# Patient Record
Sex: FEMALE | Race: WHITE | NOT HISPANIC OR LATINO | Employment: FULL TIME | URBAN - METROPOLITAN AREA
[De-identification: names, ages, dates, MRNs, and addresses within clinical notes are randomized per-mention and may not be internally consistent; named-entity substitution may affect disease eponyms.]

---

## 2017-01-02 ENCOUNTER — TELEPHONE (OUTPATIENT)
Dept: FAMILY MEDICINE | Facility: CLINIC | Age: 47
End: 2017-01-02

## 2017-01-02 ENCOUNTER — OFFICE VISIT (OUTPATIENT)
Dept: FAMILY MEDICINE | Facility: CLINIC | Age: 47
End: 2017-01-02
Payer: COMMERCIAL

## 2017-01-02 VITALS
SYSTOLIC BLOOD PRESSURE: 146 MMHG | RESPIRATION RATE: 20 BRPM | DIASTOLIC BLOOD PRESSURE: 86 MMHG | WEIGHT: 172.2 LBS | HEART RATE: 100 BPM | OXYGEN SATURATION: 97 % | BODY MASS INDEX: 29.4 KG/M2 | HEIGHT: 64 IN | TEMPERATURE: 99 F

## 2017-01-02 DIAGNOSIS — Z87.891 HISTORY OF SMOKING 30 OR MORE PACK YEARS: ICD-10-CM

## 2017-01-02 DIAGNOSIS — J45.31 MILD PERSISTENT ASTHMA WITH ACUTE EXACERBATION: ICD-10-CM

## 2017-01-02 DIAGNOSIS — J45.31 MILD PERSISTENT ASTHMA WITH ACUTE EXACERBATION: Primary | ICD-10-CM

## 2017-01-02 DIAGNOSIS — R05.9 COUGH: ICD-10-CM

## 2017-01-02 DIAGNOSIS — J20.9 ACUTE BRONCHITIS, UNSPECIFIED ORGANISM: Primary | ICD-10-CM

## 2017-01-02 PROCEDURE — 99214 OFFICE O/P EST MOD 30 MIN: CPT | Performed by: PHYSICIAN ASSISTANT

## 2017-01-02 RX ORDER — LEVALBUTEROL TARTRATE 45 UG/1
2 AEROSOL, METERED ORAL EVERY 6 HOURS PRN
Qty: 1 INHALER | Refills: 1 | Status: SHIPPED | OUTPATIENT
Start: 2017-01-02 | End: 2019-10-28

## 2017-01-02 RX ORDER — PREDNISONE 20 MG/1
40 TABLET ORAL DAILY
Qty: 10 TABLET | Refills: 0 | Status: SHIPPED | OUTPATIENT
Start: 2017-01-02 | End: 2017-01-07

## 2017-01-02 RX ORDER — FLUTICASONE PROPIONATE 220 UG/1
2 AEROSOL, METERED RESPIRATORY (INHALATION) 2 TIMES DAILY
Qty: 1 INHALER | Refills: 5 | Status: SHIPPED | OUTPATIENT
Start: 2017-01-02 | End: 2017-01-02

## 2017-01-02 RX ORDER — AZITHROMYCIN 250 MG/1
TABLET, FILM COATED ORAL
Qty: 6 TABLET | Refills: 0 | Status: SHIPPED | OUTPATIENT
Start: 2017-01-02 | End: 2017-01-23

## 2017-01-02 NOTE — PATIENT INSTRUCTIONS
-- start on prednisone today. Take with meal or snack  -- Start flovent inhaler today, remember to rinse mouth after each use  -- Use Xopenex as needed for cough wheezing or shortness of breath. Stop if causes elevated heart rate  -- Start antibiotics in 48 hours if not improving.   -- Follow up in 10-14 days if still coughing.     [facility]   Discharged by : Bren Bruno MA    Paper scripts provided to patient : no     If you have any questions regarding your visit please contact your care team:     Team Gold Clinic Hours Telephone Number   Dr. Angle Camargo PA-C   7am-7pm Monday - Thursday   7am-5pm Fridays  [phone]   (Appointment scheduling available 24/7)   RN Line   [phone] option 2       For a Price Quote for your services, please call our [place] Price Line at [phone].     What options do I have for visits at the clinic other than the traditional office visit?     To expand how we care for you, many of our providers are utilizing electronic visits (e-visits) and telephone visits, when medically appropriate, for interactions with their patients rather than a visit in the clinic. We also offer nurse visits for many medical concerns. Just like any other service, we will bill your insurance company for this type of visit based on time spent on the phone with your provider. Not all insurance companies cover these visits. Please check with your medical insurance if this type of visit is covered. You will be responsible for any charges that are not paid by your insurance.   E-visits via [employer]: generally incur a $35.00 fee.     Telephone visits:   Time spent on the phone: *charged based on time that is spent on the phone in increments of 10 minutes. Estimated cost:   5-10 mins $30.00   11-20 mins. $59.00   21-30 mins. $85.00     Use [employer] (secure email communication and access to your chart) to send your primary care  provider a message or make an appointment. Ask someone on your Team how to sign up for [employer].     As always, Thank you for trusting us with your health care needs!    WHERE TO GO FOR CARE?    Clinic    Make an appointment if you:       Are sick (cold, cough, flu, sore throat, earache or in pain).       Have a small injury (sprain, small cut, burn or broken bone).       Need a physical exam, Pap smear, vaccine or prescription refill.       Have questions about your health or medicines.    To reach us:      Call [phone] ([phone]). Open 24 hours every day. (For counseling services, call [phone].)    Log into [employer] at [email or web address].[email or web address]. (Visit [email or web address].Hickies to create an account.) Hospital emergency room    An emergency is a serious or life- threatening problem that must be treated right away.    Call 911 or get to the hospital if you have:      Very bad or sudden:            - Chest pain or pressure         - Bleeding         - Head or belly pain         - Dizziness or trouble seeing, walking or                          Speaking      Problems breathing      Blood in your vomit or you are coughing up blood      A major injury (knocked out, loss of a finger or limb, rape, broken bone protruding from skin)    A mental health crisis. (Or call the Mental Health Crisis line at 1-107.221.9040 or Suicide Prevention Hotline at 1-129.490.6134.)    Open 24 hours every day. You don't need an appointment.     Urgent care    Visit urgent care for sickness or small injuries when the clinic is closed. You don't need an appointment. To check hours or find an urgent care near you, visit [email or web address]. Online care    Get online care from [employer] for more than 70 common problems, like colds, allergies and infections. Open 24 hours every day at: [email or web address]   Need help deciding?    For advice about where to be seen, you may call your clinic and ask to speak with a nurse. We're  here for you 24 hours every day.         If you are deaf or hard of hearing, please let us know. We provide many free services including sign language interpreters, oral interpreters, TTYs, telephone amplifiers, note takers and written materials.

## 2017-01-02 NOTE — TELEPHONE ENCOUNTER
Kianna,    Patient was expecting an Rx for a Zpak to be put on file in case she needs it in 48 hours.    Aracely Arias PharmD, [facility]  Pharmacist Manager   [facility]  [phone]

## 2017-01-02 NOTE — PROGRESS NOTES
"  SUBJECTIVE:                                                    Olga Lidia Harley is a 46 year old female who presents to clinic today for the following health issues:      ENT Symptoms             Symptoms: cc Present Absent Comment   Fever/Chills  x   chills   Fatigue  x   not sleeping well   Muscle Aches  x     Eye Irritation   x    Sneezing   x    Nasal Eliel/Drg   x    Sinus Pressure/Pain   x    Loss of smell   x    Dental pain   x    Sore Throat  x   from coughing   Swollen Glands   x    Ear Pain/Fullness   x    Cough x      Wheeze  x  A little   Chest Pain  x     Shortness of breath  x     Rash   x    Other  x   headache     Symptom duration:  2 days   Symptom severity:  moderate   Treatments tried:  expectorant    Contacts:  works in a hospital     30 year smoking history. History of bronchitis.  Has history of asthma. She doesn't use her inhalers regularly. She doesn't use albuterol because it makes her heart race. She has never tried xopenex.  -------------------------------------    Problem list, Medication list, Allergies, and Medical/Social/Surgical histories reviewed in [facility] and updated as appropriate.    ROS:  A pertinent ROS of the General  HEENT  Cardiovascular  Pulmonary  GI systems is otherwise unremarkable.      OBJECTIVE:                                                    /86 mmHg  Pulse 100  Temp(Src) 99  F (37.2  C) (Oral)  Resp 20  Ht 5' 4\" (1.626 m)  Wt 172 lb 3.2 oz (78.109 kg)  BMI 29.54 kg/m2  SpO2 97%  LMP 12/17/2016   GENERAL: healthy, alert and no distress  HENT:  ear canals- normal; TMs- normal; Nose- normal; Mouth- no ulcers, no lesions  NECK: no tenderness, no adenopathy, no asymmetry, no masses, no stiffness; thyroid- normal to palpation  RESP: scattered rhonchi in bases bilateral,  otherwise lungs clear to auscultation - no rales, no wheezes  CV: regular rates and rhythm, normal S1 S2, no S3 or S4 and no murmur, no click or rub -  MS: extremities- no gross " deformities noted, no edema    Diagnostic test results:  none      ASSESSMENT/PLAN:                                                        1. Acute bronchitis, unspecified organism  Discussed treatment and supportive care for patient's symptoms.  Start with prednisone and flovent at home. Use xopenex prn.  If not improving within 48 hours, given smoking history, consider zpack.     Return to the clinic if cough is still present in 2 weeks, sooner with fevers, shortness of breath or wheezing.   - levalbuterol (XOPENEX HFA) 45 MCG/ACT Inhaler; Inhale 2 puffs into the lungs every 6 hours as needed for shortness of breath / dyspnea or wheezing  Dispense: 1 Inhaler; Refill: 1  - azithromycin (ZITHROMAX Z-AAKASH) 250 MG tablet; Take 2 tabs day 1; then 1 tab days 2-5  Dispense: 6 tablet; Refill: 0    2. Mild persistent asthma with acute exacerbation  As above. Will do PA for xopenex if needed.  - predniSONE (DELTASONE) 20 MG tablet; Take 2 tablets (40 mg) by mouth daily for 5 days  Dispense: 10 tablet; Refill: 0  - levalbuterol (XOPENEX HFA) 45 MCG/ACT Inhaler; Inhale 2 puffs into the lungs every 6 hours as needed for shortness of breath / dyspnea or wheezing  Dispense: 1 Inhaler; Refill: 1    3. Cough    4. History of smoking 30 or more pack years  - azithromycin (ZITHROMAX Z-AAKASH) 250 MG tablet; Take 2 tabs day 1; then 1 tab days 2-5  Dispense: 6 tablet; Refill: 0    Follow up with Provider - 2 weeks if still coughing, sooner with fevers, shortness of breath or wheezing.     Kianna Camargo PA-C  [facility]

## 2017-01-02 NOTE — MR AVS SNAPSHOT
After Visit Summary   1/2/2017    Olga Lidia Harley    MRN: [id number]           Patient Information     Date Of Birth          1970        Visit Information        Provider Department      1/2/2017 10:40 AM Kianna Camargo PA-C [facility]        Today's Diagnoses     Acute bronchitis, unspecified organism    -  1     Mild persistent asthma with acute exacerbation         Cough         History of smoking 30 or more pack years           Care Instructions    -- start on prednisone today. Take with meal or snack  -- Start flovent inhaler today, remember to rinse mouth after each use  -- Use Xopenex as needed for cough wheezing or shortness of breath. Stop if causes elevated heart rate  -- Start antibiotics in 48 hours if not improving.   -- Follow up in 10-14 days if still coughing.     [facility]   Discharged by : Bren Bruno MA    Paper scripts provided to patient : no     If you have any questions regarding your visit please contact your care team:     Team Gold Clinic Hours Telephone Number   Dr. Angle Camargo PA-C   7am-7pm Monday - Thursday   7am-5pm Fridays  [phone]   (Appointment scheduling available 24/7)   RN Line   [phone] option 2       For a Price Quote for your services, please call our Consumer Price Line at [phone].     What options do I have for visits at the clinic other than the traditional office visit?     To expand how we care for you, many of our providers are utilizing electronic visits (e-visits) and telephone visits, when medically appropriate, for interactions with their patients rather than a visit in the clinic. We also offer nurse visits for many medical concerns. Just like any other service, we will bill your insurance company for this type of visit based on time spent on the phone with your provider. Not all insurance companies cover these visits.  Please check with your medical insurance if this type of visit is covered. You will be responsible for any charges that are not paid by your insurance.   E-visits via [employer]: generally incur a $35.00 fee.     Telephone visits:   Time spent on the phone: *charged based on time that is spent on the phone in increments of 10 minutes. Estimated cost:   5-10 mins $30.00   11-20 mins. $59.00   21-30 mins. $85.00     Use [employer] (secure email communication and access to your chart) to send your primary care provider a message or make an appointment. Ask someone on your Team how to sign up for [employer].     As always, Thank you for trusting us with your health care needs!    WHERE TO GO FOR CARE?    Clinic    Make an appointment if you:       Are sick (cold, cough, flu, sore throat, earache or in pain).       Have a small injury (sprain, small cut, burn or broken bone).       Need a physical exam, Pap smear, vaccine or prescription refill.       Have questions about your health or medicines.    To reach us:      Call [phone] ([phone]). Open 24 hours every day. (For counseling services, call [phone].)    Log into [employer] at [email or web address].Sciona. (Visit "[employer].Sciona to create an account.) Hospital emergency room    An emergency is a serious or life- threatening problem that must be treated right away.    Call 911 or get to the hospital if you have:      Very bad or sudden:            - Chest pain or pressure         - Bleeding         - Head or belly pain         - Dizziness or trouble seeing, walking or                          Speaking      Problems breathing      Blood in your vomit or you are coughing up blood      A major injury (knocked out, loss of a finger or limb, rape, broken bone protruding from skin)    A mental health crisis. (Or call the Mental Health Crisis line at 1-580.439.9160 or Suicide Prevention Hotline at 1-826.136.3950.)    Open 24 hours every day. You don't need an  appointment.     Urgent care    Visit urgent care for sickness or small injuries when the clinic is closed. You don't need an appointment. To check hours or find an urgent care near you, visit [email or web address]. Online care    Get online care from [facility] for more than 70 common problems, like colds, allergies and infections. Open 24 hours every day at: [email or web address]   Need help deciding?    For advice about where to be seen, you may call your clinic and ask to speak with a nurse. We're here for you 24 hours every day.         If you are deaf or hard of hearing, please let us know. We provide many free services including sign language interpreters, oral interpreters, TTYs, telephone amplifiers, note takers and written materials.                       Follow-ups after your visit        Your next 10 appointments already scheduled     Oct 23, 2017  8:00 AM   (Arrive by 7:45 AM)   Return General Liver with Chaitanya Corey MD   [facility] ([facility] [place])    [address] [place] [address]  3rd [facility] [address] [phone]              Who to contact     If you have questions or need follow up information about today's clinic visit or your schedule please contact [facility] directly at [phone].  Normal or non-critical lab and imaging results will be communicated to you by MyChart, letter or phone within 4 business days after the clinic has received the results. If you do not hear from us within 7 days, please contact the clinic through NationBuilder[employer] or phone. If you have a critical or abnormal lab result, we will notify you by phone as soon as possible.  Submit refill requests through [employer] or call your pharmacy and they will forward the refill request to us. Please allow 3 business days for your refill to be completed.          Additional Information About Your Visit        [employer] Information     [employer] gives you secure access to  "your electronic health record. If you see a primary care provider, you can also send messages to your care team and make appointments. If you have questions, please call your primary care clinic.  If you do not have a primary care provider, please call [phone] and they will assist you.        Your Vitals Were     Pulse Temperature Respirations Height BMI (Body Mass Index) Pulse Oximetry    100 99  F (37.2  C) (Oral) 20 5' 4\" (1.626 m) 29.54 kg/m2 97%    Last Period                   12/17/2016            Blood Pressure from Last 3 Encounters:   01/02/17 146/86   10/24/16 125/82   07/06/16 120/70    Weight from Last 3 Encounters:   01/02/17 172 lb 3.2 oz (78.109 kg)   10/24/16 164 lb (74.39 kg)   07/06/16 160 lb 6 oz (72.746 kg)              Today, you had the following     No orders found for display         Today's Medication Changes          These changes are accurate as of: 1/2/17 11:19 AM.  If you have any questions, ask your nurse or doctor.               Start taking these medicines.        Dose/Directions    levalbuterol 45 MCG/ACT Inhaler   Commonly known as:  XOPENEX HFA   Used for:  Acute bronchitis, unspecified organism, Mild persistent asthma with acute exacerbation   Started by:  Kianna Camargo PA-C        Dose:  2 puff   Inhale 2 puffs into the lungs every 6 hours as needed for shortness of breath / dyspnea or wheezing   Quantity:  1 Inhaler   Refills:  1       predniSONE 20 MG tablet   Commonly known as:  DELTASONE   Used for:  Mild persistent asthma with acute exacerbation   Started by:  Kianna Camargo PA-C        Dose:  40 mg   Take 2 tablets (40 mg) by mouth daily for 5 days   Quantity:  10 tablet   Refills:  0         These medicines have changed or have updated prescriptions.        Dose/Directions    fluticasone 220 MCG/ACT Inhaler   Commonly known as:  FLOVENT HFA   This may have changed:  additional instructions   Used for:  Cough, Mild persistent asthma with acute exacerbation "   Changed by:  Kianna Camargo PA-C        Dose:  2 puff   Inhale 2 puffs into the lungs 2 times daily Rinse mouth after each use   Quantity:  1 Inhaler   Refills:  5       ranitidine 150 MG tablet   Commonly known as:  ZANTAC   This may have changed:    - when to take this  - reasons to take this   Used for:  Cough        Dose:  150 mg   Take 1 tablet (150 mg) by mouth 2 times daily   Quantity:  60 tablet   Refills:  4         Stop taking these medicines if you haven't already. Please contact your care team if you have questions.     metoprolol 50 MG 24 hr tablet   Commonly known as:  TOPROL-XL   Stopped by:  Kianna Camargo PA-C                Where to get your medicines      These medications were sent to [facility] - [address].  [address] [place] [address], [facility] [address]     Phone:  [phone]    - fluticasone 220 MCG/ACT Inhaler  - levalbuterol 45 MCG/ACT Inhaler  - predniSONE 20 MG tablet             Primary Care Provider Office Phone # Fax #    Wan Anderson [phone]       [address] [address]        Thank you!     Thank you for choosing [facility]  for your care. Our goal is always to provide you with excellent care. Hearing back from our patients is one way we can continue to improve our services. Please take a few minutes to complete the written survey that you may receive in the mail after your visit with us. Thank you!             Your Updated Medication List - Protect others around you: Learn how to safely use, store and throw away your medicines at www.disposemymeds.org.          This list is accurate as of: 1/2/17 11:19 AM.  Always use your most recent med list.                   Brand Name Dispense Instructions for use    amLODIPine 5 MG tablet    NORVASC    90 tablet    Take 1 tablet (5 mg) by mouth daily       aspirin 81 MG tablet     100 tablet     Take 1 tablet (81 mg) by mouth daily       atorvastatin 20 MG tablet    LIPITOR    90 tablet    Take 1 tablet (20 mg) by mouth daily       CO Q 10 PO      Take 1 tablet by mouth daily       fluticasone 220 MCG/ACT Inhaler    FLOVENT HFA    1 Inhaler    Inhale 2 puffs into the lungs 2 times daily Rinse mouth after each use       levalbuterol 45 MCG/ACT Inhaler    XOPENEX HFA    1 Inhaler    Inhale 2 puffs into the lungs every 6 hours as needed for shortness of breath / dyspnea or wheezing       losartan 50 MG tablet    COZAAR    90 tablet    Take 1 tablet (50 mg) by mouth daily       MULTIVITAMIN GUMMIES ADULT Chew      Take 1 chew tab by mouth daily       predniSONE 20 MG tablet    DELTASONE    10 tablet    Take 2 tablets (40 mg) by mouth daily for 5 days       ranitidine 150 MG tablet    ZANTAC    60 tablet    Take 1 tablet (150 mg) by mouth 2 times daily       vitamin B complex with vitamin C Tabs tablet      Take 1 tablet by mouth daily

## 2017-01-02 NOTE — TELEPHONE ENCOUNTER
Order completed.        Kianna Camargo, [facility], PA-C  [facility]

## 2017-01-02 NOTE — TELEPHONE ENCOUNTER
Please do not close this encounter until this has been addressed.  (prior auth approved/denied, prescriber refusal to complete prior auth or medication changed/discontinued)    Prior Authorization needed on: Xopenex  Drug NDC: [address]-98     Insurance: [id number]  Member ID: [id number]   Insurance phone #: [phone]    Pharmacy NPI: [id number]  Pharmacy Phone #: [phone]  Pharmacy Fax #: [phone]    Please let us know if the PA gets approved or denied or if medication is changed.     Aracely Arias PharmD, [facility]  Pharmacist Manager   [facility]  [phone]

## 2017-01-02 NOTE — TELEPHONE ENCOUNTER
Per refill authorization protocol - therapeutic substitution.     Medication: Flovent is not covered by insurance.  . Reviewed pharmacy records, and dicussed therapeutic option with patient.     Sent new prescription for Aerospan.      Routing to the prescriber as an  FYI to inform of change.      Updated medication list in EPIC.    Aracely Arias PharmD, [facility]  Pharmacist Manager   [facility]  [phone]

## 2017-01-03 NOTE — TELEPHONE ENCOUNTER
PA form started and placed in Kianna's sign folder for review.  Netta Brandon CMA ([facility])

## 2017-01-09 ENCOUNTER — MYC MEDICAL ADVICE (OUTPATIENT)
Dept: FAMILY MEDICINE | Facility: CLINIC | Age: 47
End: 2017-01-09

## 2017-01-11 NOTE — TELEPHONE ENCOUNTER
Called [employer] at: [phone], and spoke to representative Wendy. She stated that the PA needs to be submitted to [facility], phone number: [phone].   Contacted the number below- on hold for 10 minutes. Will try later.  Netta Brandon CMA ([facility])

## 2017-01-12 NOTE — TELEPHONE ENCOUNTER
Contacted [employer] at: [phone] and spoke to representative Claudia.  PA re-submitted via phone, PA # [phone]. Please follow-up in 3 days.  Netta Brandon CMA ([facility])

## 2017-01-13 ENCOUNTER — MYC MEDICAL ADVICE (OUTPATIENT)
Dept: FAMILY MEDICINE | Facility: CLINIC | Age: 47
End: 2017-01-13

## 2017-01-13 DIAGNOSIS — J20.9 ACUTE BRONCHITIS, UNSPECIFIED ORGANISM: Primary | ICD-10-CM

## 2017-01-13 RX ORDER — BENZONATATE 200 MG/1
200 CAPSULE ORAL 3 TIMES DAILY PRN
Qty: 21 CAPSULE | Refills: 0 | Status: SHIPPED | OUTPATIENT
Start: 2017-01-13 | End: 2017-01-23

## 2017-01-13 NOTE — Clinical Note
[facility]  [address] [place] [id number]  Phone: [phone]    January 16, 2017        Olga Lidia Harley  [address] [facility] [address]          To whom it may concern:    RE: Olga Lidia Harley, Appeal on coverage of Xopenex.    This patient has tried Albuterol on multiple occasions in the past as a rescue inhaler for her asthma and it has caused increased premature ventricular contractions or palpitations. Therefore, patient should not take any form of Albuterol, including formulary Ventolin and Proair. It is extremely important the patient be allowed to try Xopenex (Levalbuterol) in order to have a rescue inhaler for her asthma. As you are aware, asthma can be a life threatening condition and denying this could come with serious repercussions.     Please contact me for questions or concerns.      Sincerely,        Kianna Camargo PA-C

## 2017-01-13 NOTE — TELEPHONE ENCOUNTER
Patient's xopenex was denied. Keep open because will see if she has tried both of the meds listed.  Sent her my chart.    MAXWELL Hurt, PA-C  [facility]

## 2017-01-13 NOTE — TELEPHONE ENCOUNTER
Sent MyChart. Route to provider to see if anything cough related can be prescribed.  Valorie Kapoor RN

## 2017-01-16 NOTE — TELEPHONE ENCOUNTER
Route response. (I reviewed our historical med list & don't see that we have prescribed either.)   Valorie Kapoor RN

## 2017-01-16 NOTE — TELEPHONE ENCOUNTER
Appeal letter written. xopenex was denied. Please fax.  Placed in my completed box      Kianna Camargo PA-C

## 2017-01-16 NOTE — TELEPHONE ENCOUNTER
Started appeal for xopenex in another encounter.  Will close.  .  Kianna Camargo, MAXWELL, PA-C  [facility]

## 2017-01-16 NOTE — TELEPHONE ENCOUNTER
Appeal letter faxed to [facility] Clinical Review fax: [phone]. Will postpone encounter to see if we get a response back in a few days    Tracy De Leon MA

## 2017-01-23 ENCOUNTER — OFFICE VISIT (OUTPATIENT)
Dept: FAMILY MEDICINE | Facility: CLINIC | Age: 47
End: 2017-01-23
Payer: COMMERCIAL

## 2017-01-23 ENCOUNTER — RADIANT APPOINTMENT (OUTPATIENT)
Dept: GENERAL RADIOLOGY | Facility: CLINIC | Age: 47
End: 2017-01-23
Attending: PHYSICIAN ASSISTANT
Payer: COMMERCIAL

## 2017-01-23 VITALS
HEART RATE: 82 BPM | SYSTOLIC BLOOD PRESSURE: 138 MMHG | TEMPERATURE: 98.6 F | OXYGEN SATURATION: 96 % | DIASTOLIC BLOOD PRESSURE: 88 MMHG | HEIGHT: 64 IN | WEIGHT: 170 LBS | BODY MASS INDEX: 29.02 KG/M2

## 2017-01-23 DIAGNOSIS — R05.9 COUGH: ICD-10-CM

## 2017-01-23 DIAGNOSIS — R07.89 CHEST WALL PAIN: ICD-10-CM

## 2017-01-23 DIAGNOSIS — R05.9 COUGH: Primary | ICD-10-CM

## 2017-01-23 DIAGNOSIS — J45.31 MILD PERSISTENT ASTHMA WITH ACUTE EXACERBATION: ICD-10-CM

## 2017-01-23 PROCEDURE — 99214 OFFICE O/P EST MOD 30 MIN: CPT | Performed by: PHYSICIAN ASSISTANT

## 2017-01-23 PROCEDURE — 71020 XR CHEST 2 VW: CPT

## 2017-01-23 RX ORDER — CODEINE PHOSPHATE AND GUAIFENESIN 10; 100 MG/5ML; MG/5ML
2 SOLUTION ORAL EVERY 4 HOURS PRN
Qty: 120 ML | Refills: 0 | Status: SHIPPED | OUTPATIENT
Start: 2017-01-23 | End: 2017-02-22

## 2017-01-23 NOTE — MR AVS SNAPSHOT
After Visit Summary   1/23/2017    Olga Lidia Harley    MRN: [id number]           Patient Information     Date Of Birth          1970        Visit Information        Provider Department      1/23/2017 3:00 PM Laith Sims PA-C [facility]        Today's Diagnoses     Cough    -  1     Chest wall pain         Mild persistent asthma with acute exacerbation           Care Instructions    [facility]   Discharged by : Shagufta SOSA Certified Medical Assistant (AAMA)   Paper scripts provided to patient : 1   If you have any questions regarding to your visit please contact your care team:   [facility] Hours Telephone Number   PARISA Sanchez Dr., PA-C    7am-7pm Monday - Thursday   7am-5pm Fridays  [phone]   (Appointment scheduling available 24/7)   RN Line   [phone] option 2       What options do I have for visits at the clinic other than the traditional office visit?   To expand how we care for you, many of our providers are utilizing electronic visits (e-visits) and telephone visits, when medically appropriate, for interactions with their patients rather than a visit in the clinic. We also offer nurse visits for many medical concerns. Just like any other service, we will bill your insurance company for this type of visit based on time spent on the phone with your provider. Not all insurance companies cover these visits. Please check with your medical insurance if this type of visit is covered. You will be responsible for any charges that are not paid by your insurance.     E-visits via [employer] generally incur a $35.00 fee.     Telephone visits:   Time spent on the phone: *charged based on time that is spent on the phone in increments of 10 minutes. Estimated cost:   5-10 mins $30.00   11-20 mins. $59.00   21-30 mins. $85.00   Use [employer] (secure email communication and access to your  chart) to send your primary care provider a message or make an appointment. Ask someone on your Team how to sign up for [employer].   For a Price Quote for your services, please call our Consumer Price Line at [phone].   As always, Thank you for trusting us with your health care needs!                [facility]:    Scheduling Appointments  [facility]  Call: [phone]    [facility]  Call: [phone]    [facility]  Call: [phone]                  Follow-ups after your visit        Your next 10 appointments already scheduled     Feb 22, 2017  3:40 PM   [place] Physical Adult with Jessie Perdomo MD   [facility] ([facility])    [address] [place] [id number] [phone]            Oct 23, 2017  8:00 AM   (Arrive by 7:45 AM)   Return General Liver with Chaitanya Corey MD   [facility] ([facility])    [address] [place] [id number] [phone]              Who to contact     If you have questions or need follow up information about today's clinic visit or your schedule please contact [facility] directly at [phone].  Normal or non-critical lab and imaging results will be communicated to you by [employer], letter or phone within 4 business days after the clinic has received the results. If you do not hear from us within 7 days, please contact the clinic through [employer] or phone. If you have a critical or abnormal lab result, we will notify you by phone as soon as possible.  Submit refill requests through [employer] or call your pharmacy and they will forward the refill request to us. Please allow 3 business days for your refill to be completed.          Additional Information About Your Visit        [employer] Information     [employer] gives you secure access to your  "electronic health record. If you see a primary care provider, you can also send messages to your care team and make appointments. If you have questions, please call your primary care clinic.  If you do not have a primary care provider, please call [phone] and they will assist you.        Care EveryWhere ID     This is your Care EveryWhere ID. This could be used by other organizations to access your [facility] medical records  [id number]        Your Vitals Were     Pulse Temperature Height BMI (Body Mass Index) Pulse Oximetry Last Period    82 98.6  F (37  C) (Oral) 5' 4\" (1.626 m) 29.17 kg/m2 96% 01/11/2017 (Exact Date)       Blood Pressure from Last 3 Encounters:   01/23/17 138/88   01/02/17 146/86   10/24/16 125/82    Weight from Last 3 Encounters:   01/23/17 170 lb (77.111 kg)   01/02/17 172 lb 3.2 oz (78.109 kg)   10/24/16 164 lb (74.39 kg)                 Today's Medication Changes          These changes are accurate as of: 1/23/17  3:51 PM.  If you have any questions, ask your nurse or doctor.               Start taking these medicines.        Dose/Directions    guaiFENesin-codeine 100-10 MG/5ML Soln solution   Commonly known as:  ROBITUSSIN AC   Used for:  Cough   Started by:  Laith Sims PA-C        Dose:  2 tsp.   Take 10 mLs by mouth every 4 hours as needed for cough   Quantity:  120 mL   Refills:  0         These medicines have changed or have updated prescriptions.        Dose/Directions    ranitidine 150 MG tablet   Commonly known as:  ZANTAC   This may have changed:    - when to take this  - reasons to take this   Used for:  Cough        Dose:  150 mg   Take 1 tablet (150 mg) by mouth 2 times daily   Quantity:  60 tablet   Refills:  4            Where to get your medicines      Some of these will need a paper prescription and others can be bought over the counter.  Ask your nurse if you have questions.     Bring a paper prescription for each of these medications    - guaiFENesin-codeine " 100-10 MG/5ML Soln solution             Primary Care Provider Office Phone # Fax #    Wan Jermaine Anderson [phone]       [address] [address]        Thank you!     Thank you for choosing [facility]  for your care. Our goal is always to provide you with excellent care. Hearing back from our patients is one way we can continue to improve our services. Please take a few minutes to complete the written survey that you may receive in the mail after your visit with us. Thank you!             Your Updated Medication List - Protect others around you: Learn how to safely use, store and throw away your medicines at www.disposemymeds.org.          This list is accurate as of: 1/23/17  3:51 PM.  Always use your most recent med list.                   Brand Name Dispense Instructions for use    amLODIPine 5 MG tablet    NORVASC    90 tablet    Take 1 tablet (5 mg) by mouth daily       aspirin 81 MG tablet     100 tablet    Take 1 tablet (81 mg) by mouth daily       atorvastatin 20 MG tablet    LIPITOR    90 tablet    Take 1 tablet (20 mg) by mouth daily       CO Q 10 PO      Take 1 tablet by mouth daily       Flunisolide HFA 80 MCG/ACT Aers    AEROSPAN    2 Inhaler    Inhale 5 puffs into the lungs 2 times daily Rinse mouth after each use.       guaiFENesin-codeine 100-10 MG/5ML Soln solution    ROBITUSSIN AC    120 mL    Take 10 mLs by mouth every 4 hours as needed for cough       levalbuterol 45 MCG/ACT Inhaler    XOPENEX HFA    1 Inhaler    Inhale 2 puffs into the lungs every 6 hours as needed for shortness of breath / dyspnea or wheezing       losartan 50 MG tablet    COZAAR    90 tablet    Take 1 tablet (50 mg) by mouth daily       MULTIVITAMIN GUMMIES ADULT Chew      Take 1 chew tab by mouth daily       ranitidine 150 MG tablet    ZANTAC    60 tablet    Take 1 tablet (150 mg) by mouth 2 times daily       vitamin B complex with  vitamin C Tabs tablet      Take 1 tablet by mouth daily

## 2017-01-23 NOTE — PROGRESS NOTES
SUBJECTIVE:                                                    Olga Lidia Harley is a 46 year old female who presents to clinic today for the following health issues:    Patient is following up from bronchitis.  States that for the past 2 weeks, symptoms have stayed the same.  Also feels she might have broken a rib on the left side. Denies fevers, chills. Cough is dry. No other ENT symptoms.     Left side rib cage pain. Worse with coughing. Is sharp, local and hurts to breath. No other associated symptoms in that area.     She has asthma. Her asthma overall she thinks has been mostly controlled. Denies wheeze or restriction. She uses her inhalers regularly.    Patient Active Problem List   Diagnosis     Hypertension goal BP (blood pressure) < 140/90     Hyperlipidemia with target LDL less than 130     Herniated vertebral disc     Disorder of bursae and tendons in shoulder region     Cough     Piriformis syndrome     Mild persistent asthma     Gastroesophageal reflux disease without esophagitis     Bilateral low back pain without sciatica     Hip pain, right      Current Outpatient Prescriptions   Medication     guaiFENesin-codeine (ROBITUSSIN AC) 100-10 MG/5ML SOLN solution     levalbuterol (XOPENEX HFA) 45 MCG/ACT Inhaler     Flunisolide HFA (AEROSPAN) 80 MCG/ACT AERS     amLODIPine (NORVASC) 5 MG tablet     losartan (COZAAR) 50 MG tablet     ranitidine (ZANTAC) 150 MG tablet     atorvastatin (LIPITOR) 20 MG tablet     vitamin  B complex with vitamin C (VITAMIN  B COMPLEX) TABS     Multiple Vitamins-Minerals (MULTIVITAMIN GUMMIES ADULT) CHEW     Coenzyme Q10 (CO Q 10 PO)     aspirin 81 MG tablet     No current facility-administered medications for this visit.          Problem list and histories reviewed & adjusted, as indicated.  Additional history: as documented    Problem list, Medication list, Allergies, and Medical/Social/Surgical histories reviewed in EPIC and updated as  "appropriate.    ROS:  Constitutional, HEENT, cardiovascular, pulmonary, gi and gu systems are negative, except as otherwise noted.    OBJECTIVE:                                                    /88 mmHg  Pulse 82  Temp(Src) 98.6  F (37  C) (Oral)  Ht 5' 4\" (1.626 m)  Wt 170 lb (77.111 kg)  BMI 29.17 kg/m2  SpO2 96%  LMP 01/11/2017 (Exact Date)  Body mass index is 29.17 kg/(m^2).  GENERAL: healthy, alert and no distress  HENT: ear canals and TM's normal, nose and mouth without ulcers or lesions  NECK: no adenopathy, no asymmetry, masses, or scars and thyroid normal to palpation  RESP: lungs clear to auscultation - no rales, rhonchi or wheezes  CV: regular rate and rhythm, normal S1 S2, no S3 or S4, no murmur, click or rub, no peripheral edema and peripheral pulses strong  MS: Left anterior chest wall around T 5 or so there is a point tender area of the rib cage, no crepitus or step off   SKIN: no suspicious lesions or rashes    X ray Chest - Clear based on my read      ASSESSMENT/PLAN:                                                      (R05) Cough  (primary encounter diagnosis)  Comment:   Plan: XR Chest 2 Views, guaiFENesin-codeine         (ROBITUSSIN AC) 100-10 MG/5ML SOLN solution        Reviewed. X ray clear. No findings of infection or acute localized fracture. Await final radiologist's read. For now recommend cough management and usual symptomatic measures. No signs of major asthma exacerbation requiring steroids right now - use inhaler regularly though she's had problems with insurance coverage - she's going to check to see if the PA went through. She finished zithromax recently, I see no indication for antibiotics based on history, exam. For now, Symptomatic measures and suggestions for self care given.  Follow up if not improving or symptoms change as discussed.  All questions were answered.     (R07.89) Chest wall pain  Comment:   Plan: XR Chest 2 Views        As noted. Probably localized " rib injury due to cough. Cough management, pain management provided. Area to rest as much as possible.     (J45.31) Mild persistent asthma with acute exacerbation  Comment:   Plan: Mild - I think if she can get albuterol on board she will feel better. She has no wheeze or restrictive pattern on exam so I don't see indication for steroids right now.     SUSANNAH MCKAY PA-C  [facility]

## 2017-01-23 NOTE — NURSING NOTE
"Chief Complaint   Patient presents with     Cough     Rib Pain     pt concerned that she may have broken another rib       Initial /88 mmHg  Pulse 82  Temp(Src) 98.6  F (37  C) (Oral)  Ht 5' 4\" (1.626 m)  Wt 170 lb (77.111 kg)  BMI 29.17 kg/m2  SpO2 96%  LMP 01/11/2017 (Exact Date) Estimated body mass index is 29.17 kg/(m^2) as calculated from the following:    Height as of this encounter: 5' 4\" (1.626 m).    Weight as of this encounter: 170 lb (77.111 kg).  BP completed using cuff size: keaton Mcrae, Certified Medical Assistant (AAMA)     "

## 2017-01-23 NOTE — PATIENT INSTRUCTIONS
[facility]   Discharged by : Shagufta SOSA Certified Medical Assistant (AAMA)   Paper scripts provided to patient : 1   If you have any questions regarding to your visit please contact your care team:   [facility] Clinic Hours Telephone Number   PARISA Sanchez Dr., PA-C    7am-7pm Monday - Thursday   7am-5pm Fridays  [phone]   (Appointment scheduling available 24/7)   RN Line   [phone] option 2       What options do I have for visits at the clinic other than the traditional office visit?   To expand how we care for you, many of our providers are utilizing electronic visits (e-visits) and telephone visits, when medically appropriate, for interactions with their patients rather than a visit in the clinic. We also offer nurse visits for many medical concerns. Just like any other service, we will bill your insurance company for this type of visit based on time spent on the phone with your provider. Not all insurance companies cover these visits. Please check with your medical insurance if this type of visit is covered. You will be responsible for any charges that are not paid by your insurance.     E-visits via [employer]: generally incur a $35.00 fee.     Telephone visits:   Time spent on the phone: *charged based on time that is spent on the phone in increments of 10 minutes. Estimated cost:   5-10 mins $30.00   11-20 mins. $59.00   21-30 mins. $85.00   Use [employer] (secure email communication and access to your chart) to send your primary care provider a message or make an appointment. Ask someone on your Team how to sign up for [employer].   For a Price Quote for your services, please call our Consumer Price Line at [phone].   As always, Thank you for trusting us with your health care needs!                [facility]:    Scheduling Appointments  [facility]  Call: [phone]    [facility]  Call: [phone]    [facility]  Call: [phone]

## 2017-01-24 NOTE — TELEPHONE ENCOUNTER
Contacted [employer] and spoke to representative Madhavi. She stated that the case is still in review.  Will postpone the encounter for 3 days.  Netta Brandon CMA ([facility])

## 2017-02-02 NOTE — TELEPHONE ENCOUNTER
Contacted [employer] at: [phone] and spoke to representative Jessica. Per representative, the case is still in review.  Will try later.  Netta Brandon CMA ([facility])

## 2017-02-15 DIAGNOSIS — E88.810 DYSMETABOLIC SYNDROME X: Primary | ICD-10-CM

## 2017-02-15 NOTE — TELEPHONE ENCOUNTER
PA appeal for Xopenex HFA 45 mcg inhaler has been approved, per letter from [employer].  Case ID: [id number], this authorization is effective from 2/15/2017 to 02/15/2018.   Letter sent to abstract, patient notified via [employer]. [facility] notified.   Netta Brandon CMA ([facility])

## 2017-02-16 DIAGNOSIS — E78.5 HYPERLIPIDEMIA WITH TARGET LDL LESS THAN 130: ICD-10-CM

## 2017-02-16 NOTE — TELEPHONE ENCOUNTER
Lipitor 20mg      Last Written Prescription Date: 2/8/16  Last Fill Quantity: 90, # refills: 3    Last Office Visit with [facility], [facility] or [facility] prescribing provider:  7/6/16   Future Office Visit:    Next 5 appointments (look out 90 days)     Feb 22, 2017  3:40 PM CST   MyChart Physical Adult with Jessie Perdomo MD   [facility] ([facility])    [address] [place] [id number]   [phone]                    BP Readings from Last 3 Encounters:   01/23/17 138/88   01/02/17 146/86   10/24/16 125/82

## 2017-02-20 ENCOUNTER — MYC MEDICAL ADVICE (OUTPATIENT)
Dept: FAMILY MEDICINE | Facility: CLINIC | Age: 47
End: 2017-02-20

## 2017-02-20 RX ORDER — ATORVASTATIN CALCIUM 20 MG/1
20 TABLET, FILM COATED ORAL DAILY
Qty: 30 TABLET | Refills: 0 | Status: SHIPPED | OUTPATIENT
Start: 2017-02-20 | End: 2017-03-15

## 2017-02-20 NOTE — TELEPHONE ENCOUNTER
Prescription approved per [facility] Refill Protocol.   Velma Anderson RN   February 20, 2017 2:08 PM  [facility] Triage   [phone]

## 2017-02-21 RX ORDER — SIMETHICONE 125 MG
250 TABLET,CHEWABLE ORAL ONCE
Qty: 2 TABLET | Refills: 0 | Status: SHIPPED
Start: 2017-02-21 | End: 2017-02-21

## 2017-02-21 RX ORDER — MAGNESIUM CARB/ALUMINUM HYDROX 105-160MG
296 TABLET,CHEWABLE ORAL ONCE
Qty: 296 ML | Refills: 0 | Status: SHIPPED
Start: 2017-02-21 | End: 2017-02-21

## 2017-02-22 ENCOUNTER — OFFICE VISIT (OUTPATIENT)
Dept: OBGYN | Facility: CLINIC | Age: 47
End: 2017-02-22
Payer: COMMERCIAL

## 2017-02-22 VITALS
BODY MASS INDEX: 30.12 KG/M2 | WEIGHT: 170 LBS | DIASTOLIC BLOOD PRESSURE: 78 MMHG | HEIGHT: 63 IN | HEART RATE: 84 BPM | SYSTOLIC BLOOD PRESSURE: 118 MMHG

## 2017-02-22 DIAGNOSIS — E88.819 INSULIN RESISTANCE: ICD-10-CM

## 2017-02-22 DIAGNOSIS — L65.9 HAIR LOSS: ICD-10-CM

## 2017-02-22 DIAGNOSIS — Z97.5 PRESENCE OF INTRAUTERINE CONTRACEPTIVE DEVICE: ICD-10-CM

## 2017-02-22 DIAGNOSIS — I10 HYPERTENSION GOAL BP (BLOOD PRESSURE) < 140/90: ICD-10-CM

## 2017-02-22 DIAGNOSIS — E78.5 HYPERLIPIDEMIA WITH TARGET LDL LESS THAN 130: ICD-10-CM

## 2017-02-22 DIAGNOSIS — Z01.419 ENCOUNTER FOR GYNECOLOGICAL EXAMINATION WITHOUT ABNORMAL FINDING: Primary | ICD-10-CM

## 2017-02-22 PROCEDURE — 99396 PREV VISIT EST AGE 40-64: CPT | Performed by: OBSTETRICS & GYNECOLOGY

## 2017-02-22 PROCEDURE — G0145 SCR C/V CYTO,THINLAYER,RESCR: HCPCS | Performed by: OBSTETRICS & GYNECOLOGY

## 2017-02-22 RX ORDER — COPPER 313.4 MG/1
1 INTRAUTERINE DEVICE INTRAUTERINE ONCE
COMMUNITY
End: 2019-08-28

## 2017-02-22 ASSESSMENT — ANXIETY QUESTIONNAIRES
IF YOU CHECKED OFF ANY PROBLEMS ON THIS QUESTIONNAIRE, HOW DIFFICULT HAVE THESE PROBLEMS MADE IT FOR YOU TO DO YOUR WORK, TAKE CARE OF THINGS AT HOME, OR GET ALONG WITH OTHER PEOPLE: NOT DIFFICULT AT ALL
7. FEELING AFRAID AS IF SOMETHING AWFUL MIGHT HAPPEN: NOT AT ALL
GAD7 TOTAL SCORE: 2
1. FEELING NERVOUS, ANXIOUS, OR ON EDGE: NOT AT ALL
3. WORRYING TOO MUCH ABOUT DIFFERENT THINGS: SEVERAL DAYS
6. BECOMING EASILY ANNOYED OR IRRITABLE: SEVERAL DAYS
2. NOT BEING ABLE TO STOP OR CONTROL WORRYING: NOT AT ALL
5. BEING SO RESTLESS THAT IT IS HARD TO SIT STILL: NOT AT ALL

## 2017-02-22 ASSESSMENT — PATIENT HEALTH QUESTIONNAIRE - PHQ9: 5. POOR APPETITE OR OVEREATING: NOT AT ALL

## 2017-02-22 NOTE — PROGRESS NOTES
Olga Lidia is a 46 year old  female who presents for annual exam.     Besides routine health maintenance,  she would like to discuss hair loss and insulin resistance.    HPI:  The patient's PCP is Wan Anedrson MD.    Patient was seen last year by Dr. scott mostly for hair loss but nothing was really able to be done. Has her PCP who she sees for her lipids and HTN and does her meds and labs through them. Last set of fasting labs was within the year. Has been dx'd with insulin resistance and pre-DM though not on meds for it. Is part of a study right now that is seeing if gut kain contribute to that so when study is done may end up on medication.  Patient has a paragard IUD. She is having regular menses, not overly heavy. Had one prior to that for 10 yrs so very used to it. cna't remember for sure when this one was placed but thinks maybe 4 yrs ago. Did it at [facility] ob[facility] and told this would take her into menopause. Having some nightsweats but nothing significant. Can't tell if cyclic before menses but seems to be more sporadic. Has just started tracking her cycles b/c never paid attention before that so will keep track and see if other sx are related.  Continues to have thinning scalp hair. Has small round patches that are gone but general thinning as well. Tried vitamins and tried rogaine shampoo. The shampoo worked at first but then had blistering so stopped it. Has seen derm and told it was stress. Hasn't seen a hair loss specialist though.  Not dating and not s.a for awhile.  Son is 18 and goes to [employer] school in [place]. Likes it but comes hoem often. Freshman but didn't like dorm life so renting a room in a house so not making as many friends as he could in the dorms.  Patient discussed with Dr. Scott some excess drinking. Patient reports 3-4 drinks at a time about 2-3 times a week for many years. Denies a problem with it, no legal issues or work/life implications from it.  Elevated LFTs in  past attributed to fatty liver and statin therapy.      GYNECOLOGIC HISTORY:    Patient's last menstrual period was 2017 (exact date).  Her current contraception method is: IUD.  She  reports that she quit smoking about 4 years ago. Her smoking use included Cigarettes. She has a 10.00 pack-year smoking history. She has never used smokeless tobacco.    Patient is not sexually active.  STD testing offered?  Declined  Last PHQ-9 score on record =   PHQ-9 SCORE 2017   Total Score 1     Last GAD7 score on record =   TIMO-7 SCORE 2017   Total Score 2     Alcohol Score = 4    HEALTH MAINTENANCE:  Cholesterol: 3/8/16   Total= 142, Triglycerides=129, HDL=44, LDL=72, FBS=10/21/, TSH=12/16/15-1.54   Last Mammo: 13, Result: normal, Next Mammo: Due. Encouraged to schedule.  Pap: 10/22/12 neg  Colonoscopy: NA. Scheduled for  for reseach study.  Dexa:  Never    Health maintenance updated:  yes    HISTORY:  Obstetric History       T1      TAB0   SAB0   E0   M0   L1       # Outcome Date GA Lbr Davis/2nd Weight Sex Delivery Anes PTL Lv   3 Term 99    M       2 AB            1 AB                   Patient Active Problem List   Diagnosis     Hypertension goal BP (blood pressure) < 140/90     Hyperlipidemia with target LDL less than 130     Herniated vertebral disc     Disorder of bursae and tendons in shoulder region     Cough     Piriformis syndrome     Mild persistent asthma     Gastroesophageal reflux disease without esophagitis     Bilateral low back pain without sciatica     Hip pain, right     Presence of intrauterine contraceptive device     Past Surgical History   Procedure Laterality Date     No history of surgery        Social History   Substance Use Topics     Smoking status: Former Smoker     Packs/day: 0.50     Years: 20.00     Types: Cigarettes     Quit date: 2012     Smokeless tobacco: Never Used     Alcohol use 0.0 oz/week     0 Standard drinks or equivalent  per week      Comment: more on weekends 8 drinks per week or wine or mixed drinks       Problem (# of Occurrences) Relation (Name,Age of Onset)    Blood Disease (1) Paternal Uncle    Breast Cancer (3) Paternal Aunt, Paternal Half-Sister (aunt), Cousin    Hyperlipidemia (1) Father    Hypertension (3) Mother, Father, Maternal Grandmother    Lung Cancer (1) Mother    Other Cancer (2) Mother: lung, Paternal Half-Brother (uncle): leukemia       Negative family history of: Liver Disease            Current Outpatient Prescriptions   Medication Sig     BIOTIN PO      paragard intrauterine copper 1 each by Intrauterine route once     study - clindamycin vs. placebo (IDS# 4878) 300 MG capsule Take 1 capsule (300 mg) by mouth 3 times daily for 5 days     study - Moviprep (IDS# 4878) 100 G solution Mix according to instructions on package and take by mouth as directed.     study - vancomycin vs. placebo (IDS# 4878) 50 mg/mL SOLN oral solution Take 10 mLs (500 mg) by mouth 3 times daily for 7 days (IDS will provide as 21 x 10 ml oral syringes)     atorvastatin (LIPITOR) 20 MG tablet Take 1 tablet (20 mg) by mouth daily     levalbuterol (XOPENEX HFA) 45 MCG/ACT Inhaler Inhale 2 puffs into the lungs every 6 hours as needed for shortness of breath / dyspnea or wheezing     Flunisolide HFA (AEROSPAN) 80 MCG/ACT AERS Inhale 5 puffs into the lungs 2 times daily Rinse mouth after each use.     amLODIPine (NORVASC) 5 MG tablet Take 1 tablet (5 mg) by mouth daily     losartan (COZAAR) 50 MG tablet Take 1 tablet (50 mg) by mouth daily     ranitidine (ZANTAC) 150 MG tablet Take 1 tablet (150 mg) by mouth 2 times daily (Patient taking differently: Take 150 mg by mouth 2 times daily as needed )     Multiple Vitamins-Minerals (MULTIVITAMIN GUMMIES ADULT) CHEW Take 1 chew tab by mouth daily     Coenzyme Q10 (CO Q 10 PO) Take 1 tablet by mouth daily     aspirin 81 MG tablet Take 1 tablet (81 mg) by mouth daily     No current  "facility-administered medications for this visit.      No Known Allergies    Past medical, surgical, social and family histories were reviewed and updated in EPIC.    ROS:   12 point review of systems negative other than symptoms noted below.  Constitutional: Fatigue  Gastrointestinal: Constipation  Genitourinary: Night Sweats  Musculoskeletal: Joint Pain  Endocrine: Loss of Hair    EXAM:  /78  Pulse 84  Ht 5' 3\" (1.6 m)  Wt 170 lb (77.1 kg)  LMP 02/08/2017 (Exact Date)  BMI 30.11 kg/m2   BMI: Body mass index is 30.11 kg/(m^2).    PHYSICAL EXAM:  Constitutional:  Appearance: Well nourished, well developed, alert, in no acute distress  Neck:  Lymph Nodes:  No lymphadenopathy present    Thyroid:  Gland size normal, nontender, no nodules or masses present  on palpation  Chest:  Respiratory Effort:  Breathing unlabored  Cardiovascular:    Heart: Auscultation:  Regular rate, normal rhythm, no murmurs present  Breasts: Inspection of Breasts:  No lymphadenopathy present    Palpation of Breasts and Axillae:  No masses present on palpation, no  breast tenderness    Axillary Lymph Nodes:  No lymphadenopathy present  Gastrointestinal:   Abdominal Examination:  Abdomen nontender to palpation, tone normal without rigidity or guarding, no masses present, umbilicus without lesions   Liver and Spleen:  No hepatomegaly present, liver nontender to palpation    Hernias:  No hernias present  Lymphatic: Lymph Nodes:  No other lymphadenopathy present  Skin:  General Inspection:  No rashes present, no lesions present, no areas of  discoloration    Genitalia and Groin:  No rashes present, no lesions present, no areas of  discoloration, no masses present  Neurologic/Psychiatric:    Mental Status:  Oriented X3     Pelvic Exam:  External Genitalia:     Normal appearance for age, no discharge present, no tenderness present, no inflammatory lesions present, color normal  Vagina:    Normal vaginal vault without central or paravaginal " defects, no discharge present, no inflammatory lesions present, no masses present  Bladder:     Nontender to palpation  Urethra:   Urethral Body:  Urethra palpation normal, urethra structural support normal   Urethral Meatus:  No erythema or lesions present  Cervix:     Appearance healthy, no lesions present, nontender to palpation, no bleeding present, string present  Uterus:     Nontender to palpation, no masses present, position anteflexed, mobility: normal  Adnexa:     No adnexal tenderness present, no adnexal masses present  Perineum:     Perineum within normal limits, no evidence of trauma, no rashes or skin lesions present  Anus:     Anus within normal limits, no hemorrhoids present  Inguinal Lymph Nodes:     No lymphadenopathy present  Pubic Hair:     Normal pubic hair distribution for age  Genitalia and Groin:     No rashes present, no lesions present, no areas of discoloration, no masses present    COUNSELING:   Reviewed preventive health counseling, as reflected in patient instructions  Special attention given to:        Regular exercise       Healthy diet/nutrition       Contraception       (Ashlee)menopause management    BMI: Body mass index is 30.11 kg/(m^2).  Weight management plan: Patient was referred to their PCP to discuss a diet and exercise plan.    ASSESSMENT:  46 year old female with satisfactory annual exam.    ICD-10-CM    1. Encounter for gynecological examination without abnormal finding Z01.419 Pap imaged thin layer screen with HPV - recommended age 30 - 65     HPV High Risk Types DNA Cervical   2. Hypertension goal BP (blood pressure) < 140/90 I10    3. Hyperlipidemia with target LDL less than 130 E78.5    4. Presence of intrauterine contraceptive device Z97.5    5. Hair loss L65.9    6. Insulin resistance E88.81        PLAN:  Pap and cotesting done today  Weren't able to add patient on to mammo schedule b/c too late in the day but states she will call and schedule since works right across  the street.  Continue her primary care with her PCP and when time to work up the pre-DM would defer to them as well.  Given referral to Dr. Lilliam George who specializes in hair loss at the [facility]    Jessie Perdomo MD

## 2017-02-22 NOTE — MR AVS SNAPSHOT
After Visit Summary   2/22/2017    Olga Lidia Harley    MRN: [id number]           Patient Information     Date Of Birth          1970        Visit Information        Provider Department      2/22/2017 3:40 PM Jessie Perdomo MD [facility] [place]        Today's Diagnoses     Encounter for gynecological examination without abnormal finding    -  1    Hypertension goal BP (blood pressure) < 140/90        Hyperlipidemia with target LDL less than 130        Presence of intrauterine contraceptive device        Hair loss        Insulin resistance           Follow-ups after your visit        Your next 10 appointments already scheduled     Mar 13, 2017   Procedure with Tato Child MD   [facility] Endoscopy ([facility])    [address] [id number] [phone]           The [facility] is located on the corner of [address] and [address] on the [facility]. It is easily accessible from virtually any point in the [place] area, via [address] and [address].            Oct 23, 2017  8:00 AM CDT   (Arrive by 7:45 AM)   Return General Liver with Chaitanya Corey MD   [facility] ([facility])    [address]  [address] [facility] [id number] [phone]              Who to contact     If you have questions or need follow up information about today's clinic visit or your schedule please contact [facility] directly at [phone].  Normal or non-critical lab and imaging results will be communicated to you by MyChart, letter or phone within 4 business days after the clinic has received the results. If you do not hear from us within 7 days, please contact the clinic through MyChart or phone. If you have a critical or abnormal lab result, we will notify you by phone as soon as possible.  Submit refill requests  "through [email or web address] or call your pharmacy and they will forward the refill request to us. Please allow 3 business days for your refill to be completed.          Additional Information About Your Visit        [facility] Information     [email or web address] gives you secure access to your electronic health record. If you see a primary care provider, you can also send messages to your care team and make appointments. If you have questions, please call your primary care clinic.  If you do not have a primary care provider, please call [phone] and they will assist you.        Care EveryWhere ID     This is your Care EveryWhere ID. This could be used by other organizations to access your [place] medical records  [id number]        Your Vitals Were     Pulse Height Last Period BMI (Body Mass Index)          84 5' 3\" (1.6 m) 02/08/2017 (Exact Date) 30.11 kg/m2         Blood Pressure from Last 3 Encounters:   02/22/17 118/78   01/23/17 138/88   01/02/17 146/86    Weight from Last 3 Encounters:   02/22/17 170 lb (77.1 kg)   01/23/17 170 lb (77.1 kg)   01/02/17 172 lb 3.2 oz (78.1 kg)              We Performed the Following     HPV High Risk Types DNA Cervical     Pap imaged thin layer screen with HPV - recommended age 30 - 65          Today's Medication Changes          These changes are accurate as of: 2/22/17 11:59 PM.  If you have any questions, ask your nurse or doctor.               These medicines have changed or have updated prescriptions.        Dose/Directions    ranitidine 150 MG tablet   Commonly known as:  ZANTAC   This may have changed:    - when to take this  - reasons to take this   Used for:  Cough        Dose:  150 mg   Take 1 tablet (150 mg) by mouth 2 times daily   Quantity:  60 tablet   Refills:  4         Stop taking these medicines if you haven't already. Please contact your care team if you have questions.     guaiFENesin-codeine 100-10 MG/5ML Soln solution   Commonly known as:  ROBITUSSIN AC   Stopped by:  Leanne, " MD Jessie           vitamin B complex with vitamin C Tabs tablet   Stopped by:  Jessie Perdomo MD                    Primary Care Provider Office Phone # Fax #    Wan Anderson [phone]       [address] [address]        Thank you!     Thank you for choosing [facility]  for your care. Our goal is always to provide you with excellent care. Hearing back from our patients is one way we can continue to improve our services. Please take a few minutes to complete the written survey that you may receive in the mail after your visit with us. Thank you!             Your Updated Medication List - Protect others around you: Learn how to safely use, store and throw away your medicines at www.disposemymeds.org.          This list is accurate as of: 2/22/17 11:59 PM.  Always use your most recent med list.                   Brand Name Dispense Instructions for use    amLODIPine 5 MG tablet    NORVASC    90 tablet    Take 1 tablet (5 mg) by mouth daily       aspirin 81 MG tablet     100 tablet    Take 1 tablet (81 mg) by mouth daily       atorvastatin 20 MG tablet    LIPITOR    30 tablet    Take 1 tablet (20 mg) by mouth daily       BIOTIN PO          CO Q 10 PO      Take 1 tablet by mouth daily       Flunisolide HFA 80 MCG/ACT Aers    AEROSPAN    2 Inhaler    Inhale 5 puffs into the lungs 2 times daily Rinse mouth after each use.       levalbuterol 45 MCG/ACT Inhaler    XOPENEX HFA    1 Inhaler    Inhale 2 puffs into the lungs every 6 hours as needed for shortness of breath / dyspnea or wheezing       losartan 50 MG tablet    COZAAR    90 tablet    Take 1 tablet (50 mg) by mouth daily       MULTIVITAMIN GUMMIES ADULT Chew      Take 1 chew tab by mouth daily       paragard intrauterine copper      1 each by Intrauterine route once       ranitidine 150 MG tablet    ZANTAC    60 tablet    Take 1 tablet (150 mg) by mouth 2 times daily        study - clindamycin vs. placebo 300 MG capsule    ids# [id number]    15 capsule    Take 1 capsule (300 mg) by mouth 3 times daily for 5 days       study - Moviprep 100 G solution    IDS# 4878    1 each    Mix according to instructions on package and take by mouth as directed.       study - neomycin vs. placebo 500 MG capsule    ids# [id number]    6 capsule    Take 2 capsules (1,000 mg) by mouth 3 times daily for 1 day       study - vancomycin vs. placebo 50 mg/mL Soln oral solution    IDS# 4878    210 mL    Take 10 mLs (500 mg) by mouth 3 times daily for 7 days (IDS will provide as 21 x 10 ml oral syringes)

## 2017-02-23 PROBLEM — Z97.5 PRESENCE OF INTRAUTERINE CONTRACEPTIVE DEVICE: Status: ACTIVE | Noted: 2017-02-23

## 2017-02-23 PROCEDURE — 87624 HPV HI-RISK TYP POOLED RSLT: CPT | Performed by: OBSTETRICS & GYNECOLOGY

## 2017-02-23 ASSESSMENT — PATIENT HEALTH QUESTIONNAIRE - PHQ9: SUM OF ALL RESPONSES TO PHQ QUESTIONS 1-9: 1

## 2017-02-23 ASSESSMENT — ANXIETY QUESTIONNAIRES: GAD7 TOTAL SCORE: 2

## 2017-02-27 LAB
COPATH REPORT: NORMAL
PAP: NORMAL

## 2017-02-28 LAB
FINAL DIAGNOSIS: NORMAL
HPV HR 12 DNA CVX QL NAA+PROBE: NEGATIVE
HPV16 DNA SPEC QL NAA+PROBE: NEGATIVE
HPV18 DNA SPEC QL NAA+PROBE: NEGATIVE
SPECIMEN DESCRIPTION: NORMAL

## 2017-03-13 ENCOUNTER — RESULTS ONLY (OUTPATIENT)
Dept: GASTROENTEROLOGY | Facility: CLINIC | Age: 47
End: 2017-03-13

## 2017-03-13 ENCOUNTER — HOSPITAL ENCOUNTER (OUTPATIENT)
Facility: CLINIC | Age: 47
Discharge: HOME | End: 2017-03-13
Attending: INTERNAL MEDICINE | Admitting: INTERNAL MEDICINE

## 2017-03-13 VITALS
BODY MASS INDEX: 30.12 KG/M2 | DIASTOLIC BLOOD PRESSURE: 76 MMHG | RESPIRATION RATE: 20 BRPM | OXYGEN SATURATION: 96 % | HEIGHT: 63 IN | SYSTOLIC BLOOD PRESSURE: 123 MMHG | WEIGHT: 170 LBS

## 2017-03-13 LAB — COLONOSCOPY: NORMAL

## 2017-03-13 PROCEDURE — 45378 DIAGNOSTIC COLONOSCOPY: CPT | Performed by: INTERNAL MEDICINE

## 2017-03-13 PROCEDURE — 25000132 ZZH RX MED GY IP 250 OP 250 PS 637: Performed by: INTERNAL MEDICINE

## 2017-03-13 PROCEDURE — 25000128 H RX IP 250 OP 636: Performed by: INTERNAL MEDICINE

## 2017-03-13 PROCEDURE — G0500 MOD SEDAT ENDO SERVICE >5YRS: HCPCS | Performed by: INTERNAL MEDICINE

## 2017-03-13 PROCEDURE — 25000125 ZZHC RX 250: Performed by: INTERNAL MEDICINE

## 2017-03-13 RX ORDER — FENTANYL CITRATE 50 UG/ML
INJECTION, SOLUTION INTRAMUSCULAR; INTRAVENOUS PRN
Status: DISCONTINUED | OUTPATIENT
Start: 2017-03-13 | End: 2017-03-13 | Stop reason: HOSPADM

## 2017-03-13 RX ORDER — SIMETHICONE
LIQUID (ML) MISCELLANEOUS PRN
Status: DISCONTINUED | OUTPATIENT
Start: 2017-03-13 | End: 2017-03-13 | Stop reason: HOSPADM

## 2017-03-13 RX ORDER — ONDANSETRON 2 MG/ML
4 INJECTION INTRAMUSCULAR; INTRAVENOUS
Status: DISCONTINUED | OUTPATIENT
Start: 2017-03-13 | End: 2017-03-13 | Stop reason: HOSPADM

## 2017-03-13 NOTE — DISCHARGE INSTRUCTIONS
Discharge Instructions after Colonoscopy    Today you had a Colonoscopy with stool transplant     Activity and Diet  You were given medicine for pain. You may be dizzy or sleepy.  For 24 hours:    Do not drive or use heavy equipment.    Do not make important decisions.    Do not drink any alcohol.  You may return to your normal diet and medicines.    Discomfort    Air was placed in your colon during the exam in order to see it. Walking helps to pass the air.  Follow-up  When to call:    Call right away if you have:    Unusual pain in belly or chest pain not relieved with passing air.    More than 1 to 2 Tablespoons of bleeding from your rectum.    Fever above 100.6  F (37.5  C).    If you have severe pain, bleeding, or shortness of breath, go to an emergency room.    If you have questions, call:  Monday to Friday, 7 a.m. to 4:30 p.m.  Endoscopy: [phone] (We may have to call you back)    After hours  Hospital: [phone] (Ask for the GI fellow on call)

## 2017-03-13 NOTE — IP AVS SNAPSHOT
MRN:[id number]                      After Visit Summary   3/13/2017    Olga Lidia Harley    MRN: [id number]           Thank you!     Thank you for choosing [facility] for your care. Our goal is always to provide you with excellent care. Hearing back from our patients is one way we can continue to improve our services. Please take a few minutes to complete the written survey that you may receive in the mail after you visit with us. Thank you!        Patient Information     Date Of Birth          1970        About your hospital stay     You were admitted on:  March 13, 2017 You last received care in the:  [facility], Endoscopy    You were discharged on:  March 13, 2017       Who to Call     For medical emergencies, please call 911.  For non-urgent questions about your medical care, please call your primary care provider or clinic, [phone]  For questions related to your surgery, please call your surgery clinic        Attending Provider     Provider Specialty    Tato Child MD Gastroenterology       Primary Care Provider Office Phone # Fax #    Wan Anderson [phone]       [address] [address]        Your next 10 appointments already scheduled     Oct 23, 2017  8:00 AM CDT   (Arrive by 7:45 AM)   Return General Liver with Chaitanya Corey MD   [facility] Hepatology ([facility])    [address] [place] [address] [phone]              Further instructions from your care team       Discharge Instructions after Colonoscopy    Today you had a Colonoscopy with stool transplant     Activity and Diet  You were given medicine for pain. You may be dizzy or sleepy.  For 24 hours:    Do not drive or use heavy equipment.    Do not make important decisions.    Do not drink any alcohol.  You may return to your normal diet and  "medicines.    Discomfort    Air was placed in your colon during the exam in order to see it. Walking helps to pass the air.  Follow-up  When to call:    Call right away if you have:    Unusual pain in belly or chest pain not relieved with passing air.    More than 1 to 2 Tablespoons of bleeding from your rectum.    Fever above 100.6  F (37.5  C).    If you have severe pain, bleeding, or shortness of breath, go to an emergency room.    If you have questions, call:  Monday to Friday, 7 a.m. to 4:30 p.m.  Endoscopy: [phone] (We may have to call you back)    After hours  Hospital: [phone] (Ask for the GI fellow on call)    Pending Results     No orders found from 3/11/2017 to 3/14/2017.            Admission Information     Date & Time Provider Department Dept. Phone    3/13/2017 Tato Child MD [facility], [place], Endoscopy [phone]      Your Vitals Were     Blood Pressure Respirations Height Weight Last Period Pulse Oximetry    132/80 14 1.6 m (5' 3\") 77.1 kg (170 lb) 02/08/2017 (Exact Date) 99%    BMI (Body Mass Index)                   30.11 kg/m2           [employer] Information     "[employer] gives you secure access to your electronic health record. If you see a primary care provider, you can also send messages to your care team and make appointments. If you have questions, please call your primary care clinic.  If you do not have a primary care provider, please call [phone] and they will assist you.        Care EveryWhere ID     This is your Care EveryWhere ID. This could be used by other organizations to access your [facility] medical records  [id number]           Review of your medicines      UNREVIEWED medicines. Ask your doctor about these medicines        Dose / Directions    amLODIPine 5 MG tablet   Commonly known as:  NORVASC   Used for:  PVC's (premature ventricular contractions)        Dose:  5 mg   Take 1 tablet (5 mg) by mouth daily   Quantity:  90 tablet   Refills:  3       aspirin 81 " MG tablet        Dose:  81 mg   Take 1 tablet (81 mg) by mouth daily   Quantity:  100 tablet   Refills:  3       atorvastatin 20 MG tablet   Commonly known as:  LIPITOR   Used for:  Hyperlipidemia with target LDL less than 130        Dose:  20 mg   Take 1 tablet (20 mg) by mouth daily   Quantity:  30 tablet   Refills:  0       BIOTIN PO        Refills:  0       CO Q 10 PO   Used for:  Elevated LFTs, Hyperlipidemia, unspecified hyperlipidemia, NAFLD (nonalcoholic fatty liver disease)        Dose:  1 tablet   Take 1 tablet by mouth daily   Refills:  0       Flunisolide HFA 80 MCG/ACT Aers   Commonly known as:  AEROSPAN   Used for:  Mild persistent asthma with acute exacerbation, Cough        Dose:  5 puff   Inhale 5 puffs into the lungs 2 times daily Rinse mouth after each use.   Quantity:  2 Inhaler   Refills:  5       levalbuterol 45 MCG/ACT Inhaler   Commonly known as:  XOPENEX HFA   Used for:  Acute bronchitis, unspecified organism, Mild persistent asthma with acute exacerbation        Dose:  2 puff   Inhale 2 puffs into the lungs every 6 hours as needed for shortness of breath / dyspnea or wheezing   Quantity:  1 Inhaler   Refills:  1       losartan 50 MG tablet   Commonly known as:  COZAAR   Used for:  Essential hypertension with goal blood pressure less than 140/90        Dose:  50 mg   Take 1 tablet (50 mg) by mouth daily   Quantity:  90 tablet   Refills:  3       MULTIVITAMIN GUMMIES ADULT Chew   Used for:  Elevated LFTs, Hyperlipidemia, unspecified hyperlipidemia, NAFLD (nonalcoholic fatty liver disease)        Dose:  1 chew tab   Take 1 chew tab by mouth daily   Refills:  0       paragard intrauterine copper        Dose:  1 each   1 each by Intrauterine route once   Refills:  0       ranitidine 150 MG tablet   Commonly known as:  ZANTAC   Used for:  Cough        Dose:  150 mg   Take 1 tablet (150 mg) by mouth 2 times daily   Quantity:  60 tablet   Refills:  4       study - Moviprep 100 G solution   Commonly  known as:  IDS# [id number]   Used for:  Dysmetabolic syndrome X        Mix according to instructions on package and take by mouth as directed.   Quantity:  1 each   Refills:  0                Protect others around you: Learn how to safely use, store and throw away your medicines at www.disposemymeds.org.             Medication List: This is a list of all your medications and when to take them. Check marks below indicate your daily home schedule. Keep this list as a reference.      Medications           Morning Afternoon Evening Bedtime As Needed    amLODIPine 5 MG tablet   Commonly known as:  NORVASC   Take 1 tablet (5 mg) by mouth daily                                aspirin 81 MG tablet   Take 1 tablet (81 mg) by mouth daily                                atorvastatin 20 MG tablet   Commonly known as:  LIPITOR   Take 1 tablet (20 mg) by mouth daily                                BIOTIN PO                                CO Q 10 PO   Take 1 tablet by mouth daily                                Flunisolide HFA 80 MCG/ACT Aers   Commonly known as:  AEROSPAN   Inhale 5 puffs into the lungs 2 times daily Rinse mouth after each use.                                levalbuterol 45 MCG/ACT Inhaler   Commonly known as:  XOPENEX HFA   Inhale 2 puffs into the lungs every 6 hours as needed for shortness of breath / dyspnea or wheezing                                losartan 50 MG tablet   Commonly known as:  COZAAR   Take 1 tablet (50 mg) by mouth daily                                MULTIVITAMIN GUMMIES ADULT Chew   Take 1 chew tab by mouth daily                                paragard intrauterine copper   1 each by Intrauterine route once                                ranitidine 150 MG tablet   Commonly known as:  ZANTAC   Take 1 tablet (150 mg) by mouth 2 times daily                                study - Moviprep 100 G solution   Commonly known as:  IDS# [id number]   Mix according to instructions on package and take by mouth as  directed.

## 2017-03-13 NOTE — IP AVS SNAPSHOT
[facility], [place], Endoscopy    [address] [address]    Phone:  [phone]                                       After Visit Summary   3/13/2017    Olga Lidia Harley    MRN: [id number]           After Visit Summary Signature Page     I have received my discharge instructions, and my questions have been answered. I have discussed any challenges I see with this plan with the nurse or doctor.    ..........................................................................................................................................  Patient/Patient Representative Signature      ..........................................................................................................................................  Patient Representative Print Name and Relationship to Patient    ..................................................               ................................................  Date                                            Time    ..........................................................................................................................................  Reviewed by Signature/Title    ...................................................              ..............................................  Date                                                            Time

## 2017-03-13 NOTE — OR NURSING
Pt tolerated procedure well on 2L NC. Weaned to rm air after. VSS throughout. Transplant completed. No other interventions.

## 2017-03-15 ENCOUNTER — MYC MEDICAL ADVICE (OUTPATIENT)
Dept: FAMILY MEDICINE | Facility: CLINIC | Age: 47
End: 2017-03-15

## 2017-03-15 DIAGNOSIS — E78.5 HYPERLIPIDEMIA WITH TARGET LDL LESS THAN 130: ICD-10-CM

## 2017-03-15 RX ORDER — ATORVASTATIN CALCIUM 20 MG/1
20 TABLET, FILM COATED ORAL DAILY
Qty: 90 TABLET | Refills: 3 | Status: SHIPPED | OUTPATIENT
Start: 2017-03-15 | End: 2018-06-28

## 2017-03-15 NOTE — TELEPHONE ENCOUNTER
After reviewing chart it looks like she went in for a physical with her OB MD. She was seen in July regarding her hypertension, and other times throughout the year for various reasons. Would you like a visit with her for hyperlipidemia, or labs only?    Brad Balderrama RN

## 2017-03-17 ENCOUNTER — HOSPITAL ENCOUNTER (OUTPATIENT)
Dept: LAB | Facility: CLINIC | Age: 47
Discharge: HOME | End: 2017-03-17
Attending: FAMILY MEDICINE | Admitting: FAMILY MEDICINE
Payer: COMMERCIAL

## 2017-03-17 DIAGNOSIS — E78.5 HYPERLIPIDEMIA WITH TARGET LDL LESS THAN 130: ICD-10-CM

## 2017-03-17 LAB
CHOLEST SERPL-MCNC: 173 MG/DL
HDLC SERPL-MCNC: 50 MG/DL
LDLC SERPL CALC-MCNC: 82 MG/DL
NONHDLC SERPL-MCNC: 123 MG/DL
TRIGL SERPL-MCNC: 203 MG/DL

## 2017-03-17 PROCEDURE — 80061 LIPID PANEL: CPT | Performed by: FAMILY MEDICINE

## 2017-03-17 PROCEDURE — 36415 COLL VENOUS BLD VENIPUNCTURE: CPT | Performed by: FAMILY MEDICINE

## 2017-04-10 ENCOUNTER — TRANSFERRED RECORDS (OUTPATIENT)
Dept: HEALTH INFORMATION MANAGEMENT | Facility: CLINIC | Age: 47
End: 2017-04-10

## 2017-04-10 LAB
CHOLEST SERPL-MCNC: 168 MG/DL
HDLC SERPL-MCNC: 45 MG/DL
LDLC SERPL CALC-MCNC: 80 MG/DL
NONHDLC SERPL-MCNC: 122 MG/DL
TRIGL SERPL-MCNC: 212 MG/DL

## 2017-07-27 ENCOUNTER — MYC MEDICAL ADVICE (OUTPATIENT)
Dept: FAMILY MEDICINE | Facility: CLINIC | Age: 47
End: 2017-07-27

## 2017-08-17 ENCOUNTER — OFFICE VISIT (OUTPATIENT)
Dept: FAMILY MEDICINE | Facility: CLINIC | Age: 47
End: 2017-08-17
Payer: COMMERCIAL

## 2017-08-17 VITALS
DIASTOLIC BLOOD PRESSURE: 78 MMHG | TEMPERATURE: 98.9 F | HEIGHT: 63 IN | SYSTOLIC BLOOD PRESSURE: 122 MMHG | WEIGHT: 157.13 LBS | BODY MASS INDEX: 27.84 KG/M2 | HEART RATE: 80 BPM

## 2017-08-17 DIAGNOSIS — R73.9 ELEVATED BLOOD SUGAR: ICD-10-CM

## 2017-08-17 DIAGNOSIS — L65.9 HAIR LOSS: Primary | ICD-10-CM

## 2017-08-17 DIAGNOSIS — R07.0 THROAT DISCOMFORT: ICD-10-CM

## 2017-08-17 DIAGNOSIS — R63.4 LOSS OF WEIGHT: ICD-10-CM

## 2017-08-17 LAB — HBA1C MFR BLD: 5.1 % (ref 4.3–6)

## 2017-08-17 PROCEDURE — 83036 HEMOGLOBIN GLYCOSYLATED A1C: CPT | Performed by: FAMILY MEDICINE

## 2017-08-17 PROCEDURE — 84443 ASSAY THYROID STIM HORMONE: CPT | Performed by: FAMILY MEDICINE

## 2017-08-17 PROCEDURE — 86800 THYROGLOBULIN ANTIBODY: CPT | Performed by: FAMILY MEDICINE

## 2017-08-17 PROCEDURE — 84481 FREE ASSAY (FT-3): CPT | Performed by: FAMILY MEDICINE

## 2017-08-17 PROCEDURE — 99213 OFFICE O/P EST LOW 20 MIN: CPT | Performed by: FAMILY MEDICINE

## 2017-08-17 PROCEDURE — 36415 COLL VENOUS BLD VENIPUNCTURE: CPT | Performed by: FAMILY MEDICINE

## 2017-08-17 NOTE — NURSING NOTE
"Chief Complaint   Patient presents with     Throat Problem     Hair Loss       Initial There were no vitals taken for this visit. Estimated body mass index is 30.11 kg/(m^2) as calculated from the following:    Height as of 3/13/17: 5' 3\" (1.6 m).    Weight as of 3/13/17: 170 lb (77.1 kg).  Medication Reconciliation: complete   Sravani Kapoor CMA      "

## 2017-08-17 NOTE — PATIENT INSTRUCTIONS
Orders Placed This Encounter     TSH with free T4 reflex     Last Lab Result: TSH (mU/L)       Date                     Value                 12/16/2015               1.54             ----------     T3, Free     Anti thyroglobulin antibody     Hemoglobin A1c     Last Lab Result: No results found for: A1C     OTOLARYNGOLOGY REFERRAL     Referral Type:   Consultation

## 2017-08-17 NOTE — MR AVS SNAPSHOT
After Visit Summary   8/17/2017    Olga Lidia Harley    MRN: [id number]           Patient Information     Date Of Birth          1970        Visit Information        Provider Department      8/17/2017 4:20 PM Wan Anderson MD [facility]        Today's Diagnoses     Hair loss    -  1    Loss of weight        Throat discomfort        Elevated blood sugar          Care Instructions    Orders Placed This Encounter     TSH with free T4 reflex     Last Lab Result: TSH (mU/L)       Date                     Value                 12/16/2015               1.54             ----------     T3, Free     Anti thyroglobulin antibody     Hemoglobin A1c     Last Lab Result: No results found for: A1C     OTOLARYNGOLOGY REFERRAL     Referral Type:   Consultation             Follow-ups after your visit        Additional Services     OTOLARYNGOLOGY REFERRAL       Your provider has referred you to: [facility]: [facility] [phone]   [email or web address]    Please be aware that coverage of these services is subject to the terms and limitations of your health insurance plan.  Call member services at your health plan with any benefit or coverage questions.      Please bring the following with you to your appointment:    (1) Any X-Rays, CTs or MRIs which have been performed.  Contact the facility where they were done to arrange for  prior to your scheduled appointment.   (2) List of current medications  (3) This referral request   (4) Any documents/labs given to you for this referral                  Your next 10 appointments already scheduled     Oct 23, 2017  8:00 AM CDT   (Arrive by 7:45 AM)   Return General Liver with Chaitanya Corey MD   [facility] Hepatology ([facility] [place] [facility])    [address] [place] [address] [phone]              Who to contact     If you have questions or need  "follow up information about today's clinic visit or your schedule please contact [facility] directly at [phone].  Normal or non-critical lab and imaging results will be communicated to you by [employer], letter or phone within 4 business days after the clinic has received the results. If you do not hear from us within 7 days, please contact the clinic through [employer] or phone. If you have a critical or abnormal lab result, we will notify you by phone as soon as possible.  Submit refill requests through [employer] or call your pharmacy and they will forward the refill request to us. Please allow 3 business days for your refill to be completed.          Additional Information About Your Visit        [employer] Information     [employer] gives you secure access to your electronic health record. If you see a primary care provider, you can also send messages to your care team and make appointments. If you have questions, please call your primary care clinic.  If you do not have a primary care provider, please call [phone] and they will assist you.        Care EveryWhere ID     This is your Care EveryWhere ID. This could be used by other organizations to access your [place] medical records  [id number]        Your Vitals Were     Pulse Temperature Height BMI (Body Mass Index)          80 98.9  F (37.2  C) (Oral) 5' 3\" (1.6 m) 27.83 kg/m2         Blood Pressure from Last 3 Encounters:   08/17/17 122/78   03/13/17 123/76   02/22/17 118/78    Weight from Last 3 Encounters:   08/17/17 157 lb 2 oz (71.3 kg)   03/13/17 170 lb (77.1 kg)   02/22/17 170 lb (77.1 kg)              We Performed the Following     Anti thyroglobulin antibody     Hemoglobin A1c     OTOLARYNGOLOGY REFERRAL     T3, Free     TSH with free T4 reflex          Today's Medication Changes          These changes are accurate as of: 8/17/17  4:42 PM.  If you have any questions, ask your nurse or doctor.               These medicines have " changed or have updated prescriptions.        Dose/Directions    ranitidine 150 MG tablet   Commonly known as:  ZANTAC   This may have changed:    - when to take this  - reasons to take this   Used for:  Cough        Dose:  150 mg   Take 1 tablet (150 mg) by mouth 2 times daily   Quantity:  60 tablet   Refills:  4                Primary Care Provider Office Phone # Fax #    Wan Anderson [phone]       [address] [address]        Equal Access to Services     JENNIFER LOVELL AH: Hadii aad ku hadasho Soomaali, waaxda luqadaha, qaybta kaalmada adeegyada, waxay idiin hayaan adeeg davis holland. So [facility] [phone].    ATENCIÓN: Si habla español, tiene a you disposición servicios gratuitos de asistencia lingüística. [facility] [phone].    We comply with applicable federal civil rights laws and [place] laws. We do not discriminate on the basis of race, color, national origin, age, disability sex, sexual orientation or gender identity.            Thank you!     Thank you for choosing [facility]  for your care. Our goal is always to provide you with excellent care. Hearing back from our patients is one way we can continue to improve our services. Please take a few minutes to complete the written survey that you may receive in the mail after your visit with us. Thank you!             Your Updated Medication List - Protect others around you: Learn how to safely use, store and throw away your medicines at www.disposemymeds.org.          This list is accurate as of: 8/17/17  4:42 PM.  Always use your most recent med list.                   Brand Name Dispense Instructions for use Diagnosis    amLODIPine 5 MG tablet    NORVASC    90 tablet    Take 1 tablet (5 mg) by mouth daily    PVC's (premature ventricular contractions)       aspirin 81 MG tablet     100 tablet    Take 1 tablet (81 mg) by mouth daily        atorvastatin 20 MG tablet    LIPITOR    90  tablet    Take 1 tablet (20 mg) by mouth daily    Hyperlipidemia with target LDL less than 130       BIOTIN PO           CO Q 10 PO      Take 1 tablet by mouth daily    Elevated LFTs, Hyperlipidemia, unspecified hyperlipidemia, NAFLD (nonalcoholic fatty liver disease)       flunisolide HFA 80 MCG/ACT Aers oral inhaler    AEROSPAN    2 Inhaler    Inhale 5 puffs into the lungs 2 times daily Rinse mouth after each use.    Mild persistent asthma with acute exacerbation, Cough       levalbuterol 45 MCG/ACT Inhaler    XOPENEX HFA    1 Inhaler    Inhale 2 puffs into the lungs every 6 hours as needed for shortness of breath / dyspnea or wheezing    Acute bronchitis, unspecified organism, Mild persistent asthma with acute exacerbation       losartan 50 MG tablet    COZAAR    90 tablet    Take 1 tablet (50 mg) by mouth daily    Essential hypertension with goal blood pressure less than 140/90       MULTIVITAMIN GUMMIES ADULT Chew      Take 1 chew tab by mouth daily    Elevated LFTs, Hyperlipidemia, unspecified hyperlipidemia, NAFLD (nonalcoholic fatty liver disease)       paragard intrauterine copper      1 each by Intrauterine route once        ranitidine 150 MG tablet    ZANTAC    60 tablet    Take 1 tablet (150 mg) by mouth 2 times daily    Cough       study - Moviprep 100 G solution    IDS# [id number]    1 each    Mix according to instructions on package and take by mouth as directed.    Dysmetabolic syndrome X

## 2017-08-17 NOTE — PROGRESS NOTES
"  SUBJECTIVE:   OlgaL idia Harley is a 47 year old female who presents to clinic today for the following health issues:    Hair loss. Patient has note hair loss and a \"lump\" in her throat when swallowing. Denies dysphagia or odynophagia. Denies diarrhea, excessive sweating, heat intolerance, or other symptoms suggestive of increased basal metabolic rate. Hair loss not related to hair products. Further denies nipple discharge or unusual vaginal bleeding.     Prediabetes. Patient was in a study for \"insulin resistant diabetes\" due to having elevated fasting glucose at 110 mg/dL. She does note she has been actively trying to curtail carbohydrate intake.       Problem list and histories reviewed & adjusted, as indicated.  Additional history: as documented    Patient Active Problem List   Diagnosis     Hypertension goal BP (blood pressure) < 140/90     Hyperlipidemia with target LDL less than 130     Herniated vertebral disc     Disorder of bursae and tendons in shoulder region     Cough     Piriformis syndrome     Mild persistent asthma     Gastroesophageal reflux disease without esophagitis     Bilateral low back pain without sciatica     Hip pain, right     Presence of intrauterine contraceptive device     Past Surgical History:   Procedure Laterality Date     COLONOSCOPY N/A 3/13/2017    Procedure: COLONOSCOPY;  Surgeon: Tato Child MD;  Location:  GI     NO HISTORY OF SURGERY         Social History   Substance Use Topics     Smoking status: Former Smoker     Packs/day: 0.50     Years: 20.00     Types: Cigarettes     Quit date: 12/31/2012     Smokeless tobacco: Never Used     Alcohol use 0.0 oz/week      Comment: more on weekends 8 drinks per week or wine or mixed drinks      Family History   Problem Relation Age of Onset     Hypertension Mother      Lung Cancer Mother      Other Cancer Mother      lung     Hypertension Father      Hyperlipidemia Father      Blood Disease Paternal Uncle      Breast " Cancer Paternal Aunt      Hypertension Maternal Grandmother      Breast Cancer Paternal Half-Sister      Breast Cancer Cousin      Other Cancer Paternal Half-Brother      leukemia     Liver Disease No family hx of          Current Outpatient Prescriptions   Medication Sig Dispense Refill     atorvastatin (LIPITOR) 20 MG tablet Take 1 tablet (20 mg) by mouth daily 90 tablet 3     BIOTIN PO        paragard intrauterine copper 1 each by Intrauterine route once       study - Moviprep (IDS# [id number]) 100 G solution Mix according to instructions on package and take by mouth as directed. 1 each 0     levalbuterol (XOPENEX HFA) 45 MCG/ACT Inhaler Inhale 2 puffs into the lungs every 6 hours as needed for shortness of breath / dyspnea or wheezing 1 Inhaler 1     Flunisolide HFA (AEROSPAN) 80 MCG/ACT AERS Inhale 5 puffs into the lungs 2 times daily Rinse mouth after each use. 2 Inhaler 5     amLODIPine (NORVASC) 5 MG tablet Take 1 tablet (5 mg) by mouth daily 90 tablet 3     losartan (COZAAR) 50 MG tablet Take 1 tablet (50 mg) by mouth daily 90 tablet 3     ranitidine (ZANTAC) 150 MG tablet Take 1 tablet (150 mg) by mouth 2 times daily (Patient taking differently: Take 150 mg by mouth 2 times daily as needed ) 60 tablet 4     Multiple Vitamins-Minerals (MULTIVITAMIN GUMMIES ADULT) CHEW Take 1 chew tab by mouth daily       Coenzyme Q10 (CO Q 10 PO) Take 1 tablet by mouth daily       aspirin 81 MG tablet Take 1 tablet (81 mg) by mouth daily 100 tablet 3     No Known Allergies  Recent Labs   Lab Test  03/17/17   0610  10/21/16   0656  04/18/16   1440  03/08/16   0640  01/18/16   0707  12/16/15   1034   10/10/13   1736   LDL  82   --    --   72  166*   --    < >   --    HDL  50   --    --   44*  39*   --    < >   --    TRIG  203*   --    --   129  282*   --    < >   --    ALT   --   35  45   --   238*  195*   --    --    CR   --   0.66  0.80   --    --   0.80   < >  0.74   GFRESTIMATED   --   >90  Non  GFR Calc    76    "--    --   77   < >  85   GFRESTBLACK   --   >90   GFR Calc    >90   GFR Calc     --    --   >90   GFR Calc     < >  >90   POTASSIUM   --   3.9  3.4   --    --   3.9   < >  3.3*   TSH   --    --    --    --    --   1.54   --   1.97    < > = values in this interval not displayed.      BP Readings from Last 3 Encounters:   08/17/17 122/78   03/13/17 123/76   02/22/17 118/78    Wt Readings from Last 3 Encounters:   08/17/17 71.3 kg (157 lb 2 oz)   03/13/17 77.1 kg (170 lb)   02/22/17 77.1 kg (170 lb)                  Labs reviewed in EPIC          Reviewed and updated as needed this visit by clinical staff       Reviewed and updated as needed this visit by Provider         ROS:  Constitutional, HEENT, cardiovascular, pulmonary, GI, , musculoskeletal, neuro, skin, endocrine and psych systems are negative, except as otherwise noted.    This document serves as a record of the services and decisions personally performed and made by Dusty Anderson MD. It was created on their behalf by Clemente Brooks, a trained medical scribe. The creation of this document is based the provider's statements to the medical scribe.  Clemente Brooks August 17, 2017 4:29 PM         OBJECTIVE:   /78 (BP Location: Right arm, Cuff Size: Adult Regular)  Pulse 80  Temp 98.9  F (37.2  C) (Oral)  Ht 1.6 m (5' 3\")  Wt 71.3 kg (157 lb 2 oz)  BMI 27.83 kg/m2  Body mass index is 27.83 kg/(m^2).  GENERAL: healthy, alert and no distress  HENT: ear canals and TM's normal, nose and mouth without ulcers or lesions -- diffused thinning of hair, no patches of hair loss.  NECK: no adenopathy, no asymmetry, masses, or scars and thyroid normal to palpation  RESP: lungs clear to auscultation - no rales, rhonchi or wheezes  CV: regular rate and rhythm, normal S1 S2, no S3 or S4, no murmur, click or rub, no peripheral edema and peripheral pulses strong  MS: no gross musculoskeletal defects noted, no edema  SKIN: no " suspicious lesions or rashes  PSYCH: mentation appears normal, affect normal/bright    Diagnostic Test Results:  none     ASSESSMENT/PLAN:   (L65.9) Hair loss  (primary encounter diagnosis)  Comment: diffused thinning of hair and weight loss suggestive of thyroid disorder. Doing a TSH with free T4 and T3 would typically suffice to check for this, however patient would also like to check for autoimmune thyroiditis.     Plan: TSH with free T4 reflex, T3, Free, Anti         thyroglobulin antibody       -follow up, pending results    (R63.4) Loss of weight  Comment: potentially secondary to abrupt curtailment of carbohydrate intake, however we'll check labs today to discount thyroid disorder.   Plan: TSH with free T4 reflex, T3, Free, Anti         thyroglobulin antibody        -follow up, pending results    (R07.0) Throat discomfort  Comment: globus sensation noted by patient, but exam was unremarkable in this regard.   Plan: OTOLARYNGOLOGY REFERRAL            (R73.9) Elevated blood sugar  Comment: fasting blood sugars >110 mg/dL. We'll obtain baseline A1c today.    Plan: Hemoglobin A1c        -follow up, pending results    Patient Instructions     Orders Placed This Encounter     TSH with free T4 reflex     Last Lab Result: TSH (mU/L)       Date                     Value                 12/16/2015               1.54             ----------     T3, Free     Anti thyroglobulin antibody     Hemoglobin A1c     Last Lab Result: No results found for: A1C     OTOLARYNGOLOGY REFERRAL     Referral Type:   Consultation         The information in this document, created by the medical scribe for me, accurately reflects the services I personally performed and the decisions made by me. I have reviewed and approved this document for accuracy prior to leaving the patient care area.    Wan Anderson MD, MD  [facility]

## 2017-08-18 ENCOUNTER — MYC MEDICAL ADVICE (OUTPATIENT)
Dept: FAMILY MEDICINE | Facility: CLINIC | Age: 47
End: 2017-08-18

## 2017-08-18 LAB
T3FREE SERPL-MCNC: 2.3 PG/ML (ref 2.3–4.2)
TSH SERPL DL<=0.005 MIU/L-ACNC: 1.5 MU/L (ref 0.4–4)

## 2017-08-21 ENCOUNTER — OFFICE VISIT (OUTPATIENT)
Dept: VASCULAR SURGERY | Facility: CLINIC | Age: 47
End: 2017-08-21
Payer: COMMERCIAL

## 2017-08-21 DIAGNOSIS — Z53.9 ERRONEOUS ENCOUNTER--DISREGARD: Primary | ICD-10-CM

## 2017-08-21 LAB — THYROGLOB AB SERPL IA-ACNC: <20 IU/ML (ref 0–40)

## 2017-08-21 PROCEDURE — 99207 ZZC VEINSOLUTIONS FREE SCREENING: CPT | Performed by: SURGERY

## 2017-08-21 NOTE — PROGRESS NOTES
VEIN SOL[facility]_Mallory Harley Comes to see me today to discuss her right leg varicose veins.  This healthy 47-year-old  at our [facility]  Pathology department has noted varicosities in her right distal thigh and calf that began  At the age of 28.  She did work compression during her pregnancy but not after.  Varicose veins started at that time.    Varicosities have progressed and causing her achy discomfort by the end of the day.  She's never worn compression  She has diffuse bilateral spider telangiectasias and is never had treatment for this.    She had an accident in 1982 and likely transected the ankle greater saphenous vein that was ligated.  She's had telangiectasias develop in the area since.  Mild ankle swelling has been noted.    PMH: Medications: Lipitor, amlodipine, losartan            Medical: PVCs for which she is on her medications                          Prediabetes  Not requiring medication                          Hyperlipidemia            No major surgeries after the injury in 1982            Nonsmoker.            Two glasses of wine daily    One Full  Term pregnancy    ROS:  History of PVCs well-controlled with medications\              Chronic mild neck and back pain    Grandmother had significant venous problems.  No known clotting problems    Exam: Alert and appropriate.  Glasses. Height 5 foot 4 inches  Weight= 157 pounds  Chest= clear  Cardiovascular=RR   Minimal right ankle edema with allogenic cages around surgical site.  Scattered telangiectasias on the thigh and calf region.  Dilated varicose vein 4-5 mm in diameter starting in the mid medial  Right thigh extending down to the posterior medial mid calf and also crossing over the patella.  No large varicosities left leg.  +3 palpable dorsalis pedis and posterior tibial pulses bilaterally    Impression: Symptomatic right thigh and calf varicose veins.  Likely Incompetence of the greater saphenous system.  She has  not tried conservative treatment and this will be initiated with compression stockings today.   We discussed the underlying problem at length today.  She'll eventually need a duplex ultrasound to evaluate her venous system.  For likely surgery will eventually be needed and this was discussed today pending findings of her ultrasound and trial of compression.      Cosme Osborne MD     Dictated 8/21/2017 at 1527 hrs.

## 2017-08-21 NOTE — MR AVS SNAPSHOT
After Visit Summary   8/21/2017    Olga Lidia Harley    MRN: [id number]           Patient Information     Date Of Birth          1970        Visit Information        Provider Department      8/21/2017 2:45 PM Cosme Osborne MD [facility]      Today's Diagnoses     ERRONEOUS ENCOUNTER--DISREGARD    -  1       Follow-ups after your visit        Your next 10 appointments already scheduled     Apr 16, 2018  2:00 PM CDT   [facility] Jessie [facility]   [facility] ([facility])    [address]  [facility] [id number] [phone]              Who to contact     If you have questions or need follow up information about today's clinic visit or your schedule please contact [facility] directly at [phone].  Normal or non-critical lab and imaging results will be communicated to you by [employer], letter or phone within 4 business days after the clinic has received the results. If you do not hear from us within 7 days, please contact the clinic through [employer] or phone. If you have a critical or abnormal lab result, we will notify you by phone as soon as possible.  Submit refill requests through [employer] or call your pharmacy and they will forward the refill request to us. Please allow 3 business days for your refill to be completed.          Additional Information About Your Visit        MyChart Information     [employer] gives you secure access to your electronic health record. If you see a primary care provider, you can also send messages to your care team and make appointments. If you have questions, please call your primary care clinic.  If you do not have a primary care provider, please call [phone] and they will assist you.        Care EveryWhere ID     This is your Care EveryWhere ID. This could be used by other  organizations to access your [place] medical records  [id number]         Blood Pressure from Last 3 Encounters:   11/15/17 121/81   08/17/17 122/78   03/13/17 123/76    Weight from Last 3 Encounters:   11/15/17 151 lb (68.5 kg)   08/17/17 157 lb 2 oz (71.3 kg)   03/13/17 170 lb (77.1 kg)              Today, you had the following     No orders found for display         Today's Medication Changes          These changes are accurate as of 8/21/17 11:59 PM.  If you have any questions, ask your nurse or doctor.               These medicines have changed or have updated prescriptions.        Dose/Directions    ranitidine 150 MG tablet   Commonly known as:  ZANTAC   This may have changed:    - when to take this  - reasons to take this   Used for:  Cough        Dose:  150 mg   Take 1 tablet (150 mg) by mouth 2 times daily   Quantity:  60 tablet   Refills:  4                Primary Care Provider Office Phone # Fax #    Wan Anderson [phone]       [address] [place] [address]        Equal Access to Services     [facility] : Hadii carla rodríguez Soarmida, waaxda luqadaha, qaybta kaalmajamari nuñez, elana holland. So [facility] [phone].    ATENCIÓN: Si habla español, tiene a you disposición servicios gratuitos de asistencia lingüística. [facility] [phone].    We comply with applicable federal civil rights laws and [place] laws. We do not discriminate on the basis of race, color, national origin, age, disability, sex, sexual orientation, or gender identity.            Thank you!     Thank you for choosing [facility]  for your care. Our goal is always to provide you with excellent care. Hearing back from our patients is one way we can continue to improve our services. Please take a few minutes to complete the written survey that you may receive in the mail after your visit with us. Thank you!             Your Updated Medication  List - Protect others around you: Learn how to safely use, store and throw away your medicines at www.disposemymeds.org.          This list is accurate as of 8/21/17 11:59 PM.  Always use your most recent med list.                   Brand Name Dispense Instructions for use Diagnosis    aspirin 81 MG tablet     100 tablet    Take 1 tablet (81 mg) by mouth daily        atorvastatin 20 MG tablet    LIPITOR    90 tablet    Take 1 tablet (20 mg) by mouth daily    Hyperlipidemia with target LDL less than 130       BIOTIN PO           CO Q 10 PO      Take 1 tablet by mouth daily    Elevated LFTs, Hyperlipidemia, unspecified hyperlipidemia, NAFLD (nonalcoholic fatty liver disease)       flunisolide HFA 80 MCG/ACT Aers oral inhaler    AEROSPAN    2 Inhaler    Inhale 5 puffs into the lungs 2 times daily Rinse mouth after each use.    Mild persistent asthma with acute exacerbation, Cough       levalbuterol 45 MCG/ACT Inhaler    XOPENEX HFA    1 Inhaler    Inhale 2 puffs into the lungs every 6 hours as needed for shortness of breath / dyspnea or wheezing    Acute bronchitis, unspecified organism, Mild persistent asthma with acute exacerbation       MULTIVITAMIN GUMMIES ADULT Chew      Take 1 chew tab by mouth daily    Elevated LFTs, Hyperlipidemia, unspecified hyperlipidemia, NAFLD (nonalcoholic fatty liver disease)       paragard intrauterine copper      1 each by Intrauterine route once        ranitidine 150 MG tablet    ZANTAC    60 tablet    Take 1 tablet (150 mg) by mouth 2 times daily    Cough       study - Moviprep 100 G solution    IDS# [id number]    1 each    Mix according to instructions on package and take by mouth as directed.    Dysmetabolic syndrome X

## 2017-08-22 ENCOUNTER — MYC MEDICAL ADVICE (OUTPATIENT)
Dept: FAMILY MEDICINE | Facility: CLINIC | Age: 47
End: 2017-08-22

## 2017-08-23 DIAGNOSIS — I10 ESSENTIAL HYPERTENSION WITH GOAL BLOOD PRESSURE LESS THAN 140/90: ICD-10-CM

## 2017-08-24 RX ORDER — LOSARTAN POTASSIUM 50 MG/1
TABLET ORAL
Qty: 90 TABLET | Refills: 0 | Status: SHIPPED | OUTPATIENT
Start: 2017-08-24 | End: 2017-12-06

## 2017-08-24 NOTE — TELEPHONE ENCOUNTER
losartan (COZAAR) 50 MG tablet   50 mg, DAILY 3 ordered  Edit     Summary: Take 1 tablet (50 mg) by mouth daily, Disp-90 tablet, R-3, E-Prescribe   Dose, Route, Frequency: 50 mg, Oral, DAILY  Start: 7/6/2016  Ord/Sold: 7/6/2016 (O)  Report  Taking:   Long-term:   Pharmacy: [facility], [place] - [address]  Med Dose History       Patient Sig: Take 1 tablet (50 mg) by mouth daily       Ordered on: 7/6/2016       Authorized by: ANAMARIA WOLFE       Dispense: 90 tablet          Last Office Visit with [facility], [facility] or [facility] prescribing provider: 8-       Potassium   Date Value Ref Range Status   10/21/2016 3.9 3.4 - 5.3 mmol/L Final     Creatinine   Date Value Ref Range Status   10/21/2016 0.66 0.52 - 1.04 mg/dL Final     BP Readings from Last 3 Encounters:   08/17/17 122/78   03/13/17 123/76   02/22/17 118/78

## 2017-08-24 NOTE — TELEPHONE ENCOUNTER
Prescription approved per [facility] Refill Protocol.     Velma Anderson RN

## 2017-08-24 NOTE — TELEPHONE ENCOUNTER
Received records from study she was involved with with GI. She is concerned she has insulin resistance. Recent A1c is 5.1    Reviewed results but no sure if they indicate true insulin resistance. I will forward to Dr Child to get his opinion. Appears he was ordering MD .    I cannot find anything in epic about final results and recommendations    Dusty Anderson MD

## 2017-09-18 ENCOUNTER — OFFICE VISIT (OUTPATIENT)
Dept: VASCULAR SURGERY | Facility: CLINIC | Age: 47
End: 2017-09-18
Payer: COMMERCIAL

## 2017-09-18 DIAGNOSIS — Z53.9 ERRONEOUS ENCOUNTER--DISREGARD: Primary | ICD-10-CM

## 2017-09-18 PROCEDURE — 93971 EXTREMITY STUDY: CPT | Performed by: SURGERY

## 2017-09-18 PROCEDURE — 99203 OFFICE O/P NEW LOW 30 MIN: CPT | Performed by: SURGERY

## 2017-09-18 NOTE — MR AVS SNAPSHOT
After Visit Summary   9/18/2017    Olga Lidia Harley    MRN: [id number]           Patient Information     Date Of Birth          1970        Visit Information        Provider Department      9/18/2017 3:30 PM Cosme Osborne MD [facility]      Today's Diagnoses     ERRONEOUS ENCOUNTER--DISREGARD    -  1       Follow-ups after your visit        Your next 10 appointments already scheduled     Apr 16, 2018  2:00 PM CDT   [facility] Jessie Perdomo MD   [facility] ([facility])    [address]  [facility] [id number] [phone]              Who to contact     If you have questions or need follow up information about today's clinic visit or your schedule please contact [facility] directly at [phone].  Normal or non-critical lab and imaging results will be communicated to you by [email or web address]hart, letter or phone within 4 business days after the clinic has received the results. If you do not hear from us within 7 days, please contact the clinic through [email or web address] or phone. If you have a critical or abnormal lab result, we will notify you by phone as soon as possible.  Submit refill requests through [employer] or call your pharmacy and they will forward the refill request to us. Please allow 3 business days for your refill to be completed.          Additional Information About Your Visit        MyChart Information     [employer] gives you secure access to your electronic health record. If you see a primary care provider, you can also send messages to your care team and make appointments. If you have questions, please call your primary care clinic.  If you do not have a primary care provider, please call [phone] and they will assist you.        Care EveryWhere ID     This is your Care EveryWhere ID. This could be used by other  organizations to access your [place] medical records  [id number]         Blood Pressure from Last 3 Encounters:   11/15/17 121/81   08/17/17 122/78   03/13/17 123/76    Weight from Last 3 Encounters:   11/15/17 151 lb (68.5 kg)   08/17/17 157 lb 2 oz (71.3 kg)   03/13/17 170 lb (77.1 kg)              Today, you had the following     No orders found for display         Today's Medication Changes          These changes are accurate as of 9/18/17 11:59 PM.  If you have any questions, ask your nurse or doctor.               These medicines have changed or have updated prescriptions.        Dose/Directions    ranitidine 150 MG tablet   Commonly known as:  ZANTAC   This may have changed:    - when to take this  - reasons to take this   Used for:  Cough        Dose:  150 mg   Take 1 tablet (150 mg) by mouth 2 times daily   Quantity:  60 tablet   Refills:  4                Primary Care Provider Office Phone # Fax #    Wan Anderson [phone]       [address] [place] [address]        Equal Access to Services     [facility] : Hadii carla rodríguez Soarmida, waaxda luqadaha, qaybta kaalmajamari nuñez, elana holland. So [facility] [phone].    ATENCIÓN: Si habla español, tiene a you disposición servicios gratuitos de asistencia lingüística. [facility] [phone].    We comply with applicable federal civil rights laws and [place] laws. We do not discriminate on the basis of race, color, national origin, age, disability, sex, sexual orientation, or gender identity.            Thank you!     Thank you for choosing [facility]  for your care. Our goal is always to provide you with excellent care. Hearing back from our patients is one way we can continue to improve our services. Please take a few minutes to complete the written survey that you may receive in the mail after your visit with us. Thank you!             Your Updated Medication  List - Protect others around you: Learn how to safely use, store and throw away your medicines at www.disposemymeds.org.          This list is accurate as of 9/18/17 11:59 PM.  Always use your most recent med list.                   Brand Name Dispense Instructions for use Diagnosis    aspirin 81 MG tablet     100 tablet    Take 1 tablet (81 mg) by mouth daily        atorvastatin 20 MG tablet    LIPITOR    90 tablet    Take 1 tablet (20 mg) by mouth daily    Hyperlipidemia with target LDL less than 130       BIOTIN PO           CO Q 10 PO      Take 1 tablet by mouth daily    Elevated LFTs, Hyperlipidemia, unspecified hyperlipidemia, NAFLD (nonalcoholic fatty liver disease)       flunisolide HFA 80 MCG/ACT Aers oral inhaler    AEROSPAN    2 Inhaler    Inhale 5 puffs into the lungs 2 times daily Rinse mouth after each use.    Mild persistent asthma with acute exacerbation, Cough       levalbuterol 45 MCG/ACT Inhaler    XOPENEX HFA    1 Inhaler    Inhale 2 puffs into the lungs every 6 hours as needed for shortness of breath / dyspnea or wheezing    Acute bronchitis, unspecified organism, Mild persistent asthma with acute exacerbation       MULTIVITAMIN GUMMIES ADULT Chew      Take 1 chew tab by mouth daily    Elevated LFTs, Hyperlipidemia, unspecified hyperlipidemia, NAFLD (nonalcoholic fatty liver disease)       paragard intrauterine copper      1 each by Intrauterine route once        ranitidine 150 MG tablet    ZANTAC    60 tablet    Take 1 tablet (150 mg) by mouth 2 times daily    Cough       study - Moviprep 100 G solution    IDS# [id number]    1 each    Mix according to instructions on package and take by mouth as directed.    Dysmetabolic syndrome X
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
## 2017-09-27 ENCOUNTER — MYC MEDICAL ADVICE (OUTPATIENT)
Dept: OBGYN | Facility: CLINIC | Age: 47
End: 2017-09-27

## 2017-09-27 NOTE — TELEPHONE ENCOUNTER
Annual 2/22/17. Pt has not had cycle since August 10th. Pt is spotting every day. Pt is wearing a panty liner. Little bit each day. Color of spotting is dark red with some small clots. Pt is having slight cramps. Almost like she would start a cycle but does not start. Pt has been doing this since the end of August beginning of September. Please see my chart message below. Routing to Dr. Perdomo. Please review and advise.

## 2017-09-27 NOTE — TELEPHONE ENCOUNTER
Annual 2/22/17. LMTCB       2/22/17 Patient has a paragard IUD. She is having regular menses, not overly heavy. Had one prior to that for 10 yrs so very used to it. cna't remember for sure when this one was placed but thinks maybe 4 yrs ago. Did it at [facility] and told this would take her into menopause. Having some nightsweats but nothing significant. Can't tell if cyclic before menses but seems to be more sporadic. Has just started tracking her cycles b/c never paid attention before that so will keep track and see if other sx are related.

## 2017-09-28 ENCOUNTER — RADIANT APPOINTMENT (OUTPATIENT)
Dept: MAMMOGRAPHY | Facility: CLINIC | Age: 47
End: 2017-09-28
Payer: COMMERCIAL

## 2017-09-28 DIAGNOSIS — Z12.31 VISIT FOR SCREENING MAMMOGRAM: ICD-10-CM

## 2017-09-28 PROCEDURE — G0202 SCR MAMMO BI INCL CAD: HCPCS | Mod: TC

## 2017-09-28 NOTE — TELEPHONE ENCOUNTER
This isn't uncommon at her age. Likely had an anovulatory cycle which is more likely to happen now in the next couple years as menopause nears. If paragard still in then I'd just try to wait it out a little longer and see if it resolves on own. If continues another 7-10 days then could do  10mg for 10 days. Would stop the spotting, would have a withdrawal when done with provera and hopefully would reset her.

## 2017-10-03 ENCOUNTER — MYC MEDICAL ADVICE (OUTPATIENT)
Dept: OBGYN | Facility: CLINIC | Age: 47
End: 2017-10-03

## 2017-10-03 DIAGNOSIS — N93.8 DUB (DYSFUNCTIONAL UTERINE BLEEDING): Primary | ICD-10-CM

## 2017-10-03 RX ORDER — MEDROXYPROGESTERONE ACETATE 10 MG
10 TABLET ORAL DAILY
Qty: 10 TABLET | Refills: 0 | Status: SHIPPED | OUTPATIENT
Start: 2017-10-03 | End: 2017-10-13

## 2017-10-03 NOTE — TELEPHONE ENCOUNTER
"Mychart encounter 9/27/17: \"If continues another 7-10 days then could do  10mg for 10 days.\"  Rx sent per instructions. Mychart message sent.   "

## 2017-11-02 DIAGNOSIS — R79.89 ELEVATED LFTS: Primary | ICD-10-CM

## 2017-11-07 ENCOUNTER — APPOINTMENT (OUTPATIENT)
Dept: VASCULAR SURGERY | Facility: CLINIC | Age: 47
End: 2017-11-07
Payer: COMMERCIAL

## 2017-11-07 ENCOUNTER — OFFICE VISIT (OUTPATIENT)
Dept: VASCULAR SURGERY | Facility: CLINIC | Age: 47
End: 2017-11-07
Payer: COMMERCIAL

## 2017-11-07 DIAGNOSIS — Z53.9 ERRONEOUS ENCOUNTER--DISREGARD: Primary | ICD-10-CM

## 2017-11-07 PROCEDURE — 36475 ENDOVENOUS RF 1ST VEIN: CPT | Mod: RT | Performed by: SURGERY

## 2017-11-07 PROCEDURE — S9999 SALES TAX: HCPCS | Performed by: SURGERY

## 2017-11-07 PROCEDURE — 37799 UNLISTED PX VASCULAR SURGERY: CPT | Performed by: SURGERY

## 2017-11-07 PROCEDURE — 37765 STAB PHLEB VEINS XTR 10-20: CPT | Performed by: SURGERY

## 2017-11-07 PROCEDURE — 36468 NJX SCLRSNT SPIDER VEINS: CPT | Performed by: SURGERY

## 2017-11-07 NOTE — PROGRESS NOTES
[facility]: [place]    Vascular Surgery Operative Note    PREOPERATIVE DIAGNOSIS:  #1  Symptomatic right thigh-calf varicose veinssecondary to incompetence of the accessory saphenous vein.                                                        #2  Bilateral spider telangiectasia     POSTOPERATIVE DIAGNOSIS:  Same    PROCEDURE: #1  Radiofrequency ablation right accessory saphenous vein                            #2  Stab phlebectomy right  thigh-calf-patella varicose veins                            #3   Extensive bilateral sclerotherapy    ANESTHESIA:  Tumescent anesthetic    PREOPERATIVE MEDICATIONS:  Ativan 3 mg orally-clonidine 0.1 mg orally    SURGEON:  Cosme Osborne MD    ASSISTANT:  Anya Mansfield, CST/CFA.  Assistant was required owing to challenging exposure and need for retraction.    INDICATIONS: 47-year-old patient has had painful varicosities starting in her right distal thigh and extending down to the mid calf and across the patella  This failed conservative compression treatment.  Ultrasound reveals an incompetent 5 mm excess Mario saphenous vein feeding into the surface varicosities.  This is at least 20 cm long before it penetrates the fascia going to the varicosities. Was felt to benefit from closure of the accessory saphenous vein along with stab phlebectomy.  He also has bilateral spider telangiectasias that she desired to have treated.  Risks and benefits of sclerotherapy along with the phlebectomy as were discussed again with the patient and her friend. Surface varicosities were marked with her standing.    PROCEDURE:  Sclerotherapy:  We began with the injections.  With the use of the 30-gauge needle and the Syris polarized magnified light system we injected all visible anterior and posterior thigh and calf telangiectasias.  Several in the ankle were also treated.  We used a total of 14 syringes of our undiluted 0.5% Polodacanol solution. Excellent results were noted with no  complications.  On the left leg for thigh-high compression stocking was applied.    VNUS Cloaure:  She was then brought to the procedure room.  Entire right leg and groin were prepped and draped.  Timeout was called and the sites were identified.  She was placed in reversed Trendelenburg and we identified the accessory saphenous vein from its origin to the greater saphenous vein  To it perforated the fascia joining the surface varicosities.   A lidocaine injection was performed and a needle was easily introduced under ultrasound guidance into the accessory saphenous vein at the distal one third of the thigh..  This was followed by a guidewire and a 7-Kazakh sheath.  Our closure fast catheter was selected and flushed and passed up to the junction of the accessory to greater saphenous vein which was approximately 1 cm from the saphenofemoral junction.  We slightly withdrew the catheter to ensure that it was in the accessory saphenous vein only.  Patient was placed in Trendelenburg where the position was confirmed and documented.  Under ultrasound guidance we injected tumescent solution around the catheter. We then began our treatment sessions.  The first three 7 cm segments were treated with two 20 sec treatments at 120 degree C. with good visualization of the result with ultrasound directed pressure. On the first segment after the first treatment we did withdraw 1 cm before the second treatment to help avoid heat going into the greater saphenous vein.  Sheath and catheter removed. We documented patency of the common femoral vein and greater saphenous vein upon completion.      STAB PHLEBECTOMY:  We then anesthetized the marked sites on her thigh-patella-calf.  Via 15 stepladder incisions with a micro-ophthalmic blade and a vein hook we removed all marked varicosities.  Feeding branches going to the greater saphenous vein in the proximal medial calf and the patellar feeding veins were ligated with 3-0 Vicryl  suture.  The patient tolerated this very well.    Continued his blood pressure-pulse-pulse oximeter were monitored by Liliana Marinelli CST under my direct supervision very stable.    We applied Vaseline ointment to all sites followed by ABD pads-cast roll-Ace bandage.    Patient tolerated procedure well.  She recovered her sweets and was discharged to home with her friend with appropriate  Postoperative instructions and follow-up.    We treated the right accessory saphenous vein for a length 27 cm with a treatment time of two minutes and 20 seconds.  Total of 6 mL of the lidocaine-epinephrine injectable solution was used.  Total blood and 60 mL of the tumescent was used.        Cosme Osborne MD  Dictated  11/7/2017 at 1000 hrs.

## 2017-11-07 NOTE — MR AVS SNAPSHOT
After Visit Summary   11/7/2017    Olga Lidia Harley    MRN: [id number]           Patient Information     Date Of Birth          1970        Visit Information        Provider Department      11/7/2017 9:00 AM Cosme Osborne MD [facility]      Today's Diagnoses     ERRONEOUS ENCOUNTER--DISREGARD    -  1       Follow-ups after your visit        Your next 10 appointments already scheduled     Apr 16, 2018  2:00 PM CDT   [facility] with Jessie [facility] MD   [facility] ([facility])    [address]  [facility] [id number] [phone]              Who to contact     If you have questions or need follow up information about today's clinic visit or your schedule please contact [facility] directly at [phone].  Normal or non-critical lab and imaging results will be communicated to you by Crispifyhart, letter or phone within 4 business days after the clinic has received the results. If you do not hear from us within 7 days, please contact the clinic through Crispifyhart or phone. If you have a critical or abnormal lab result, we will notify you by phone as soon as possible.  Submit refill requests through [email or web address] or call your pharmacy and they will forward the refill request to us. Please allow 3 business days for your refill to be completed.          Additional Information About Your Visit        MyChart Information     [email or web address] gives you secure access to your electronic health record. If you see a primary care provider, you can also send messages to your care team and make appointments. If you have questions, please call your primary care clinic.  If you do not have a primary care provider, please call [phone] and they will assist you.        Care EveryWhere ID     This is your Care EveryWhere ID. This could be used by other  organizations to access your [facility] medical records  [id number]         Blood Pressure from Last 3 Encounters:   11/15/17 121/81   08/17/17 122/78   03/13/17 123/76    Weight from Last 3 Encounters:   11/15/17 151 lb (68.5 kg)   08/17/17 157 lb 2 oz (71.3 kg)   03/13/17 170 lb (77.1 kg)              Today, you had the following     No orders found for display         Today's Medication Changes          These changes are accurate as of 11/7/17 11:59 PM.  If you have any questions, ask your nurse or doctor.               These medicines have changed or have updated prescriptions.        Dose/Directions    ranitidine 150 MG tablet   Commonly known as:  ZANTAC   This may have changed:    - when to take this  - reasons to take this   Used for:  Cough        Dose:  150 mg   Take 1 tablet (150 mg) by mouth 2 times daily   Quantity:  60 tablet   Refills:  4                Primary Care Provider Office Phone # Fax #    Wan Anderson [phone]       [address] [place] [id number]        Equal Access to Services     [facility] : Hadii carla rodríguez Soarmida, waaxda luqadaha, qaybta kaalmajamari nuñez, elana holland. So [facility] [phone].    ATENCIÓN: Si habla español, tiene a you disposición servicios gratuitos de asistencia lingüística. [facility] [phone].    We comply with applicable federal civil rights laws and [place] laws. We do not discriminate on the basis of race, color, national origin, age, disability, sex, sexual orientation, or gender identity.            Thank you!     Thank you for choosing [facility]  for your care. Our goal is always to provide you with excellent care. Hearing back from our patients is one way we can continue to improve our services. Please take a few minutes to complete the written survey that you may receive in the mail after your visit with us. Thank you!             Your Updated Medication  List - Protect others around you: Learn how to safely use, store and throw away your medicines at www.disposemymeds.org.          This list is accurate as of 11/7/17 11:59 PM.  Always use your most recent med list.                   Brand Name Dispense Instructions for use Diagnosis    aspirin 81 MG tablet     100 tablet    Take 1 tablet (81 mg) by mouth daily        atorvastatin 20 MG tablet    LIPITOR    90 tablet    Take 1 tablet (20 mg) by mouth daily    Hyperlipidemia with target LDL less than 130       BIOTIN PO           CO Q 10 PO      Take 1 tablet by mouth daily    Elevated LFTs, Hyperlipidemia, unspecified hyperlipidemia, NAFLD (nonalcoholic fatty liver disease)       flunisolide HFA 80 MCG/ACT Aers oral inhaler    AEROSPAN    2 Inhaler    Inhale 5 puffs into the lungs 2 times daily Rinse mouth after each use.    Mild persistent asthma with acute exacerbation, Cough       levalbuterol 45 MCG/ACT Inhaler    XOPENEX HFA    1 Inhaler    Inhale 2 puffs into the lungs every 6 hours as needed for shortness of breath / dyspnea or wheezing    Acute bronchitis, unspecified organism, Mild persistent asthma with acute exacerbation       MULTIVITAMIN GUMMIES ADULT Chew      Take 1 chew tab by mouth daily    Elevated LFTs, Hyperlipidemia, unspecified hyperlipidemia, NAFLD (nonalcoholic fatty liver disease)       paragard intrauterine copper      1 each by Intrauterine route once        ranitidine 150 MG tablet    ZANTAC    60 tablet    Take 1 tablet (150 mg) by mouth 2 times daily    Cough       study - Moviprep 100 G solution    IDS# [id number]    1 each    Mix according to instructions on package and take by mouth as directed.    Dysmetabolic syndrome X

## 2017-11-08 ENCOUNTER — TELEPHONE (OUTPATIENT)
Dept: VASCULAR SURGERY | Facility: CLINIC | Age: 47
End: 2017-11-08

## 2017-11-08 NOTE — TELEPHONE ENCOUNTER
[facility]    I called Olga Lidia Solorzano [place] after her vein surgery yesterday.  She reports that she is very comfortable  With very minimal if any discomfort.    She may remove all of her dressings tomorrow and shower but either use the Ace bandage or compression stocking until she returns to see us in two days for follow-up.  She'll call if she has any concerns.       Cosme Osborne MD

## 2017-11-10 ENCOUNTER — APPOINTMENT (OUTPATIENT)
Dept: VASCULAR SURGERY | Facility: CLINIC | Age: 47
End: 2017-11-10
Payer: COMMERCIAL

## 2017-11-10 PROCEDURE — 93971 EXTREMITY STUDY: CPT | Performed by: SURGERY

## 2017-11-10 PROCEDURE — 99207 ZZC VEINSOLUTIONS POST OPERATIVE VISIT: CPT | Performed by: SURGERY

## 2017-11-13 ENCOUNTER — HOSPITAL ENCOUNTER (OUTPATIENT)
Dept: LAB | Facility: CLINIC | Age: 47
Discharge: HOME | End: 2017-11-13
Attending: INTERNAL MEDICINE | Admitting: INTERNAL MEDICINE
Payer: COMMERCIAL

## 2017-11-13 DIAGNOSIS — R79.89 ELEVATED LFTS: ICD-10-CM

## 2017-11-13 LAB
ALBUMIN SERPL-MCNC: 3.8 G/DL (ref 3.4–5)
ALP SERPL-CCNC: 57 U/L (ref 40–150)
ALT SERPL W P-5'-P-CCNC: 71 U/L (ref 0–50)
ANION GAP SERPL CALCULATED.3IONS-SCNC: 6 MMOL/L (ref 3–14)
AST SERPL W P-5'-P-CCNC: 33 U/L (ref 0–45)
BILIRUB DIRECT SERPL-MCNC: 0.1 MG/DL (ref 0–0.2)
BILIRUB SERPL-MCNC: 0.6 MG/DL (ref 0.2–1.3)
BUN SERPL-MCNC: 18 MG/DL (ref 7–30)
CALCIUM SERPL-MCNC: 8.8 MG/DL (ref 8.5–10.1)
CHLORIDE SERPL-SCNC: 104 MMOL/L (ref 94–109)
CO2 SERPL-SCNC: 29 MMOL/L (ref 20–32)
CREAT SERPL-MCNC: 0.62 MG/DL (ref 0.52–1.04)
ERYTHROCYTE [DISTWIDTH] IN BLOOD BY AUTOMATED COUNT: 16.1 % (ref 10–15)
GFR SERPL CREATININE-BSD FRML MDRD: >90 ML/MIN/1.7M2
GLUCOSE SERPL-MCNC: 99 MG/DL (ref 70–99)
HCT VFR BLD AUTO: 36.9 % (ref 35–47)
HGB BLD-MCNC: 12.1 G/DL (ref 11.7–15.7)
INR PPP: 0.86 (ref 0.86–1.14)
MCH RBC QN AUTO: 26.7 PG (ref 26.5–33)
MCHC RBC AUTO-ENTMCNC: 32.8 G/DL (ref 31.5–36.5)
MCV RBC AUTO: 82 FL (ref 78–100)
PLATELET # BLD AUTO: 204 10E9/L (ref 150–450)
POTASSIUM SERPL-SCNC: 4 MMOL/L (ref 3.4–5.3)
PROT SERPL-MCNC: 7.2 G/DL (ref 6.8–8.8)
RBC # BLD AUTO: 4.53 10E12/L (ref 3.8–5.2)
SODIUM SERPL-SCNC: 139 MMOL/L (ref 133–144)
WBC # BLD AUTO: 6.4 10E9/L (ref 4–11)

## 2017-11-13 PROCEDURE — 80076 HEPATIC FUNCTION PANEL: CPT | Performed by: INTERNAL MEDICINE

## 2017-11-13 PROCEDURE — 80048 BASIC METABOLIC PNL TOTAL CA: CPT | Performed by: INTERNAL MEDICINE

## 2017-11-13 PROCEDURE — 85027 COMPLETE CBC AUTOMATED: CPT | Performed by: INTERNAL MEDICINE

## 2017-11-13 PROCEDURE — 36415 COLL VENOUS BLD VENIPUNCTURE: CPT | Performed by: INTERNAL MEDICINE

## 2017-11-13 PROCEDURE — 85610 PROTHROMBIN TIME: CPT | Performed by: INTERNAL MEDICINE

## 2017-11-15 ENCOUNTER — OFFICE VISIT (OUTPATIENT)
Dept: GASTROENTEROLOGY | Facility: CLINIC | Age: 47
End: 2017-11-15
Attending: INTERNAL MEDICINE
Payer: COMMERCIAL

## 2017-11-15 VITALS
HEIGHT: 64 IN | DIASTOLIC BLOOD PRESSURE: 81 MMHG | HEART RATE: 69 BPM | RESPIRATION RATE: 20 BRPM | TEMPERATURE: 98.2 F | SYSTOLIC BLOOD PRESSURE: 121 MMHG | OXYGEN SATURATION: 95 % | BODY MASS INDEX: 25.78 KG/M2 | WEIGHT: 151 LBS

## 2017-11-15 DIAGNOSIS — K76.0 NAFLD (NONALCOHOLIC FATTY LIVER DISEASE): Primary | ICD-10-CM

## 2017-11-15 PROCEDURE — 99212 OFFICE O/P EST SF 10 MIN: CPT | Mod: ZF

## 2017-11-15 ASSESSMENT — PAIN SCALES - GENERAL: PAINLEVEL: NO PAIN (0)

## 2017-11-15 NOTE — PROGRESS NOTES
[facility]    Hepatology follow-up    Follow-up for fatty liver disease     HPI:  47 year old female    Ms. Jeanine Harley is seen today in follow-up for abnormal liver function tests/fatty liver disease.  Last clinic visit Oct 2016.  She recently completed Dr. Child' metabolic syndrome study with using fecal microbial transplantation.      She states that she continues to feel well after the study and, whether it is related or not, has continued to lose weight.  Her peak weight the end of December beginning of 01/2016 was 172lbs and today in clinic, she weighs ~151lbs.  She said that she has done this primarily through carbohydrate restriction and some caloric restriction as well.      She does note that she has substantially cut out alcoholic beverages from her diet, although she just recently went on a trip to [place] and [place] for which she did increase her alcohol intake substantially.      She otherwise specifically denies any jaundice, abdominal distention, lower extremity edema, lethargy or confusion.      She has not had any overt GI bleeding. She specifically denies any melena, hematemesis or hematochezia.      She denied any systemic symptoms and specifically denies any fevers, chills, sweats or unintentional weight loss.        Medical hx Surgical hx   Past Medical History:   Diagnosis Date     Hyperlipidemia LDL goal < 130      Hypertension goal BP (blood pressure) < 140/90      Insulin resistance      IUD (intrauterine device) in place ??2012 or 2013     Obesity      Uncomplicated asthma       Past Surgical History:   Procedure Laterality Date     COLONOSCOPY N/A 3/13/2017    Procedure: COLONOSCOPY;  Surgeon: Tato Child MD;  Location: [facility]     NO HISTORY OF SURGERY            Medications  Prior to Admission medications    Medication Sig Start Date End Date Taking? Authorizing Provider   NIACIN CR PO    Yes Reported, Patient   Barberry-Oreg Grape-Goldenseal  "(BERBERINE COMPLEX PO)    Yes Reported, Patient   losartan (COZAAR) 50 MG tablet TAKE ONE TABLET BY MOUTH EVERY DAY 8/24/17  Yes Wan Anderson MD   atorvastatin (LIPITOR) 20 MG tablet Take 1 tablet (20 mg) by mouth daily 3/15/17  Yes Wan Anderson MD   paragard intrauterine copper 1 each by Intrauterine route once   Yes Reported, Patient   amLODIPine (NORVASC) 5 MG tablet Take 1 tablet (5 mg) by mouth daily 11/2/16  Yes Wan Anderson MD   Multiple Vitamins-Minerals (MULTIVITAMIN GUMMIES ADULT) CHEW Take 1 chew tab by mouth daily   Yes Reported, Patient   Coenzyme Q10 (CO Q 10 PO) Take 1 tablet by mouth daily   Yes Reported, Patient   BIOTIN PO     Reported, Patient   study - Moviprep (IDS# [id number]) 100 G solution Mix according to instructions on package and take by mouth as directed.  Patient not taking: Reported on 11/15/2017 2/21/17   Tato Child MD   levalbuterol (XOPENEX HFA) 45 MCG/ACT Inhaler Inhale 2 puffs into the lungs every 6 hours as needed for shortness of breath / dyspnea or wheezing  Patient not taking: Reported on 11/15/2017 1/2/17   Kianna Camargo PA-C   Flunisolide HFA (AEROSPAN) 80 MCG/ACT AERS Inhale 5 puffs into the lungs 2 times daily Rinse mouth after each use.  Patient not taking: Reported on 11/15/2017 1/2/17   Kianna Camargo PA-C   ranitidine (ZANTAC) 150 MG tablet Take 1 tablet (150 mg) by mouth 2 times daily  Patient not taking: Reported on 11/15/2017 2/10/16   Wan Anderson MD   aspirin 81 MG tablet Take 1 tablet (81 mg) by mouth daily 10/10/13   Wan Anderson MD       Allergies  No Known Allergies    Review of systems  A 10-point review of systems was negative.    Examination  /81  Pulse 69  Temp 98.2  F (36.8  C)  Resp 20  Ht 1.626 m (5' 4\")  Wt 68.5 kg (151 lb)  SpO2 95%  Breastfeeding? No  BMI 25.92 kg/m2  Body mass index is 25.92 kg/(m^2).    Gen- well, NAD, A+Ox3, normal color  Eye- EOMI  ENT- MMM, normal " oropharynx  Lym- no palpable lymphadenopathy  CVS- S1, S2 normal, no added sounds, RRR  RS- CTA  Abd- soft, NT, ND, BS+ no palpable organomegaly  Extr- pulses good, no CHIKI  MS- hands normal- no clubbing  Neuro- A+Ox3, no asterixis  Skin- no rash or jaundice  Psych- normal mood    Laboratory  Lab Results   Component Value Date     11/13/2017    POTASSIUM 4.0 11/13/2017    CHLORIDE 104 11/13/2017    CO2 29 11/13/2017    BUN 18 11/13/2017    CR 0.62 11/13/2017       Lab Results   Component Value Date    BILITOTAL 0.6 11/13/2017    ALT 71 11/13/2017    AST 33 11/13/2017    ALKPHOS 57 11/13/2017       Lab Results   Component Value Date    ALBUMIN 3.8 11/13/2017    PROTTOTAL 7.2 11/13/2017        Lab Results   Component Value Date    WBC 6.4 11/13/2017    HGB 12.1 11/13/2017    MCV 82 11/13/2017     11/13/2017       Lab Results   Component Value Date    INR 0.86 11/13/2017       Radiology   Nil recent    Assessment/Plan:  Ms Diaz is a 47-year-old female with fatty liver disease, likely predominantly due to nonalcoholic steatohepatitis and some contribution from excess alcohol intake as well who presents to clinic for 6 month follow-up.  She is overall doing well but does have a mild ALT elevation today which is most likely due to her very recent increased alcohol use.  She will has since discontinue alcohol and is making good efforts at ongoing weight loss and carbohydrate restriction.      She is also counseled to increase her exercise as much as possible and that there is some limited data on the use of up to 4 regular cups of coffee daily and continued efforts at approximately 5-7 more pounds weight loss should put her within an ideal BMI range.      We will obtain labs in 6 months with sobriety and see her back in clinic in approximately one years time     This patient was seen and discussed with Dr. Santos.       Tre Alejo  Hepatology Fellow    Physician Attestation  I, Chaitanya Santos, saw this  patient with the fellow and agree with the fellow s findings and plan of care as documented in the fellow s note.  I personally reviewed vital signs, medications and labs.    Chaitanya Santos  Date of Service (when I saw the patient): Nov 15, 2017

## 2017-11-15 NOTE — NURSING NOTE
"Chief Complaint   Patient presents with     RECHECK     Annual Follow up        Initial /81  Pulse 69  Temp 98.2  F (36.8  C)  Resp 20  Ht 1.626 m (5' 4\")  Wt 68.5 kg (151 lb)  SpO2 95%  Breastfeeding? No  BMI 25.92 kg/m2 Estimated body mass index is 25.92 kg/(m^2) as calculated from the following:    Height as of this encounter: 1.626 m (5' 4\").    Weight as of this encounter: 68.5 kg (151 lb).  Medication Reconciliation: complete    "

## 2017-11-15 NOTE — MR AVS SNAPSHOT
"              After Visit Summary   11/15/2017    Olga Lidia Harley    MRN: [id number]           Patient Information     Date Of Birth          1970        Visit Information        Provider Department      11/15/2017 11:20 AM Chaitanya Carlton MD [facility] Hepatology        Today's Diagnoses     NAFLD (nonalcoholic fatty liver disease)    -  1       Follow-ups after your visit        Follow-up notes from your care team     Return in about 1 year (around 11/15/2018).      Who to contact     If you have questions or need follow up information about today's clinic visit or your schedule please contact [facility] HEPATOLOGY directly at [phone].  Normal or non-critical lab and imaging results will be communicated to you by MyChart, letter or phone within 4 business days after the clinic has received the results. If you do not hear from us within 7 days, please contact the clinic through [employer] or phone. If you have a critical or abnormal lab result, we will notify you by phone as soon as possible.  Submit refill requests through [employer] or call your pharmacy and they will forward the refill request to us. Please allow 3 business days for your refill to be completed.          Additional Information About Your Visit        MyChart Information     [employer] gives you secure access to your electronic health record. If you see a primary care provider, you can also send messages to your care team and make appointments. If you have questions, please call your primary care clinic.  If you do not have a primary care provider, please call [phone] and they will assist you.        Care EveryWhere ID     This is your Care EveryWhere ID. This could be used by other organizations to access your [place] medical records  [id number]        Your Vitals Were     Pulse Temperature Respirations Height Pulse Oximetry Breastfeeding?    69 98.2  F (36.8  C) 20 1.626 m (5' 4\") 95% No    BMI (Body Mass Index)             "       25.92 kg/m2            Blood Pressure from Last 3 Encounters:   11/15/17 121/81   08/17/17 122/78   03/13/17 123/76    Weight from Last 3 Encounters:   11/15/17 68.5 kg (151 lb)   08/17/17 71.3 kg (157 lb 2 oz)   03/13/17 77.1 kg (170 lb)              Today, you had the following     No orders found for display       Primary Care Provider Office Phone # Fax #    Wan Anderson [phone]       [address] [facility] [address] [facility] [address]        Equal Access to Services     [facility]: Hadii carla dobson hadasho Soarmida, waaxda luqadaha, qaybta kaalmada adetuyet, elana parham . So [facility] [phone].    ATENCIÓN: Si habla español, tiene a you disposición servicios gratuitos de asistencia lingüística. [facility] [phone].    We comply with applicable federal civil rights laws and [place] laws. We do not discriminate on the basis of race, color, national origin, age, disability, sex, sexual orientation, or gender identity.            Thank you!     Thank you for choosing [facility] HEPATOLOGY  for your care. Our goal is always to provide you with excellent care. Hearing back from our patients is one way we can continue to improve our services. Please take a few minutes to complete the written survey that you may receive in the mail after your visit with us. Thank you!             Your Updated Medication List - Protect others around you: Learn how to safely use, store and throw away your medicines at www.disposemymeds.org.          This list is accurate as of: 11/15/17 11:59 PM.  Always use your most recent med list.                   Brand Name Dispense Instructions for use Diagnosis    amLODIPine 5 MG tablet    NORVASC    90 tablet    Take 1 tablet (5 mg) by mouth daily    PVC's (premature ventricular contractions)       aspirin 81 MG tablet     100 tablet    Take 1 tablet (81 mg) by mouth daily        atorvastatin 20 MG tablet    LIPITOR    90 tablet    Take 1  tablet (20 mg) by mouth daily    Hyperlipidemia with target LDL less than 130       BERBERINE COMPLEX PO           BIOTIN PO           CO Q 10 PO      Take 1 tablet by mouth daily    Elevated LFTs, Hyperlipidemia, unspecified hyperlipidemia, NAFLD (nonalcoholic fatty liver disease)       flunisolide HFA 80 MCG/ACT Aers oral inhaler    AEROSPAN    2 Inhaler    Inhale 5 puffs into the lungs 2 times daily Rinse mouth after each use.    Mild persistent asthma with acute exacerbation, Cough       levalbuterol 45 MCG/ACT Inhaler    XOPENEX HFA    1 Inhaler    Inhale 2 puffs into the lungs every 6 hours as needed for shortness of breath / dyspnea or wheezing    Acute bronchitis, unspecified organism, Mild persistent asthma with acute exacerbation       losartan 50 MG tablet    COZAAR    90 tablet    TAKE ONE TABLET BY MOUTH EVERY DAY    Essential hypertension with goal blood pressure less than 140/90       MULTIVITAMIN GUMMIES ADULT Chew      Take 1 chew tab by mouth daily    Elevated LFTs, Hyperlipidemia, unspecified hyperlipidemia, NAFLD (nonalcoholic fatty liver disease)       NIACIN CR PO           paragard intrauterine copper      1 each by Intrauterine route once        ranitidine 150 MG tablet    ZANTAC    60 tablet    Take 1 tablet (150 mg) by mouth 2 times daily    Cough       study - Moviprep 100 G solution    IDS# [id number]    1 each    Mix according to instructions on package and take by mouth as directed.    Dysmetabolic syndrome X

## 2017-11-15 NOTE — LETTER
11/15/2017       RE: Olga Lidia Harley  [address] [address]     Dear Colleague,    Thank you for referring your patient, Olga Lidia Harley, to the [facility] HEPATOLOGY at [facility]. Please see a copy of my visit note below.    [facility]    Hepatology follow-up    Follow-up for fatty liver disease     HPI:  47 year old female    Ms. Jeanine Harley is seen today in follow-up for abnormal liver function tests/fatty liver disease.  Last clinic visit Oct 2016.  She recently completed Dr. Child' metabolic syndrome study with using fecal microbial transplantation.      She states that she continues to feel well after the study and, whether it is related or not, has continued to lose weight.  Her peak weight the end of December beginning of 01/2016 was 172lbs and today in clinic, she weighs ~151lbs.  She said that she has done this primarily through carbohydrate restriction and some caloric restriction as well.      She does note that she has substantially cut out alcoholic beverages from her diet, although she just recently went on a trip to [place] and [place] for which she did increase her alcohol intake substantially.      She otherwise specifically denies any jaundice, abdominal distention, lower extremity edema, lethargy or confusion.      She has not had any overt GI bleeding. She specifically denies any melena, hematemesis or hematochezia.      She denied any systemic symptoms and specifically denies any fevers, chills, sweats or unintentional weight loss.        Medical hx Surgical hx   Past Medical History:   Diagnosis Date     Hyperlipidemia LDL goal < 130      Hypertension goal BP (blood pressure) < 140/90      Insulin resistance      IUD (intrauterine device) in place ??2012 or 2013     Obesity      Uncomplicated asthma       Past Surgical History:   Procedure Laterality Date     COLONOSCOPY N/A  3/13/2017    Procedure: COLONOSCOPY;  Surgeon: Tato Child MD;  Location:  GI     NO HISTORY OF SURGERY            Medications  Prior to Admission medications    Medication Sig Start Date End Date Taking? Authorizing Provider   NIACIN CR PO    Yes Reported, Patient   Barberry-Oreg Grape-Goldenseal (BERBERINE COMPLEX PO)    Yes Reported, Patient   losartan (COZAAR) 50 MG tablet TAKE ONE TABLET BY MOUTH EVERY DAY 8/24/17  Yes Wan Anderson MD   atorvastatin (LIPITOR) 20 MG tablet Take 1 tablet (20 mg) by mouth daily 3/15/17  Yes Wan Anderson MD   paragard intrauterine copper 1 each by Intrauterine route once   Yes Reported, Patient   amLODIPine (NORVASC) 5 MG tablet Take 1 tablet (5 mg) by mouth daily 11/2/16  Yes Wan Anderson MD   Multiple Vitamins-Minerals (MULTIVITAMIN GUMMIES ADULT) CHEW Take 1 chew tab by mouth daily   Yes Reported, Patient   Coenzyme Q10 (CO Q 10 PO) Take 1 tablet by mouth daily   Yes Reported, Patient   BIOTIN PO     Reported, Patient   study - Moviprep (IDS# [id number]) 100 G solution Mix according to instructions on package and take by mouth as directed.  Patient not taking: Reported on 11/15/2017 2/21/17   Tato Child MD   levalbuterol (XOPENEX HFA) 45 MCG/ACT Inhaler Inhale 2 puffs into the lungs every 6 hours as needed for shortness of breath / dyspnea or wheezing  Patient not taking: Reported on 11/15/2017 1/2/17   Kianna Camargo PA-C   Flunisolide HFA (AEROSPAN) 80 MCG/ACT AERS Inhale 5 puffs into the lungs 2 times daily Rinse mouth after each use.  Patient not taking: Reported on 11/15/2017 1/2/17   Kianna Camargo PA-C   ranitidine (ZANTAC) 150 MG tablet Take 1 tablet (150 mg) by mouth 2 times daily  Patient not taking: Reported on 11/15/2017 2/10/16   Wan Anderson MD   aspirin 81 MG tablet Take 1 tablet (81 mg) by mouth daily 10/10/13   Wan Anderson MD       Allergies  No Known Allergies    Review of systems  A 10-point  "review of systems was negative.    Examination  /81  Pulse 69  Temp 98.2  F (36.8  C)  Resp 20  Ht 1.626 m (5' 4\")  Wt 68.5 kg (151 lb)  SpO2 95%  Breastfeeding? No  BMI 25.92 kg/m2  Body mass index is 25.92 kg/(m^2).    Gen- well, NAD, A+Ox3, normal color  Eye- EOMI  ENT- MMM, normal oropharynx  Lym- no palpable lymphadenopathy  CVS- S1, S2 normal, no added sounds, RRR  RS- CTA  Abd- soft, NT, ND, BS+ no palpable organomegaly  Extr- pulses good, no CHIKI  MS- hands normal- no clubbing  Neuro- A+Ox3, no asterixis  Skin- no rash or jaundice  Psych- normal mood    Laboratory  Lab Results   Component Value Date     11/13/2017    POTASSIUM 4.0 11/13/2017    CHLORIDE 104 11/13/2017    CO2 29 11/13/2017    BUN 18 11/13/2017    CR 0.62 11/13/2017       Lab Results   Component Value Date    BILITOTAL 0.6 11/13/2017    ALT 71 11/13/2017    AST 33 11/13/2017    ALKPHOS 57 11/13/2017       Lab Results   Component Value Date    ALBUMIN 3.8 11/13/2017    PROTTOTAL 7.2 11/13/2017        Lab Results   Component Value Date    WBC 6.4 11/13/2017    HGB 12.1 11/13/2017    MCV 82 11/13/2017     11/13/2017       Lab Results   Component Value Date    INR 0.86 11/13/2017       Radiology   Nil recent    Assessment/Plan:  Ms Diaz is a 47-year-old female with fatty liver disease, likely predominantly due to nonalcoholic steatohepatitis and some contribution from excess alcohol intake as well who presents to clinic for 6 month follow-up.  She is overall doing well but does have a mild ALT elevation today which is most likely due to her very recent increased alcohol use.  She will has since discontinue alcohol and is making good efforts at ongoing weight loss and carbohydrate restriction.      She is also counseled to increase her exercise as much as possible and that there is some limited data on the use of up to 4 regular cups of coffee daily and continued efforts at approximately 5-7 more pounds weight " loss should put her within an ideal BMI range.      We will obtain labs in 6 months with sobriety and see her back in clinic in approximately one years time     This patient was seen and discussed with Dr. Santos.       Tre Alejo  Hepatology Fellow    Physician Attestation  I, Chaitanya Santos, saw this patient with the fellow and agree with the fellow s findings and plan of care as documented in the fellow s note.  I personally reviewed vital signs, medications and labs.    Chaitanya Santos  Date of Service (when I saw the patient): Nov 15, 2017

## 2017-12-06 DIAGNOSIS — I10 ESSENTIAL HYPERTENSION WITH GOAL BLOOD PRESSURE LESS THAN 140/90: ICD-10-CM

## 2017-12-06 NOTE — TELEPHONE ENCOUNTER
losartan (COZAAR) 50 MG tablet    0 ordered  Edit     Summary: TAKE ONE TABLET BY MOUTH EVERY DAY, Disp-90 tablet, R-0, E-Prescribe   Start: 8/24/2017  Ord/Sold: 8/24/2017 (O)  Report  Taking:   Long-term:   Pharmacy: [facility] [place]  [place], [place] - [address]  Med Dose History       Patient Sig: TAKE ONE TABLET BY MOUTH EVERY DAY       Ordered on: 8/24/2017       Authorized by: ANAMARIA WOLFE       Dispense: 90 tablet        LOV: 8/17/2017

## 2017-12-11 RX ORDER — LOSARTAN POTASSIUM 50 MG/1
TABLET ORAL
Qty: 90 TABLET | Refills: 1 | Status: SHIPPED | OUTPATIENT
Start: 2017-12-11 | End: 2018-10-04

## 2017-12-11 NOTE — TELEPHONE ENCOUNTER
Prescription approved per [facility] Refill Protocol.  Lenora Nair,Clinic Rn  [place]

## 2017-12-18 ENCOUNTER — OFFICE VISIT (OUTPATIENT)
Dept: VASCULAR SURGERY | Facility: CLINIC | Age: 47
End: 2017-12-18

## 2017-12-18 DIAGNOSIS — Z53.9 ERRONEOUS ENCOUNTER--DISREGARD: Primary | ICD-10-CM

## 2017-12-18 PROCEDURE — S9999 SALES TAX: HCPCS | Performed by: SURGERY

## 2017-12-18 PROCEDURE — 36468 NJX SCLRSNT SPIDER VEINS: CPT | Performed by: SURGERY

## 2017-12-18 NOTE — PROGRESS NOTES
"[facility]: [place] Har[place] returns for 6 week follow-up.  She underwent closure of the right accessory saphenous vein along with stab phlebectomies on 11/7/2017.  Stab phlebectomies were performed on the thigh-calf-patella with good results.  We also did extensive bilateral sclerotherapy.    He has noticed some intermittent\" pulling\" along the course of the accessory saphenous vein.  There is no palpable cord associated with this and I expect this is the inflammation from the treatment and should gradually resolve.  Heat to this area as needed.  Discussed with patient.    All stab phlebectomy sites are healing well.  Does have a small asymptomatic palpable cord in the right distal medial thigh and proximal medial tibial region.  Overlying skin is normal.  She does not desire any treatment.    She responded overall well to her sclerotherapy.  However, she did have extensive telangiectasia.  She has noticed a new area in the mid medial thigh which is not a site that we treated for phlebectomies.  She had no complications with her sclerotherapy.      Patient wanted to have another sclerotherapy session today.  Risks and benefits were reviewed.      Procedure:   Consent was signed and timeout was called.  Using our [employer] polarize magnified light system 30-gauge needles I proceeded to treat the telangiectasia primarily on the right and left anterior thigh with undiluted 0.5% polidocanol.  We are able to access all sites with good initial response and no complications.  One larger reticular vein in the left popliteal region was treated with good results.  A total of 10 syringes were used.  She was wrapped with Ace bandages for tonight and will remove these to shower in the morning then wear her thigh-high compression stockings that she left at home during the day for the next 5-6 days.      Impression: #1 good result with closure accessory saphenous vein and stab phlebectomy.  No follow-up " duplex ultrasound is indicated for this procedure being the accessory saphenous vein at 1 year.  The cord should resolve with time.  Heat as needed to the areas.                        #2  Repeat extensive sclerotherapy.  Compression as above.  Repeat treatment if needed.      Cosme Obsorne MD   Dictated 12/18/2017

## 2017-12-18 NOTE — MR AVS SNAPSHOT
After Visit Summary   12/18/2017    Olga Lidia Harley    MRN: [id number]           Patient Information     Date Of Birth          1970        Visit Information        Provider Department      12/18/2017 3:15 PM Cosme Osborne MD [facility]      Today's Diagnoses     ERRONEOUS ENCOUNTER--DISREGARD    -  1       Follow-ups after your visit        Your next 10 appointments already scheduled     Apr 16, 2018  2:00 PM CDT   [facility] with Jessie [facility]   [facility] [place] ([facility])    [address]  [place] [place] [id number] [phone]              Who to contact     If you have questions or need follow up information about today's clinic visit or your schedule please contact [facility] directly at [phone].  Normal or non-critical lab and imaging results will be communicated to you by [employer], letter or phone within 4 business days after the clinic has received the results. If you do not hear from us within 7 days, please contact the clinic through [employer] or phone. If you have a critical or abnormal lab result, we will notify you by phone as soon as possible.  Submit refill requests through [employer] or call your pharmacy and they will forward the refill request to us. Please allow 3 business days for your refill to be completed.          Additional Information About Your Visit        MyChart Information     [employer] gives you secure access to your electronic health record. If you see a primary care provider, you can also send messages to your care team and make appointments. If you have questions, please call your primary care clinic.  If you do not have a primary care provider, please call [phone] and they will assist you.        Care EveryWhere ID     This is your Care EveryWhere ID. This could be used by other  organizations to access your [place] medical records  [id number]         Blood Pressure from Last 3 Encounters:   11/15/17 121/81   08/17/17 122/78   03/13/17 123/76    Weight from Last 3 Encounters:   11/15/17 151 lb (68.5 kg)   08/17/17 157 lb 2 oz (71.3 kg)   03/13/17 170 lb (77.1 kg)              Today, you had the following     No orders found for display       Primary Care Provider Office Phone # Fax #    Wan Anderson [phone]       [address] [facility] [address] [facility] [address]        Equal Access to Services     INOCENCIO LOVELL : Hadii carla greshamo Saeed, waaxda lubernice, qaybta kaalmada joaquin, elana holland. So [facility] [place] [facility] [phone].    ATENCIÓN: Si habla español, tiene a you disposición servicios gratuitos de asistencia lingüística. Ll[facility] [phone].    We comply with applicable federal civil rights laws and [place] laws. We do not discriminate on the basis of race, color, national origin, age, disability, sex, sexual orientation, or gender identity.            Thank you!     Thank you for choosing [facility]  for your care. Our goal is always to provide you with excellent care. Hearing back from our patients is one way we can continue to improve our services. Please take a few minutes to complete the written survey that you may receive in the mail after your visit with us. Thank you!             Your Updated Medication List - Protect others around you: Learn how to safely use, store and throw away your medicines at www.disposemymeds.org.          This list is accurate as of 12/18/17 11:59 PM.  Always use your most recent med list.                   Brand Name Dispense Instructions for use Diagnosis    aspirin 81 MG tablet     100 tablet    Take 1 tablet (81 mg) by mouth daily        atorvastatin 20 MG tablet    LIPITOR    90 tablet    Take 1 tablet (20 mg) by mouth daily    Hyperlipidemia with target LDL less than 130        BERBERINE COMPLEX PO           BIOTIN PO           CO Q 10 PO      Take 1 tablet by mouth daily    Elevated LFTs, Hyperlipidemia, unspecified hyperlipidemia, NAFLD (nonalcoholic fatty liver disease)       flunisolide HFA 80 MCG/ACT Aers oral inhaler    AEROSPAN    2 Inhaler    Inhale 5 puffs into the lungs 2 times daily Rinse mouth after each use.    Mild persistent asthma with acute exacerbation, Cough       levalbuterol 45 MCG/ACT Inhaler    XOPENEX HFA    1 Inhaler    Inhale 2 puffs into the lungs every 6 hours as needed for shortness of breath / dyspnea or wheezing    Acute bronchitis, unspecified organism, Mild persistent asthma with acute exacerbation       losartan 50 MG tablet    COZAAR    90 tablet    TAKE ONE TABLET BY MOUTH EVERY DAY    Essential hypertension with goal blood pressure less than 140/90       MULTIVITAMIN GUMMIES ADULT Chew      Take 1 chew tab by mouth daily    Elevated LFTs, Hyperlipidemia, unspecified hyperlipidemia, NAFLD (nonalcoholic fatty liver disease)       NIACIN CR PO           paragard intrauterine copper      1 each by Intrauterine route once        ranitidine 150 MG tablet    ZANTAC    60 tablet    Take 1 tablet (150 mg) by mouth 2 times daily    Cough       study - Moviprep 100 G solution    IDS# [id number]    1 each    Mix according to instructions on package and take by mouth as directed.    Dysmetabolic syndrome X

## 2017-12-19 DIAGNOSIS — I49.3 PVC'S (PREMATURE VENTRICULAR CONTRACTIONS): ICD-10-CM

## 2017-12-19 NOTE — TELEPHONE ENCOUNTER
Requested Prescriptions   Pending Prescriptions Disp Refills     amLODIPine (NORVASC) 5 MG tablet [Pharmacy Med Name: AMLODIPINE BESYLATE 5MG TABS]  Last Written Prescription Date:  11/2/2016  Last Fill Quantity: 90 tablet,  # refills: 3   Last Office Visit with [facility], [facility] or [facility] prescribing provider:  8/17/2017     Future Office Visit:      90 tablet 3     Sig: TAKE ONE TABLET BY MOUTH ONCE DAILY    Calcium Channel Blockers Protocol  Passed    12/19/2017  1:55 PM       Passed - Blood pressure under 140/90    BP Readings from Last 3 Encounters:   11/15/17 121/81   08/17/17 122/78   03/13/17 123/76                Passed - Recent or future visit with authorizing provider    Patient had office visit in the last year or has a visit in the next 30 days with authorizing provider.  See chart review.              Passed - Patient is age 18 or older       Passed - No active pregnancy on record       Passed - Normal serum creatinine on file in past 12 months    Recent Labs   Lab Test  11/13/17   0540   CR  0.62            Passed - No positive pregnancy test in past 12 months

## 2017-12-28 RX ORDER — AMLODIPINE BESYLATE 5 MG/1
TABLET ORAL
Qty: 90 TABLET | Refills: 3 | Status: SHIPPED | OUTPATIENT
Start: 2017-12-28 | End: 2018-10-04

## 2017-12-28 NOTE — TELEPHONE ENCOUNTER
Prescription approved per [facility] Refill Protocol.    Romaine Cuello RN

## 2018-01-18 ENCOUNTER — TRANSFERRED RECORDS (OUTPATIENT)
Dept: HEALTH INFORMATION MANAGEMENT | Facility: CLINIC | Age: 48
End: 2018-01-18

## 2018-02-20 ENCOUNTER — THERAPY VISIT (OUTPATIENT)
Dept: CHIROPRACTIC MEDICINE | Facility: CLINIC | Age: 48
End: 2018-02-20
Payer: COMMERCIAL

## 2018-02-20 DIAGNOSIS — M62.838 SPASM OF MUSCLE: ICD-10-CM

## 2018-02-20 DIAGNOSIS — M54.50 LUMBAGO: ICD-10-CM

## 2018-02-20 DIAGNOSIS — M99.05 SEGMENTAL DYSFUNCTION OF PELVIC REGION: Primary | ICD-10-CM

## 2018-02-20 DIAGNOSIS — M99.03 SEGMENTAL DYSFUNCTION OF LUMBAR REGION: ICD-10-CM

## 2018-02-20 PROCEDURE — 99203 OFFICE O/P NEW LOW 30 MIN: CPT | Mod: 25 | Performed by: CHIROPRACTOR

## 2018-02-20 PROCEDURE — 98940 CHIROPRACT MANJ 1-2 REGIONS: CPT | Mod: AT | Performed by: CHIROPRACTOR

## 2018-02-20 PROCEDURE — 97810 ACUP 1/> WO ESTIM 1ST 15 MIN: CPT | Performed by: CHIROPRACTOR

## 2018-02-20 PROCEDURE — 97110 THERAPEUTIC EXERCISES: CPT | Performed by: CHIROPRACTOR

## 2018-02-20 NOTE — PROGRESS NOTES
Initial Chiropractic Clinic Visit    PCP: Wan Anderson    Olga Lidia Harley is a 47 year old female who is seen  as a self referral presenting with low back pain. Patient reports that the onset was on 2/16/2018 after bending forward and twisting.  When asked, patient denies:, falling, slipping or sleeping awkwardly.  Prior to onset, the patient was able to draw blood for labs at a hospital. Patient notes that due to symptoms, they can only stand for 10-15 minutes. Olga Lidia Harley notes low back pain rated at a 3/10 and  prior to this onset it was 0/10.  Olga Lidia mentions that she has a disc herniation and that she has a scoliosis.  She has been dealing with back pain for the past 15 years off and on.    Injury: bending over drawing blood for lab    Location of Pain: bilateral, lower back at the following level(s) L1, L2, L3 , L4 , L5  and Sacrum   Duration of Pain: 5 day(s)  Rating of Pain at worst: 8/10  Rating of Pain Currently: 3/10  Symptoms are better with: Ice, Ibuprofen and Rest  Symptoms are worse with: flexion, sitting, standing and stairs  Additional Features: weakness     Health History  as reported by the patient:    How does the patient rate their own health:   Fair    Current or past medical history:   Anemia, High blood pressure, Migraines/headaches, Overweight and Pain at night/rest    Medical allergies  None    Past Traumas/Surgeries  Other:  vericose vein removal    Family History  This patient has no significant family history    Medications:  Anti-inflammatory and High blood pressure    Occupation:      Primary job tasks:   Prolonged standing and Repetitive tasks    Barriers as home/work:   bathroom/bedroom not on first floor and stairs    Additional health Issues:     Herniated disc L4-L5 about 15 years ago.           Olga Lidia was asked to complete the Oswestry Low Back Disability Index and Luz Maria Start Back screening tool, today in the office.  Disability score: 42.22%.  "  Luz Maria Start Total Score:5 Sub Score: 1     Review of Systems  Musculoskeletal: as above  Remainder of review of systems is negative including constitutional, CV, pulmonary, GI, Skin and Neurologic except as noted in HPI or medical history.    Past Medical History:   Diagnosis Date     Hyperlipidemia LDL goal < 130      Hypertension goal BP (blood pressure) < 140/90      Insulin resistance      IUD (intrauterine device) in place ??2012 or 2013    Paragard     Obesity      Uncomplicated asthma      Past Surgical History:   Procedure Laterality Date     COLONOSCOPY N/A 3/13/2017    Procedure: COLONOSCOPY;  Surgeon: Tato Child MD;  Location: [facility]     NO HISTORY OF SURGERY       Objective  There were no vitals taken for this visit.      GENERAL APPEARANCE: healthy, alert and no distress   GAIT: NORMAL  SKIN: no suspicious lesions or rashes  NEURO: Normal strength and tone, mentation intact and speech normal  PSYCH:  mentation appears normal and affect normal/bright    Low back exam:    Inspection:  \"     no visible deformity in the low back       normal skin\",    ROM:       limited flexion due to pain       limited extension due to pain    Tender:       paraspinal muscles    Non Tender:       remainder of lumbar spine    Strength:       hip flexion 5/5 Normal       knee extension 5/5 Normal       ankle dorsiflexion 5/5 Normal       ankle plantarflexion 5/5 Normal       dorsiflexion of the great toe 5/5 Normal    Reflexes:       patellar (L3, L4) 2 bilaterally    Sensation:      grossly intact throughout lower extremities    Special tests:  SLR - Right negative and Left negative, Fabere - Left positive, Yeoman's - Right negative and Left negative, Anish - Right negative and Left negative and Ely's - Right negative and Left negative    Segmental spinal dysfunction/restrictions found at::  L4 Right rotation restricted  L5 Right rotation restricted  PSIS Right Extension restriction.    The following soft tissue " hypotonicities were observed:Piriformis: left, referred pain: no    Trigger points were found in:Piriformis    Muscle spasm found in:Piriformis      Radiology:      Assessment:    1. Segmental dysfunction of pelvic region    2. Lumbago    3. Segmental dysfunction of lumbar region    4. Spasm of muscle        RX ordered/plan of care  Anticipated outcomes  Possible risks and side effects    After discussing the risk and benefits of care, patient consented to treatment    Prognosis: Good      Patient's condition:  Patient had restrictions pre-manipulation    Treatment effectiveness:  Post manipulation there is better intersegmental movement and Patient claims to feel looser post manipulation      Plan:    Procedures:  Evaluation and Management:  78291 Moderate level exam 30 min    CMT:  04939 Chiropractic manipulative treatment 1-2 regions performed   Lumbar: Activator, L4, L5, Prone  Pelvis: Activator, PSIS Right , Prone    Modalities:  05965: Heat:   For 5 min to Piriformis  41490: MSTM:  To Piriformis  for 5 min  85443: Acupuncture, for 15 minutes:  Points: B25, B27, GV3, K3, B62, SI3  For 15 minutes    Therapeutic procedures:  76589: Therapeutic Exercises  The following were demonstrated and practiced:   Stretches - Reviewed stretches today.  Crossed leg piriformis seated  Crossed leg piriformis supine  Standing hamstring stretch    Response to Treatment  Patient is feeling a little stiff and sore from lying on her stomach.  No pain radiating down leg.      Treatment plan and goals:  Goals:  Would like to be able to stand and work for 8 hours    Frequency of care  Duration of care is estimated to be 8 weeks, from the initial treatment.  It is estimated that the patient will need a total of 8 visits to resolve this episode.  For the initial therapeutic trial of care, the frequency is recommended at 1 X week, once daily.  A reevaluation would be clinically appropriate in 8 visits, to determine progress and further  course of care.    In-Office Treatment  Evaluation  Spinal Chiropractic Manipulative Therapy:    Modalities: Heat and MSTM  Acupuncture:    Therapeutic exercises:          Recommendations:    Instructions:ice 20 minutes every other hour as needed and stretch as instructed at visit    Follow-up:  Return to care in one week.       Discussed the assessment with the patient.      Disclaimer: This note consists of symbols derived from keyboarding, dictation and/or voice recognition software. As a result, there may be errors in the script that have gone undetected. Please consider this when interpreting information found in this chart.

## 2018-03-19 ENCOUNTER — MYC MEDICAL ADVICE (OUTPATIENT)
Dept: FAMILY MEDICINE | Facility: CLINIC | Age: 48
End: 2018-03-19

## 2018-04-16 ENCOUNTER — OFFICE VISIT (OUTPATIENT)
Dept: OBGYN | Facility: CLINIC | Age: 48
End: 2018-04-16
Payer: COMMERCIAL

## 2018-04-16 VITALS
BODY MASS INDEX: 25.27 KG/M2 | WEIGHT: 148 LBS | HEART RATE: 74 BPM | SYSTOLIC BLOOD PRESSURE: 108 MMHG | HEIGHT: 64 IN | DIASTOLIC BLOOD PRESSURE: 64 MMHG

## 2018-04-16 DIAGNOSIS — Z11.3 SCREEN FOR STD (SEXUALLY TRANSMITTED DISEASE): ICD-10-CM

## 2018-04-16 DIAGNOSIS — R61 NIGHT SWEATS: ICD-10-CM

## 2018-04-16 DIAGNOSIS — N92.6 IRREGULAR MENSES: ICD-10-CM

## 2018-04-16 DIAGNOSIS — Z11.8 SCREENING FOR CHLAMYDIAL DISEASE: ICD-10-CM

## 2018-04-16 DIAGNOSIS — L65.9 HAIR LOSS: ICD-10-CM

## 2018-04-16 DIAGNOSIS — E78.5 HYPERLIPIDEMIA WITH TARGET LDL LESS THAN 130: ICD-10-CM

## 2018-04-16 DIAGNOSIS — Z13.6 SCREENING FOR CARDIOVASCULAR CONDITION: ICD-10-CM

## 2018-04-16 DIAGNOSIS — Z11.4 SCREENING FOR HUMAN IMMUNODEFICIENCY VIRUS: ICD-10-CM

## 2018-04-16 DIAGNOSIS — Z01.419 ENCOUNTER FOR GYNECOLOGICAL EXAMINATION WITHOUT ABNORMAL FINDING: Primary | ICD-10-CM

## 2018-04-16 DIAGNOSIS — Z97.5 PRESENCE OF INTRAUTERINE CONTRACEPTIVE DEVICE: ICD-10-CM

## 2018-04-16 PROCEDURE — 87591 N.GONORRHOEAE DNA AMP PROB: CPT | Performed by: OBSTETRICS & GYNECOLOGY

## 2018-04-16 PROCEDURE — 99396 PREV VISIT EST AGE 40-64: CPT | Performed by: OBSTETRICS & GYNECOLOGY

## 2018-04-16 PROCEDURE — 86780 TREPONEMA PALLIDUM: CPT | Performed by: OBSTETRICS & GYNECOLOGY

## 2018-04-16 PROCEDURE — 87389 HIV-1 AG W/HIV-1&-2 AB AG IA: CPT | Performed by: OBSTETRICS & GYNECOLOGY

## 2018-04-16 PROCEDURE — 86695 HERPES SIMPLEX TYPE 1 TEST: CPT | Performed by: OBSTETRICS & GYNECOLOGY

## 2018-04-16 PROCEDURE — 36415 COLL VENOUS BLD VENIPUNCTURE: CPT | Performed by: OBSTETRICS & GYNECOLOGY

## 2018-04-16 PROCEDURE — 87491 CHLMYD TRACH DNA AMP PROBE: CPT | Performed by: OBSTETRICS & GYNECOLOGY

## 2018-04-16 PROCEDURE — 86696 HERPES SIMPLEX TYPE 2 TEST: CPT | Performed by: OBSTETRICS & GYNECOLOGY

## 2018-04-16 ASSESSMENT — ANXIETY QUESTIONNAIRES
7. FEELING AFRAID AS IF SOMETHING AWFUL MIGHT HAPPEN: NOT AT ALL
5. BEING SO RESTLESS THAT IT IS HARD TO SIT STILL: NOT AT ALL
1. FEELING NERVOUS, ANXIOUS, OR ON EDGE: NOT AT ALL
3. WORRYING TOO MUCH ABOUT DIFFERENT THINGS: NOT AT ALL
2. NOT BEING ABLE TO STOP OR CONTROL WORRYING: SEVERAL DAYS
6. BECOMING EASILY ANNOYED OR IRRITABLE: NOT AT ALL
IF YOU CHECKED OFF ANY PROBLEMS ON THIS QUESTIONNAIRE, HOW DIFFICULT HAVE THESE PROBLEMS MADE IT FOR YOU TO DO YOUR WORK, TAKE CARE OF THINGS AT HOME, OR GET ALONG WITH OTHER PEOPLE: NOT DIFFICULT AT ALL
GAD7 TOTAL SCORE: 1

## 2018-04-16 ASSESSMENT — PATIENT HEALTH QUESTIONNAIRE - PHQ9: 5. POOR APPETITE OR OVEREATING: NOT AT ALL

## 2018-04-16 NOTE — MR AVS SNAPSHOT
After Visit Summary   4/16/2018    Olga Lidia Harley    MRN: [id number]           Patient Information     Date Of Birth          1970        Visit Information        Provider Department      4/16/2018 2:00 PM Jessie Perdomo MD [facility]        Today's Diagnoses     Encounter for gynecological examination without abnormal finding    -  1    Irregular menses        Night sweats        Hair loss        Presence of intrauterine contraceptive device        Hyperlipidemia with target LDL less than 130        Screening for cardiovascular condition        Screening for chlamydial disease        Screen for STD (sexually transmitted disease)        Screening for human immunodeficiency virus           Follow-ups after your visit        Future tests that were ordered for you today     Open Future Orders        Priority Expected Expires Ordered    Lipid panel reflex to direct LDL Fasting Routine  4/16/2019 4/16/2018            Who to contact     If you have questions or need follow up information about today's clinic visit or your schedule please contact [facility] directly at [phone].  Normal or non-critical lab and imaging results will be communicated to you by MyChart, letter or phone within 4 business days after the clinic has received the results. If you do not hear from us within 7 days, please contact the clinic through [employer] or phone. If you have a critical or abnormal lab result, we will notify you by phone as soon as possible.  Submit refill requests through [employer] or call your pharmacy and they will forward the refill request to us. Please allow 3 business days for your refill to be completed.          Additional Information About Your Visit        [employer] Information     [employer] gives you secure access to your electronic health record. If you see a primary care provider, you can also send messages to your care team and make appointments. If  "you have questions, please call your primary care clinic.  If you do not have a primary care provider, please call [phone] and they will assist you.        Care EveryWhere ID     This is your Care EveryWhere ID. This could be used by other organizations to access your [place] medical records  [id number]        Your Vitals Were     Pulse Height Last Period BMI (Body Mass Index)          74 5' 4\" (1.626 m) 04/07/2018 (Approximate) 25.4 kg/m2         Blood Pressure from Last 3 Encounters:   04/16/18 108/64   11/15/17 121/81   08/17/17 122/78    Weight from Last 3 Encounters:   04/16/18 148 lb (67.1 kg)   11/15/17 151 lb (68.5 kg)   08/17/17 157 lb 2 oz (71.3 kg)              We Performed the Following     Anti Treponema     Chlamydia trachomatis PCR     Herpes Simplex Virus 1 and 2 IgG     HIV Antigen Antibody Combo     Neisseria gonorrhoeae PCR        Primary Care Provider Office Phone # Fax #    Wan Jermaine Anderson [phone]       [facility] [facility] [address]        Equal Access to Services     [facility] : Hadii aad ku hadasho Soomaali, waaxda luqadaha, qaybta kaalmada adeegyada, elana parham . So [facility] [phone].    ATENCIÓN: Si habla español, tiene a you disposición servicios gratuitos de asistencia lingüística. Llame al [phone].    We comply with applicable federal civil rights laws and [place] laws. We do not discriminate on the basis of race, color, national origin, age, disability, sex, sexual orientation, or gender identity.            Thank you!     Thank you for choosing [facility]  for your care. Our goal is always to provide you with excellent care. Hearing back from our patients is one way we can continue to improve our services. Please take a few minutes to complete the written survey that you may receive in the mail after your visit with us. Thank you!             Your Updated Medication List - Protect " others around you: Learn how to safely use, store and throw away your medicines at www.disposemymeds.org.          This list is accurate as of 4/16/18 11:59 PM.  Always use your most recent med list.                   Brand Name Dispense Instructions for use Diagnosis    amLODIPine 5 MG tablet    NORVASC    90 tablet    TAKE ONE TABLET BY MOUTH ONCE DAILY    PVC's (premature ventricular contractions)       aspirin 81 MG tablet     100 tablet    Take 1 tablet (81 mg) by mouth daily        atorvastatin 20 MG tablet    LIPITOR    90 tablet    Take 1 tablet (20 mg) by mouth daily    Hyperlipidemia with target LDL less than 130       BERBERINE COMPLEX PO           flunisolide HFA 80 MCG/ACT Aers oral inhaler    AEROSPAN    2 Inhaler    Inhale 5 puffs into the lungs 2 times daily Rinse mouth after each use.    Mild persistent asthma with acute exacerbation, Cough       levalbuterol 45 MCG/ACT Inhaler    XOPENEX HFA    1 Inhaler    Inhale 2 puffs into the lungs every 6 hours as needed for shortness of breath / dyspnea or wheezing    Acute bronchitis, unspecified organism, Mild persistent asthma with acute exacerbation       losartan 50 MG tablet    COZAAR    90 tablet    TAKE ONE TABLET BY MOUTH EVERY DAY    Essential hypertension with goal blood pressure less than 140/90       NIACIN CR PO           paragard intrauterine copper      1 each by Intrauterine route once

## 2018-04-16 NOTE — PROGRESS NOTES
Olga Lidia is a 47 year old  female who presents for annual exam.     Besides routine health maintenance, she has no other health concerns today .    HPI:  The patient's PCP is Wan Anderson MD, MD.    Patient is having more and more gaps in her cycles. Had one in January and then just had one last week. Not overly heavy or long but just not nearly as predictable.  Has some night sweats. Not much during the day. Some sleep disruption but tolerable. Has been going on for a long time and can definitely live with it  No vaginal dryness.    Patient is newly dating someone since . Met at a bar. He does IT. Has a 22 y.o son. Newly s.a after not having been for quite a few years. No pain or issues with that    Seeing her PCP for liipids and BP mgmt. Needs to return for a fasting lipid panel since her trigs remain high. Last year were in the low 200's so not overly high. Her LDL/HDL is fine though    Has struggled with hair thinning for a long time. Saw one derm and no help. Referred her to [facility] last year but could see her b/c only taking complicated and failed many other tx pts at this point. Saw Veronika stevens and thinks it may be from low ferritin. Doing iron since feb and will see Jose G in 1 more month for repeat. If no help will then get an rx for hair from her        GYNECOLOGIC HISTORY:    Patient's last menstrual period was 2018 (approximate).  Her current contraception method is: IUD.  She  reports that she quit smoking about 5 years ago. Her smoking use included Cigarettes. She has a 10.00 pack-year smoking history. She has never used smokeless tobacco.    Patient is sexually active.  STD testing offered?  Declined  Last PHQ-9 score on record =   PHQ-9 SCORE 2018   Total Score 1     Last GAD7 score on record =   TIMO-7 SCORE 2018   Total Score 1     Alcohol Score = 5    HEALTH MAINTENANCE:  Cholesterol: 04/10/17   Total= 168, Triglycerides=212, HDL=45, LDL=80, WHH=215 (10/21/16)  Last  Mammo: 17, Result: normal, Next Mammo: Set  2019  Pap:   Lab Results   Component Value Date    PAP NIL, HPV- 2017      Colonoscopy:  17, Result: diverticulosis, Next Colonoscopy: 1 years.  Dexa:  Never    Health maintenance updated:  yes    HISTORY:  Obstetric History       T1      L1     SAB0   TAB0   Ectopic0   Multiple0   Live Births0       # Outcome Date GA Lbr Davis/2nd Weight Sex Delivery Anes PTL Lv   3 Term 99    M       2 AB            1 AB                   Patient Active Problem List   Diagnosis     Hypertension goal BP (blood pressure) < 140/90     Hyperlipidemia with target LDL less than 130     Herniated vertebral disc     Mild persistent asthma     Gastroesophageal reflux disease without esophagitis     Bilateral low back pain without sciatica     Hip pain, right     Presence of intrauterine contraceptive device     Past Surgical History:   Procedure Laterality Date     COLONOSCOPY N/A 3/13/2017    Procedure: COLONOSCOPY;  Surgeon: Tato Child MD;  Location: [facility]     NO HISTORY OF SURGERY        Social History   Substance Use Topics     Smoking status: Former Smoker     Packs/day: 0.50     Years: 20.00     Types: Cigarettes     Quit date: 2012     Smokeless tobacco: Never Used     Alcohol use 0.0 oz/week      Comment: more on weekends 8 drinks per week or wine or mixed drinks       Problem (# of Occurrences) Relation (Name,Age of Onset)    Blood Disease (1) Paternal Uncle    Breast Cancer (3) Paternal Aunt, Paternal Half-Sister (aunt), Cousin    Hyperlipidemia (1) Father    Hypertension (3) Mother, Father, Maternal Grandmother    Lung Cancer (1) Mother    Other Cancer (2) Mother: lung, Paternal Half-Brother (uncle): leukemia       Negative family history of: Liver Disease            Current Outpatient Prescriptions   Medication Sig     amLODIPine (NORVASC) 5 MG tablet TAKE ONE TABLET BY MOUTH ONCE DAILY     losartan (COZAAR) 50 MG tablet TAKE ONE  "TABLET BY MOUTH EVERY DAY     NIACIN CR PO      Barberry-Oreg Grape-Goldenseal (BERBERINE COMPLEX PO)      atorvastatin (LIPITOR) 20 MG tablet Take 1 tablet (20 mg) by mouth daily     paragard intrauterine copper 1 each by Intrauterine route once     aspirin 81 MG tablet Take 1 tablet (81 mg) by mouth daily     levalbuterol (XOPENEX HFA) 45 MCG/ACT Inhaler Inhale 2 puffs into the lungs every 6 hours as needed for shortness of breath / dyspnea or wheezing (Patient not taking: Reported on 11/15/2017)     Flunisolide HFA (AEROSPAN) 80 MCG/ACT AERS Inhale 5 puffs into the lungs 2 times daily Rinse mouth after each use. (Patient not taking: Reported on 11/15/2017)     No current facility-administered medications for this visit.      No Known Allergies    Past medical, surgical, social and family histories were reviewed and updated in EPIC.    ROS:   12 point review of systems negative other than symptoms noted below.  Head: Ringing  Genitourinary: Irregular Menses, Night Sweats and Spotting  Endocrine: Loss of Hair    EXAM:  /64  Pulse 74  Ht 5' 4\" (1.626 m)  Wt 148 lb (67.1 kg)  LMP 04/07/2018 (Approximate)  BMI 25.4 kg/m2   BMI: Body mass index is 25.4 kg/(m^2).    PHYSICAL EXAM:  Constitutional:  Appearance: Well nourished, well developed, alert, in no acute distress  Neck:  Lymph Nodes:  No lymphadenopathy present    Thyroid:  Gland size normal, nontender, no nodules or masses present  on palpation  Chest:  Respiratory Effort:  Breathing unlabored  Cardiovascular:    Heart: Auscultation:  Regular rate, normal rhythm, no murmurs present  Breasts: Palpation of Breasts and Axillae:  No masses present on palpation, no breast tenderness. and No nodularity, asymmetry or nipple discharge bilaterally.  Gastrointestinal:   Abdominal Examination:  Abdomen nontender to palpation, tone normal without rigidity or guarding, no masses present, umbilicus without lesions   Liver and Spleen:  No hepatomegaly present, liver " nontender to palpation    Hernias:  No hernias present  Lymphatic: Lymph Nodes:  No other lymphadenopathy present  Skin:  General Inspection:  No rashes present, no lesions present, no areas of  discoloration    Genitalia and Groin:  No rashes present, no lesions present, no areas of  discoloration, no masses present  Neurologic/Psychiatric:    Mental Status:  Oriented X3     Pelvic Exam:  External Genitalia:     Normal appearance for age, no discharge present, no tenderness present, no inflammatory lesions present, color normal  Vagina:     Normal vaginal vault without central or paravaginal defects, no discharge present, no inflammatory lesions present, no masses present  Bladder:     Nontender to palpation  Urethra:   Urethral Body:  Urethra palpation normal, urethra structural support normal   Urethral Meatus:  No erythema or lesions present  Cervix:     Appearance healthy, no lesions present, nontender to palpation, no bleeding present, IUD string wasn't seen but then could be easily palpated  Uterus:     Uterus: firm, normal sized and nontender, anteverted in position.   Adnexa:     No adnexal tenderness present, no adnexal masses present  Perineum:     Perineum within normal limits, no evidence of trauma, no rashes or skin lesions present  Anus:     Anus within normal limits, no hemorrhoids present  Inguinal Lymph Nodes:     No lymphadenopathy present  Pubic Hair:     Normal pubic hair distribution for age  Genitalia and Groin:     No rashes present, no lesions present, no areas of discoloration, no masses present      COUNSELING:   Reviewed preventive health counseling, as reflected in patient instructions    BMI: Body mass index is 25.4 kg/(m^2).      ASSESSMENT:  47 year old female with satisfactory annual exam.    ICD-10-CM    1. Encounter for gynecological examination without abnormal finding Z01.419    2. Irregular menses N92.6    3. Night sweats R61    4. Hair loss L65.9    5. Presence of intrauterine  contraceptive device Z97.5    6. Hyperlipidemia with target LDL less than 130 E78.5    7. Screening for cardiovascular condition Z13.6 Lipid panel reflex to direct LDL Fasting   8. Screening for chlamydial disease Z11.8 Chlamydia trachomatis PCR   9. Screen for STD (sexually transmitted disease) Z11.3 Neisseria gonorrhoeae PCR     Herpes Simplex Virus 1 and 2 IgG     Anti Treponema   10. Screening for human immunodeficiency virus Z11.4 HIV Antigen Antibody Combo       PLAN:  Pap is UTD for 4 more years  mammo is now being done in sept so off schedule from her visits with us. Will try over the next couple years to move it up a little to try to sync it with her appointment  Will do full STD testing since newly s.a with a new partner  futured out a lipid panel so she can do that in the near future  paragard in for about 5 yrs or so. Will wait until full one year amenorrhea/menopause before we remove it    Jessie Perdomo MD

## 2018-04-17 LAB
C TRACH DNA SPEC QL NAA+PROBE: NEGATIVE
HIV 1+2 AB+HIV1 P24 AG SERPL QL IA: NONREACTIVE
HSV1 IGG SERPL QL IA: <0.2 AI (ref 0–0.8)
HSV2 IGG SERPL QL IA: <0.2 AI (ref 0–0.8)
N GONORRHOEA DNA SPEC QL NAA+PROBE: NEGATIVE
SPECIMEN SOURCE: NORMAL
SPECIMEN SOURCE: NORMAL
T PALLIDUM IGG+IGM SER QL: NEGATIVE

## 2018-04-17 ASSESSMENT — PATIENT HEALTH QUESTIONNAIRE - PHQ9: SUM OF ALL RESPONSES TO PHQ QUESTIONS 1-9: 1

## 2018-04-17 ASSESSMENT — ANXIETY QUESTIONNAIRES: GAD7 TOTAL SCORE: 1

## 2018-04-19 ENCOUNTER — MYC MEDICAL ADVICE (OUTPATIENT)
Dept: FAMILY MEDICINE | Facility: CLINIC | Age: 48
End: 2018-04-19

## 2018-04-19 ENCOUNTER — HOSPITAL ENCOUNTER (OUTPATIENT)
Dept: LAB | Facility: CLINIC | Age: 48
Discharge: HOME | End: 2018-04-19
Attending: OBSTETRICS & GYNECOLOGY | Admitting: OBSTETRICS & GYNECOLOGY
Payer: COMMERCIAL

## 2018-04-19 DIAGNOSIS — Z13.6 SCREENING FOR CARDIOVASCULAR CONDITION: ICD-10-CM

## 2018-04-19 LAB
CHOLEST SERPL-MCNC: 215 MG/DL
HDLC SERPL-MCNC: 64 MG/DL
LDLC SERPL CALC-MCNC: 80 MG/DL
NONHDLC SERPL-MCNC: 151 MG/DL
TRIGL SERPL-MCNC: 354 MG/DL

## 2018-04-19 PROCEDURE — 80061 LIPID PANEL: CPT | Performed by: OBSTETRICS & GYNECOLOGY

## 2018-05-07 ENCOUNTER — MYC MEDICAL ADVICE (OUTPATIENT)
Dept: FAMILY MEDICINE | Facility: CLINIC | Age: 48
End: 2018-05-07

## 2018-05-07 DIAGNOSIS — I10 HYPERTENSION GOAL BP (BLOOD PRESSURE) < 140/90: Primary | ICD-10-CM

## 2018-05-07 NOTE — TELEPHONE ENCOUNTER
Route MyChart question regarding medication to PCP.  Patient currently taking amlodipine 5mg daily and losartan 50mg daily.     Andie Davies RN

## 2018-05-09 ENCOUNTER — HOSPITAL ENCOUNTER (OUTPATIENT)
Dept: LAB | Facility: CLINIC | Age: 48
Discharge: HOME | End: 2018-05-09
Attending: FAMILY MEDICINE | Admitting: FAMILY MEDICINE
Payer: COMMERCIAL

## 2018-05-09 DIAGNOSIS — I10 HYPERTENSION GOAL BP (BLOOD PRESSURE) < 140/90: ICD-10-CM

## 2018-05-09 LAB
ANION GAP SERPL CALCULATED.3IONS-SCNC: 6 MMOL/L (ref 3–14)
BUN SERPL-MCNC: 16 MG/DL (ref 7–30)
CALCIUM SERPL-MCNC: 9.3 MG/DL (ref 8.5–10.1)
CHLORIDE SERPL-SCNC: 104 MMOL/L (ref 94–109)
CO2 SERPL-SCNC: 28 MMOL/L (ref 20–32)
CREAT SERPL-MCNC: 0.52 MG/DL (ref 0.52–1.04)
GFR SERPL CREATININE-BSD FRML MDRD: >90 ML/MIN/1.7M2
GLUCOSE SERPL-MCNC: 104 MG/DL (ref 70–99)
POTASSIUM SERPL-SCNC: 4.2 MMOL/L (ref 3.4–5.3)
SODIUM SERPL-SCNC: 138 MMOL/L (ref 133–144)

## 2018-05-09 PROCEDURE — 80048 BASIC METABOLIC PNL TOTAL CA: CPT | Performed by: FAMILY MEDICINE

## 2018-05-18 ENCOUNTER — MYC MEDICAL ADVICE (OUTPATIENT)
Dept: FAMILY MEDICINE | Facility: CLINIC | Age: 48
End: 2018-05-18

## 2018-05-18 DIAGNOSIS — I10 HYPERTENSION GOAL BP (BLOOD PRESSURE) < 140/90: Primary | ICD-10-CM

## 2018-05-18 NOTE — TELEPHONE ENCOUNTER
"Route MyChart to PCP to advise.  Your note from 5/9/18 lab result states:    \"Olga Lidia     Your kidney and potassium tests are good. It would be ok to start the spironolactone but I would hold your losartan initially and monitor the blood pressure. If your blood pressure goes up we can restart the Losartan.     Dusty Anderson MD\"    MyChart sent.   Andie Davies RN    "

## 2018-05-21 NOTE — TELEPHONE ENCOUNTER
We should recheck in 3-4 weeks. If blood pressures ok then no need to follow up immediately but I have not seen her since last august so should follow up in next 2-3 months.    Future order placed for BMP    Orders Placed This Encounter     **Basic metabolic panel FUTURE anytime     Standing Status:   Future     Standing Expiration Date:   5/21/2019

## 2018-05-21 NOTE — TELEPHONE ENCOUNTER
PCP has sent MyChart with recommendations as states below.  Will leave encounter open to ensure patient has read MyChart, then may close.    Andie Davies RN

## 2018-06-05 ENCOUNTER — HOSPITAL ENCOUNTER (OUTPATIENT)
Dept: LAB | Facility: CLINIC | Age: 48
Discharge: HOME | End: 2018-06-05
Attending: FAMILY MEDICINE | Admitting: FAMILY MEDICINE
Payer: COMMERCIAL

## 2018-06-05 DIAGNOSIS — R79.89 ABNORMAL LIVER FUNCTION TESTS: ICD-10-CM

## 2018-06-05 DIAGNOSIS — I10 HYPERTENSION GOAL BP (BLOOD PRESSURE) < 140/90: ICD-10-CM

## 2018-06-05 DIAGNOSIS — K76.0 NAFLD (NONALCOHOLIC FATTY LIVER DISEASE): Primary | ICD-10-CM

## 2018-06-05 LAB
ALBUMIN SERPL-MCNC: 4.5 G/DL (ref 3.4–5)
ALP SERPL-CCNC: 53 U/L (ref 40–150)
ALT SERPL W P-5'-P-CCNC: 69 U/L (ref 0–50)
ANION GAP SERPL CALCULATED.3IONS-SCNC: 10 MMOL/L (ref 3–14)
AST SERPL W P-5'-P-CCNC: 36 U/L (ref 0–45)
BILIRUB DIRECT SERPL-MCNC: 0.1 MG/DL (ref 0–0.2)
BILIRUB SERPL-MCNC: 0.5 MG/DL (ref 0.2–1.3)
BUN SERPL-MCNC: 21 MG/DL (ref 7–30)
CALCIUM SERPL-MCNC: 9.2 MG/DL (ref 8.5–10.1)
CHLORIDE SERPL-SCNC: 102 MMOL/L (ref 94–109)
CO2 SERPL-SCNC: 25 MMOL/L (ref 20–32)
CREAT SERPL-MCNC: 0.57 MG/DL (ref 0.52–1.04)
ERYTHROCYTE [DISTWIDTH] IN BLOOD BY AUTOMATED COUNT: 13.2 % (ref 10–15)
GFR SERPL CREATININE-BSD FRML MDRD: >90 ML/MIN/1.7M2
GLUCOSE SERPL-MCNC: 104 MG/DL (ref 70–99)
HCT VFR BLD AUTO: 41.6 % (ref 35–47)
HGB BLD-MCNC: 15.2 G/DL (ref 11.7–15.7)
INR PPP: 0.92 (ref 0.86–1.14)
MCH RBC QN AUTO: 32.3 PG (ref 26.5–33)
MCHC RBC AUTO-ENTMCNC: 36.5 G/DL (ref 31.5–36.5)
MCV RBC AUTO: 89 FL (ref 78–100)
PLATELET # BLD AUTO: 170 10E9/L (ref 150–450)
POTASSIUM SERPL-SCNC: 4 MMOL/L (ref 3.4–5.3)
PROT SERPL-MCNC: 8.2 G/DL (ref 6.8–8.8)
RBC # BLD AUTO: 4.7 10E12/L (ref 3.8–5.2)
SODIUM SERPL-SCNC: 137 MMOL/L (ref 133–144)
WBC # BLD AUTO: 5.6 10E9/L (ref 4–11)

## 2018-06-05 PROCEDURE — 80048 BASIC METABOLIC PNL TOTAL CA: CPT | Performed by: FAMILY MEDICINE

## 2018-06-05 PROCEDURE — 85610 PROTHROMBIN TIME: CPT | Performed by: INTERNAL MEDICINE

## 2018-06-05 PROCEDURE — 80076 HEPATIC FUNCTION PANEL: CPT | Performed by: FAMILY MEDICINE

## 2018-06-05 PROCEDURE — 36415 COLL VENOUS BLD VENIPUNCTURE: CPT | Performed by: INTERNAL MEDICINE

## 2018-06-05 PROCEDURE — 80053 COMPREHEN METABOLIC PANEL: CPT | Performed by: INTERNAL MEDICINE

## 2018-06-05 PROCEDURE — 85027 COMPLETE CBC AUTOMATED: CPT | Performed by: INTERNAL MEDICINE

## 2018-06-05 PROCEDURE — 36415 COLL VENOUS BLD VENIPUNCTURE: CPT | Performed by: FAMILY MEDICINE

## 2018-06-28 DIAGNOSIS — E78.5 HYPERLIPIDEMIA WITH TARGET LDL LESS THAN 130: ICD-10-CM

## 2018-06-29 RX ORDER — ATORVASTATIN CALCIUM 20 MG/1
TABLET, FILM COATED ORAL
Qty: 60 TABLET | Refills: 0 | Status: SHIPPED | OUTPATIENT
Start: 2018-06-29 | End: 2018-09-07

## 2018-06-29 NOTE — TELEPHONE ENCOUNTER
Prescription approved per [facility] Refill Protocol. Due mid August for a visit.  Valorie Kapoor RN

## 2018-06-29 NOTE — TELEPHONE ENCOUNTER
"Requested Prescriptions   Pending Prescriptions Disp Refills     atorvastatin (LIPITOR) 20 MG tablet [Pharmacy Med Name: ATORVASTATIN CALCIUM 20MG TABS]  Last Written Prescription Date:  3/15/2017  Last Fill Quantity: 90 tablet,  # refills: 3   Last office visit: 8/17/2017 with prescribing provider:  MARLEN Anderson   Future Office Visit:     90 tablet 3     Sig: TAKE ONE TABLET BY MOUTH EVERY DAY    [facility] Protocol Passed    6/28/2018 10:52 PM       Passed - LDL on file in past 12 months    Recent Labs   Lab Test  04/19/18   0605   LDL  80            Passed - No abnormal creatine kinase in past 12 months    No lab results found.            Passed - Recent (12 mo) or future (30 days) visit within the authorizing provider's specialty    Patient had office visit in the last 12 months or has a visit in the next 30 days with authorizing provider or within the authorizing provider's specialty.  See \"Patient Info\" tab in inbasket, or \"Choose Columns\" in Meds & Orders section of the refill encounter.           Passed - Patient is age 18 or older       Passed - No active pregnancy on record       Passed - No positive pregnancy test in past 12 months          "

## 2018-09-07 ENCOUNTER — TELEPHONE (OUTPATIENT)
Dept: FAMILY MEDICINE | Facility: CLINIC | Age: 48
End: 2018-09-07

## 2018-09-07 DIAGNOSIS — E78.5 HYPERLIPIDEMIA WITH TARGET LDL LESS THAN 130: ICD-10-CM

## 2018-09-07 RX ORDER — ATORVASTATIN CALCIUM 20 MG/1
TABLET, FILM COATED ORAL
Qty: 60 TABLET | Refills: 0 | Status: SHIPPED | OUTPATIENT
Start: 2018-09-07 | End: 2018-10-04

## 2018-09-07 NOTE — TELEPHONE ENCOUNTER
"Requested Prescriptions   Pending Prescriptions Disp Refills     atorvastatin (LIPITOR) 20 MG tablet [Pharmacy Med Name: ATORVASTATIN CALCIUM 20MG TABS]  Last Written Prescription Date:  6/29/2018  Last Fill Quantity: 60 tablet,  # refills: 0   Last office visit: 8/17/2017 with prescribing provider:  MARLEN Anderson   Future Office Visit:     60 tablet 0     Sig: TAKE ONE TABLET BY MOUTH EVERY DAY    Statins Protocol Failed    9/7/2018  1:54 PM       Failed - Recent (12 mo) or future (30 days) visit within the authorizing provider's specialty    Patient had office visit in the last 12 months or has a visit in the next 30 days with authorizing provider or within the authorizing provider's specialty.  See \"Patient Info\" tab in inbasket, or \"Choose Columns\" in Meds & Orders section of the refill encounter.           Passed - LDL on file in past 12 months    Recent Labs   Lab Test  04/19/18   0605   LDL  80            Passed - No abnormal creatine kinase in past 12 months    No lab results found.            Passed - Patient is age 18 or older       Passed - No active pregnancy on record       Passed - No positive pregnancy test in past 12 months          "

## 2018-09-21 NOTE — TELEPHONE ENCOUNTER
LMOM for patient to call back to schedule f/u/ med check. Letter mailed.    Thanks!  Brad Landeros

## 2018-10-04 ENCOUNTER — OFFICE VISIT (OUTPATIENT)
Dept: FAMILY MEDICINE | Facility: CLINIC | Age: 48
End: 2018-10-04
Payer: COMMERCIAL

## 2018-10-04 VITALS
TEMPERATURE: 98.3 F | DIASTOLIC BLOOD PRESSURE: 78 MMHG | HEART RATE: 72 BPM | BODY MASS INDEX: 24.59 KG/M2 | SYSTOLIC BLOOD PRESSURE: 118 MMHG | WEIGHT: 144 LBS | HEIGHT: 64 IN

## 2018-10-04 DIAGNOSIS — I49.3 PVC'S (PREMATURE VENTRICULAR CONTRACTIONS): ICD-10-CM

## 2018-10-04 DIAGNOSIS — I10 HYPERTENSION GOAL BP (BLOOD PRESSURE) < 140/90: Primary | ICD-10-CM

## 2018-10-04 DIAGNOSIS — R79.0 LOW IRON STORES: ICD-10-CM

## 2018-10-04 DIAGNOSIS — E78.5 HYPERLIPIDEMIA WITH TARGET LDL LESS THAN 130: ICD-10-CM

## 2018-10-04 DIAGNOSIS — R73.9 ELEVATED BLOOD SUGAR: ICD-10-CM

## 2018-10-04 PROCEDURE — 99214 OFFICE O/P EST MOD 30 MIN: CPT | Performed by: FAMILY MEDICINE

## 2018-10-04 RX ORDER — ATORVASTATIN CALCIUM 20 MG/1
20 TABLET, FILM COATED ORAL DAILY
Qty: 90 TABLET | Refills: 3 | Status: SHIPPED | OUTPATIENT
Start: 2018-10-04 | End: 2019-10-28

## 2018-10-04 RX ORDER — SPIRONOLACTONE 50 MG/1
50 TABLET, FILM COATED ORAL 2 TIMES DAILY
Qty: 180 TABLET | Refills: 3 | Status: SHIPPED | OUTPATIENT
Start: 2018-10-04 | End: 2019-12-19

## 2018-10-04 RX ORDER — AMLODIPINE BESYLATE 5 MG/1
5 TABLET ORAL DAILY
Qty: 90 TABLET | Refills: 3 | Status: SHIPPED | OUTPATIENT
Start: 2018-10-04 | End: 2019-08-28

## 2018-10-04 RX ORDER — SPIRONOLACTONE 50 MG/1
50 TABLET, FILM COATED ORAL 2 TIMES DAILY
COMMUNITY
End: 2018-10-04

## 2018-10-04 NOTE — PROGRESS NOTES
SUBJECTIVE:   Olga Lidia Solorzano is a 48 year old female who presents to clinic today for the following health issues:      *Patient would like to get a refill on prescription that she originally was given by dermatologist for spironolactone for alopecia. No difficulties tolerating medication.  Patient would also like to check for anemia as she had been told that alopecia was due to low ferretin. She continues to note mild thinning of her hair despite taking Fe supplement.     Hyperlipidemia Follow-Up      Rate your low fat/cholesterol diet?: not monitoring fat    Taking statin?  Yes, possible muscle aches from statin    Other lipid medications/supplements?:  none    Hypertension Follow-up      Outpatient blood pressures are not being checked.    Low Salt Diet: not monitoring salt    Asthma Follow-Up    Was ACT completed today?    Yes    ACT Total Scores 7/6/2016   ACT TOTAL SCORE -   ASTHMA ER VISITS -   ASTHMA HOSPITALIZATIONS -   ACT TOTAL SCORE (Goal Greater than or Equal to 20) 23   In the past 12 months, how many times did you visit the emergency room for your asthma without being admitted to the hospital? 0   In the past 12 months, how many times were you hospitalized overnight because of your asthma? 0       Recent asthma triggers that patient is dealing with: None        Amount of exercise or physical activity: none outside of at work    Problems taking medications regularly: No    Medication side effects: muscle aches    Diet: regular (no restrictions)        Past/recent records reviewed and discussed for -- immunizations.      Problem list and histories reviewed & adjusted, as indicated.  Additional history: as documented    Patient Active Problem List   Diagnosis     Hypertension goal BP (blood pressure) < 140/90     Hyperlipidemia with target LDL less than 130     Herniated vertebral disc     Mild persistent asthma     Gastroesophageal reflux disease without esophagitis     Bilateral low back pain  without sciatica     Hip pain, right     Presence of intrauterine contraceptive device     Past Surgical History:   Procedure Laterality Date     COLONOSCOPY N/A 3/13/2017    Procedure: COLONOSCOPY;  Surgeon: Tato Child MD;  Location: [facility]     NO HISTORY OF SURGERY         Social History   Substance Use Topics     Smoking status: Former Smoker     Packs/day: 0.50     Years: 20.00     Types: Cigarettes     Quit date: 12/31/2012     Smokeless tobacco: Never Used     Alcohol use 0.0 oz/week      Comment: more on weekends 8 drinks per week or wine or mixed drinks      Family History   Problem Relation Age of Onset     Hypertension Mother      Lung Cancer Mother      Other Cancer Mother      lung     Hypertension Father      Hyperlipidemia Father      Blood Disease Paternal Uncle      Breast Cancer Paternal Aunt      Hypertension Maternal Grandmother      Breast Cancer Paternal Half-Sister      Breast Cancer Cousin      Other Cancer Paternal Half-Brother      leukemia     Liver Disease No family hx of          Current Outpatient Prescriptions   Medication Sig Dispense Refill     amLODIPine (NORVASC) 5 MG tablet Take 1 tablet (5 mg) by mouth daily 90 tablet 3     aspirin 81 MG tablet Take 1 tablet (81 mg) by mouth daily 100 tablet 3     atorvastatin (LIPITOR) 20 MG tablet Take 1 tablet (20 mg) by mouth daily 90 tablet 3     Barberry-Oreg Grape-Goldenseal (BERBERINE COMPLEX PO)        Ferrous Sulfate (IRON SUPPLEMENT PO)        Flunisolide HFA (AEROSPAN) 80 MCG/ACT AERS Inhale 5 puffs into the lungs 2 times daily Rinse mouth after each use. 2 Inhaler 5     levalbuterol (XOPENEX HFA) 45 MCG/ACT Inhaler Inhale 2 puffs into the lungs every 6 hours as needed for shortness of breath / dyspnea or wheezing 1 Inhaler 1     paragard intrauterine copper 1 each by Intrauterine route once       spironolactone (ALDACTONE) 50 MG tablet Take 1 tablet (50 mg) by mouth 2 times daily 180 tablet 3     [DISCONTINUED] amLODIPine  (NORVASC) 5 MG tablet TAKE ONE TABLET BY MOUTH ONCE DAILY 90 tablet 3     [DISCONTINUED] atorvastatin (LIPITOR) 20 MG tablet TAKE ONE TABLET BY MOUTH EVERY DAY 60 tablet 0     [DISCONTINUED] spironolactone (ALDACTONE) 50 MG tablet Take 50 mg by mouth 2 times daily       Allergies   Allergen Reactions     Niacin Shortness Of Breath, Other (See Comments) and Nausea and Vomiting     All over body pains (patient reported symptoms)     Recent Labs   Lab Test  06/05/18   0650  05/09/18   0655  04/19/18   0605  11/13/17   0540  08/17/17   1645 04/10/17  03/17/17   0610  10/21/16   0656   12/16/15   1034   A1C   --    --    --    --   5.1   --    --    --    --    --    LDL   --    --   80   --    --   80  82   --    < >   --    HDL   --    --   64   --    --   45  50   --    < >   --    TRIG   --    --   354*   --    --   212*  203*   --    < >   --    ALT  69*   --    --   71*   --    --    --   35   < >  195*   CR  0.57  0.52   --   0.62   --    --    --   0.66   < >  0.80   GFRESTIMATED  >90  >90   --   >90   --    --    --   >90  Non  GFR Calc     < >  77   GFRESTBLACK  >90  >90   --   >90   --    --    --   >90   GFR Calc     < >  >90   GFR Calc     POTASSIUM  4.0  4.2   --   4.0   --    --    --   3.9   < >  3.9   TSH   --    --    --    --   1.50   --    --    --    --   1.54    < > = values in this interval not displayed.      BP Readings from Last 3 Encounters:   10/04/18 118/78   04/16/18 108/64   11/15/17 121/81    Wt Readings from Last 3 Encounters:   10/04/18 65.3 kg (144 lb)   04/16/18 67.1 kg (148 lb)   11/15/17 68.5 kg (151 lb)                  Labs reviewed in EPIC    Reviewed and updated as needed this visit by clinical staff       Reviewed and updated as needed this visit by Provider         ROS:  Constitutional, HEENT, cardiovascular, pulmonary, GI, , musculoskeletal, neuro, skin, endocrine and psych systems are negative, except as otherwise  "noted.    This document serves as a record of the services and decisions personally performed and made by Dusty Anderson MD. It was created on their behalf by Clemente Brooks, a trained medical scribe. The creation of this document is based the provider's statements to the medical scribe.  Clemente Brooks October 4, 2018 4:58 PM       OBJECTIVE:     /78 (BP Location: Left arm, Cuff Size: Adult Regular)  Pulse 72  Temp 98.3  F (36.8  C) (Oral)  Ht 1.626 m (5' 4\")  Wt 65.3 kg (144 lb)  BMI 24.72 kg/m2  Body mass index is 24.72 kg/(m^2).  GENERAL: healthy, alert and no distress  HENT: ear canals and TM's normal, nose and mouth without ulcers or lesions  RESP: lungs clear to auscultation - no rales, rhonchi or wheezes  CV: regular rate and rhythm, normal S1 S2, no S3 or S4, no murmur, click or rub  MS: no gross musculoskeletal defects noted, no edema  PSYCH: mentation appears normal, affect normal/bright    Diagnostic Test Results:  none     ASSESSMENT/PLAN:   (I10) Hypertension goal BP (blood pressure) < 140/90  (primary encounter diagnosis)  Comment: controlled.   Plan: **Comprehensive metabolic panel FUTURE 14d,         spironolactone (ALDACTONE) 50 MG tablet        -continue present medications, medications refilled.     (I49.3) PVC's (premature ventricular contractions)  Comment: stable. I did review recommendations for ASA for primary prevention of CAD. Based on ASCVD risk score of 1.3%, there's no immediate need for ASA.   Plan: amLODIPine (NORVASC) 5 MG tablet,         **Comprehensive metabolic panel FUTURE 14d        -continue present medications, medications refilled      (E78.5) Hyperlipidemia with target LDL less than 130  Comment: LDL at 80 mg/dL on 04/19/2018, patient currently taking atorvastatin 20 mg every day. Pt would like to get off statins. Lipid panel will be rechecked to determine need for therapy.   Plan: atorvastatin (LIPITOR) 20 MG tablet, Lipid         panel reflex to direct LDL Fasting,      "    **Comprehensive metabolic panel FUTURE 14d        -follow up for fasting labs.     (R79.0) Low iron stores  Comment: Dermatology had told her that she has low ferretin levels which contributes to alopecia. Patient taking ferrous sulfate. Labs will be checked.   Plan: **CBC with platelets FUTURE anytime, Ferritin        -follow up for labs    (R73.9) Elevated blood sugar  Comment: past BG in ~105 range, patient has been trying to wean away from carbonated drinks and bread. A1c will be checked to rule out diabetes.   Plan: **A1C FUTURE anytime        -follow up for labs      The information in this document, created by the medical scribe for me, accurately reflects the services I personally performed and the decisions made by me. I have reviewed and approved this document for accuracy prior to leaving the patient care area.    Wan Anderson MD, MD  [facility]

## 2018-10-04 NOTE — PATIENT INSTRUCTIONS
The 10-year ASCVD risk score (Earl ZHOU Jr et al., 2013) is: 1.3%    Values used to calculate the score:      Age: 48 years      Sex: Female      Is Non- : No      Diabetic: No      Tobacco smoker: No      Systolic Blood Pressure: 118 mmHg      Is BP treated: Yes      HDL Cholesterol: 64 mg/dL      Total Cholesterol: 215 mg/dL

## 2018-10-04 NOTE — MR AVS SNAPSHOT
After Visit Summary   10/4/2018    Olga Lidia Solorzano    MRN: [id number]           Patient Information     Date Of Birth          1970        Visit Information        Provider Department      10/4/2018 3:20 PM Wan Anderson MD [facility]        Today's Diagnoses     Hypertension goal BP (blood pressure) < 140/90    -  1    PVC's (premature ventricular contractions)        Hyperlipidemia with target LDL less than 130        Low iron stores        Elevated blood sugar          Care Instructions      The 10-year ASCVD risk score (Dalzellsaqib ZHOU Jr, et al., 2013) is: 1.3%    Values used to calculate the score:      Age: 48 years      Sex: Female      Is Non- : No      Diabetic: No      Tobacco smoker: No      Systolic Blood Pressure: 118 mmHg      Is BP treated: Yes      HDL Cholesterol: 64 mg/dL      Total Cholesterol: 215 mg/dL              Follow-ups after your visit        Future tests that were ordered for you today     Open Future Orders        Priority Expected Expires Ordered    **Comprehensive metabolic panel FUTURE 14d Routine 10/11/2018 10/18/2018 10/4/2018    **CBC with platelets FUTURE anytime Routine 10/4/2018 10/4/2019 10/4/2018    Ferritin Routine 10/4/2018 10/4/2019 10/4/2018    **A1C FUTURE anytime Routine 10/4/2018 10/4/2019 10/4/2018    Lipid panel reflex to direct LDL Fasting Routine 10/4/2018 10/4/2019 10/4/2018            Who to contact     If you have questions or need follow up information about today's clinic visit or your schedule please contact [facility] directly at [phone].  Normal or non-critical lab and imaging results will be communicated to you by MyChart, letter or phone within 4 business days after the clinic has received the results. If you do not hear from us within 7 days, please contact the clinic through MyChart or phone. If you have a critical or abnormal lab result, we will notify you by  "phone as soon as possible.  Submit refill requests through Caregivers or call your pharmacy and they will forward the refill request to us. Please allow 3 business days for your refill to be completed.          Additional Information About Your Visit        Caregivers Information     [facility]givers gives you secure access to your electronic health record. If you see a primary care provider, you can also send messages to your care team and make appointments. If you have questions, please call your primary care clinic.  If you do not have a primary care provider, please call [phone] and they will assist you.        Care EveryWhere ID     This is your Care EveryWhere ID. This could be used by other organizations to access your [facility] medical records  [id number]        Your Vitals Were     Pulse Temperature Height BMI (Body Mass Index)          72 98.3  F (36.8  C) (Oral) 5' 4\" (1.626 m) 24.72 kg/m2         Blood Pressure from Last 3 Encounters:   10/04/18 118/78   04/16/18 108/64   11/15/17 121/81    Weight from Last 3 Encounters:   10/04/18 144 lb (65.3 kg)   04/16/18 148 lb (67.1 kg)   11/15/17 151 lb (68.5 kg)                 Today's Medication Changes          These changes are accurate as of 10/4/18  4:20 PM.  If you have any questions, ask your nurse or doctor.               These medicines have changed or have updated prescriptions.        Dose/Directions    amLODIPine 5 MG tablet   Commonly known as:  NORVASC   This may have changed:  See the new instructions.   Used for:  PVC's (premature ventricular contractions)   Changed by:  Wan Anderson MD        Dose:  5 mg   Take 1 tablet (5 mg) by mouth daily   Quantity:  90 tablet   Refills:  3       atorvastatin 20 MG tablet   Commonly known as:  LIPITOR   This may have changed:  See the new instructions.   Used for:  Hyperlipidemia with target LDL less than 130   Changed by:  Wan Anderson MD        Dose:  20 mg   Take 1 tablet (20 mg) by mouth daily "   Quantity:  90 tablet   Refills:  3            Where to get your medicines      These medications were sent to [facility] [place], [place] - [address]  [address], [place] [address]     Phone:  [phone]     amLODIPine 5 MG tablet    atorvastatin 20 MG tablet    spironolactone 50 MG tablet                Primary Care Provider Office Phone # Fax #    Wan Anderson [phone]       [facility] [address]        Equal Access to Services     [facility] HospitalLEONILA : Hadii aad ku hadasho Soomaali, waaxda luqadaha, qaybta kaalmada adeegyada, waxay idiin hayaan marcy parham . So [facility] [phone].    ATENCIÓN: Si habla español, tiene a you disposición servicios gratuitos de asistencia lingüística. [facility] [phone].    We comply with applicable federal civil rights laws and [place] laws. We do not discriminate on the basis of race, color, national origin, age, disability, sex, sexual orientation, or gender identity.            Thank you!     Thank you for choosing [facility]  for your care. Our goal is always to provide you with excellent care. Hearing back from our patients is one way we can continue to improve our services. Please take a few minutes to complete the written survey that you may receive in the mail after your visit with us. Thank you!             Your Updated Medication List - Protect others around you: Learn how to safely use, store and throw away your medicines at www.disposemymeds.org.          This list is accurate as of 10/4/18  4:20 PM.  Always use your most recent med list.                   Brand Name Dispense Instructions for use Diagnosis    amLODIPine 5 MG tablet    NORVASC    90 tablet    Take 1 tablet (5 mg) by mouth daily    PVC's (premature ventricular contractions)       aspirin 81 MG tablet     100 tablet    Take 1 tablet (81 mg) by mouth daily        atorvastatin 20 MG tablet     LIPITOR    90 tablet    Take 1 tablet (20 mg) by mouth daily    Hyperlipidemia with target LDL less than 130       BERBERINE COMPLEX PO           flunisolide HFA 80 MCG/ACT Aers oral inhaler    AEROSPAN    2 Inhaler    Inhale 5 puffs into the lungs 2 times daily Rinse mouth after each use.    Mild persistent asthma with acute exacerbation, Cough       IRON SUPPLEMENT PO           levalbuterol 45 MCG/ACT Inhaler    XOPENEX HFA    1 Inhaler    Inhale 2 puffs into the lungs every 6 hours as needed for shortness of breath / dyspnea or wheezing    Acute bronchitis, unspecified organism, Mild persistent asthma with acute exacerbation       paragard intrauterine copper      1 each by Intrauterine route once        spironolactone 50 MG tablet    ALDACTONE    180 tablet    Take 1 tablet (50 mg) by mouth 2 times daily    Hypertension goal BP (blood pressure) < 140/90

## 2018-10-05 ASSESSMENT — ASTHMA QUESTIONNAIRES: ACT_TOTALSCORE: 25

## 2018-10-18 ENCOUNTER — HOSPITAL ENCOUNTER (OUTPATIENT)
Dept: LAB | Facility: CLINIC | Age: 48
Discharge: HOME | End: 2018-10-18
Attending: FAMILY MEDICINE | Admitting: FAMILY MEDICINE
Payer: COMMERCIAL

## 2018-10-18 DIAGNOSIS — R79.0 LOW IRON STORES: ICD-10-CM

## 2018-10-18 DIAGNOSIS — E78.5 HYPERLIPIDEMIA WITH TARGET LDL LESS THAN 130: ICD-10-CM

## 2018-10-18 DIAGNOSIS — R73.9 ELEVATED BLOOD SUGAR: ICD-10-CM

## 2018-10-18 DIAGNOSIS — I10 HYPERTENSION GOAL BP (BLOOD PRESSURE) < 140/90: ICD-10-CM

## 2018-10-18 DIAGNOSIS — I49.3 PVC'S (PREMATURE VENTRICULAR CONTRACTIONS): ICD-10-CM

## 2018-10-18 LAB
ALBUMIN SERPL-MCNC: 4.8 G/DL (ref 3.4–5)
ALP SERPL-CCNC: 52 U/L (ref 40–150)
ALT SERPL W P-5'-P-CCNC: 108 U/L (ref 0–50)
ANION GAP SERPL CALCULATED.3IONS-SCNC: 7 MMOL/L (ref 3–14)
AST SERPL W P-5'-P-CCNC: 52 U/L (ref 0–45)
BILIRUB SERPL-MCNC: 0.4 MG/DL (ref 0.2–1.3)
BUN SERPL-MCNC: 22 MG/DL (ref 7–30)
CALCIUM SERPL-MCNC: 9.2 MG/DL (ref 8.5–10.1)
CHLORIDE SERPL-SCNC: 103 MMOL/L (ref 94–109)
CHOLEST SERPL-MCNC: 229 MG/DL
CO2 SERPL-SCNC: 28 MMOL/L (ref 20–32)
CREAT SERPL-MCNC: 0.56 MG/DL (ref 0.52–1.04)
ERYTHROCYTE [DISTWIDTH] IN BLOOD BY AUTOMATED COUNT: 12.6 % (ref 10–15)
FERRITIN SERPL-MCNC: 216 NG/ML (ref 8–252)
GFR SERPL CREATININE-BSD FRML MDRD: >90 ML/MIN/1.7M2
GLUCOSE SERPL-MCNC: 101 MG/DL (ref 70–99)
HBA1C MFR BLD: 5.2 % (ref 0–5.6)
HCT VFR BLD AUTO: 42.1 % (ref 35–47)
HDLC SERPL-MCNC: 56 MG/DL
HGB BLD-MCNC: 15.2 G/DL (ref 11.7–15.7)
LDLC SERPL CALC-MCNC: 115 MG/DL
MCH RBC QN AUTO: 32.9 PG (ref 26.5–33)
MCHC RBC AUTO-ENTMCNC: 36.1 G/DL (ref 31.5–36.5)
MCV RBC AUTO: 91 FL (ref 78–100)
NONHDLC SERPL-MCNC: 173 MG/DL
PLATELET # BLD AUTO: 185 10E9/L (ref 150–450)
POTASSIUM SERPL-SCNC: 3.8 MMOL/L (ref 3.4–5.3)
PROT SERPL-MCNC: 8.6 G/DL (ref 6.8–8.8)
RBC # BLD AUTO: 4.62 10E12/L (ref 3.8–5.2)
SODIUM SERPL-SCNC: 138 MMOL/L (ref 133–144)
TRIGL SERPL-MCNC: 291 MG/DL
WBC # BLD AUTO: 5.8 10E9/L (ref 4–11)

## 2018-10-18 PROCEDURE — 82728 ASSAY OF FERRITIN: CPT | Performed by: FAMILY MEDICINE

## 2018-10-18 PROCEDURE — 85027 COMPLETE CBC AUTOMATED: CPT | Performed by: FAMILY MEDICINE

## 2018-10-18 PROCEDURE — 36415 COLL VENOUS BLD VENIPUNCTURE: CPT | Performed by: FAMILY MEDICINE

## 2018-10-18 PROCEDURE — 80061 LIPID PANEL: CPT | Performed by: FAMILY MEDICINE

## 2018-10-18 PROCEDURE — 83036 HEMOGLOBIN GLYCOSYLATED A1C: CPT | Performed by: FAMILY MEDICINE

## 2018-10-18 PROCEDURE — 80053 COMPREHEN METABOLIC PANEL: CPT | Performed by: FAMILY MEDICINE

## 2018-10-23 ENCOUNTER — MYC MEDICAL ADVICE (OUTPATIENT)
Dept: OBGYN | Facility: CLINIC | Age: 48
End: 2018-10-23

## 2018-10-24 ENCOUNTER — OFFICE VISIT (OUTPATIENT)
Dept: OBGYN | Facility: CLINIC | Age: 48
End: 2018-10-24
Payer: COMMERCIAL

## 2018-10-24 VITALS — BODY MASS INDEX: 25.2 KG/M2 | DIASTOLIC BLOOD PRESSURE: 80 MMHG | SYSTOLIC BLOOD PRESSURE: 120 MMHG | WEIGHT: 146.8 LBS

## 2018-10-24 DIAGNOSIS — N95.1 VASOMOTOR SYMPTOMS DUE TO MENOPAUSE: Primary | ICD-10-CM

## 2018-10-24 PROCEDURE — 99213 OFFICE O/P EST LOW 20 MIN: CPT | Performed by: OBSTETRICS & GYNECOLOGY

## 2018-10-24 RX ORDER — ESTRADIOL 0.1 MG/D
1 FILM, EXTENDED RELEASE TRANSDERMAL
Qty: 24 PATCH | Refills: 1 | Status: SHIPPED | OUTPATIENT
Start: 2018-10-25 | End: 2020-02-03

## 2018-10-24 NOTE — MR AVS SNAPSHOT
After Visit Summary   10/24/2018    Olga Lidia Solorzano    MRN: [id number]           Patient Information     Date Of Birth          1970        Visit Information        Provider Department      10/24/2018 3:50 PM Jessie Perdomo MD [facility]        Today's Diagnoses     Vasomotor symptoms due to menopause    -  1       Follow-ups after your visit        Your next 10 appointments already scheduled     Oct 29, 2018  1:00 PM CDT   MA SCREENING DIGITAL BILATERAL with WEMA1   [facility] ([facility])    [address] [place] [address], [address]  [facility] [address]   [phone]           How do I prepare for my exam? (Food and drink instructions) No Food and Drink Restrictions.  How do I prepare for my exam? (Other instructions) Do not use any powder, lotion or deodorant under your arms or on your breast. If you do, we will ask you to remove it before your exam.  What should I wear: Wear comfortable, two-piece clothing.  How long does the exam take: Most scans will take 15 minutes.  What should I bring: Bring any previous mammograms from other facilities or have them mailed to the breast center.  Do I need a :  No  is needed.  What do I need to tell my doctor: If you have any allergies, tell your care team.  What should I do after the exam: No restrictions, You may resume normal activities.  What is this test: This test is an x-ray of the breast to look for breast disease. The breast is pressed between two plates to flatten and spread the tissue. An X-ray is taken of the breast from different angles.  Who should I call with questions: If you have any questions, please call the Imaging Department where you will have your exam. Directions, parking instructions, and other information is available on our website, [email or web address].  Other information about my exam Three-dimensional (3D) mammograms are available at [facility] locations in  "[facility], [place], [place], [facility], [place], [facility] and [place].  Health locations include [place] and the [facility] in [place].  Benefits of 3D mammograms include * Improved rate of cancer detection * Decreases your chance of having to go back for more tests, which means fewer: * \"False-positive\" results (This means that there is an abnormal area but it isn't cancer.) * Invasive testing procedures, such as a biopsy or surgery * Can provide clearer images of the breast if you have dense breast tissue.  *3D mammography is an optional exam that anyone can have with a 2D mammogram. It doesn't replace or take the place of a 2D mammogram. 2D mammograms remain an effective screening test for all women.  Not all insurance companies cover the cost of a 3D mammogram. Check with your insurance. Three-dimensional (3D) mammograms are available at [facility] in [facility], [facility], [facility], and [place]. Health locations include [place] and [facility] in [place]. Benefits of 3D mammograms include: - Improved rate of cancer detection - Decreases your chance of having to go back for more tests, which means fewer: - \"False-positive\" results (This means that there is an abnormal area but it isn't cancer.) - Invasive testing procedures, such as a biopsy or surgery - Can provide clearer images of the breast if you have dense breast tissue. 3D mammography is an optional exam that anyone can have with a 2D mammogram. It doesn't replace or take the place of a 2D mammogram. 2D mammograms remain an effective screening test for all women.  Not all insurance companies cover the cost of a 3D mammogram. Check with your insurance.              Who to contact     If you have questions or need follow up information about today's clinic visit or your schedule please contact [facility] directly at " [phone].  Normal or non-critical lab and imaging results will be communicated to you by Ositohart, letter or phone within 4 business days after the clinic has received the results. If you do not hear from us within 7 days, please contact the clinic through KuponGidt or phone. If you have a critical or abnormal lab result, we will notify you by phone as soon as possible.  Submit refill requests through Biodirection or call your pharmacy and they will forward the refill request to us. Please allow 3 business days for your refill to be completed.          Additional Information About Your Visit        Ositohart Information     Biodirection gives you secure access to your electronic health record. If you see a primary care provider, you can also send messages to your care team and make appointments. If you have questions, please call your primary care clinic.  If you do not have a primary care provider, please call [phone] and they will assist you.        Care EveryWhere ID     This is your Care EveryWhere ID. This could be used by other organizations to access your [facility] medical records  [id number]        Your Vitals Were     Last Period Breastfeeding? BMI (Body Mass Index)             06/23/2018 (Exact Date) No 25.2 kg/m2          Blood Pressure from Last 3 Encounters:   10/24/18 120/80   10/04/18 118/78   04/16/18 108/64    Weight from Last 3 Encounters:   10/24/18 146 lb 12.8 oz (66.6 kg)   10/04/18 144 lb (65.3 kg)   04/16/18 148 lb (67.1 kg)              Today, you had the following     No orders found for display         Today's Medication Changes          These changes are accurate as of 10/24/18  4:25 PM.  If you have any questions, ask your nurse or doctor.               Start taking these medicines.        Dose/Directions    estradiol 0.1 MG/24HR BIW patch   Commonly known as:  VIVELLE-DOT   Used for:  Vasomotor symptoms due to menopause   Started by:  Jessie Perdomo MD        Dose:  1 patch   Start taking  on:  10/25/2018   Place 1 patch onto the skin twice a week   Quantity:  24 patch   Refills:  1       progesterone 100 MG capsule   Commonly known as:  PROMETRIUM   Used for:  Vasomotor symptoms due to menopause   Started by:  Jessie Perdomo MD        Dose:  100 mg   Take 1 capsule (100 mg) by mouth daily At bedtime   Quantity:  90 capsule   Refills:  1            Where to get your medicines      These medications were sent to [facility], [place] - [address], [address]  [address], [address], [facility] [address]     Phone:  [phone]     estradiol 0.1 MG/24HR BIW patch    progesterone 100 MG capsule                Primary Care Provider Office Phone # Fax #    Wan [facility] [phone]       [address] [place] [id number]        Equal Access to Services     [facility]: Hadii carla dobson hadasho Soomaali, waaxda luqadaha, qaybta kaalmada adeegyada, elana parham . So [facility] [phone].    ATENCIÓN: Si habla español, tiene a you disposición servicios gratuitos de asistencia lingüística. Llame al [phone].    We comply with applicable federal civil rights laws and [place] laws. We do not discriminate on the basis of race, color, national origin, age, disability, sex, sexual orientation, or gender identity.            Thank you!     Thank you for choosing [facility]  for your care. Our goal is always to provide you with excellent care. Hearing back from our patients is one way we can continue to improve our services. Please take a few minutes to complete the written survey that you may receive in the mail after your visit with us. Thank you!             Your Updated Medication List - Protect others around you: Learn how to safely use, store and throw away your medicines at www.disposemymeds.org.          This list is accurate as of 10/24/18  4:25 PM.  Always use your most recent med list.                    Brand Name Dispense Instructions for use Diagnosis    amLODIPine 5 MG tablet    NORVASC    90 tablet    Take 1 tablet (5 mg) by mouth daily    PVC's (premature ventricular contractions)       atorvastatin 20 MG tablet    LIPITOR    90 tablet    Take 1 tablet (20 mg) by mouth daily    Hyperlipidemia with target LDL less than 130       BERBERINE COMPLEX PO           estradiol 0.1 MG/24HR BIW patch   Start taking on:  10/25/2018    VIVELLE-DOT    24 patch    Place 1 patch onto the skin twice a week    Vasomotor symptoms due to menopause       flunisolide HFA 80 MCG/ACT Aers oral inhaler    AEROSPAN    2 Inhaler    Inhale 5 puffs into the lungs 2 times daily Rinse mouth after each use.    Mild persistent asthma with acute exacerbation, Cough       IRON SUPPLEMENT PO           levalbuterol 45 MCG/ACT Inhaler    XOPENEX HFA    1 Inhaler    Inhale 2 puffs into the lungs every 6 hours as needed for shortness of breath / dyspnea or wheezing    Acute bronchitis, unspecified organism, Mild persistent asthma with acute exacerbation       paragard intrauterine copper      1 each by Intrauterine route once        progesterone 100 MG capsule    PROMETRIUM    90 capsule    Take 1 capsule (100 mg) by mouth daily At bedtime    Vasomotor symptoms due to menopause       spironolactone 50 MG tablet    ALDACTONE    180 tablet    Take 1 tablet (50 mg) by mouth 2 times daily    Hypertension goal BP (blood pressure) < 140/90

## 2018-10-24 NOTE — PROGRESS NOTES
SUBJECTIVE:                                                   Olga Lidia Solorzano is a 48 year old female who presents to clinic today for the following health issue(s):  Patient presents with:  Consult      HPI:  Patient was having a little irregularity to her cycles when she was in for annual in April. But then she actually got fairly regular until . Had a normal period and none since. This is the longest she's gone without one at 4 months and is having constant, all day long hot flashes and night sweats. Can't sleep at all from them and totally miserable.    No fhx of breast cancer. She is getting her mammo annually and due for one now. Was a smoker but quit 5 yrs ago    Patient's mom was on HRT for many years and told her how they tried to make her d/c it and she couldn't b/c her sx went on for years.    Is ready to try HRt and wants to discuss it    LMP is 18   Patient is sexually active, .  Using IUD for contraception.    reports that she quit smoking about 5 years ago. Her smoking use included Cigarettes. She has a 10.00 pack-year smoking history. She has never used smokeless tobacco.    STD testing offered?  Declined    Health maintenance updated:  yes    Today's PHQ-2 Score:   PHQ-2 (  [employer]) 2015   Q1: Little interest or pleasure in doing things 0   Q2: Feeling down, depressed or hopeless 0   PHQ-2 Score 0     Today's PHQ-9 Score:   PHQ-9 SCORE 2018   Total Score 1     Today's TIMO-7 Score:   TIMO-7 SCORE 2018   Total Score 1       Problem list and histories reviewed & adjusted, as indicated.  Additional history: as documented.    Patient Active Problem List   Diagnosis     Hypertension goal BP (blood pressure) < 140/90     Hyperlipidemia with target LDL less than 130     Herniated vertebral disc     Mild persistent asthma     Gastroesophageal reflux disease without esophagitis     Bilateral low back pain without sciatica     Hip pain, right     Presence of intrauterine  contraceptive device     Past Surgical History:   Procedure Laterality Date     COLONOSCOPY N/A 3/13/2017    Procedure: COLONOSCOPY;  Surgeon: Tato Child MD;  Location: [facility]     NO HISTORY OF SURGERY        Social History   Substance Use Topics     Smoking status: Former Smoker     Packs/day: 0.50     Years: 20.00     Types: Cigarettes     Quit date: 12/31/2012     Smokeless tobacco: Never Used     Alcohol use 0.0 oz/week      Comment: more on weekends 8 drinks per week or wine or mixed drinks       Problem (# of Occurrences) Relation (Name,Age of Onset)    Blood Disease (1) Paternal Uncle    Breast Cancer (3) Paternal Aunt, Paternal Half-Sister (aunt), Cousin    Hyperlipidemia (1) Father    Hypertension (3) Mother, Father, Maternal Grandmother    Lung Cancer (1) Mother    Other Cancer (2) Mother: lung, Paternal Half-Brother (uncle): leukemia       Negative family history of: Liver Disease            Current Outpatient Prescriptions   Medication Sig     amLODIPine (NORVASC) 5 MG tablet Take 1 tablet (5 mg) by mouth daily     atorvastatin (LIPITOR) 20 MG tablet Take 1 tablet (20 mg) by mouth daily     Barberry-Oreg Grape-Goldenseal (BERBERINE COMPLEX PO)      [START ON 10/25/2018] estradiol (VIVELLE-DOT) 0.1 MG/24HR BIW patch Place 1 patch onto the skin twice a week     Ferrous Sulfate (IRON SUPPLEMENT PO)      Flunisolide HFA (AEROSPAN) 80 MCG/ACT AERS Inhale 5 puffs into the lungs 2 times daily Rinse mouth after each use.     levalbuterol (XOPENEX HFA) 45 MCG/ACT Inhaler Inhale 2 puffs into the lungs every 6 hours as needed for shortness of breath / dyspnea or wheezing     paragard intrauterine copper 1 each by Intrauterine route once     progesterone (PROMETRIUM) 100 MG capsule Take 1 capsule (100 mg) by mouth daily At bedtime     spironolactone (ALDACTONE) 50 MG tablet Take 1 tablet (50 mg) by mouth 2 times daily     No current facility-administered medications for this visit.      Allergies   Allergen  Reactions     Niacin Shortness Of Breath, Other (See Comments) and Nausea and Vomiting     All over body pains (patient reported symptoms)       ROS:  12 point review of systems negative other than symptoms noted below.  Head: Ringing  Genitourinary: Hot Flashes, Night Sweats and No Periods    OBJECTIVE:     /80  Wt 146 lb 12.8 oz (66.6 kg)  LMP 06/23/2018 (Exact Date)  Breastfeeding? No  BMI 25.2 kg/m2  Body mass index is 25.2 kg/(m^2).    Exam:  Constitutional:  Appearance: Well nourished, well developed alert, in no acute distress     In-Clinic Test Results:  No results found for this or any previous visit (from the past 24 hour(s)).    ASSESSMENT/PLAN:                                                        ICD-10-CM    1. Vasomotor symptoms due to menopause N95.1 estradiol (VIVELLE-DOT) 0.1 MG/24HR BIW patch     progesterone (PROMETRIUM) 100 MG capsule         Patient is having signficant vasomotor sx and it's making it intolerable for her day to day and very sleep disruptive.  Discussed HRT and the wHI with risks for breast cancer and stroke and also the benefits of it  Discussed different formulations of HRT  At this point will start on vivelle patch at0.1mg and then a nightly prometrium at 100mg. She is not fully menopausal at only 4 months w/o a cycle so daily HRT could lead to DUB. Counseled regarding this but will start this way. If starts to get DUB will then switch to cyclic prometrium  Patient will call if not helping or having s.e but o/w will just f/u at her annual with me as that's only about 5 months away  Also discussed the pros/cons of 3d vs 2d mammo    Spent 15 min with patient 100% of which was in face to face counseling time    Jessie Perdomo MD  [facility]

## 2018-10-29 ENCOUNTER — RADIANT APPOINTMENT (OUTPATIENT)
Dept: MAMMOGRAPHY | Facility: CLINIC | Age: 48
End: 2018-10-29
Payer: COMMERCIAL

## 2018-10-29 DIAGNOSIS — Z12.31 VISIT FOR SCREENING MAMMOGRAM: ICD-10-CM

## 2018-10-29 PROCEDURE — 77067 SCR MAMMO BI INCL CAD: CPT | Mod: TC

## 2018-10-29 PROCEDURE — 77063 BREAST TOMOSYNTHESIS BI: CPT | Mod: TC

## 2018-11-12 ENCOUNTER — MYC MEDICAL ADVICE (OUTPATIENT)
Dept: OBGYN | Facility: CLINIC | Age: 48
End: 2018-11-12

## 2018-11-12 NOTE — TELEPHONE ENCOUNTER
10/24/18 Vasomotor symptoms due to menopause. Please see my chart message below. Pt returned call is spotting dark brown discharge with mucous.  Spotting for about 3-4 days in a row and then stops for a day or two. Wearing light day tampon and changes every 4 hours. Had some cramping with the beginning of the spotting but is better now. Wants to know what she could do to stop the spotting. Routing to Dr. Perdomo.       10/24/18 Patient is having signficant vasomotor sx and it's making it intolerable for her day to day and very sleep disruptive.  Discussed HRT and the wHI with risks for breast cancer and stroke and also the benefits of it  Discussed different formulations of HRT  At this point will start on vivelle patch at0.1mg and then a nightly prometrium at 100mg. She is not fully menopausal at only 4 months w/o a cycle so daily HRT could lead to DUB. Counseled regarding this but will start this way. If starts to get DUB will then switch to cyclic prometrium  Patient will call if not helping or having s.e but o/w will just f/u at her annual with me as that's only about 5 months away  Also discussed the pros/cons of 3d vs 2d mammo

## 2018-11-12 NOTE — TELEPHONE ENCOUNTER
Options are to continue as is since light spotting and see if just self corrects with some more time since the HRT is all new.  Other option is as per my note. Instead of doing 100mg every day of prometrium we do 200mg and do it the 1-10th of every month. That way she has a light but predictable one time a month cycle rather than random irregular DUB.    If wants the latter than ok to stop her prometrium, likely will get some sort of withdrawal bleeding and then starting 12/1 for 10 days do the 200mg prometrium then the 1st of every month for 10 days will do 200mg prometrium.

## 2018-11-13 NOTE — TELEPHONE ENCOUNTER
Pt returning call  Instructed pt on Dr. Perdomo response below in detail. Pt is not sure what direction she will take at this time but aware of her options in detail, verbalizing back the instructions. No further questions at this time. Encouraged to call with any other concerns.

## 2018-11-16 ENCOUNTER — MYC MEDICAL ADVICE (OUTPATIENT)
Dept: FAMILY MEDICINE | Facility: CLINIC | Age: 48
End: 2018-11-16

## 2018-11-20 ENCOUNTER — OFFICE VISIT (OUTPATIENT)
Dept: FAMILY MEDICINE | Facility: CLINIC | Age: 48
End: 2018-11-20
Payer: COMMERCIAL

## 2018-11-20 VITALS
TEMPERATURE: 98.4 F | BODY MASS INDEX: 24.92 KG/M2 | WEIGHT: 146 LBS | HEIGHT: 64 IN | HEART RATE: 72 BPM | SYSTOLIC BLOOD PRESSURE: 122 MMHG | DIASTOLIC BLOOD PRESSURE: 70 MMHG

## 2018-11-20 DIAGNOSIS — M25.511 ACUTE PAIN OF RIGHT SHOULDER: Primary | ICD-10-CM

## 2018-11-20 PROCEDURE — 99213 OFFICE O/P EST LOW 20 MIN: CPT | Performed by: FAMILY MEDICINE

## 2018-11-20 RX ORDER — PREDNISONE 20 MG/1
TABLET ORAL
Qty: 8 TABLET | Refills: 0 | Status: SHIPPED | OUTPATIENT
Start: 2018-11-20 | End: 2018-12-10

## 2018-11-20 NOTE — PROGRESS NOTES
SUBJECTIVE:   Olga Lidia Solorzano is a 48 year old female who presents to clinic today for the following health issues:      Joint Pain    Onset: mid October after flu shot     Description:   Location:  Shoulder, right  Character: sharp when she moves it, achey when she doesn't move it, lessened range of motion, freezes or locks up at times.     Intensity: moderate    Progression of Symptoms: gets worse if she overuses.     Accompanying Signs & Symptoms:  Other symptoms: none    History:   Previous similar pain: no       Precipitating factors:   Trauma or overuse: YES    Alleviating factors:  Improved by: rest/inactivity helps, ibuprofen and heat help temporarily;y    Therapies Tried and outcome: rest helps, ibuprofen and heat are temporary relief    Patient complains of tenderness of her right shoulder after receiving a flu vaccine in mid October. Patient had previously received the flue vaccine. She feels a sharp muscular pain with arm movements and overuse. She also experiences an ache when she is not using her right shoulder, a decrease in range of motion, and it locks up at times. Patient placed heat on her right shoulder to help alleviate the symptoms, but it only helped temporarily. She has also tried Ibuprofen. Patient denies numbness, paresthesia, erythema and edema.      Patient is having vaginal bleeding, which she states is from her new hormone replacement. She sees her OBGYN for this.      Problem list and histories reviewed & adjusted, as indicated.  Additional history: as documented    Patient Active Problem List   Diagnosis     Hypertension goal BP (blood pressure) < 140/90     Hyperlipidemia with target LDL less than 130     Herniated vertebral disc     Mild persistent asthma     Gastroesophageal reflux disease without esophagitis     Bilateral low back pain without sciatica     Hip pain, right     Presence of intrauterine contraceptive device     Past Surgical History:   Procedure Laterality Date      COLONOSCOPY N/A 3/13/2017    Procedure: COLONOSCOPY;  Surgeon: Tato Child MD;  Location: [facility]     NO HISTORY OF SURGERY         Social History   Substance Use Topics     Smoking status: Former Smoker     Packs/day: 0.50     Years: 20.00     Types: Cigarettes     Quit date: 12/31/2012     Smokeless tobacco: Never Used     Alcohol use 0.0 oz/week      Comment: more on weekends 8 drinks per week or wine or mixed drinks      Family History   Problem Relation Age of Onset     Hypertension Mother      Lung Cancer Mother      Other Cancer Mother      lung     Hypertension Father      Hyperlipidemia Father      Blood Disease Paternal Uncle      Breast Cancer Paternal Aunt      Hypertension Maternal Grandmother      Breast Cancer Paternal Half-Sister      Breast Cancer Cousin      Other Cancer Paternal Half-Brother      leukemia     Liver Disease No family hx of          Current Outpatient Prescriptions   Medication Sig Dispense Refill     amLODIPine (NORVASC) 5 MG tablet Take 1 tablet (5 mg) by mouth daily 90 tablet 3     atorvastatin (LIPITOR) 20 MG tablet Take 1 tablet (20 mg) by mouth daily 90 tablet 3     Barberry-Oreg Grape-Goldenseal (BERBERINE COMPLEX PO)        estradiol (VIVELLE-DOT) 0.1 MG/24HR BIW patch Place 1 patch onto the skin twice a week 24 patch 1     Ferrous Sulfate (IRON SUPPLEMENT PO)        Flunisolide HFA (AEROSPAN) 80 MCG/ACT AERS Inhale 5 puffs into the lungs 2 times daily Rinse mouth after each use. 2 Inhaler 5     levalbuterol (XOPENEX HFA) 45 MCG/ACT Inhaler Inhale 2 puffs into the lungs every 6 hours as needed for shortness of breath / dyspnea or wheezing 1 Inhaler 1     paragard intrauterine copper 1 each by Intrauterine route once       predniSONE (DELTASONE) 20 MG tablet 2 tablets day 1 then 1 per day for 1 week 8 tablet 0     progesterone (PROMETRIUM) 100 MG capsule Take 1 capsule (100 mg) by mouth daily At bedtime 90 capsule 1     spironolactone (ALDACTONE) 50 MG tablet Take 1  tablet (50 mg) by mouth 2 times daily 180 tablet 3     Allergies   Allergen Reactions     Niacin Shortness Of Breath, Other (See Comments) and Nausea and Vomiting     All over body pains (patient reported symptoms)     Recent Labs   Lab Test  10/18/18   0716  06/05/18   0650   04/19/18   0605  11/13/17   0540  08/17/17   1645 04/10/17   12/16/15   1034   A1C  5.2   --    --    --    --   5.1   --    --    --    LDL  115*   --    --   80   --    --   80   < >   --    HDL  56   --    --   64   --    --   45   < >   --    TRIG  291*   --    --   354*   --    --   212*   < >   --    ALT  108*  69*   --    --   71*   --    --    < >  195*   CR  0.56  0.57   < >   --   0.62   --    --    < >  0.80   GFRESTIMATED  >90  >90   < >   --   >90   --    --    < >  77   GFRESTBLACK  >90  >90   < >   --   >90   --    --    < >  >90   GFR Calc     POTASSIUM  3.8  4.0   < >   --   4.0   --    --    < >  3.9   TSH   --    --    --    --    --   1.50   --    --   1.54    < > = values in this interval not displayed.      BP Readings from Last 3 Encounters:   11/20/18 122/70   10/24/18 120/80   10/04/18 118/78    Wt Readings from Last 3 Encounters:   11/20/18 66.2 kg (146 lb)   10/24/18 66.6 kg (146 lb 12.8 oz)   10/04/18 65.3 kg (144 lb)                  Labs reviewed in EPIC    Reviewed and updated as needed this visit by clinical staff  Tobacco  Allergies  Meds  Med Hx  Surg Hx  Fam Hx  Soc Hx      Reviewed and updated as needed this visit by Provider         ROS:  Constitutional, HEENT, cardiovascular, pulmonary, gi and gu systems are negative, except as otherwise noted.    This document serves as a record of the services and decisions personally performed by ANAMARIA WOLFE. It was created on his behalf by Ari Herzog and Zohra Mcdaniel, trained medical scribes. The creation of this document is based on the provider's statements to the medical scribe. Ari Herzog, November 20, 2018 9:17 AM      OBJECTIVE:  "    /70  Pulse 72  Temp 98.4  F (36.9  C) (Oral)  Ht 1.626 m (5' 4\")  Wt 66.2 kg (146 lb)  BMI 25.06 kg/m2  Body mass index is 25.06 kg/(m^2).  GENERAL: healthy, alert and no distress  MS: no impingements, tender deltoid muscle, no gross musculoskeletal defects noted, no edema  NEURO: testing of rotator cuff illicit's pain but no weakness, Gait normal. Reflexes normal and symmetric. Sensation grossly WNL.  SKIN: no suspicious lesions or rashes  PSYCH: mentation appears normal, affect normal/bright    Diagnostic Test Results:  No results found for this or any previous visit (from the past 24 hour(s)).    ASSESSMENT/PLAN:     (M25.511) Acute pain of right shoulder  (primary encounter diagnosis)  Comment: Uncontrolled right shoulder pain.   Plan: predniSONE (DELTASONE) 20 MG tablet        Will start Prednisone 20 mg, take 2 tablets on the 1st day, then can decrease to 1 tablet daily until finished, recommended placement of Ice pack as needed and follow up PRN      Patient Instructions   Take 2 tables of prednisone and decrease to 1 a day until finished    Use Ice more so over heat    The information in this document, created by the medical scribes Ari Herzog and Zohra Mcdaniel for me, accurately reflects the services I personally performed and the decisions made by me. I have reviewed and approved this document for accuracy prior to leaving the patient care area.      Wan Anderson MD, MD  [facility]    "

## 2018-11-20 NOTE — MR AVS SNAPSHOT
After Visit Summary   11/20/2018    Olga Lidia Solorzano    MRN: [id number]           Patient Information     Date Of Birth          1970        Visit Information        Provider Department      11/20/2018 9:00 AM Wan Anderson MD [facility]        Today's Diagnoses     Acute pain of right shoulder    -  1      Care Instructions    Take 2 tables of prednisone on 1st day then decrease to 1 tablet daily until finished    Use Ice more so over heat          Follow-ups after your visit        Follow-up notes from your care team     Return if symptoms worsen or fail to improve.      Who to contact     If you have questions or need follow up information about today's clinic visit or your schedule please contact [facility] directly at [phone].  Normal or non-critical lab and imaging results will be communicated to you by InsideMapshart, letter or phone within 4 business days after the clinic has received the results. If you do not hear from us within 7 days, please contact the clinic through InsideMapshart or phone. If you have a critical or abnormal lab result, we will notify you by phone as soon as possible.  Submit refill requests through BuzzMob or call your pharmacy and they will forward the refill request to us. Please allow 3 business days for your refill to be completed.          Additional Information About Your Visit        MyChart Information     BuzzMob gives you secure access to your electronic health record. If you see a primary care provider, you can also send messages to your care team and make appointments. If you have questions, please call your primary care clinic.  If you do not have a primary care provider, please call [phone] and they will assist you.        Care EveryWhere ID     This is your Care EveryWhere ID. This could be used by other organizations to access your [place] medical records  [id number]        Your Vitals Were     Pulse  "Temperature Height BMI (Body Mass Index)          72 98.4  F (36.9  C) (Oral) 5' 4\" (1.626 m) 25.06 kg/m2         Blood Pressure from Last 3 Encounters:   11/20/18 122/70   10/24/18 120/80   10/04/18 118/78    Weight from Last 3 Encounters:   11/20/18 146 lb (66.2 kg)   10/24/18 146 lb 12.8 oz (66.6 kg)   10/04/18 144 lb (65.3 kg)              Today, you had the following     No orders found for display         Today's Medication Changes          These changes are accurate as of 11/20/18  1:50 PM.  If you have any questions, ask your nurse or doctor.               Start taking these medicines.        Dose/Directions    predniSONE 20 MG tablet   Commonly known as:  DELTASONE   Used for:  Acute pain of right shoulder   Started by:  Wan Anderson MD        2 tablets day 1 then 1 per day for 1 week   Quantity:  8 tablet   Refills:  0            Where to get your medicines      These medications were sent to [facility] [place] - [place] - [address]  [address] [facility] [address], [place] [address]     Phone:  [phone]     predniSONE 20 MG tablet                Primary Care Provider Office Phone # Fax #    Wan Anderson [phone]       [facility] [address]        Equal Access to Services     INOCENCIO LOVELL AH: Hadii carla greshamo Soarmida, waaxda luqadaha, qaybta kaalmaelana tamayo . So [facility] [phone].    ATENCIÓN: Si habla español, tiene a you disposición servicios gratuitos de asistencia lingüística. Jerry al [phone].    We comply with applicable federal civil rights laws and [place] laws. We do not discriminate on the basis of race, color, national origin, age, disability, sex, sexual orientation, or gender identity.            Thank you!     Thank you for choosing [facility]  for your care. Our goal is always to provide you with excellent care. Hearing back from our patients is " one way we can continue to improve our services. Please take a few minutes to complete the written survey that you may receive in the mail after your visit with us. Thank you!             Your Updated Medication List - Protect others around you: Learn how to safely use, store and throw away your medicines at www.disposemymeds.org.          This list is accurate as of 11/20/18  1:50 PM.  Always use your most recent med list.                   Brand Name Dispense Instructions for use Diagnosis    amLODIPine 5 MG tablet    NORVASC    90 tablet    Take 1 tablet (5 mg) by mouth daily    PVC's (premature ventricular contractions)       atorvastatin 20 MG tablet    LIPITOR    90 tablet    Take 1 tablet (20 mg) by mouth daily    Hyperlipidemia with target LDL less than 130       BERBERINE COMPLEX PO           estradiol 0.1 MG/24HR BIW patch    VIVELLE-DOT    24 patch    Place 1 patch onto the skin twice a week    Vasomotor symptoms due to menopause       flunisolide HFA 80 MCG/ACT Aers oral inhaler    AEROSPAN    2 Inhaler    Inhale 5 puffs into the lungs 2 times daily Rinse mouth after each use.    Mild persistent asthma with acute exacerbation, Cough       IRON SUPPLEMENT PO           levalbuterol 45 MCG/ACT Inhaler    XOPENEX HFA    1 Inhaler    Inhale 2 puffs into the lungs every 6 hours as needed for shortness of breath / dyspnea or wheezing    Acute bronchitis, unspecified organism, Mild persistent asthma with acute exacerbation       paragard intrauterine copper      1 each by Intrauterine route once        predniSONE 20 MG tablet    DELTASONE    8 tablet    2 tablets day 1 then 1 per day for 1 week    Acute pain of right shoulder       progesterone 100 MG capsule    PROMETRIUM    90 capsule    Take 1 capsule (100 mg) by mouth daily At bedtime    Vasomotor symptoms due to menopause       spironolactone 50 MG tablet    ALDACTONE    180 tablet    Take 1 tablet (50 mg) by mouth 2 times daily    Hypertension goal BP  (blood pressure) < 140/90

## 2018-11-27 ENCOUNTER — MYC MEDICAL ADVICE (OUTPATIENT)
Dept: OBGYN | Facility: CLINIC | Age: 48
End: 2018-11-27

## 2018-11-28 NOTE — TELEPHONE ENCOUNTER
Tibolone is not available in the [place]. It is only available in [place] and it's a controlled substance b/c it's considered an anabolic steroid in [place].    So though there's pros/cons to all the different HRT out there we wouldn't even have access to tibolone

## 2018-12-05 ENCOUNTER — MYC MEDICAL ADVICE (OUTPATIENT)
Dept: FAMILY MEDICINE | Facility: CLINIC | Age: 48
End: 2018-12-05

## 2018-12-05 DIAGNOSIS — M25.511 RIGHT ANTERIOR SHOULDER PAIN: Primary | ICD-10-CM

## 2018-12-05 NOTE — TELEPHONE ENCOUNTER
"Route MyChart to PCP to advise regarding ongoing shoulder pain please. Patient seen by you for this 11/20/18 and continues with pain despite following treatment recommendations.  Your note below.    Andie Davies RN    \"(M25.511) Acute pain of right shoulder  (primary encounter diagnosis)  Comment: Uncontrolled right shoulder pain.   Plan: predniSONE (DELTASONE) 20 MG tablet        Will start Prednisone 20 mg, take 2 tablets on the 1st day, then can decrease to 1 tablet daily until finished, recommended placement of Ice pack as needed and follow up PRN\"           "

## 2018-12-07 ENCOUNTER — MYC MEDICAL ADVICE (OUTPATIENT)
Dept: OBGYN | Facility: CLINIC | Age: 48
End: 2018-12-07

## 2018-12-07 DIAGNOSIS — N95.1 VASOMOTOR SYMPTOMS DUE TO MENOPAUSE: Primary | ICD-10-CM

## 2018-12-07 NOTE — TELEPHONE ENCOUNTER
Routing pt realSociable message to Dr Perdomo to advise.        Jessie Perdomo MD   to We Triage 11/27/18     Options are to continue as is since light spotting and see if just self corrects with some more time since the HRT is all new.  Other option is as per my note. Instead of doing 100mg every day of prometrium we do 200mg and do it the 1-10th of every month. That way she has a light but predictable one time a month cycle rather than random irregular DUB.     If wants the latter than ok to stop her prometrium, likely will get some sort of withdrawal bleeding and then starting 12/1 for 10 days do the 200mg prometrium then the 1st of every month for 10 days will do 200mg prometrium.

## 2018-12-07 NOTE — TELEPHONE ENCOUNTER
Lowering the estrogen dose doesn't usually work but we could certainly try it. I usually find it just make for more hot flashes and night sweats. Could drop her down a dose if she wants to do that as a first trial and leave the prometrium as a daily 100mg  Second option is we leave the estrogen dose alone and switch her progestin. Instead of prometrium 100mg we do provera 10mg daily. This is more thinning than prometrium so may stop the spotting.  Final option would be to do 200mg day 1-10 of the month and then get a once a month, light predictable withdrawal rather than every day spotting  Let me know which she'd like to try

## 2018-12-10 ENCOUNTER — ANCILLARY PROCEDURE (OUTPATIENT)
Dept: GENERAL RADIOLOGY | Facility: CLINIC | Age: 48
End: 2018-12-10
Attending: FAMILY MEDICINE
Payer: OTHER MISCELLANEOUS

## 2018-12-10 ENCOUNTER — OFFICE VISIT (OUTPATIENT)
Dept: ORTHOPEDICS | Facility: CLINIC | Age: 48
End: 2018-12-10
Payer: OTHER MISCELLANEOUS

## 2018-12-10 VITALS
DIASTOLIC BLOOD PRESSURE: 78 MMHG | BODY MASS INDEX: 24.92 KG/M2 | WEIGHT: 146 LBS | HEIGHT: 64 IN | SYSTOLIC BLOOD PRESSURE: 130 MMHG

## 2018-12-10 DIAGNOSIS — M25.511 ACUTE PAIN OF RIGHT SHOULDER: ICD-10-CM

## 2018-12-10 DIAGNOSIS — M25.511 ACUTE PAIN OF RIGHT SHOULDER: Primary | ICD-10-CM

## 2018-12-10 PROCEDURE — 73030 X-RAY EXAM OF SHOULDER: CPT | Mod: RT

## 2018-12-10 PROCEDURE — 99203 OFFICE O/P NEW LOW 30 MIN: CPT | Mod: 25 | Performed by: FAMILY MEDICINE

## 2018-12-10 PROCEDURE — 20610 DRAIN/INJ JOINT/BURSA W/O US: CPT | Mod: RT | Performed by: FAMILY MEDICINE

## 2018-12-10 RX ORDER — ESTRADIOL 0.05 MG/D
1 PATCH, EXTENDED RELEASE TRANSDERMAL
Qty: 24 PATCH | Refills: 0 | Status: SHIPPED | OUTPATIENT
Start: 2018-12-10 | End: 2019-09-16

## 2018-12-10 RX ORDER — LIDOCAINE HYDROCHLORIDE 10 MG/ML
5 INJECTION, SOLUTION INFILTRATION; PERINEURAL
Status: DISCONTINUED | OUTPATIENT
Start: 2018-12-10 | End: 2019-02-18

## 2018-12-10 RX ORDER — METHYLPREDNISOLONE ACETATE 40 MG/ML
40 INJECTION, SUSPENSION INTRA-ARTICULAR; INTRALESIONAL; INTRAMUSCULAR; SOFT TISSUE
Status: DISCONTINUED | OUTPATIENT
Start: 2018-12-10 | End: 2019-02-18

## 2018-12-10 RX ORDER — LIDOCAINE HYDROCHLORIDE 10 MG/ML
4 INJECTION, SOLUTION INFILTRATION; PERINEURAL
Status: DISCONTINUED | OUTPATIENT
Start: 2018-12-10 | End: 2019-02-18

## 2018-12-10 RX ADMIN — METHYLPREDNISOLONE ACETATE 40 MG: 40 INJECTION, SUSPENSION INTRA-ARTICULAR; INTRALESIONAL; INTRAMUSCULAR; SOFT TISSUE at 16:12

## 2018-12-10 RX ADMIN — LIDOCAINE HYDROCHLORIDE 5 ML: 10 INJECTION, SOLUTION INFILTRATION; PERINEURAL at 16:12

## 2018-12-10 RX ADMIN — LIDOCAINE HYDROCHLORIDE 4 ML: 10 INJECTION, SOLUTION INFILTRATION; PERINEURAL at 16:12

## 2018-12-10 ASSESSMENT — MIFFLIN-ST. JEOR: SCORE: 1267.25

## 2018-12-10 NOTE — PROGRESS NOTES
[facility]   Clinic Visit s Dec 10, 2018    PCP: Wan Anderson      Olga Lidia is a 48 year old female who is seen in consultation at the request of Dr. Anderson for   Chief Complaint   Patient presents with     Right Shoulder - Pain       Injury: Patient describes injury as pain that began after a flu shot given 10/8/18. Normal soreness iniitally, getting worse 3 days later.      Right hand dominant    Location of Pain: right shoulder lateral, nonradiating   Duration of Pain: 2 month(s)  Rating of Pain at worst: 7/10  Rating of Pain Currently: 1/10  Pain is better with: activity avoidance, heat, ibuprofen 800 mg dialy  Pain is worse with: ADLs:  all activities, driving, lifting  Treatment so far consists of: heat and steroids  Associated symptoms: no distal numbness or tingling; denies swelling or warmth  Recent imaging completed: No recent imaging completed.  Prior History of related problems: shoulder pain a few years ago when arm yanked by dog on leash.    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       Past Medical History:   Diagnosis Date     Hyperlipidemia LDL goal < 130      Hypertension goal BP (blood pressure) < 140/90      Insulin resistance      IUD (intrauterine device) in place ??2012 or 2013    Paragard     Obesity      Uncomplicated asthma        Patient Active Problem List    Diagnosis Date Noted     Presence of intrauterine contraceptive device 02/23/2017     Priority: Medium     Bilateral low back pain without sciatica 03/24/2016     Priority: Medium     Hip pain, right 03/24/2016     Priority: Medium     Gastroesophageal reflux disease without esophagitis 02/10/2016     Priority: Medium     Mild persistent asthma 01/05/2015     Priority: Medium     Herniated vertebral disc 03/21/2013     Priority: Medium     Hyperlipidemia with target LDL less than 130 01/11/2012     Priority: Medium     Diagnosis updated by automated process. Provider to review and confirm.    "    Hypertension goal BP (blood pressure) < 140/90      Priority: Medium       Family History   Problem Relation Age of Onset     Hypertension Mother      Lung Cancer Mother      Other Cancer Mother         lung     Hypertension Father      Hyperlipidemia Father      Blood Disease Paternal Uncle      Breast Cancer Paternal Aunt      Hypertension Maternal Grandmother      Breast Cancer Paternal Half-Sister      Breast Cancer Cousin      Other Cancer Paternal Half-Brother         leukemia     Liver Disease No family hx of        Social History     Socioeconomic History     Marital status: Single     Spouse name:  -does IT     Number of children: 1     Years of education: Not on file     Highest education level: Not on file   Social Needs     Financial resource strain: Not on file     Food insecurity - worry: Not on file     Food insecurity - inability: Not on file     Transportation needs - medical: Not on file     Transportation needs - non-medical: Not on file   Occupational History     Occupation: lab tech     Employer: [employer] [address]   Tobacco Use     Smoking status: Former Smoker     Packs/day: 0.50     Years: 20.00     Pack years: 10.00     Types: Cigarettes     Last attempt to quit: 2012     Years since quittin.9     Smokeless tobacco: Never Used   Substance and Sexual Activity     Alcohol use: Yes     Alcohol/week: 0.0 oz     Comment: more on weekends 8 drinks per week or wine or mixed drinks        Past Surgical History:   Procedure Laterality Date     COLONOSCOPY N/A 3/13/2017    Procedure: COLONOSCOPY;  Surgeon: Tato Child MD;  Location: [facility]     NO HISTORY OF SURGERY               Review of Systems   Musculoskeletal: Positive for joint pain.   All other systems reviewed and are negative.        Physical Exam    /78 (BP Location: Left arm, Patient Position: Sitting, Cuff Size: Adult Large)   Ht 1.626 m (5' 4\")   Wt 66.2 kg (146 lb)   BMI 25.06 " kg/m    Constitutional:well-developed, well-nourished, and in no distress.   Cardiovascular: Intact distal pulses.    Neurological: alert. Gait Normal:   Gait, station, stance, and balance appear normal for age  Skin: Skin is warm and dry.   Psychiatric: Mood and affect normal.   Respiratory: unlabored, speaks in full sentences  Lymph: no LAD, no lymphangitis    Right Shoulder Exam     Tenderness   Right shoulder tenderness location: Upper outer aspect of upper arm.    Range of Motion   Active abduction: abnormal   Passive abduction: abnormal   Extension: abnormal   External rotation: abnormal   Forward flexion: abnormal   Internal rotation 0 degrees: normal   Internal rotation 90 degrees: normal     Muscle Strength   Abduction: 5/5   Internal rotation: 5/5   External rotation: 5/5   Supraspinatus: 5/5   Subscapularis: 5/5   Biceps: 5/5     Tests   Apprehension: negative  Childs test: positive  Cross arm: negative  Impingement: positive  Drop arm: positive  Sulcus: absent    Other   Erythema: absent  Scars: absent  Sensation: normal  Pulse: present    Comments:  Full range of motion both actively and passively but marked pain at end range especially overhead            X-ray images Ordered and independently reviewed by me in the office today with the patient. X-ray shows normal shoulder      ASSESSMENT/PLAN    ICD-10-CM    1. Acute pain of right shoulder M25.511 XR Shoulder Right G/E 3 Views     Large Joint Injection/Arthocentesis: R subacromial bursa     Acute pain of right shoulder presumably from flu vaccine, and pain pattern suggestive of bursitis.  Reassurance regarding normal x-rays and good strength indicating no rotator cuff tearing.    Offered subacromial bursa injection for pain control and this may be all she needs to recover.  Patient agreed to proceed.  [place] workability form completed, copy given to patient, copy faxed to the appropriate office.  Recheck in 2 weeks time and if not better than may  need advanced imaging and/or physical therapy      : Large Joint Injection/Arthocentesis: R subacromial bursa  Date/Time: 12/10/2018 4:12 PM  Performed by: Reagan Arriaga MD  Authorized by: Reagan Arriaga MD     Indications:  Pain  Needle Size:  25 G  Guidance: landmark guided    Approach:  Posterolateral  Location:  Shoulder  Site:  R subacromial bursa  Medications:  5 mL lidocaine 1 %; 4 mL lidocaine 1 %; 40 mg methylPREDNISolone 40 MG/ML  Outcome:  Tolerated well, no immediate complications  Procedure discussed: discussed risks, benefits, and alternatives    Timeout: timeout called immediately prior to procedure    Prep: patient was prepped and draped in usual sterile fashion     LOT [id number] exp 12/2019

## 2018-12-10 NOTE — TELEPHONE ENCOUNTER
Pt informed. Is not having any hot flashes or night sweats. Will go to the 0.05 patch. Order placed.         We can go down to 0.075 on her patch or 0.05. Either is fine. Is she having any hotflashes or nightsweats at all. If even a little bit then we should just go down one dose and if none at all we could try to go to the 0.05   AJ (Routing comment)

## 2018-12-10 NOTE — TELEPHONE ENCOUNTER
Routing pt Progression message to Dr. Perdomo to advise.    Pt responding to AJ's response from 12/7/18:  Jessie Perdomo MD   to We Triage       12/7/18 3:36 PM   Note      Lowering the estrogen dose doesn't usually work but we could certainly try it. I usually find it just make for more hot flashes and night sweats. Could drop her down a dose if she wants to do that as a first trial and leave the prometrium as a daily 100mg  Second option is we leave the estrogen dose alone and switch her progestin. Instead of prometrium 100mg we do provera 10mg daily. This is more thinning than prometrium so may stop the spotting.  Final option would be to do 200mg day 1-10 of the month and then get a once a month, light predictable withdrawal rather than every day spotting  Let me know which she'd like to try

## 2018-12-10 NOTE — LETTER
12/10/2018         RE: Olga Lidia Solorzano  [address] [facility] [address]        Dear Colleague,    Thank you for referring your patient, Olga Lidia Solorzano, to the  SPORTS MEDICINE. Please see a copy of my visit note below.    [facility]   Clinic Visit s Dec 10, 2018    PCP: Wan Anderson      Olga Lidia is a 48 year old female who is seen in consultation at the request of Dr. Anderson for   Chief Complaint   Patient presents with     Right Shoulder - Pain       Injury: Patient describes injury as pain that began after a flu shot given 10/8/18. Normal soreness iniitally, getting worse 3 days later.      Right hand dominant    Location of Pain: right shoulder lateral, nonradiating   Duration of Pain: 2 month(s)  Rating of Pain at worst: 7/10  Rating of Pain Currently: 1/10  Pain is better with: activity avoidance, heat, ibuprofen 800 mg dialy  Pain is worse with: ADLs:  all activities, driving, lifting  Treatment so far consists of: heat and steroids  Associated symptoms: no distal numbness or tingling; denies swelling or warmth  Recent imaging completed: No recent imaging completed.  Prior History of related problems: shoulder pain a few years ago when arm yanked by dog on leash.    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       Past Medical History:   Diagnosis Date     Hyperlipidemia LDL goal < 130      Hypertension goal BP (blood pressure) < 140/90      Insulin resistance      IUD (intrauterine device) in place ??2012 or 2013    Paragard     Obesity      Uncomplicated asthma        Patient Active Problem List    Diagnosis Date Noted     Presence of intrauterine contraceptive device 02/23/2017     Priority: Medium     Bilateral low back pain without sciatica 03/24/2016     Priority: Medium     Hip pain, right 03/24/2016     Priority: Medium     Gastroesophageal reflux disease without esophagitis 02/10/2016     Priority: Medium     Mild persistent asthma  2015     Priority: Medium     Herniated vertebral disc 2013     Priority: Medium     Hyperlipidemia with target LDL less than 130 2012     Priority: Medium     Diagnosis updated by automated process. Provider to review and confirm.       Hypertension goal BP (blood pressure) < 140/90      Priority: Medium       Family History   Problem Relation Age of Onset     Hypertension Mother      Lung Cancer Mother      Other Cancer Mother         lung     Hypertension Father      Hyperlipidemia Father      Blood Disease Paternal Uncle      Breast Cancer Paternal Aunt      Hypertension Maternal Grandmother      Breast Cancer Paternal Half-Sister      Breast Cancer Cousin      Other Cancer Paternal Half-Brother         leukemia     Liver Disease No family hx of        Social History     Socioeconomic History     Marital status: Single     Spouse name: NOEMI -does IT     Number of children: 1     Years of education: Not on file     Highest education level: Not on file   Social Needs     Financial resource strain: Not on file     Food insecurity - worry: Not on file     Food insecurity - inability: Not on file     Transportation needs - medical: Not on file     Transportation needs - non-medical: Not on file   Occupational History     Occupation: lab tech     Employer: [employer]   Tobacco Use     Smoking status: Former Smoker     Packs/day: 0.50     Years: 20.00     Pack years: 10.00     Types: Cigarettes     Last attempt to quit: 2012     Years since quittin.9     Smokeless tobacco: Never Used   Substance and Sexual Activity     Alcohol use: Yes     Alcohol/week: 0.0 oz     Comment: more on weekends 8 drinks per week or wine or mixed drinks        Past Surgical History:   Procedure Laterality Date     COLONOSCOPY N/A 3/13/2017    Procedure: COLONOSCOPY;  Surgeon: Tato Child MD;  Location:  GI     NO HISTORY OF SURGERY               Review of Systems  "  Musculoskeletal: Positive for joint pain.   All other systems reviewed and are negative.        Physical Exam    /78 (BP Location: Left arm, Patient Position: Sitting, Cuff Size: Adult Large)   Ht 1.626 m (5' 4\")   Wt 66.2 kg (146 lb)   BMI 25.06 kg/m     Constitutional:well-developed, well-nourished, and in no distress.   Cardiovascular: Intact distal pulses.    Neurological: alert. Gait Normal:   Gait, station, stance, and balance appear normal for age  Skin: Skin is warm and dry.   Psychiatric: Mood and affect normal.   Respiratory: unlabored, speaks in full sentences  Lymph: no LAD, no lymphangitis    Right Shoulder Exam     Tenderness   Right shoulder tenderness location: Upper outer aspect of upper arm.    Range of Motion   Active abduction: abnormal   Passive abduction: abnormal   Extension: abnormal   External rotation: abnormal   Forward flexion: abnormal   Internal rotation 0 degrees: normal   Internal rotation 90 degrees: normal     Muscle Strength   Abduction: 5/5   Internal rotation: 5/5   External rotation: 5/5   Supraspinatus: 5/5   Subscapularis: 5/5   Biceps: 5/5     Tests   Apprehension: negative  Childs test: positive  Cross arm: negative  Impingement: positive  Drop arm: positive  Sulcus: absent    Other   Erythema: absent  Scars: absent  Sensation: normal  Pulse: present    Comments:  Full range of motion both actively and passively but marked pain at end range especially overhead            X-ray images Ordered and independently reviewed by me in the office today with the patient. X-ray shows normal shoulder      ASSESSMENT/PLAN    ICD-10-CM    1. Acute pain of right shoulder M25.511 XR Shoulder Right G/E 3 Views     Large Joint Injection/Arthocentesis: R subacromial bursa     Acute pain of right shoulder presumably from flu vaccine, and pain pattern suggestive of bursitis.  Reassurance regarding normal x-rays and good strength indicating no rotator cuff tearing.    Offered " subacromial bursa injection for pain control and this may be all she needs to recover.  Patient agreed to proceed.  [place] workability form completed, copy given to patient, copy faxed to the appropriate office.  Recheck in 2 weeks time and if not better than may need advanced imaging and/or physical therapy      : Large Joint Injection/Arthocentesis: R subacromial bursa  Date/Time: 12/10/2018 4:12 PM  Performed by: Reagan Arriaga MD  Authorized by: Reagan Arriaga MD     Indications:  Pain  Needle Size:  25 G  Guidance: landmark guided    Approach:  Posterolateral  Location:  Shoulder  Site:  R subacromial bursa  Medications:  5 mL lidocaine 1 %; 4 mL lidocaine 1 %; 40 mg methylPREDNISolone 40 MG/ML  Outcome:  Tolerated well, no immediate complications  Procedure discussed: discussed risks, benefits, and alternatives    Timeout: timeout called immediately prior to procedure    Prep: patient was prepped and draped in usual sterile fashion     LOT [id number] exp 12/2019            Again, thank you for allowing me to participate in the care of your patient.        Sincerely,        Reagan Arriaga MD

## 2018-12-19 NOTE — PROGRESS NOTES
[facility]   Follow-up Visit s Dec 26, 2018    PCP: Wan Anderson      Subjective:  Olga Lidia is a 48 year old female who is seen in follow up for evaluation of No chief complaint on file.    Her last visit was on 12/10/2018.  Since that time, symptoms have been better than before. Olga Lidia Solorzano is accompanied today by self.       Patient had a right shoulder: subacromial space Cortisone injection on 12/10/2018 that provided 95% relief, that is continuing to provide relief.  Patient reports 95% improvement since last visit.  Patient has noticed improved symptoms with corticosteroid injection treatment.  Pain is located over the right lateral aspect of shoulder, intermittent.  Patient is using no aids.    Patient denies any new injuries.        Patient's past medical, surgical, social and family histories are reviewed today.    History from previous visit on 12/10/2018  Injury: Patient describes injury as pain that began after a flu shot given 10/8/18. Normal soreness iniitally, getting worse 3 days later.      Right hand dominant    Location of Pain: right shoulder lateral, nonradiating   Duration of Pain: 2 month(s)  Rating of Pain at worst: 7/10  Rating of Pain Currently: 1/10  Pain is better with: activity avoidance, heat, ibuprofen 800 mg dialy  Pain is worse with: ADLs:  all activities, driving, lifting  Treatment so far consists of: heat and steroids  Associated symptoms: no distal numbness or tingling; denies swelling or warmth  Recent imaging completed: No recent imaging completed.  Prior History of related problems: shoulder pain a few years ago when arm yanked by dog on leash.    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 lab tech      Past Medical History:   Diagnosis Date     Hyperlipidemia LDL goal < 130      Hypertension goal BP (blood pressure) < 140/90      Insulin resistance      IUD (intrauterine device) in place ??2012 or 2013    Paragard     Obesity       Uncomplicated asthma        Patient Active Problem List    Diagnosis Date Noted     Presence of intrauterine contraceptive device 2017     Priority: Medium     Bilateral low back pain without sciatica 2016     Priority: Medium     Hip pain, right 2016     Priority: Medium     Gastroesophageal reflux disease without esophagitis 02/10/2016     Priority: Medium     Mild persistent asthma 2015     Priority: Medium     Herniated vertebral disc 2013     Priority: Medium     Hyperlipidemia with target LDL less than 130 2012     Priority: Medium     Diagnosis updated by automated process. Provider to review and confirm.       Hypertension goal BP (blood pressure) < 140/90      Priority: Medium       Family History   Problem Relation Age of Onset     Hypertension Mother      Lung Cancer Mother      Other Cancer Mother         lung     Hypertension Father      Hyperlipidemia Father      Blood Disease Paternal Uncle      Breast Cancer Paternal Aunt      Hypertension Maternal Grandmother      Breast Cancer Paternal Half-Sister      Breast Cancer Cousin      Other Cancer Paternal Half-Brother         leukemia     Liver Disease No family hx of        Social History     Socioeconomic History     Marital status: Single     Spouse name:  -does IT     Number of children: 1     Years of education: Not on file     Highest education level: Not on file   Social Needs     Financial resource strain: Not on file     Food insecurity - worry: Not on file     Food insecurity - inability: Not on file     Transportation needs - medical: Not on file     Transportation needs - non-medical: Not on file   Occupational History     Occupation: lab tech     Employer: [employer],[address]   Tobacco Use     Smoking status: Former Smoker     Packs/day: 0.50     Years: 20.00     Pack years: 10.00     Types: Cigarettes     Last attempt to quit: 2012     Years since quittin.9      "Smokeless tobacco: Never Used   Substance and Sexual Activity     Alcohol use: Yes     Alcohol/week: 0.0 oz     Comment: more on weekends 8 drinks per week or wine or mixed drinks        Past Surgical History:   Procedure Laterality Date     COLONOSCOPY N/A 3/13/2017    Procedure: COLONOSCOPY;  Surgeon: Tato Child MD;  Location: [facility]     NO HISTORY OF SURGERY               Review of Systems   Musculoskeletal: Positive for joint pain.   All other systems reviewed and are negative.        Physical Exam    /82   Ht 1.626 m (5' 4\")   Wt 66.2 kg (146 lb)   BMI 25.06 kg/m    Constitutional:well-developed, well-nourished, and in no distress.   Cardiovascular: Intact distal pulses.    Neurological: alert. Gait Normal:   Gait, station, stance, and balance appear normal for age  Skin: Skin is warm and dry.   Psychiatric: Mood and affect normal.   Respiratory: unlabored, speaks in full sentences  Lymph: no LAD, no lymphangitis    Right Shoulder Exam     Tenderness   The patient is experiencing no tenderness.    Range of Motion   Active abduction: normal   Passive abduction: normal   Extension: normal   External rotation: normal   Forward flexion: normal   Internal rotation 0 degrees: normal   Internal rotation 90 degrees: normal     Muscle Strength   Abduction: 5/5   Internal rotation: 5/5   External rotation: 5/5   Supraspinatus: 5/5   Subscapularis: 5/5   Biceps: 5/5     Tests   Apprehension: negative  Childs test: negative  Cross arm: negative  Impingement: positive  Drop arm: negative  Sulcus: absent    Other   Erythema: absent  Scars: absent  Sensation: normal  Pulse: present    Comments:  Minimally positive overhead impingement            X-ray images Ordered and independently reviewed by me in the office today with the patient. X-ray shows normal shoulder      ASSESSMENT/PLAN    ICD-10-CM    1. Acute pain of right shoulder M25.511        Minimal pain with overhead impingement testing, otherwise " painless range of motion and full strength.    Will return to regular work without restrictions, return if needed.  Workability form completed, copy to patient and faxed to the appropriate office.  : Procedures

## 2018-12-26 ENCOUNTER — OFFICE VISIT (OUTPATIENT)
Dept: ORTHOPEDICS | Facility: CLINIC | Age: 48
End: 2018-12-26
Payer: OTHER MISCELLANEOUS

## 2018-12-26 VITALS
HEIGHT: 64 IN | SYSTOLIC BLOOD PRESSURE: 128 MMHG | WEIGHT: 146 LBS | DIASTOLIC BLOOD PRESSURE: 82 MMHG | BODY MASS INDEX: 24.92 KG/M2

## 2018-12-26 DIAGNOSIS — M25.511 ACUTE PAIN OF RIGHT SHOULDER: Primary | ICD-10-CM

## 2018-12-26 PROCEDURE — 99213 OFFICE O/P EST LOW 20 MIN: CPT | Performed by: FAMILY MEDICINE

## 2018-12-26 ASSESSMENT — MIFFLIN-ST. JEOR: SCORE: 1267.25

## 2018-12-26 NOTE — LETTER
12/26/2018         RE: Olga Lidia Solorzano  [address] [facility] [address]        Dear Colleague,    Thank you for referring your patient, Olga Lidia Solorzano, to the  SPORTS MEDICINE. Please see a copy of my visit note below.    [facility]   Follow-up Visit s Dec 26, 2018    PCP: Wan Anderson      Subjective:  Olga Lidia is a 48 year old female who is seen in follow up for evaluation of No chief complaint on file.    Her last visit was on 12/10/2018.  Since that time, symptoms have been better than before. Olga Lidia Solorzano is accompanied today by self.       Patient had a right shoulder: subacromial space Cortisone injection on 12/10/2018 that provided 95% relief, that is continuing to provide relief.  Patient reports 95% improvement since last visit.  Patient has noticed improved symptoms with corticosteroid injection treatment.  Pain is located over the right lateral aspect of shoulder, intermittent.  Patient is using no aids.    Patient denies any new injuries.        Patient's past medical, surgical, social and family histories are reviewed today.    History from previous visit on 12/10/2018  Injury: Patient describes injury as pain that began after a flu shot given 10/8/18. Normal soreness iniitally, getting worse 3 days later.      Right hand dominant    Location of Pain: right shoulder lateral, nonradiating   Duration of Pain: 2 month(s)  Rating of Pain at worst: 7/10  Rating of Pain Currently: 1/10  Pain is better with: activity avoidance, heat, ibuprofen 800 mg dialy  Pain is worse with: ADLs:  all activities, driving, lifting  Treatment so far consists of: heat and steroids  Associated symptoms: no distal numbness or tingling; denies swelling or warmth  Recent imaging completed: No recent imaging completed.  Prior History of related problems: shoulder pain a few years ago when arm yanked by dog on leash.    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        Past Medical History:   Diagnosis Date     Hyperlipidemia LDL goal < 130      Hypertension goal BP (blood pressure) < 140/90      Insulin resistance      IUD (intrauterine device) in place ??2012 or 2013    Paragard     Obesity      Uncomplicated asthma        Patient Active Problem List    Diagnosis Date Noted     Presence of intrauterine contraceptive device 02/23/2017     Priority: Medium     Bilateral low back pain without sciatica 03/24/2016     Priority: Medium     Hip pain, right 03/24/2016     Priority: Medium     Gastroesophageal reflux disease without esophagitis 02/10/2016     Priority: Medium     Mild persistent asthma 01/05/2015     Priority: Medium     Herniated vertebral disc 03/21/2013     Priority: Medium     Hyperlipidemia with target LDL less than 130 01/11/2012     Priority: Medium     Diagnosis updated by automated process. Provider to review and confirm.       Hypertension goal BP (blood pressure) < 140/90      Priority: Medium       Family History   Problem Relation Age of Onset     Hypertension Mother      Lung Cancer Mother      Other Cancer Mother         lung     Hypertension Father      Hyperlipidemia Father      Blood Disease Paternal Uncle      Breast Cancer Paternal Aunt      Hypertension Maternal Grandmother      Breast Cancer Paternal Half-Sister      Breast Cancer Cousin      Other Cancer Paternal Half-Brother         leukemia     Liver Disease No family hx of        Social History     Socioeconomic History     Marital status: Single     Spouse name:  8/17-does IT     Number of children: 1     Years of education: Not on file     Highest education level: Not on file   Social Needs     Financial resource strain: Not on file     Food insecurity - worry: Not on file     Food insecurity - inability: Not on file     Transportation needs - medical: Not on file     Transportation needs - non-medical: Not on file   Occupational History     Occupation: lab tech     Employer: [employer]  "[employer],[address]   Tobacco Use     Smoking status: Former Smoker     Packs/day: 0.50     Years: 20.00     Pack years: 10.00     Types: Cigarettes     Last attempt to quit: 2012     Years since quittin.9     Smokeless tobacco: Never Used   Substance and Sexual Activity     Alcohol use: Yes     Alcohol/week: 0.0 oz     Comment: more on weekends 8 drinks per week or wine or mixed drinks        Past Surgical History:   Procedure Laterality Date     COLONOSCOPY N/A 3/13/2017    Procedure: COLONOSCOPY;  Surgeon: Tato Child MD;  Location: [facility]     NO HISTORY OF SURGERY               Review of Systems   Musculoskeletal: Positive for joint pain.   All other systems reviewed and are negative.        Physical Exam    /82   Ht 1.626 m (5' 4\")   Wt 66.2 kg (146 lb)   BMI 25.06 kg/m     Constitutional:well-developed, well-nourished, and in no distress.   Cardiovascular: Intact distal pulses.    Neurological: alert. Gait Normal:   Gait, station, stance, and balance appear normal for age  Skin: Skin is warm and dry.   Psychiatric: Mood and affect normal.   Respiratory: unlabored, speaks in full sentences  Lymph: no LAD, no lymphangitis    Right Shoulder Exam     Tenderness   The patient is experiencing no tenderness.    Range of Motion   Active abduction: normal   Passive abduction: normal   Extension: normal   External rotation: normal   Forward flexion: normal   Internal rotation 0 degrees: normal   Internal rotation 90 degrees: normal     Muscle Strength   Abduction: 5/5   Internal rotation: 5/5   External rotation: 5/5   Supraspinatus: 5/5   Subscapularis: 5/5   Biceps: 5/5     Tests   Apprehension: negative  Childs test: negative  Cross arm: negative  Impingement: positive  Drop arm: negative  Sulcus: absent    Other   Erythema: absent  Scars: absent  Sensation: normal  Pulse: present    Comments:  Minimally positive overhead impingement            X-ray images Ordered and " independently reviewed by me in the office today with the patient. X-ray shows normal shoulder      ASSESSMENT/PLAN    ICD-10-CM    1. Acute pain of right shoulder M25.511        Minimal pain with overhead impingement testing, otherwise painless range of motion and full strength.    Will return to regular work without restrictions, return if needed.  Workability form completed, copy to patient and faxed to the appropriate office.  : Procedures      Again, thank you for allowing me to participate in the care of your patient.        Sincerely,        Reagan Arriaga MD

## 2019-02-18 ENCOUNTER — OFFICE VISIT (OUTPATIENT)
Dept: OBGYN | Facility: CLINIC | Age: 49
End: 2019-02-18
Payer: COMMERCIAL

## 2019-02-18 VITALS
HEIGHT: 64 IN | SYSTOLIC BLOOD PRESSURE: 124 MMHG | HEART RATE: 70 BPM | DIASTOLIC BLOOD PRESSURE: 62 MMHG | WEIGHT: 147 LBS | BODY MASS INDEX: 25.1 KG/M2

## 2019-02-18 DIAGNOSIS — N95.1 PERIMENOPAUSAL VASOMOTOR SYMPTOMS: ICD-10-CM

## 2019-02-18 DIAGNOSIS — Z30.432 ENCOUNTER FOR REMOVAL OF INTRAUTERINE CONTRACEPTIVE DEVICE: Primary | ICD-10-CM

## 2019-02-18 PROCEDURE — 58301 REMOVE INTRAUTERINE DEVICE: CPT | Performed by: OBSTETRICS & GYNECOLOGY

## 2019-02-18 PROCEDURE — 99212 OFFICE O/P EST SF 10 MIN: CPT | Mod: 25 | Performed by: OBSTETRICS & GYNECOLOGY

## 2019-02-18 ASSESSMENT — MIFFLIN-ST. JEOR: SCORE: 1271.79

## 2019-02-18 NOTE — PROGRESS NOTES
"  IUD Removal:  SUBJECTIVE:    Is a pregnancy test required: No.  Was a consent obtained?  Yes    Olga Lidia Solorzano is a 48 year old female,,LMP was  but had a lot of DUB for a couple months 2018 when started on daily HRT. She presents today for IUD removal. Her current IUD was placed over 5 years ago. She has not had problems with the IUD. She requests removal of the IUD because she is close to being menopausal and wants to just be done thinking about it and wondering if it's contributing to some of the DUB she had.    PROCEDURE:    A speculum exam was performed and the cervix was visualized. The IUD string was visualized. Using ring forceps, the string  was grasped and the IUD removed intact.    POST PROCEDURE:    The patient tolerated the procedure well. Patient was discharged in stable condition.    Call if bleeding, pain or fever occur. and Birth control counseling given.     Patient was having severe nightsweats every night and daytime hotflashes when saw me end of October. Her period came in  and hadn't had one since. Had gone about 4-5 months in past as well. Decided to start on daily HRT with vivelle patch 0.1mg and a nightly prometrium of 100mg. Cautioned that daily HRT when not fully menopausal could lead to DUB and sometimes cyclic HRT is better. Patient started on HRT and did it about 2 months. Her vasomotor sx improved but had almost daily bleeding for close to 2 months. She called and we discussed lower dose patch, cyclic prometrium but she decided to just stop it all together. Now has not had any bleeding since. Night sweats still present but not nearly as intense or often. Still gets them most nights but short lived and can definitely tolerate it.    Wanted the IUD out b/c wasn't sure if it contributed to her DUB but also feels like \"what are the chances I'm going to get pregnant at 48 yrs old and I just want to not think about anything \"down there\" for a while\".  " Cautioned the patient that until she has one full year of amenorrhea she is not menopausal and there is no guarantee that she couldn't get pregnant. Informed her that her bleeding had nothing to do with the IUD b/c it is the paragard and it was more just HRT related but patient still just wants to remove it. Understands that pregnancy is still a possibility.    If her vasomotor sx return/worsen can contact me and discuss options at that time.    Spent 10 additional minutes on top of the IUD removal discussing vasomotor sx, HRT options, contraception.    Jessie Perdomo MD

## 2019-03-19 NOTE — PATIENT INSTRUCTIONS
Take 2 tables of prednisone on 1st day then decrease to 1 tablet daily until finished    Use Ice more so over heat  
Yes

## 2019-03-21 NOTE — PROGRESS NOTES
[facility]   Follow-up Visit s Mar 25, 2019    PCP: Wan Anderson      Subjective:  Olga Lidia is a 48 year old female who is seen in follow up for evaluation of   Chief Complaint   Patient presents with     Right Shoulder - Pain     Her last visit was on 12/26/2018.  Since that time, symptoms have returned. Olga Lidia Solorzano is accompanied today by self.         Patient had a right shoulder: subacromial space Cortisone injection on 12/10/2018 that provided 100% relief, lasting for 5 week(s).     Patient has noticed improved symptoms with injection treatment.    Pain is located right shoulder, lateral , getting progressively worse.  Patient is using no aids.    Patient denies any new injuries.    Unable to sleep.       History from previous visit on 12/10/2018  Injury: Patient describes injury as pain that began after a flu shot given 10/8/18. Normal soreness iniitally, getting worse 3 days later.      Right hand dominant    Location of Pain: right shoulder lateral, nonradiating   Duration of Pain: 2 month(s)  Rating of Pain at worst: 7/10  Rating of Pain Currently: 1/10  Pain is better with: activity avoidance, heat, ibuprofen 800 mg dialy  Pain is worse with: ADLs:  all activities, driving, lifting  Treatment so far consists of: heat and steroids  Associated symptoms: no distal numbness or tingling; denies swelling or warmth  Recent imaging completed: No recent imaging completed.  Prior History of related problems: shoulder pain a few years ago when arm yanked by dog on leash.    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       Past Medical History:   Diagnosis Date     Hyperlipidemia LDL goal < 130      Hypertension goal BP (blood pressure) < 140/90      Insulin resistance      IUD (intrauterine device) in place ??2012 or 2013-2/18/19    Paragard     Obesity      Uncomplicated asthma        Patient Active Problem List    Diagnosis Date Noted     IUD (intrauterine device) in place       Priority: Medium     Paragard       Presence of intrauterine contraceptive device 2017     Priority: Medium     Bilateral low back pain without sciatica 2016     Priority: Medium     Hip pain, right 2016     Priority: Medium     Gastroesophageal reflux disease without esophagitis 02/10/2016     Priority: Medium     Mild persistent asthma 2015     Priority: Medium     Herniated vertebral disc 2013     Priority: Medium     Hyperlipidemia with target LDL less than 130 2012     Priority: Medium     Diagnosis updated by automated process. Provider to review and confirm.       Hypertension goal BP (blood pressure) < 140/90      Priority: Medium       Family History   Problem Relation Age of Onset     Hypertension Mother      Lung Cancer Mother      Other Cancer Mother         lung     Hypertension Father      Hyperlipidemia Father      Blood Disease Paternal Uncle      Breast Cancer Paternal Aunt      Hypertension Maternal Grandmother      Breast Cancer Paternal Half-Sister      Breast Cancer Cousin      Other Cancer Paternal Half-Brother         leukemia     Liver Disease No family hx of        Social History     Socioeconomic History     Marital status: Single     Spouse name: NOEMI -does IT     Number of children: 1     Years of education: Not on file     Highest education level: Not on file   Social Needs     Financial resource strain: Not on file     Food insecurity - worry: Not on file     Food insecurity - inability: Not on file     Transportation needs - medical: Not on file     Transportation needs - non-medical: Not on file   Occupational History     Occupation: lab tech     Employer: [employer],[address]   Tobacco Use     Smoking status: Former Smoker     Packs/day: 0.50     Years: 20.00     Pack years: 10.00     Types: Cigarettes     Last attempt to quit: 2012     Years since quittin.9     Smokeless tobacco: Never Used   Substance and  "Sexual Activity     Alcohol use: Yes     Alcohol/week: 0.0 oz     Comment: more on weekends 8 drinks per week or wine or mixed drinks        Past Surgical History:   Procedure Laterality Date     COLONOSCOPY N/A 3/13/2017    Procedure: COLONOSCOPY;  Surgeon: Tato Child MD;  Location: [facility]     NO HISTORY OF SURGERY               Review of Systems   Musculoskeletal: Positive for joint pain.   All other systems reviewed and are negative.        Physical Exam    /90   Ht 1.626 m (5' 4\")   Wt 66.7 kg (147 lb)   BMI 25.23 kg/m    Constitutional:well-developed, well-nourished, and in no distress.   Cardiovascular: Intact distal pulses.    Neurological: alert. Gait Normal:   Gait, station, stance, and balance appear normal for age  Skin: Skin is warm and dry.   Psychiatric: Mood and affect normal.   Respiratory: unlabored, speaks in full sentences  Lymph: no LAD, no lymphangitis    Right Shoulder Exam     Tenderness   The patient is experiencing no tenderness.    Range of Motion   Active abduction: normal   Passive abduction: normal   Extension: normal   External rotation: normal   Forward flexion: normal   Internal rotation 0 degrees: normal   Internal rotation 90 degrees: normal     Muscle Strength   Abduction: 5/5   Internal rotation: 5/5   External rotation: 5/5   Supraspinatus: 5/5   Subscapularis: 5/5   Biceps: 5/5     Tests   Apprehension: negative  Childs test: positive  Cross arm: negative  Impingement: positive  Drop arm: positive  Sulcus: absent    Other   Erythema: absent  Scars: absent  Sensation: normal  Pulse: present    Comments:  Minimally positive overhead impingement              ASSESSMENT/PLAN    ICD-10-CM    1. Acute pain of right shoulder M25.511 MR Shoulder Right w/o Contrast     Unusual right shoulder pain seemingly related to flu shot administered last fall.  Symptoms are primarily impingement related and a subacromial injection last December was very effective.  However " recurrence noted.  No other risk factors identified to trigger rotator cuff symptoms.  Given recurrence, will obtain MRI before proceeding with further injections or therapy.  Patient to return 2 days following MRI to discuss results and determine next steps.    She has been able to work without restrictions despite some discomfort.  Letter completed to take back to work.    : Procedures

## 2019-03-25 ENCOUNTER — OFFICE VISIT (OUTPATIENT)
Dept: ORTHOPEDICS | Facility: CLINIC | Age: 49
End: 2019-03-25
Payer: OTHER MISCELLANEOUS

## 2019-03-25 VITALS
BODY MASS INDEX: 25.1 KG/M2 | HEIGHT: 64 IN | DIASTOLIC BLOOD PRESSURE: 90 MMHG | WEIGHT: 147 LBS | SYSTOLIC BLOOD PRESSURE: 139 MMHG

## 2019-03-25 DIAGNOSIS — M25.511 ACUTE PAIN OF RIGHT SHOULDER: Primary | ICD-10-CM

## 2019-03-25 PROCEDURE — 99214 OFFICE O/P EST MOD 30 MIN: CPT | Performed by: FAMILY MEDICINE

## 2019-03-25 ASSESSMENT — MIFFLIN-ST. JEOR: SCORE: 1271.79

## 2019-03-25 NOTE — PATIENT INSTRUCTIONS
MRI ordered, Olga Lidia instructed to return at least 2 days following MRI to review results and determine treatment plan     Imaging Scheduling Centers:            [facility] - [phone]            [facility] - [phone]            [facility] [phone]

## 2019-03-25 NOTE — LETTER
SPORTS MEDICINE  [address]  [place] [address]  Phone: [phone]  Fax: [phone]    03/25/19    Olga Lidia Solorzano  [facility] [address]      To whom it may concern:     Olga Lidia has had a recurrence of her work-related shoulder injury. An MRI has been ordered; she is on no work restrictions at this time.         Sincerely,      Reagan Arriaga MD

## 2019-03-25 NOTE — LETTER
3/25/2019         RE: Olga Lidia Solorzano  [address] [address]        Dear Colleague,    Thank you for referring your patient, Olga Lidia Solorzano, to the  SPORTS MEDICINE. Please see a copy of my visit note below.    [facility]   Follow-up Visit s Mar 25, 2019    PCP: Wan Anderson      Subjective:  Olga Lidia is a 48 year old female who is seen in follow up for evaluation of   Chief Complaint   Patient presents with     Right Shoulder - Pain     Her last visit was on 12/26/2018.  Since that time, symptoms have returned. Olga Lidia Solorzano is accompanied today by self.         Patient had a right shoulder: subacromial space Cortisone injection on 12/10/2018 that provided 100% relief, lasting for 5 week(s).     Patient has noticed improved symptoms with injection treatment.    Pain is located right shoulder, lateral , getting progressively worse.  Patient is using no aids.    Patient denies any new injuries.    Unable to sleep.       History from previous visit on 12/10/2018  Injury: Patient describes injury as pain that began after a flu shot given 10/8/18. Normal soreness iniitally, getting worse 3 days later.      Right hand dominant    Location of Pain: right shoulder lateral, nonradiating   Duration of Pain: 2 month(s)  Rating of Pain at worst: 7/10  Rating of Pain Currently: 1/10  Pain is better with: activity avoidance, heat, ibuprofen 800 mg dialy  Pain is worse with: ADLs:  all activities, driving, lifting  Treatment so far consists of: heat and steroids  Associated symptoms: no distal numbness or tingling; denies swelling or warmth  Recent imaging completed: No recent imaging completed.  Prior History of related problems: shoulder pain a few years ago when arm yanked by dog on leash.    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       Past Medical History:   Diagnosis Date     Hyperlipidemia LDL goal < 130      Hypertension goal BP (blood  pressure) < 140/90      Insulin resistance      IUD (intrauterine device) in place ??2012 or 2013-2/18/19    Paragard     Obesity      Uncomplicated asthma        Patient Active Problem List    Diagnosis Date Noted     IUD (intrauterine device) in place      Priority: Medium     Paragard       Presence of intrauterine contraceptive device 02/23/2017     Priority: Medium     Bilateral low back pain without sciatica 03/24/2016     Priority: Medium     Hip pain, right 03/24/2016     Priority: Medium     Gastroesophageal reflux disease without esophagitis 02/10/2016     Priority: Medium     Mild persistent asthma 01/05/2015     Priority: Medium     Herniated vertebral disc 03/21/2013     Priority: Medium     Hyperlipidemia with target LDL less than 130 01/11/2012     Priority: Medium     Diagnosis updated by automated process. Provider to review and confirm.       Hypertension goal BP (blood pressure) < 140/90      Priority: Medium       Family History   Problem Relation Age of Onset     Hypertension Mother      Lung Cancer Mother      Other Cancer Mother         lung     Hypertension Father      Hyperlipidemia Father      Blood Disease Paternal Uncle      Breast Cancer Paternal Aunt      Hypertension Maternal Grandmother      Breast Cancer Paternal Half-Sister      Breast Cancer Cousin      Other Cancer Paternal Half-Brother         leukemia     Liver Disease No family hx of        Social History     Socioeconomic History     Marital status: Single     Spouse name:  8/17-does IT     Number of children: 1     Years of education: Not on file     Highest education level: Not on file   Social Needs     Financial resource strain: Not on file     Food insecurity - worry: Not on file     Food insecurity - inability: Not on file     Transportation needs - medical: Not on file     Transportation needs - non-medical: Not on file   Occupational History     Occupation: lab tech     Employer: [employer],[address] [employer]  [address]   Tobacco Use     Smoking status: Former Smoker     Packs/day: 0.50     Years: 20.00     Pack years: 10.00     Types: Cigarettes     Last attempt to quit: 2012     Years since quittin.9     Smokeless tobacco: Never Used   Substance and Sexual Activity     Alcohol use: Yes     Alcohol/week: 0.0 oz     Comment: more on weekends 8 drinks per week or wine or mixed drinks        Past Surgical History:   Procedure Laterality Date     COLONOSCOPY N/A 3/13/2017    Procedure: COLONOSCOPY;  Surgeon: Tato Child MD;  Location: [facility]     NO HISTORY OF SURGERY               Review of Systems   Musculoskeletal: Positive for joint pain.   All other systems reviewed and are negative.        Physical Exam    /90   Ht 1.626 m (5' 4\")   Wt 66.7 kg (147 lb)   BMI 25.23 kg/m     Constitutional:well-developed, well-nourished, and in no distress.   Cardiovascular: Intact distal pulses.    Neurological: alert. Gait Normal:   Gait, station, stance, and balance appear normal for age  Skin: Skin is warm and dry.   Psychiatric: Mood and affect normal.   Respiratory: unlabored, speaks in full sentences  Lymph: no LAD, no lymphangitis    Right Shoulder Exam     Tenderness   The patient is experiencing no tenderness.    Range of Motion   Active abduction: normal   Passive abduction: normal   Extension: normal   External rotation: normal   Forward flexion: normal   Internal rotation 0 degrees: normal   Internal rotation 90 degrees: normal     Muscle Strength   Abduction: 5/5   Internal rotation: 5/5   External rotation: 5/5   Supraspinatus: 5/5   Subscapularis: 5/5   Biceps: 5/5     Tests   Apprehension: negative  Childs test: positive  Cross arm: negative  Impingement: positive  Drop arm: positive  Sulcus: absent    Other   Erythema: absent  Scars: absent  Sensation: normal  Pulse: present    Comments:  Minimally positive overhead impingement              ASSESSMENT/PLAN    ICD-10-CM    1. Acute pain of right " shoulder M25.511 MR Shoulder Right w/o Contrast     Unusual right shoulder pain seemingly related to flu shot administered last fall.  Symptoms are primarily impingement related and a subacromial injection last December was very effective.  However recurrence noted.  No other risk factors identified to trigger rotator cuff symptoms.  Given recurrence, will obtain MRI before proceeding with further injections or therapy.  Patient to return 2 days following MRI to discuss results and determine next steps.    She has been able to work without restrictions despite some discomfort.  Letter completed to take back to work.    : Procedures      Again, thank you for allowing me to participate in the care of your patient.        Sincerely,        Reagan Arriaga MD

## 2019-04-04 ENCOUNTER — HOSPITAL ENCOUNTER (OUTPATIENT)
Dept: MRI IMAGING | Facility: CLINIC | Age: 49
Discharge: HOME | End: 2019-04-04
Attending: FAMILY MEDICINE | Admitting: FAMILY MEDICINE
Payer: OTHER MISCELLANEOUS

## 2019-04-04 DIAGNOSIS — M25.511 ACUTE PAIN OF RIGHT SHOULDER: ICD-10-CM

## 2019-04-04 PROCEDURE — 73221 MRI JOINT UPR EXTREM W/O DYE: CPT | Mod: RT

## 2019-04-09 NOTE — PROGRESS NOTES
[facility]   Follow-up Visit s Apr 10, 2019    PCP: Wan Anderson      Subjective:  Olga Lidia is a 48 year old female who is seen in follow up for evaluation of   Chief Complaint   Patient presents with     Right Shoulder - Results     Her last visit was on 3/25/2019.  Since that time, symptoms have been unchanged. Olga Lidia Solorzano is accompanied today by self.       Patient had a shoulder MRI since last visit.        Patient has noticed a stable course of symptoms with imaging treatment.    Pain is located right shoulder, diffuse, persistent.  Patient is using no aids.    Patient denies any new injuries.    Patient's past medical, surgical, social and family histories are reviewed today.    History from previous visit on 3/25/2019  Her last visit was on 12/26/2018.  Since that time, symptoms have returned. Olga Lidia Solorzano is accompanied today by self.     Patient had a right shoulder: subacromial space Cortisone injection on 12/10/2018 that provided 100% relief, lasting for 5 week(s).     Patient has noticed improved symptoms with injection treatment.    Pain is located right shoulder, lateral , getting progressively worse.  Patient is using no aids.    Patient denies any new injuries.    Unable to sleep.       History from previous visit on 12/10/2018  Injury: Patient describes injury as pain that began after a flu shot given 10/8/18. Normal soreness iniitally, getting worse 3 days later.      Right hand dominant    Prior History of related problems: shoulder pain a few years ago when arm yanked by dog on leash.  Full resolution of pain following this episode with no intervention required.    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       Past Medical History:   Diagnosis Date     Hyperlipidemia LDL goal < 130      Hypertension goal BP (blood pressure) < 140/90      Insulin resistance      IUD (intrauterine device) in place ??2012 or 2013-2/18/19    Paragard     Obesity       Uncomplicated asthma        Patient Active Problem List    Diagnosis Date Noted     Acute pain of right shoulder 03/25/2019     Priority: Medium     IUD (intrauterine device) in place      Priority: Medium     Paragard       Presence of intrauterine contraceptive device 02/23/2017     Priority: Medium     Bilateral low back pain without sciatica 03/24/2016     Priority: Medium     Hip pain, right 03/24/2016     Priority: Medium     Gastroesophageal reflux disease without esophagitis 02/10/2016     Priority: Medium     Mild persistent asthma 01/05/2015     Priority: Medium     Herniated vertebral disc 03/21/2013     Priority: Medium     Hyperlipidemia with target LDL less than 130 01/11/2012     Priority: Medium     Diagnosis updated by automated process. Provider to review and confirm.       Hypertension goal BP (blood pressure) < 140/90      Priority: Medium       Family History   Problem Relation Age of Onset     Hypertension Mother      Lung Cancer Mother      Other Cancer Mother         lung     Hypertension Father      Hyperlipidemia Father      Blood Disease Paternal Uncle      Breast Cancer Paternal Aunt      Hypertension Maternal Grandmother      Breast Cancer Paternal Half-Sister      Breast Cancer Cousin      Other Cancer Paternal Half-Brother         leukemia     Liver Disease No family hx of        Social History     Socioeconomic History     Marital status: Single     Spouse name:  8/17-does IT     Number of children: 1     Years of education: Not on file     Highest education level: Not on file   Social Needs     Financial resource strain: Not on file     Food insecurity - worry: Not on file     Food insecurity - inability: Not on file     Transportation needs - medical: Not on file     Transportation needs - non-medical: Not on file   Occupational History     Occupation: lab tech     Employer: [employer],[address]   Tobacco Use     Smoking status: Former Smoker      "Packs/day: 0.50     Years: 20.00     Pack years: 10.00     Types: Cigarettes     Last attempt to quit: 2012     Years since quittin.9     Smokeless tobacco: Never Used   Substance and Sexual Activity     Alcohol use: Yes     Alcohol/week: 0.0 oz     Comment: more on weekends 8 drinks per week or wine or mixed drinks        Past Surgical History:   Procedure Laterality Date     COLONOSCOPY N/A 3/13/2017    Procedure: COLONOSCOPY;  Surgeon: Tato Child MD;  Location: [facility]     NO HISTORY OF SURGERY               Review of Systems   Musculoskeletal: Positive for joint pain.   All other systems reviewed and are negative.        Physical Exam    /84   Ht 1.626 m (5' 4\")   Wt 66.7 kg (147 lb)   BMI 25.23 kg/m    Constitutional:well-developed, well-nourished, and in no distress.   Cardiovascular: Intact distal pulses.    Neurological: alert. Gait Normal:   Gait, station, stance, and balance appear normal for age  Skin: Skin is warm and dry.   Psychiatric: Mood and affect normal.   Respiratory: unlabored, speaks in full sentences  Lymph: no LAD, no lymphangitis    Right Shoulder Exam     Tenderness   The patient is experiencing no tenderness.    Range of Motion   Active abduction: normal   Passive abduction: normal   Extension: normal   External rotation: normal   Forward flexion: normal   Internal rotation 0 degrees: normal   Internal rotation 90 degrees: normal     Muscle Strength   Abduction: 5/5   Internal rotation: 5/5   External rotation: 5/5   Supraspinatus: 5/5   Subscapularis: 5/5   Biceps: 5/5     Tests   Apprehension: negative  Childs test: positive  Cross arm: negative  Impingement: positive  Drop arm: positive  Sulcus: absent    Other   Erythema: absent  Scars: absent  Sensation: normal  Pulse: present    Comments:  Minimally positive overhead impingement          Recent Results (from the past 744 hour(s))   MR Shoulder Right w/o Contrast    Narrative    MR SHOULDER RIGHT WITHOUT " CONTRAST 4/4/2019 4:40 PM     HISTORY: Shoulder pain, rotator cuff tear/impingement suspected; pain  following flu vaccine, short term response to cortisone injection.  Acute pain of right shoulder.    TECHNIQUE:  Axial dual echo T2. Coronal T1. Coronal T2 with and  without fat suppression. Sagittal T2.    FINDINGS:  Osseous Acromion Outlet:  There is active AC arthrosis, including  reactive bone edema and mild inferior hypertrophic change.  Type 2  acromion.  No os acromiale.    Rotator Cuff: Supraspinatus, infraspinatus tendons. There is a linear  longitudinal tear which extends from the mid supraspinatus tendon to  the proximal aspect of the anterior infraspinatus tendon. The tear is  somewhat ill-defined. The majority of the tear is interstitial, but  there appears to be subtle bursal surface extension. The tear extends  very close to the articular surface as well. The tear measures 2 cm  mediolateral and 1.7 cm AP. There is also a separate linear  interstitial tear involving the more posterior portion of the  infraspinatus tendon. This has near full-thickness extension and  measures 0.7 cm. Subscapularis tendon -  no tear or significant  tendinosis.  Intact teres minor tendon.  No asymmetric muscle atrophy.      Labral Structures: No superior labral tear (SLAP lesion) identified.  No labral cyst identified. No anterior or posterior labral tear  identified.    Biceps Tendon: No tear, dislocation, or significant tendinosis.    Osseous and Cartilaginous Structures: Mild resorptive change of the  humeral head.  No bone contusion or fracture. No glenohumeral  osteoarthritis or apparent chondromalacia identified.    Joint Space: No significant overall joint effusion.    Additional Findings: No deltoid muscle edema. Edema within the  subacromial-subdeltoid bursa.      Impression    IMPRESSION:  1. 1.7 cm linear longitudinal tear involving the supraspinatus tendon  and adjacent infraspinatus tendon. This has near  full-thickness  extension.  2. 0.7 cm linear interstitial infraspinatus tendon tear.    BRANDO QIU MD         ASSESSMENT/PLAN    ICD-10-CM    1. Partial nontraumatic tear of right rotator cuff M75.111 [facility]   2. DJD of right AC (acromioclavicular) joint M19.011 [facility]       MRI documents extensive but not complete rotator cuff tear.  It would be hard to conceive of the way in which the flu vaccine could have caused this but perhaps it triggered symptoms.  She continues to be able to work without restrictions though has some difficulty with discomfort.  I advised her to avoid further cortisone injections at the risk of provoking the tear further, and start physical therapy for 6 weeks but would refer to surgery sooner rather than later if not making progress at at that point.  Recheck appointment 6 weeks following physical therapy.        : Procedures

## 2019-04-10 ENCOUNTER — OFFICE VISIT (OUTPATIENT)
Dept: ORTHOPEDICS | Facility: CLINIC | Age: 49
End: 2019-04-10
Payer: COMMERCIAL

## 2019-04-10 VITALS
BODY MASS INDEX: 25.1 KG/M2 | WEIGHT: 147 LBS | DIASTOLIC BLOOD PRESSURE: 84 MMHG | HEIGHT: 64 IN | SYSTOLIC BLOOD PRESSURE: 131 MMHG

## 2019-04-10 DIAGNOSIS — M19.011 DJD OF RIGHT AC (ACROMIOCLAVICULAR) JOINT: ICD-10-CM

## 2019-04-10 DIAGNOSIS — M75.111 PARTIAL NONTRAUMATIC TEAR OF RIGHT ROTATOR CUFF: Primary | ICD-10-CM

## 2019-04-10 PROCEDURE — 99213 OFFICE O/P EST LOW 20 MIN: CPT | Performed by: FAMILY MEDICINE

## 2019-04-10 ASSESSMENT — MIFFLIN-ST. JEOR: SCORE: 1271.79

## 2019-04-10 NOTE — LETTER
4/10/2019         RE: Olga Lidia Solorzano  [address] [facility] [address]        Dear Colleague,    Thank you for referring your patient, Olga Lidia Solorzano, to the  SPORTS MEDICINE. Please see a copy of my visit note below.    [facility]   Follow-up Visit s Apr 10, 2019    PCP: Wan Anderson      Subjective:  Olga Lidia is a 48 year old female who is seen in follow up for evaluation of   Chief Complaint   Patient presents with     Right Shoulder - Results     Her last visit was on 3/25/2019.  Since that time, symptoms have been unchanged. Olga Lidia Solorzano is accompanied today by self.       Patient had a shoulder MRI since last visit.        Patient has noticed a stable course of symptoms with imaging treatment.    Pain is located right shoulder, diffuse, persistent.  Patient is using no aids.    Patient denies any new injuries.    Patient's past medical, surgical, social and family histories are reviewed today.    History from previous visit on 3/25/2019  Her last visit was on 12/26/2018.  Since that time, symptoms have returned. Olga Lidia Solorzano is accompanied today by self.     Patient had a right shoulder: subacromial space Cortisone injection on 12/10/2018 that provided 100% relief, lasting for 5 week(s).     Patient has noticed improved symptoms with injection treatment.    Pain is located right shoulder, lateral , getting progressively worse.  Patient is using no aids.    Patient denies any new injuries.    Unable to sleep.       History from previous visit on 12/10/2018  Injury: Patient describes injury as pain that began after a flu shot given 10/8/18. Normal soreness iniitally, getting worse 3 days later.      Right hand dominant    Prior History of related problems: shoulder pain a few years ago when arm yanked by dog on leash.  Full resolution of pain following this episode with no intervention required.    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 lab  tech      Past Medical History:   Diagnosis Date     Hyperlipidemia LDL goal < 130      Hypertension goal BP (blood pressure) < 140/90      Insulin resistance      IUD (intrauterine device) in place ??2012 or 2013-2/18/19    Paragard     Obesity      Uncomplicated asthma        Patient Active Problem List    Diagnosis Date Noted     Acute pain of right shoulder 03/25/2019     Priority: Medium     IUD (intrauterine device) in place      Priority: Medium     Paragard       Presence of intrauterine contraceptive device 02/23/2017     Priority: Medium     Bilateral low back pain without sciatica 03/24/2016     Priority: Medium     Hip pain, right 03/24/2016     Priority: Medium     Gastroesophageal reflux disease without esophagitis 02/10/2016     Priority: Medium     Mild persistent asthma 01/05/2015     Priority: Medium     Herniated vertebral disc 03/21/2013     Priority: Medium     Hyperlipidemia with target LDL less than 130 01/11/2012     Priority: Medium     Diagnosis updated by automated process. Provider to review and confirm.       Hypertension goal BP (blood pressure) < 140/90      Priority: Medium       Family History   Problem Relation Age of Onset     Hypertension Mother      Lung Cancer Mother      Other Cancer Mother         lung     Hypertension Father      Hyperlipidemia Father      Blood Disease Paternal Uncle      Breast Cancer Paternal Aunt      Hypertension Maternal Grandmother      Breast Cancer Paternal Half-Sister      Breast Cancer Cousin      Other Cancer Paternal Half-Brother         leukemia     Liver Disease No family hx of        Social History     Socioeconomic History     Marital status: Single     Spouse name:  8/17-does IT     Number of children: 1     Years of education: Not on file     Highest education level: Not on file   Social Needs     Financial resource strain: Not on file     Food insecurity - worry: Not on file     Food insecurity - inability: Not on file     Transportation  "needs - medical: Not on file     Transportation needs - non-medical: Not on file   Occupational History     Occupation: lab tech     Employer: [employer],[address]   Tobacco Use     Smoking status: Former Smoker     Packs/day: 0.50     Years: 20.00     Pack years: 10.00     Types: Cigarettes     Last attempt to quit: 2012     Years since quittin.9     Smokeless tobacco: Never Used   Substance and Sexual Activity     Alcohol use: Yes     Alcohol/week: 0.0 oz     Comment: more on weekends 8 drinks per week or wine or mixed drinks        Past Surgical History:   Procedure Laterality Date     COLONOSCOPY N/A 3/13/2017    Procedure: COLONOSCOPY;  Surgeon: Tato Child MD;  Location: [facility]     NO HISTORY OF SURGERY               Review of Systems   Musculoskeletal: Positive for joint pain.   All other systems reviewed and are negative.        Physical Exam    /84   Ht 1.626 m (5' 4\")   Wt 66.7 kg (147 lb)   BMI 25.23 kg/m     Constitutional:well-developed, well-nourished, and in no distress.   Cardiovascular: Intact distal pulses.    Neurological: alert. Gait Normal:   Gait, station, stance, and balance appear normal for age  Skin: Skin is warm and dry.   Psychiatric: Mood and affect normal.   Respiratory: unlabored, speaks in full sentences  Lymph: no LAD, no lymphangitis    Right Shoulder Exam     Tenderness   The patient is experiencing no tenderness.    Range of Motion   Active abduction: normal   Passive abduction: normal   Extension: normal   External rotation: normal   Forward flexion: normal   Internal rotation 0 degrees: normal   Internal rotation 90 degrees: normal     Muscle Strength   Abduction: 5/5   Internal rotation: 5/5   External rotation: 5/5   Supraspinatus: 5/5   Subscapularis: 5/5   Biceps: 5/5     Tests   Apprehension: negative  Childs test: positive  Cross arm: negative  Impingement: positive  Drop arm: positive  Sulcus: absent    Other   Erythema: " absent  Scars: absent  Sensation: normal  Pulse: present    Comments:  Minimally positive overhead impingement          Recent Results (from the past 744 hour(s))   MR Shoulder Right w/o Contrast    Narrative    MR SHOULDER RIGHT WITHOUT CONTRAST 4/4/2019 4:40 PM     HISTORY: Shoulder pain, rotator cuff tear/impingement suspected; pain  following flu vaccine, short term response to cortisone injection.  Acute pain of right shoulder.    TECHNIQUE:  Axial dual echo T2. Coronal T1. Coronal T2 with and  without fat suppression. Sagittal T2.    FINDINGS:  Osseous Acromion Outlet:  There is active AC arthrosis, including  reactive bone edema and mild inferior hypertrophic change.  Type 2  acromion.  No os acromiale.    Rotator Cuff: Supraspinatus, infraspinatus tendons. There is a linear  longitudinal tear which extends from the mid supraspinatus tendon to  the proximal aspect of the anterior infraspinatus tendon. The tear is  somewhat ill-defined. The majority of the tear is interstitial, but  there appears to be subtle bursal surface extension. The tear extends  very close to the articular surface as well. The tear measures 2 cm  mediolateral and 1.7 cm AP. There is also a separate linear  interstitial tear involving the more posterior portion of the  infraspinatus tendon. This has near full-thickness extension and  measures 0.7 cm. Subscapularis tendon -  no tear or significant  tendinosis.  Intact teres minor tendon.  No asymmetric muscle atrophy.      Labral Structures: No superior labral tear (SLAP lesion) identified.  No labral cyst identified. No anterior or posterior labral tear  identified.    Biceps Tendon: No tear, dislocation, or significant tendinosis.    Osseous and Cartilaginous Structures: Mild resorptive change of the  humeral head.  No bone contusion or fracture. No glenohumeral  osteoarthritis or apparent chondromalacia identified.    Joint Space: No significant overall joint effusion.    Additional  Findings: No deltoid muscle edema. Edema within the  subacromial-subdeltoid bursa.      Impression    IMPRESSION:  1. 1.7 cm linear longitudinal tear involving the supraspinatus tendon  and adjacent infraspinatus tendon. This has near full-thickness  extension.  2. 0.7 cm linear interstitial infraspinatus tendon tear.    BRANDO QIU MD         ASSESSMENT/PLAN    ICD-10-CM    1. Partial nontraumatic tear of right rotator cuff M75.111 [facility]   2. DJD of right AC (acromioclavicular) joint M19.011 [facility]       MRI documents extensive but not complete rotator cuff tear.  It would be hard to conceive of the way in which the flu vaccine could have caused this but perhaps it triggered symptoms.  She continues to be able to work without restrictions though has some difficulty with discomfort.  I advised her to avoid further cortisone injections at the risk of provoking the tear further, and start physical therapy for 6 weeks but would refer to surgery sooner rather than later if not making progress at at that point.  Recheck appointment 6 weeks following physical therapy.        : Procedures      Again, thank you for allowing me to participate in the care of your patient.        Sincerely,        Reagan Arriaga MD

## 2019-04-30 ENCOUNTER — THERAPY VISIT (OUTPATIENT)
Dept: PHYSICAL THERAPY | Facility: CLINIC | Age: 49
End: 2019-04-30
Payer: COMMERCIAL

## 2019-04-30 DIAGNOSIS — M19.011 DJD OF RIGHT AC (ACROMIOCLAVICULAR) JOINT: ICD-10-CM

## 2019-04-30 DIAGNOSIS — M75.111 PARTIAL NONTRAUMATIC TEAR OF RIGHT ROTATOR CUFF: ICD-10-CM

## 2019-04-30 DIAGNOSIS — M25.512 LEFT SHOULDER PAIN: ICD-10-CM

## 2019-04-30 DIAGNOSIS — M25.511 RIGHT SHOULDER PAIN: ICD-10-CM

## 2019-04-30 PROCEDURE — 97110 THERAPEUTIC EXERCISES: CPT | Mod: GP | Performed by: PHYSICAL THERAPIST

## 2019-04-30 PROCEDURE — 97161 PT EVAL LOW COMPLEX 20 MIN: CPT | Mod: GP | Performed by: PHYSICAL THERAPIST

## 2019-04-30 NOTE — PROGRESS NOTES
[facility] Initial Evaluation  Subjective:   Physical Therapy Initial Evaluation   4/30/19   Precautions/Restrictions/MD instructions: PT E&T   Therapist Impression:   Pt is a 47 y/o female, with 9 month history of right shoulder pain  MRI indicated tear of the supra and infraspinatus tendons. Pt presents with pain, decreased ROM/mobility, poor posture and decreased strength. These impairments limit their ability to reaching out to side and behind back, to sleep, and lifting. Skilled PT services necessary in order to reduce impairments and improve independent function.    Subjective:   Chief Complaint: R shoulder pain, notes shoulder started hurting after flu shot.  DOI/onset: 10/15/18  DOS: none   Location: Right shoulder (lateral)  Quality: sharp   Frequency: constant  Radiates: none   Pain scale: Rest 1/10 Activity 7/10    Sleeping: rolling onto right side wakes (4-5X/night)   Exacerbated by: reaching behind back and out to side, sleeping, lying on right side, computer work  Relieved by: heat, NSAID's, cortisone injection  Progression: same   Previous Treatment: none  Effect of prior treatment: na   PMH and/or surgical history: none to note    Imaging: MRI (tears of infra and supra)     Occupation: lab tech  Job duties: standing, repetitive motions, push/pull, lift, computer work    Current HEP/exercise regimen: none to note  Patient's goals: painfree daily tasks, sleep   Medications: HTN, cholesterol   General health as reported by patient: fair/good   Return to MD: as needed   Red Flags: none to note      HPI                    Objective:  SHOULDER:    Standing Posture: forward head, rounded shoulders        Shoulder: (* indicates patient's pain)   PROM L PROM R AROM L AROM R MMT L MMT R   Flex   160 125* 4/5 4-/5*   Abd   170 160     IR 80 75*   5/5 5/5   ER 90 90 75 65* 4/5 4-/5*   Ext/IR   T4 T8         Palpation: no tenderness to biceps, supra, infra; tender to subscap and lats on  right      System    Physical Exam    General     ROS    Assessment/Plan:    Patient is a 48 year old female with left side shoulder complaints.    Patient has the following significant findings with corresponding treatment plan.                Diagnosis 1:  L shoulder pain  Pain -  hot/cold therapy, manual therapy, self management, education and home program  Decreased ROM/flexibility - manual therapy and therapeutic exercise  Decreased joint mobility - manual therapy and therapeutic exercise  Decreased strength - therapeutic exercise and therapeutic activities  Inflammation - cold therapy and self management/home program  Impaired muscle performance - neuro re-education  Decreased function - therapeutic activities  Impaired posture - neuro re-education  Instability -  Therapeutic Activity  Therapeutic Exercise    Therapy Evaluation Codes:     1) Decision-Making    Low complexity using standardized patient assessment instrument and/or measureable assessment of functional outcome.  Cumulative Therapy Evaluation is: Low complexity.    Previous and current functional limitations:  (See Goal Flow Sheet for this information)    Short term and Long term goals: (See Goal Flow Sheet for this information)     Communication ability:  Patient appears to be able to clearly communicate and understand verbal and written communication and follow directions correctly.  Treatment Explanation - The following has been discussed with the patient:   RX ordered/plan of care  Anticipated outcomes  Possible risks and side effects  This patient would benefit from PT intervention to resume normal activities.   Rehab potential is good.    Frequency:  1 X week, once daily  Duration:  for 6 weeks  Discharge Plan:  Achieve all LTG.  Independent in home treatment program.  Reach maximal therapeutic benefit.    Please refer to the daily flowsheet for treatment today, total treatment time and time spent performing 1:1 timed codes.

## 2019-05-07 ENCOUNTER — THERAPY VISIT (OUTPATIENT)
Dept: PHYSICAL THERAPY | Facility: CLINIC | Age: 49
End: 2019-05-07
Payer: COMMERCIAL

## 2019-05-07 DIAGNOSIS — M25.512 LEFT SHOULDER PAIN, UNSPECIFIED CHRONICITY: ICD-10-CM

## 2019-05-07 DIAGNOSIS — M25.511 RIGHT SHOULDER PAIN, UNSPECIFIED CHRONICITY: ICD-10-CM

## 2019-05-07 PROCEDURE — 97140 MANUAL THERAPY 1/> REGIONS: CPT | Mod: GP | Performed by: PHYSICAL THERAPIST

## 2019-05-07 PROCEDURE — 97110 THERAPEUTIC EXERCISES: CPT | Mod: GP | Performed by: PHYSICAL THERAPIST

## 2019-05-14 ENCOUNTER — THERAPY VISIT (OUTPATIENT)
Dept: PHYSICAL THERAPY | Facility: CLINIC | Age: 49
End: 2019-05-14
Payer: COMMERCIAL

## 2019-05-14 DIAGNOSIS — M25.511 RIGHT SHOULDER PAIN, UNSPECIFIED CHRONICITY: ICD-10-CM

## 2019-05-14 DIAGNOSIS — M25.512 LEFT SHOULDER PAIN, UNSPECIFIED CHRONICITY: ICD-10-CM

## 2019-05-14 PROCEDURE — 97140 MANUAL THERAPY 1/> REGIONS: CPT | Mod: GP | Performed by: PHYSICAL THERAPIST

## 2019-05-14 PROCEDURE — 97110 THERAPEUTIC EXERCISES: CPT | Mod: GP | Performed by: PHYSICAL THERAPIST

## 2019-05-21 ENCOUNTER — THERAPY VISIT (OUTPATIENT)
Dept: PHYSICAL THERAPY | Facility: CLINIC | Age: 49
End: 2019-05-21
Payer: COMMERCIAL

## 2019-05-21 DIAGNOSIS — M25.511 RIGHT SHOULDER PAIN, UNSPECIFIED CHRONICITY: ICD-10-CM

## 2019-05-21 DIAGNOSIS — M25.512 LEFT SHOULDER PAIN, UNSPECIFIED CHRONICITY: ICD-10-CM

## 2019-05-21 PROCEDURE — 97140 MANUAL THERAPY 1/> REGIONS: CPT | Mod: GP | Performed by: PHYSICAL THERAPIST

## 2019-05-21 PROCEDURE — 97110 THERAPEUTIC EXERCISES: CPT | Mod: GP | Performed by: PHYSICAL THERAPIST

## 2019-05-21 PROCEDURE — 97112 NEUROMUSCULAR REEDUCATION: CPT | Mod: GP | Performed by: PHYSICAL THERAPIST

## 2019-05-28 ENCOUNTER — THERAPY VISIT (OUTPATIENT)
Dept: PHYSICAL THERAPY | Facility: CLINIC | Age: 49
End: 2019-05-28
Payer: COMMERCIAL

## 2019-05-28 DIAGNOSIS — M25.512 LEFT SHOULDER PAIN, UNSPECIFIED CHRONICITY: ICD-10-CM

## 2019-05-28 DIAGNOSIS — M25.511 RIGHT SHOULDER PAIN, UNSPECIFIED CHRONICITY: ICD-10-CM

## 2019-05-28 PROCEDURE — 97110 THERAPEUTIC EXERCISES: CPT | Mod: GP | Performed by: PHYSICAL THERAPIST

## 2019-05-28 PROCEDURE — 97140 MANUAL THERAPY 1/> REGIONS: CPT | Mod: GP | Performed by: PHYSICAL THERAPIST

## 2019-05-28 PROCEDURE — 97112 NEUROMUSCULAR REEDUCATION: CPT | Mod: GP | Performed by: PHYSICAL THERAPIST

## 2019-08-28 ENCOUNTER — ANCILLARY PROCEDURE (OUTPATIENT)
Dept: GENERAL RADIOLOGY | Facility: CLINIC | Age: 49
End: 2019-08-28
Attending: FAMILY MEDICINE
Payer: COMMERCIAL

## 2019-08-28 ENCOUNTER — OFFICE VISIT (OUTPATIENT)
Dept: FAMILY MEDICINE | Facility: CLINIC | Age: 49
End: 2019-08-28
Payer: COMMERCIAL

## 2019-08-28 VITALS
HEART RATE: 76 BPM | DIASTOLIC BLOOD PRESSURE: 78 MMHG | BODY MASS INDEX: 25.1 KG/M2 | WEIGHT: 147 LBS | TEMPERATURE: 98.7 F | SYSTOLIC BLOOD PRESSURE: 130 MMHG | HEIGHT: 64 IN

## 2019-08-28 DIAGNOSIS — E78.5 HYPERLIPIDEMIA WITH TARGET LDL LESS THAN 130: ICD-10-CM

## 2019-08-28 DIAGNOSIS — M79.641 BILATERAL HAND PAIN: ICD-10-CM

## 2019-08-28 DIAGNOSIS — E78.2 MIXED HYPERLIPIDEMIA: ICD-10-CM

## 2019-08-28 DIAGNOSIS — M79.642 BILATERAL HAND PAIN: ICD-10-CM

## 2019-08-28 DIAGNOSIS — I10 ESSENTIAL HYPERTENSION: Primary | ICD-10-CM

## 2019-08-28 DIAGNOSIS — I49.3 PVC'S (PREMATURE VENTRICULAR CONTRACTIONS): ICD-10-CM

## 2019-08-28 PROCEDURE — 73130 X-RAY EXAM OF HAND: CPT | Mod: RT

## 2019-08-28 PROCEDURE — 99214 OFFICE O/P EST MOD 30 MIN: CPT | Performed by: FAMILY MEDICINE

## 2019-08-28 RX ORDER — ATORVASTATIN CALCIUM 20 MG/1
20 TABLET, FILM COATED ORAL DAILY
Qty: 90 TABLET | Refills: 3 | Status: CANCELLED | OUTPATIENT
Start: 2019-08-28

## 2019-08-28 RX ORDER — AMLODIPINE BESYLATE 5 MG/1
5 TABLET ORAL DAILY
Qty: 90 TABLET | Refills: 3 | Status: SHIPPED | OUTPATIENT
Start: 2019-08-28 | End: 2020-02-03

## 2019-08-28 RX ORDER — ATORVASTATIN CALCIUM 40 MG/1
40 TABLET, FILM COATED ORAL DAILY
Qty: 90 TABLET | Refills: 0 | Status: SHIPPED | OUTPATIENT
Start: 2019-08-28 | End: 2019-12-05

## 2019-08-28 ASSESSMENT — MIFFLIN-ST. JEOR: SCORE: 1266.79

## 2019-08-28 NOTE — PATIENT INSTRUCTIONS
We were able to do you bilateral hand XRAY today to make sure the bones look okay. I do see arthritis changes, as advised monitor and follow up in 6-8 weeks with provider    Increase Lipitor to 40mg today and we will do future blood work to make sure liver function is not changed. -follow up in 6-8 weeks .  I would advised a visit after labs.    I would like you to schedule a lab sometime in the future to get your thyroid levels and cholesterol (COME FASTING) labs done at your visit(recommend a physical as it will be over a year since last one, can be with primary doctor). We also will do rhematuid factor at this appointment just to make sure we have an idea of what is going on. If the labs are negative at that time, I will recommend that we go back to ortho.     Patient Education     [facility]   Discharged by : Bren Bruno MA    Paper scripts provided to patient : no     If you have any questions regarding your visit please contact your care team:     Team Gold                Clinic Hours Telephone Number     Dr. Angle Baig, CNP 7am-7pm  Monday - Thursday   7am-5pm  Fridays  [phone]   (Appointment scheduling available 24/7)     RN Line  [phone] option 2     Urgent Care - [place] and [facility] - 11am-9pm Monday-Friday Saturday-Sunday- 9am-5pm     [place] -   5pm-9pm Monday-Friday Saturday-Sunday- 9am-5pm    [phone] - [place]    [phone] - [place]     For a Price Quote for your services, please call our Consumer Price Line at [phone].     What options do I have for visits at the clinic other than the traditional office visit?     To expand how we care for you, many of our providers are utilizing electronic visits (e-visits) and telephone visits, when medically appropriate, for interactions with their patients rather than a visit in the clinic. We also offer nurse visits for many medical concerns.  Just like any other service, we will bill your insurance company for this type of visit based on time spent on the phone with your provider. Not all insurance companies cover these visits. Please check with your medical insurance if this type of visit is covered. You will be responsible for any charges that are not paid by your insurance.   E-visits via [employer]: generally incur a $45.00 fee.     Telephone visits:  Time spent on the phone: *charged based on time that is spent on the phone in increments of 10 minutes. Estimated cost:   5-10 mins $30.00   11-20 mins. $59.00   21-30 mins. $85.00     Use [facility]box (secure email communication and access to your chart) to send your primary care provider a message or make an appointment. Ask someone on your Team how to sign up for blinkbox.     As always, Thank you for trusting us with your health care needs!    [facility]:    Scheduling Appointments  [facility]  Call: [phone]    [facility]  Call: [phone]    [facility]  Call: [phone]    For Gastroenterology referrals   [facility]   Clinics and Surgery Center, 4th Floor   [address] [place] [address]   Appointments: [phone]    WHERE TO GO FOR CARE?  Clinic    Make an appointment if you:       Are sick (cold, cough, flu, sore throat, earache or in pain).       Have a small injury (sprain, small cut, burn or broken bone).       Need a physical exam, Pap smear, vaccine or prescription refill.       Have questions about your health or medicines.    To reach us:      Call [phone] ([phone]). Open 24 hours every day. (For counseling services, call [phone].)    Log into blinkbox at [email or web address]. (Visit [email or web address] to create an account.) Hospital emergency room    An emergency is a serious or life- threatening problem that must be treated right away.    Call  911 or get to the hospital if you have:      Very bad or sudden:            - Chest pain or pressure         - Bleeding         - Head or belly pain         - Dizziness or trouble seeing, walking or                          Speaking      Problems breathing      Blood in your vomit or you are coughing up blood      A major injury (knocked out, loss of a finger or limb, rape, broken bone protruding from skin)    A mental health crisis. (Or call the Mental Health Crisis line at 1-691.183.5722 or Suicide Prevention Hotline at 1-426.189.4696.)    Open 24 hours every day. You don't need an appointment.     Urgent care    Visit urgent care for sickness or small injuries when the clinic is closed. You don't need an appointment. To check hours or find an urgent care near you, visit [email or web address]. Online care    Get online care from [facility] for more than 70 common problems, like colds, allergies and infections. Open 24 hours every day at:   [email or web address]   Need help deciding?    For advice about where to be seen, you may call your clinic and ask to speak with a nurse. We're here for you 24 hours every day.         If you are deaf or hard of hearing, please let us know. We provide many free services including sign language interpreters, oral interpreters, TTYs, telephone amplifiers, note takers and written materials.

## 2019-08-28 NOTE — PROGRESS NOTES
Subjective     Olga Lidia Solorzano is a 49 year old female who presents to clinic today for the following health issues:    HPI   Asthma Follow-Up    Was ACT completed today?    Yes    ACT Total Scores 10/4/2018   ACT TOTAL SCORE -   ASTHMA ER VISITS -   ASTHMA HOSPITALIZATIONS -   ACT TOTAL SCORE (Goal Greater than or Equal to 20) 25   In the past 12 months, how many times did you visit the emergency room for your asthma without being admitted to the hospital? 0   In the past 12 months, how many times were you hospitalized overnight because of your asthma? 0       How many days per week do you miss taking your asthma controller medication?  0    Please describe any recent triggers for your asthma: None    Have you had any Emergency Room Visits, Urgent Care Visits, or Hospital Admissions since your last office visit?  No    Chronic Pain Follow-Up       Type / Location of Pain: hands and shoulder. Hands are a new problem. Shoulder is ongoing.   Analgesia/pain control:       Recent changes:  Worse. Hands are a new problems. Unsure if this is arthritis.       Overall control: Inadequate pain control in regards to hands  Activity level/function:      Daily activities: hand pain Stops her from some things    Work:  She works without limitations  Adverse effects:  No  Adherance    Taking medication as directed?  Yes    Participating in other treatments: yes, PT for shoulder, Sports Medicine seen for shoulders   Risk Factors:    Sleep:  Affects sleep at times, hand pain     Mood/anxiety:  controlled    Recent family or social stressors:  none noted    Other aggravating factors: typing at work   PHQ-9 SCORE 1/26/2016 2/22/2017 4/16/2018   PHQ-9 Total Score 0 1 1     TIMO-7 SCORE 1/26/2016 2/22/2017 4/16/2018   Total Score 0 2 1     Encounter-Level CSA:    There are no encounter-level csa.     Patient-Level CSA:    There are no patient-level csa.         Bilateral hand pain  Bilateral pain on both hands. Patient stated that it  started a few months ago. Denies any trauma to hands, swelling, unusual activity that proceeded the pain, other joint pains, and wrist pain. She says that she feels pain is diffused, one is not worst than the other. Notes that pain seems worse in the morning. describes her pain as achy.   She has recently noticed other perimenopausal symptoms so she I suspicious of a drop in her estrogen level. She thinks this might be the cause of her recent hand pain.    Hypertension Follow-up      Do you check your blood pressure regularly outside of the clinic? No     Are you following a low salt diet? Yes    Are your blood pressures ever more than 140 on the top number (systolic) OR more   than 90 on the bottom number (diastolic), for example 140/90? No        Patient Active Problem List   Diagnosis     Hypertension goal BP (blood pressure) < 140/90     Hyperlipidemia with target LDL less than 130     Herniated vertebral disc     Mild persistent asthma     Gastroesophageal reflux disease without esophagitis     Bilateral low back pain without sciatica     Hip pain, right     Presence of intrauterine contraceptive device     IUD (intrauterine device) in place     Acute pain of right shoulder     Partial nontraumatic tear of right rotator cuff     DJD of right AC (acromioclavicular) joint     Left shoulder pain     Right shoulder pain     Past Surgical History:   Procedure Laterality Date     COLONOSCOPY N/A 3/13/2017    Procedure: COLONOSCOPY;  Surgeon: Tato Child MD;  Location: [facility]     NO HISTORY OF SURGERY         Social History     Tobacco Use     Smoking status: Former Smoker     Packs/day: 0.50     Years: 20.00     Pack years: 10.00     Types: Cigarettes     Last attempt to quit: 2012     Years since quittin.6     Smokeless tobacco: Never Used   Substance Use Topics     Alcohol use: Yes     Alcohol/week: 0.0 oz     Comment: more on weekends 8 drinks per week or wine or mixed drinks      Family History    Problem Relation Age of Onset     Hypertension Mother      Lung Cancer Mother      Other Cancer Mother         lung     Hypertension Father      Hyperlipidemia Father      Blood Disease Paternal Uncle      Breast Cancer Paternal Aunt      Hypertension Maternal Grandmother      Breast Cancer Paternal Half-Sister      Breast Cancer Cousin      Other Cancer Paternal Half-Brother         leukemia     Liver Disease No family hx of          Current Outpatient Medications   Medication Sig Dispense Refill     amLODIPine (NORVASC) 5 MG tablet Take 1 tablet (5 mg) by mouth daily 90 tablet 3     atorvastatin (LIPITOR) 20 MG tablet Take 1 tablet (20 mg) by mouth daily 90 tablet 3     Barberry-Oreg Grape-Goldenseal (BERBERINE COMPLEX PO)        Ferrous Sulfate (IRON SUPPLEMENT PO)        Flunisolide HFA (AEROSPAN) 80 MCG/ACT AERS Inhale 5 puffs into the lungs 2 times daily Rinse mouth after each use. 2 Inhaler 5     levalbuterol (XOPENEX HFA) 45 MCG/ACT Inhaler Inhale 2 puffs into the lungs every 6 hours as needed for shortness of breath / dyspnea or wheezing 1 Inhaler 1     spironolactone (ALDACTONE) 50 MG tablet Take 1 tablet (50 mg) by mouth 2 times daily 180 tablet 3     estradiol (VIVELLE-DOT) 0.05 MG/24HR bi-weekly patch Place 1 patch onto the skin twice a week (Patient not taking: Reported on 8/28/2019) 24 patch 0     estradiol (VIVELLE-DOT) 0.1 MG/24HR BIW patch Place 1 patch onto the skin twice a week (Patient not taking: Reported on 8/28/2019) 24 patch 1     progesterone (PROMETRIUM) 100 MG capsule Take 1 capsule (100 mg) by mouth daily At bedtime (Patient not taking: Reported on 8/28/2019) 90 capsule 1     BP Readings from Last 3 Encounters:   08/28/19 130/78   04/10/19 131/84   03/25/19 139/90    Wt Readings from Last 3 Encounters:   08/28/19 66.7 kg (147 lb)   04/10/19 66.7 kg (147 lb)   03/25/19 66.7 kg (147 lb)               Reviewed and updated as needed this visit by Provider         Review of Systems   ROS  "COMP: Constitutional, HEENT, cardiovascular, pulmonary, GI, , musculoskeletal, neuro, skin, endocrine and psych systems are negative, except as otherwise noted.      This document serves as a record of the services and decisions personally performed and made by Leo Tejada DO. It was created on her behalf by Jessie Moran, a trained medical scribe. The creation of this document is based on the provider's statements to the medical scribe.  eJssie Moran 7:10 PM August 28, 2019    Objective    /78   Pulse 76   Temp 98.7  F (37.1  C) (Oral)   Ht 1.626 m (5' 4\")   Wt 66.7 kg (147 lb)   BMI 25.23 kg/m    Body mass index is 25.23 kg/m .     Physical Exam   GENERAL: healthy, alert and no distress  EYES: Eyes grossly normal to inspection, PERRL and conjunctivae and sclerae normal  RESP: lungs clear to auscultation - no rales, rhonchi or wheezes  CV: regular rate and rhythm, normal S1 S2, no S3 or S4, no murmur, click or rub, no peripheral edema and peripheral pulses strong  ABDOMEN: soft, nontender, no hepatosplenomegaly, no masses and bowel sounds normal  MS: no significant arthritic changes with her hands bilaterally. No tenderness to palpation, good ROM, no swelling. Has one heberden's node on her right index.   SKIN: no suspicious lesions or rashes to visible skin  NEURO: Normal strength and tone, mentation intact and speech normal  PSYCH: mentation appears normal, affect normal/bright    Diagnostic Test Results:  Labs reviewed in Epic        Assessment & Plan     1. PVC's (premature ventricular contractions)  Stable continue current medications.   - amLODIPine (NORVASC) 5 MG tablet; Take 1 tablet (5 mg) by mouth daily  Dispense: 90 tablet; Refill: 3    2. Hyperlipidemia with target LDL less than 130  Due to previous lab results, I recommended and increase in Lipitor to 40 mg. Follow-up in 8-10 week with fasting blood works.      3. Mixed hyperlipidemia  As Above  - atorvastatin (LIPITOR) 40 MG " tablet; Take 1 tablet (40 mg) by mouth daily  Dispense: 90 tablet; Refill: 0    4. Bilateral hand pain  New problem. Began a few months ago. We took an X-ray today and although no significant arthritic changes noted on exam I did notice some arthitic changes with the imaging. I recommended hand exercises with stress ball because patient doesn't want to follow-up with orthopedics or hand specialist. Follow-up with me in 6-8 weeks. Discussed rheumatoid factor will follow-up with blood work at our next visit if symptoms persist.  - XR Hand Bilateral G/E 3 Views; Future    5. Essential hypertension  Continue current treatment. At upper limit of normal. Follow-up with PCP.          See Patient Instructions    No follow-ups on file.     The information in this document, created by the medical scribe for me, accurately reflects the services I personally performed and the decisions made by me. I have reviewed and approved this document for accuracy prior to leaving the patient care area.  August 28, 2019 5:50 PM     Leo Tejada DO  [facility]

## 2019-08-28 NOTE — NURSING NOTE
Offered to schedule patients Physical and Future Fasting labs and she declined. Stated she will schedule on her own when she has access to her work schedule.   She will get her lab work done at her clinic.     Bren Bruno MA

## 2019-08-29 ASSESSMENT — ASTHMA QUESTIONNAIRES: ACT_TOTALSCORE: 23

## 2019-09-16 DIAGNOSIS — N95.1 VASOMOTOR SYMPTOMS DUE TO MENOPAUSE: ICD-10-CM

## 2019-09-17 RX ORDER — ESTRADIOL 0.05 MG/D
PATCH, EXTENDED RELEASE TRANSDERMAL
Qty: 24 PATCH | Refills: 0 | Status: SHIPPED | OUTPATIENT
Start: 2019-09-17 | End: 2020-02-03

## 2019-09-17 NOTE — TELEPHONE ENCOUNTER
"Requested Prescriptions   Pending Prescriptions Disp Refills     estradiol (VIVELLE-DOT) 0.05 MG/24HR bi-weekly patch [Pharmacy Med Name: ESTRADIOL 0.05MG/24HR PTTW]  0     Sig: REMOVE OLD PATCH AND APPLY ONE NEW PATCH TWICE A WEEK.       Hormone Replacement Therapy Passed - 9/16/2019  5:28 PM        Passed - Blood pressure under 140/90 in past 12 months     BP Readings from Last 3 Encounters:   08/28/19 130/78   04/10/19 131/84   03/25/19 139/90                 Passed - Recent (12 mo) or future (30 days) visit within the authorizing provider's specialty     Patient had office visit in the last 12 months or has a visit in the next 30 days with authorizing provider or within the authorizing provider's specialty.  See \"Patient Info\" tab in inbasket, or \"Choose Columns\" in Meds & Orders section of the refill encounter.              Passed - Patient has mammogram in past 2 years on file if age 50-75        Passed - Medication is active on med list        Passed - Patient is 18 years of age or older        Passed - No active pregnancy on record        Passed - No positive pregnancy test on record in past 12 months        Last Written Prescription Date:  12/10/18  Last Fill Quantity: 24,  # refills: 0   Last office visit: 2/18/2019 with prescribing provider:  Leanne   Future Office Visit:      Pt reported 8/28/19 \"not taking\"  Medication is being filled for 1 time refill only due to:  sent message through pharmacy to confirm pt taking as pt reported \"stopped\"   Siomara Guerrero RN on 9/17/2019 at 8:29 AM      "

## 2019-10-07 ENCOUNTER — OFFICE VISIT (OUTPATIENT)
Dept: ORTHOPEDICS | Facility: CLINIC | Age: 49
End: 2019-10-07
Payer: OTHER MISCELLANEOUS

## 2019-10-07 VITALS
SYSTOLIC BLOOD PRESSURE: 130 MMHG | WEIGHT: 147 LBS | DIASTOLIC BLOOD PRESSURE: 78 MMHG | HEIGHT: 64 IN | BODY MASS INDEX: 25.1 KG/M2

## 2019-10-07 DIAGNOSIS — M75.111 PARTIAL NONTRAUMATIC TEAR OF RIGHT ROTATOR CUFF: Primary | ICD-10-CM

## 2019-10-07 PROCEDURE — 99213 OFFICE O/P EST LOW 20 MIN: CPT | Performed by: FAMILY MEDICINE

## 2019-10-07 ASSESSMENT — MIFFLIN-ST. JEOR: SCORE: 1266.79

## 2019-10-07 NOTE — PROGRESS NOTES
[facility]   Follow-up Visit s Oct 7, 2019    PCP: Wan Anderson      Subjective:  Olga Lidia is a 49 year old female who is seen in follow up for evaluation of   Chief Complaint   Patient presents with     Right Shoulder - RECHECK     Her last visit was on 4/10/2019.  Since that time, symptoms have been unchanged. Olga Lidia Solorzano is accompanied today by self.     Patient has noticed worsened symptoms with physical therapy  Treatment.  Last therapy appointment in May.  Has been avoiding the use since then.    Pain is located right shoulder, lateral, waxing and waning.  Patient is using no aids.    Patient denies any new injuries.    Patient had a shoulder MRI      Patient has noticed a stable course of symptoms with imaging treatment.    Pain is located right shoulder, diffuse, persistent.  Patient is using no aids.    Patient denies any new injuries.      Patient had a right shoulder: subacromial space Cortisone injection on 12/10/2018 that provided 100% relief, lasting for 5 week(s).       History from previous visit on 12/10/2018  Injury: Patient describes injury as pain that began after a flu shot given 10/8/18. Normal soreness initially, getting worse 3 days later.      Right hand dominant    Prior History of related problems: shoulder pain a few years ago when arm yanked by dog on leash.  Full resolution of pain following this episode with no intervention required.    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 lab tech      Past Medical History:   Diagnosis Date     DJD of right AC (acromioclavicular) joint 4/10/2019     Hyperlipidemia LDL goal < 130      Hypertension goal BP (blood pressure) < 140/90      Insulin resistance      IUD (intrauterine device) in place ??2012 or 2013-2/18/19    Paragard     Obesity      Uncomplicated asthma        Patient Active Problem List    Diagnosis Date Noted     Left shoulder pain 04/30/2019     Priority: Medium     Right shoulder pain 04/30/2019      Priority: Medium     Partial nontraumatic tear of right rotator cuff 04/10/2019     Priority: Medium     DJD of right AC (acromioclavicular) joint 04/10/2019     Priority: Medium     Acute pain of right shoulder 03/25/2019     Priority: Medium     IUD (intrauterine device) in place      Priority: Medium     Paragard       Presence of intrauterine contraceptive device 02/23/2017     Priority: Medium     Bilateral low back pain without sciatica 03/24/2016     Priority: Medium     Hip pain, right 03/24/2016     Priority: Medium     Gastroesophageal reflux disease without esophagitis 02/10/2016     Priority: Medium     Mild persistent asthma 01/05/2015     Priority: Medium     Herniated vertebral disc 03/21/2013     Priority: Medium     Hyperlipidemia with target LDL less than 130 01/11/2012     Priority: Medium     Diagnosis updated by automated process. Provider to review and confirm.       Hypertension goal BP (blood pressure) < 140/90      Priority: Medium       Family History   Problem Relation Age of Onset     Hypertension Mother      Lung Cancer Mother      Other Cancer Mother         lung     Hypertension Father      Hyperlipidemia Father      Blood Disease Paternal Uncle      Breast Cancer Paternal Aunt      Hypertension Maternal Grandmother      Breast Cancer Paternal Half-Sister      Breast Cancer Cousin      Other Cancer Paternal Half-Brother         leukemia     Liver Disease No family hx of        Social History     Socioeconomic History     Marital status: Single     Spouse name: NOEMI 8/17-does IT     Number of children: 1     Years of education: Not on file     Highest education level: Not on file   Social Needs     Financial resource strain: Not on file     Food insecurity - worry: Not on file     Food insecurity - inability: Not on file     Transportation needs - medical: Not on file     Transportation needs - non-medical: Not on file   Occupational History     Occupation: lab tech     Employer:  "[employer],[id number]   Tobacco Use     Smoking status: Former Smoker     Packs/day: 0.50     Years: 20.00     Pack years: 10.00     Types: Cigarettes     Last attempt to quit: 2012     Years since quittin.9     Smokeless tobacco: Never Used   Substance and Sexual Activity     Alcohol use: Yes     Alcohol/week: 0.0 oz     Comment: more on weekends 8 drinks per week or wine or mixed drinks        Past Surgical History:   Procedure Laterality Date     COLONOSCOPY N/A 3/13/2017    Procedure: COLONOSCOPY;  Surgeon: Tato Child MD;  Location: [facility]     NO HISTORY OF SURGERY               Review of Systems   Musculoskeletal: Positive for joint pain.   All other systems reviewed and are negative.        Physical Exam    /78   Ht 1.626 m (5' 4\")   Wt 66.7 kg (147 lb)   BMI 25.23 kg/m    Constitutional:well-developed, well-nourished, and in no distress.   Cardiovascular: Intact distal pulses.    Neurological: alert. Gait Normal:   Gait, station, stance, and balance appear normal for age  Skin: Skin is warm and dry.   Psychiatric: Mood and affect normal.   Respiratory: unlabored, speaks in full sentences  Lymph: no LAD, no lymphangitis    Right Shoulder Exam     Tenderness   The patient is experiencing no tenderness.    Range of Motion   Active abduction: normal   Passive abduction: normal   Extension: normal   External rotation: normal   Forward flexion: normal   Internal rotation 0 degrees: normal   Internal rotation 90 degrees: normal     Muscle Strength   Abduction: 4/5   Internal rotation: 5/5   External rotation: 4/5   Supraspinatus: 4/5   Subscapularis: 5/5   Biceps: 5/5     Tests   Apprehension: negative  Childs test: positive  Cross arm: negative  Impingement: positive  Drop arm: positive  Sulcus: absent    Other   Erythema: absent  Scars: absent  Sensation: normal  Pulse: present    Comments:  Minimally positive overhead impingement          MR SHOULDER RIGHT WITHOUT " CONTRAST 4/4/2019 4:40 PM      HISTORY: Shoulder pain, rotator cuff tear/impingement suspected; pain  following flu vaccine, short term response to cortisone injection.  Acute pain of right shoulder.     TECHNIQUE:  Axial dual echo T2. Coronal T1. Coronal T2 with and  without fat suppression. Sagittal T2.     FINDINGS:  Osseous Acromion Outlet:  There is active AC arthrosis, including  reactive bone edema and mild inferior hypertrophic change.  Type 2  acromion.  No os acromiale.     Rotator Cuff: Supraspinatus, infraspinatus tendons. There is a linear  longitudinal tear which extends from the mid supraspinatus tendon to  the proximal aspect of the anterior infraspinatus tendon. The tear is  somewhat ill-defined. The majority of the tear is interstitial, but  there appears to be subtle bursal surface extension. The tear extends  very close to the articular surface as well. The tear measures 2 cm  mediolateral and 1.7 cm AP. There is also a separate linear  interstitial tear involving the more posterior portion of the  infraspinatus tendon. This has near full-thickness extension and  measures 0.7 cm. Subscapularis tendon -  no tear or significant  tendinosis.  Intact teres minor tendon.  No asymmetric muscle atrophy.        Labral Structures: No superior labral tear (SLAP lesion) identified.  No labral cyst identified. No anterior or posterior labral tear  identified.     Biceps Tendon: No tear, dislocation, or significant tendinosis.     Osseous and Cartilaginous Structures: Mild resorptive change of the  humeral head.  No bone contusion or fracture. No glenohumeral  osteoarthritis or apparent chondromalacia identified.     Joint Space: No significant overall joint effusion.     Additional Findings: No deltoid muscle edema. Edema within the  subacromial-subdeltoid bursa.                                                                      IMPRESSION:  1. 1.7 cm linear longitudinal tear involving the supraspinatus  tendon  and adjacent infraspinatus tendon. This has near full-thickness  extension.  2. 0.7 cm linear interstitial infraspinatus tendon tear.     BRANDO QIU MD        ASSESSMENT/PLAN    ICD-10-CM    1. Partial nontraumatic tear of right rotator cuff M75.111 ORTHO  REFERRAL       Pain and weakness, not resolving with physical therapy or modified activity.  Though incomplete, tear appears to be significant enough to be not resolving on its own.  Discussed options and at this point I recommend surgical consultation.  Patient is comfortable proceeding.        : Procedures

## 2019-10-07 NOTE — LETTER
10/7/2019         RE: Olga Lidia Solorzano  [address] [facility] [address]        Dear Colleague,    Thank you for referring your patient, Olga Lidia Solorzano, to the  SPORTS MEDICINE. Please see a copy of my visit note below.    [facility]   Follow-up Visit s Oct 7, 2019    PCP: Wan Anderson      Subjective:  Olga Lidia is a 49 year old female who is seen in follow up for evaluation of   Chief Complaint   Patient presents with     Right Shoulder - RECHECK     Her last visit was on 4/10/2019.  Since that time, symptoms have been unchanged. Olga Lidia Solorzano is accompanied today by self.     Patient has noticed worsened symptoms with physical therapy  Treatment.  Last therapy appointment in May.  Has been avoiding the use since then.    Pain is located right shoulder, lateral, waxing and waning.  Patient is using no aids.    Patient denies any new injuries.    Patient had a shoulder MRI      Patient has noticed a stable course of symptoms with imaging treatment.    Pain is located right shoulder, diffuse, persistent.  Patient is using no aids.    Patient denies any new injuries.      Patient had a right shoulder: subacromial space Cortisone injection on 12/10/2018 that provided 100% relief, lasting for 5 week(s).       History from previous visit on 12/10/2018  Injury: Patient describes injury as pain that began after a flu shot given 10/8/18. Normal soreness initially, getting worse 3 days later.      Right hand dominant    Prior History of related problems: shoulder pain a few years ago when arm yanked by dog on leash.  Full resolution of pain following this episode with no intervention required.    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 lab tech      Past Medical History:   Diagnosis Date     DJD of right AC (acromioclavicular) joint 4/10/2019     Hyperlipidemia LDL goal < 130      Hypertension goal BP (blood pressure) < 140/90      Insulin resistance      IUD  (intrauterine device) in place ??2012 or 2013-2/18/19    Paragard     Obesity      Uncomplicated asthma        Patient Active Problem List    Diagnosis Date Noted     Left shoulder pain 04/30/2019     Priority: Medium     Right shoulder pain 04/30/2019     Priority: Medium     Partial nontraumatic tear of right rotator cuff 04/10/2019     Priority: Medium     DJD of right AC (acromioclavicular) joint 04/10/2019     Priority: Medium     Acute pain of right shoulder 03/25/2019     Priority: Medium     IUD (intrauterine device) in place      Priority: Medium     Paragard       Presence of intrauterine contraceptive device 02/23/2017     Priority: Medium     Bilateral low back pain without sciatica 03/24/2016     Priority: Medium     Hip pain, right 03/24/2016     Priority: Medium     Gastroesophageal reflux disease without esophagitis 02/10/2016     Priority: Medium     Mild persistent asthma 01/05/2015     Priority: Medium     Herniated vertebral disc 03/21/2013     Priority: Medium     Hyperlipidemia with target LDL less than 130 01/11/2012     Priority: Medium     Diagnosis updated by automated process. Provider to review and confirm.       Hypertension goal BP (blood pressure) < 140/90      Priority: Medium       Family History   Problem Relation Age of Onset     Hypertension Mother      Lung Cancer Mother      Other Cancer Mother         lung     Hypertension Father      Hyperlipidemia Father      Blood Disease Paternal Uncle      Breast Cancer Paternal Aunt      Hypertension Maternal Grandmother      Breast Cancer Paternal Half-Sister      Breast Cancer Cousin      Other Cancer Paternal Half-Brother         leukemia     Liver Disease No family hx of        Social History     Socioeconomic History     Marital status: Single     Spouse name: NOEMI 8/17-does IT     Number of children: 1     Years of education: Not on file     Highest education level: Not on file   Social Needs     Financial resource strain: Not on file  "    Food insecurity - worry: Not on file     Food insecurity - inability: Not on file     Transportation needs - medical: Not on file     Transportation needs - non-medical: Not on file   Occupational History     Occupation: lab tech     Employer: [employer],[address]   Tobacco Use     Smoking status: Former Smoker     Packs/day: 0.50     Years: 20.00     Pack years: 10.00     Types: Cigarettes     Last attempt to quit: 2012     Years since quittin.9     Smokeless tobacco: Never Used   Substance and Sexual Activity     Alcohol use: Yes     Alcohol/week: 0.0 oz     Comment: more on weekends 8 drinks per week or wine or mixed drinks        Past Surgical History:   Procedure Laterality Date     COLONOSCOPY N/A 3/13/2017    Procedure: COLONOSCOPY;  Surgeon: Tato Child MD;  Location: [facility]     NO HISTORY OF SURGERY               Review of Systems   Musculoskeletal: Positive for joint pain.   All other systems reviewed and are negative.        Physical Exam    /78   Ht 1.626 m (5' 4\")   Wt 66.7 kg (147 lb)   BMI 25.23 kg/m     Constitutional:well-developed, well-nourished, and in no distress.   Cardiovascular: Intact distal pulses.    Neurological: alert. Gait Normal:   Gait, station, stance, and balance appear normal for age  Skin: Skin is warm and dry.   Psychiatric: Mood and affect normal.   Respiratory: unlabored, speaks in full sentences  Lymph: no LAD, no lymphangitis    Right Shoulder Exam     Tenderness   The patient is experiencing no tenderness.    Range of Motion   Active abduction: normal   Passive abduction: normal   Extension: normal   External rotation: normal   Forward flexion: normal   Internal rotation 0 degrees: normal   Internal rotation 90 degrees: normal     Muscle Strength   Abduction: 4/5   Internal rotation: 5/5   External rotation: 4/5   Supraspinatus: 4/5   Subscapularis: 5/5   Biceps: 5/5     Tests   Apprehension: negative  Childs test: " positive  Cross arm: negative  Impingement: positive  Drop arm: positive  Sulcus: absent    Other   Erythema: absent  Scars: absent  Sensation: normal  Pulse: present    Comments:  Minimally positive overhead impingement          MR SHOULDER RIGHT WITHOUT CONTRAST 4/4/2019 4:40 PM      HISTORY: Shoulder pain, rotator cuff tear/impingement suspected; pain  following flu vaccine, short term response to cortisone injection.  Acute pain of right shoulder.     TECHNIQUE:  Axial dual echo T2. Coronal T1. Coronal T2 with and  without fat suppression. Sagittal T2.     FINDINGS:  Osseous Acromion Outlet:  There is active AC arthrosis, including  reactive bone edema and mild inferior hypertrophic change.  Type 2  acromion.  No os acromiale.     Rotator Cuff: Supraspinatus, infraspinatus tendons. There is a linear  longitudinal tear which extends from the mid supraspinatus tendon to  the proximal aspect of the anterior infraspinatus tendon. The tear is  somewhat ill-defined. The majority of the tear is interstitial, but  there appears to be subtle bursal surface extension. The tear extends  very close to the articular surface as well. The tear measures 2 cm  mediolateral and 1.7 cm AP. There is also a separate linear  interstitial tear involving the more posterior portion of the  infraspinatus tendon. This has near full-thickness extension and  measures 0.7 cm. Subscapularis tendon -  no tear or significant  tendinosis.  Intact teres minor tendon.  No asymmetric muscle atrophy.        Labral Structures: No superior labral tear (SLAP lesion) identified.  No labral cyst identified. No anterior or posterior labral tear  identified.     Biceps Tendon: No tear, dislocation, or significant tendinosis.     Osseous and Cartilaginous Structures: Mild resorptive change of the  humeral head.  No bone contusion or fracture. No glenohumeral  osteoarthritis or apparent chondromalacia identified.     Joint Space: No significant overall joint  effusion.     Additional Findings: No deltoid muscle edema. Edema within the  subacromial-subdeltoid bursa.                                                                      IMPRESSION:  1. 1.7 cm linear longitudinal tear involving the supraspinatus tendon  and adjacent infraspinatus tendon. This has near full-thickness  extension.  2. 0.7 cm linear interstitial infraspinatus tendon tear.     BRANDO QIU MD        ASSESSMENT/PLAN    ICD-10-CM    1. Partial nontraumatic tear of right rotator cuff M75.111 ORTHO  REFERRAL       Pain and weakness, not resolving with physical therapy or modified activity.  Though incomplete, tear appears to be significant enough to be not resolving on its own.  Discussed options and at this point I recommend surgical consultation.  Patient is comfortable proceeding.        : Procedures      Again, thank you for allowing me to participate in the care of your patient.        Sincerely,        Reagan Arriaga MD

## 2019-10-14 ASSESSMENT — ENCOUNTER SYMPTOMS
DECREASED APPETITE: 0
BACK PAIN: 1
HOT FLASHES: 1
WEIGHT LOSS: 0
MUSCLE CRAMPS: 1
CHILLS: 0
POLYPHAGIA: 0
NECK PAIN: 0
EXERCISE INTOLERANCE: 0
MUSCLE WEAKNESS: 0
NIGHT SWEATS: 1
JOINT SWELLING: 0
HYPERTENSION: 1
POLYDIPSIA: 0
WEIGHT GAIN: 0
ALTERED TEMPERATURE REGULATION: 1
PALPITATIONS: 1
LEG PAIN: 0
DECREASED LIBIDO: 0
FATIGUE: 1
ARTHRALGIAS: 1
MYALGIAS: 1
ORTHOPNEA: 0
FEVER: 0
HALLUCINATIONS: 0
SLEEP DISTURBANCES DUE TO BREATHING: 0
HYPOTENSION: 0
INCREASED ENERGY: 0
STIFFNESS: 1
SYNCOPE: 0

## 2019-10-14 NOTE — TELEPHONE ENCOUNTER
RECORDS RECEIVED FROM: INTERNAL   DATE RECEIVED: 10/14   NOTES STATUS DETAILS   OFFICE NOTE from referring provider Internal 10/7/19*4/10/19*3/25/19*12/26/18*12/10/18*   OFFICE NOTE from other specialist N/A    DISCHARGE SUMMARY from hospital N/A    DISCHARGE REPORT from the ER N/A    OPERATIVE REPORT N/A    MEDICATION LIST Internal    IMPLANT RECORD/STICKER N/A    LABS     CBC/DIFF N/A    CULTURES N/A    INJECTIONS DONE IN RADIOLOGY N/A    MRI Internal 4/4/19*   CT SCAN N/A    XRAYS (IMAGES & REPORTS) Internal 12/10/18*   TUMOR     PATHOLOGY  Slides & report N/A

## 2019-10-25 DIAGNOSIS — M25.511 RIGHT SHOULDER PAIN, UNSPECIFIED CHRONICITY: Primary | ICD-10-CM

## 2019-10-28 ENCOUNTER — OFFICE VISIT (OUTPATIENT)
Dept: ORTHOPEDICS | Facility: CLINIC | Age: 49
End: 2019-10-28
Payer: COMMERCIAL

## 2019-10-28 ENCOUNTER — ANCILLARY PROCEDURE (OUTPATIENT)
Dept: GENERAL RADIOLOGY | Facility: CLINIC | Age: 49
End: 2019-10-28
Attending: ORTHOPAEDIC SURGERY
Payer: COMMERCIAL

## 2019-10-28 ENCOUNTER — PRE VISIT (OUTPATIENT)
Dept: ORTHOPEDICS | Facility: CLINIC | Age: 49
End: 2019-10-28

## 2019-10-28 VITALS — HEIGHT: 64 IN | WEIGHT: 147 LBS | BODY MASS INDEX: 25.1 KG/M2

## 2019-10-28 DIAGNOSIS — M25.511 RIGHT SHOULDER PAIN, UNSPECIFIED CHRONICITY: ICD-10-CM

## 2019-10-28 DIAGNOSIS — M75.121 NONTRAUMATIC COMPLETE TEAR OF RIGHT ROTATOR CUFF: Primary | ICD-10-CM

## 2019-10-28 PROBLEM — M25.549 PAIN IN JOINT, HAND: Status: ACTIVE | Noted: 2018-08-15

## 2019-10-28 ASSESSMENT — MIFFLIN-ST. JEOR: SCORE: 1266.79

## 2019-10-28 NOTE — NURSING NOTE
Teaching Flowsheet   Relevant Diagnosis: Rotator cuff tear  Teaching Topic: Pre-op for right ARCR, ASD, biceps tenodesis - to be scheduled at [facility] or [facility].  Patient will call to schedule.  Requests February 2020 date     Person(s) involved in teaching:   Patient     Motivation Level:  Asks Questions: Yes  Eager to Learn: Yes  Cooperative: Yes  Receptive (willing/able to accept information): Yes  Any cultural factors/Congregation beliefs that may influence understanding or compliance? No     Patient demonstrates understanding of the following:  Reason for the appointment, diagnosis and treatment plan: Yes  Knowledge of proper use of medications and conditions for which they are ordered (with special attention to potential side effects or drug interactions): Yes  Which situations necessitate calling provider and whom to contact: Yes     Teaching Concerns Addressed:   PAC for H&P  Significant other will provide transportation and assistance with cares after surgery  Aware of post-op limitations     Proper use and care of sling x 8 weeks (medical equip, care aids, etc.): Yes  Nutritional needs and diet plan: Yes  Pain management techniques: Yes  Wound Care: Yes  How and/when to access community resources: Yes     Instructional Materials Used/Given: Pre-op packet, surgical soap, written guidelines for care after rotator cuff repair

## 2019-10-28 NOTE — LETTER
10/28/2019       RE: Olga Lidia Solorzano  [address] [facility] [address]     Dear Colleague,    Thank you for referring your patient, Olga Lidia Solorzano, to the [facility] at [facility]. Please see a copy of my visit note below.    NEW PATIENT    Olga Lidia Solorzano  MRN: [id number]    Chief complaint: Right shoulder pain    HPI: 49 year old female presents with right shoulder pain which is been ongoing now for over a year without any inciting or provoking traumatic event.  There was a remote incident which occurred when her dog pulled the lesion jerked the arm but this was years ago and she had a significant asymptomatic period up until these new symptoms began.  Her discomfort is primarily anterior lateral and is activity related particularly with reaching and overhead activity, she also states she feels that she is lost motion and strength of the shoulder.  This is her dominant arm.  She works as a  and her discomfort and weakness have been limiting her not only at home caring for herself in her home but also at her occupation.  She denies any associated neurologic surgical history of the right shoulder.  She has attempted acupuncture, physical therapy, over-the-counter pain medication, home exercises, and injections which have failed to provide significant relief.    Review of systems: 10 point review performed and negative for anything other than what mentioned above for musculoskeletal    Past Medical History:   Diagnosis Date     Anemia 09/2018     DJD of right AC (acromioclavicular) joint 4/10/2019     Hyperlipidemia      Hyperlipidemia LDL goal < 130      Hypertension goal BP (blood pressure) < 140/90      Insulin resistance      IUD (intrauterine device) in place ??2012 or 2013-2/18/19    Paragard     Obesity      Scoliosis      Uncomplicated asthma        Past Surgical History:   Procedure Laterality Date     COLONOSCOPY N/A  3/13/2017    Procedure: COLONOSCOPY;  Surgeon: Tato Child MD;  Location:  GI      VASCULAR SURGERY PROCEDURE UNLIST      vericose vein     NO HISTORY OF SURGERY         SOCIAL HISTORY  No smoking, daily glass of wine, no illicits  Work as a     FAMILY Hx: Non-contributory    Medications see chart       Allergies   Allergen Reactions     Niacin Shortness Of Breath, Other (See Comments) and Nausea and Vomiting     All over body pains (patient reported symptoms)       Examination  General: Alert and oriented, atraumatic normocephalic  Cardiovascular: Extremities well-perfused, upper extremity distal pulses with regular rhythm  Respiratory: Nonlabored breathing  Musculoskeletal: Evaluation of the right shoulder there is no significant deformity present nor is there edema.  The shoulder girdle is grossly symmetric to the contralateral side.  She has focal tenderness over the bicipital groove anteriorly and laterally over the proximal humerus as well.  There is very mild discomfort of the AC joint and posterior glenohumeral joint line.  She has 180 degrees of active forward elevation externally rotates to 60 degrees and internally rotates to the lumbar region, her arc of motion causes mild discomfort particularly with external rotation.  She has a positive Yam[place]'s maneuver, negative mildly positive Speed and Yergason.  She was able to provide good strength against resistance with testing of the subscapularis and teres minor but significant weakness was noted with testing of the supraspinatus and infraspinatus.  Distally she is neurovascular intact    Imaging  X-rays of the shoulder demonstrate well-preserved glenohumeral joint space mild AC joint arthrosis and no acute bony pathology noted otherwise    MRI of the shoulder demonstrates superior labral tearing with intra-articular abnormality of the bicep tendon, she has a full-thickness tear of the supraspinatus without significant retraction  or any fatty atrophy of the musculature involved.  Glenohumeral joint space is well-maintained with mild chondromalacia.    Assessment and plan: 49 year old female with a right sided atraumatic full-thickness rotator cuff tear of the supraspinatus in conjunction with bicipital tendinitis.  Based on her clinical examination and imaging and failure of conservative management she is indicated for an arthroscopic rotator cuff repair, biceps tenodesis and subacromial decompression.  The risks and benefits of surgical and nonsurgical management were discussed today she understands that the risks of surgery include the possibility of failure of the rotator cuff repair and the possibility need for future revision surgery is also the possibility she could become stiff.  There is the risk that she could have persistent pain cramping and deformity with treatment of the bicep.  There is also a course the risk with the surgery of bleeding infection damage to surrounding neurovascular structures need including the possibility of damage to the heart lungs or brain including possibility of death.  She verbalized understand of these risks and has elected to proceed with surgery.    Patient was seen and evaluated with Dr. Verdugo    October 28, 2019    Sd Mejia DO  Orthopedic Surgery Sports Medicine Fellow    Again, thank you for allowing me to participate in the care of your patient.      Sincerely,    Cody Verdugo MD

## 2019-10-28 NOTE — NURSING NOTE
"Reason For Visit:   Chief Complaint   Patient presents with     Consult     Right shoulder tear       PCP: Wan Anderson      ? No  Occupation.    Currently working? Yes.  Work status?  Full time.  Date of injury: None    Date of surgery: None.  Smoker: No    Righ hand dominant    SANE score  Affected shoulder: Right  Right shoulder SANE: 80  Left shoulder SANE: 100    Dynamometer  Forward Elevation:  R = 9 pounds,  L = 16 pounds  External Rotation:   R = 10 pounds,  L = 13 pounds    Ht 1.626 m (5' 4\")   Wt 66.7 kg (147 lb)   BMI 25.23 kg/m        Pain Assessment  Patient Currently in Pain: Yes  0-10 Pain Scale: 1  Primary Pain Location: Shoulder  Pain Descriptors: Discomfort      Esperanza Boswell LPN        "

## 2019-10-28 NOTE — PROGRESS NOTES
I saw the patient with the resident.  I agree with the resident note and plan of care.      Cody Verdugo MD    NEW PATIENT    Olga Lidia Solorzano  MRN: [id number]      Chief complaint: Right shoulder pain    HPI: 49 year old female presents with right shoulder pain which is been ongoing now for over a year without any inciting or provoking traumatic event.  There was a remote incident which occurred when her dog pulled the lesion jerked the arm but this was years ago and she had a significant asymptomatic period up until these new symptoms began.  Her discomfort is primarily anterior lateral and is activity related particularly with reaching and overhead activity, she also states she feels that she is lost motion and strength of the shoulder.  This is her dominant arm.  She works as a  and her discomfort and weakness have been limiting her not only at home caring for herself in her home but also at her occupation.  She denies any associated neurologic surgical history of the right shoulder.  She has attempted acupuncture, physical therapy, over-the-counter pain medication, home exercises, and injections which have failed to provide significant relief.    Review of systems: 10 point review performed and negative for anything other than what mentioned above for musculoskeletal    Past Medical History:   Diagnosis Date     Anemia 09/2018     DJD of right AC (acromioclavicular) joint 4/10/2019     Hyperlipidemia      Hyperlipidemia LDL goal < 130      Hypertension goal BP (blood pressure) < 140/90      Insulin resistance      IUD (intrauterine device) in place ??2012 or 2013-2/18/19    Paragard     Obesity      Scoliosis      Uncomplicated asthma        Past Surgical History:   Procedure Laterality Date     COLONOSCOPY N/A 3/13/2017    Procedure: COLONOSCOPY;  Surgeon: Tato Child MD;  Location: [facility]     [facility] VASCULAR SURGERY PROCEDURE UNLIST      vericose vein     NO HISTORY OF SURGERY          SOCIAL HISTORY  No smoking, daily glass of wine, no illicits  Work as a     FAMILY Hx: Non-contributory    Medications see chart       Allergies   Allergen Reactions     Niacin Shortness Of Breath, Other (See Comments) and Nausea and Vomiting     All over body pains (patient reported symptoms)       Examination  General: Alert and oriented, atraumatic normocephalic  Cardiovascular: Extremities well-perfused, upper extremity distal pulses with regular rhythm  Respiratory: Nonlabored breathing  Musculoskeletal: Evaluation of the right shoulder there is no significant deformity present nor is there edema.  The shoulder girdle is grossly symmetric to the contralateral side.  She has focal tenderness over the bicipital groove anteriorly and laterally over the proximal humerus as well.  There is very mild discomfort of the AC joint and posterior glenohumeral joint line.  She has 180 degrees of active forward elevation externally rotates to 60 degrees and internally rotates to the lumbar region, her arc of motion causes mild discomfort particularly with external rotation.  She has a positive [place]'s maneuver, negative mildly positive Speed and Yergason.  She was able to provide good strength against resistance with testing of the subscapularis and teres minor but significant weakness was noted with testing of the supraspinatus and infraspinatus.  Distally she is neurovascular intact    Imaging  X-rays of the shoulder demonstrate well-preserved glenohumeral joint space mild AC joint arthrosis and no acute bony pathology noted otherwise    MRI of the shoulder demonstrates superior labral tearing with intra-articular abnormality of the bicep tendon, she has a full-thickness tear of the supraspinatus without significant retraction or any fatty atrophy of the musculature involved.  Glenohumeral joint space is well-maintained with mild chondromalacia.    Assessment and plan: 49 year old female with a right  sided atraumatic full-thickness rotator cuff tear of the supraspinatus in conjunction with bicipital tendinitis.  Based on her clinical examination and imaging and failure of conservative management she is indicated for an arthroscopic rotator cuff repair, biceps tenodesis and subacromial decompression.  The risks and benefits of surgical and nonsurgical management were discussed today she understands that the risks of surgery include the possibility of failure of the rotator cuff repair and the possibility need for future revision surgery is also the possibility she could become stiff.  There is the risk that she could have persistent pain cramping and deformity with treatment of the bicep.  There is also a course the risk with the surgery of bleeding infection damage to surrounding neurovascular structures need including the possibility of damage to the heart lungs or brain including possibility of death.  She verbalized understand of these risks and has elected to proceed with surgery.    Patient was seen and evaluated with Dr. Verdugo    October 28, 2019    Sd Mejia DO  Orthopedic Surgery Sports Medicine Fellow

## 2019-10-29 ENCOUNTER — PREP FOR PROCEDURE (OUTPATIENT)
Dept: ORTHOPEDICS | Facility: CLINIC | Age: 49
End: 2019-10-29

## 2019-10-29 DIAGNOSIS — M75.100 ROTATOR CUFF TEAR: Primary | ICD-10-CM

## 2019-11-06 ENCOUNTER — HEALTH MAINTENANCE LETTER (OUTPATIENT)
Age: 49
End: 2019-11-06

## 2019-11-22 DIAGNOSIS — N95.1 VASOMOTOR SYMPTOMS DUE TO MENOPAUSE: ICD-10-CM

## 2019-11-22 NOTE — TELEPHONE ENCOUNTER
"Requested Prescriptions   Pending Prescriptions Disp Refills     progesterone (PROMETRIUM) 100 MG capsule [Pharmacy Med Name: PROGESTERONE MICRONIZED 100MG CAPS] 90 capsule 1     Sig: TAKE ONE CAPSULE BY MOUTH AT BEDTIME       Hormone Replacement Therapy Passed - 11/22/2019  9:07 AM        Passed - Blood pressure under 140/90 in past 12 months     BP Readings from Last 3 Encounters:   10/07/19 130/78   08/28/19 130/78   04/10/19 131/84                 Passed - Recent (12 mo) or future (30 days) visit within the authorizing provider's specialty     Patient has had an office visit with the authorizing provider or a provider within the authorizing providers department within the previous 12 mos or has a future within next 30 days. See \"Patient Info\" tab in inbasket, or \"Choose Columns\" in Meds & Orders section of the refill encounter.              Passed - Patient has mammogram in past 2 years on file if age 50-75        Passed - Medication is active on med list        Passed - Patient is 18 years of age or older        Passed - No active pregnancy on record        Passed - No positive pregnancy test on record in past 12 months        Appointment needed.   Alicia Pardo RN on 11/22/2019 at 9:18 AM      "

## 2019-12-19 DIAGNOSIS — E78.2 MIXED HYPERLIPIDEMIA: Primary | ICD-10-CM

## 2019-12-19 DIAGNOSIS — R79.0 LOW IRON STORES: ICD-10-CM

## 2019-12-19 DIAGNOSIS — I10 ESSENTIAL HYPERTENSION: ICD-10-CM

## 2019-12-19 DIAGNOSIS — R73.9 ELEVATED BLOOD SUGAR: ICD-10-CM

## 2019-12-19 DIAGNOSIS — K76.0 NON-ALCOHOLIC FATTY LIVER DISEASE: ICD-10-CM

## 2019-12-19 DIAGNOSIS — I10 HYPERTENSION GOAL BP (BLOOD PRESSURE) < 140/90: ICD-10-CM

## 2019-12-19 RX ORDER — SPIRONOLACTONE 50 MG/1
TABLET, FILM COATED ORAL
Qty: 60 TABLET | Refills: 0 | Status: SHIPPED | OUTPATIENT
Start: 2019-12-19 | End: 2020-02-03

## 2019-12-19 NOTE — TELEPHONE ENCOUNTER
"Requested Prescriptions   Pending Prescriptions Disp Refills     spironolactone (ALDACTONE) 50 MG tablet [Pharmacy Med Name: SPIRONOLACTONE 50MG TABS]  Last Written Prescription Date:  10/4/2018  Last Fill Quantity: 180 tabs,  # refills: 3   Last office visit: 8/28/2019 with prescribing provider:  Justin   Future Office Visit:   180 tablet 3     Sig: TAKE ONE TABLET BY MOUTH TWICE A DAY       Diuretics (Including Combos) Protocol Failed - 12/19/2019  4:53 PM        Failed - Normal serum creatinine on file in past 12 months     Recent Labs   Lab Test 10/18/18  0716   CR 0.56              Failed - Normal serum potassium on file in past 12 months     Recent Labs   Lab Test 10/18/18  0716   POTASSIUM 3.8                    Failed - Normal serum sodium on file in past 12 months     Recent Labs   Lab Test 10/18/18  0716                 Passed - Blood pressure under 140/90 in past 12 months     BP Readings from Last 3 Encounters:   10/07/19 130/78   08/28/19 130/78   04/10/19 131/84                 Passed - Recent (12 mo) or future (30 days) visit within the authorizing provider's specialty     Patient has had an office visit with the authorizing provider or a provider within the authorizing providers department within the previous 12 mos or has a future within next 30 days. See \"Patient Info\" tab in inbasket, or \"Choose Columns\" in Meds & Orders section of the refill encounter.              Passed - Medication is active on med list        Passed - Patient is age 18 or older        Passed - No active pregancy on record        Passed - No positive pregnancy test in past 12 months         "

## 2019-12-20 NOTE — TELEPHONE ENCOUNTER
Patient was last seen 8/28/19 by Dr. Tejada, plan:    Instructions         Return in about 10 weeks (around 11/6/2019) for Lab Work.     No future orders noted.    Routing refill request to provider for review/approval because:  Labs not current:  KOFFI Dempsey RN  [facility]

## 2019-12-24 NOTE — TELEPHONE ENCOUNTER
Routing to PCP to place lab orders.    Patient scheduled to see PCP at the end of January. Patient would like to have all her lab work done ahead of time and needs orders to be placed. Patient works with in [place] and is planing to have blood her done at her facility once orders are placed.    Thank you,  Tammy SINHA  [facility]  Team [place] Coordinator

## 2020-01-14 ENCOUNTER — HOSPITAL ENCOUNTER (OUTPATIENT)
Dept: LAB | Facility: CLINIC | Age: 50
Discharge: HOME | End: 2020-01-14
Attending: FAMILY MEDICINE | Admitting: FAMILY MEDICINE
Payer: COMMERCIAL

## 2020-01-14 DIAGNOSIS — R73.9 ELEVATED BLOOD SUGAR: ICD-10-CM

## 2020-01-14 DIAGNOSIS — R79.0 LOW IRON STORES: ICD-10-CM

## 2020-01-14 DIAGNOSIS — I10 ESSENTIAL HYPERTENSION: ICD-10-CM

## 2020-01-14 DIAGNOSIS — E78.2 MIXED HYPERLIPIDEMIA: ICD-10-CM

## 2020-01-14 DIAGNOSIS — K76.0 NON-ALCOHOLIC FATTY LIVER DISEASE: ICD-10-CM

## 2020-01-14 LAB
ALBUMIN SERPL-MCNC: 4.7 G/DL (ref 3.4–5)
ALP SERPL-CCNC: 47 U/L (ref 40–150)
ALT SERPL W P-5'-P-CCNC: 124 U/L (ref 0–50)
ANION GAP SERPL CALCULATED.3IONS-SCNC: 5 MMOL/L (ref 3–14)
AST SERPL W P-5'-P-CCNC: 57 U/L (ref 0–45)
BILIRUB SERPL-MCNC: 0.3 MG/DL (ref 0.2–1.3)
BUN SERPL-MCNC: 13 MG/DL (ref 7–30)
CALCIUM SERPL-MCNC: 9.6 MG/DL (ref 8.5–10.1)
CHLORIDE SERPL-SCNC: 105 MMOL/L (ref 94–109)
CHOLEST SERPL-MCNC: 184 MG/DL
CO2 SERPL-SCNC: 27 MMOL/L (ref 20–32)
CREAT SERPL-MCNC: 0.55 MG/DL (ref 0.52–1.04)
ERYTHROCYTE [DISTWIDTH] IN BLOOD BY AUTOMATED COUNT: 13.2 % (ref 10–15)
FERRITIN SERPL-MCNC: 210 NG/ML (ref 8–252)
GFR SERPL CREATININE-BSD FRML MDRD: >90 ML/MIN/{1.73_M2}
GLUCOSE SERPL-MCNC: 105 MG/DL (ref 70–99)
HBA1C MFR BLD: 5.3 % (ref 0–5.6)
HCT VFR BLD AUTO: 44.6 % (ref 35–47)
HDLC SERPL-MCNC: 66 MG/DL
HGB BLD-MCNC: 15.1 G/DL (ref 11.7–15.7)
INR PPP: 0.88 (ref 0.86–1.14)
LDLC SERPL CALC-MCNC: 86 MG/DL
MCH RBC QN AUTO: 31.6 PG (ref 26.5–33)
MCHC RBC AUTO-ENTMCNC: 33.9 G/DL (ref 31.5–36.5)
MCV RBC AUTO: 93 FL (ref 78–100)
NONHDLC SERPL-MCNC: 118 MG/DL
PLATELET # BLD AUTO: 189 10E9/L (ref 150–450)
POTASSIUM SERPL-SCNC: 4 MMOL/L (ref 3.4–5.3)
PROT SERPL-MCNC: 8.2 G/DL (ref 6.8–8.8)
RBC # BLD AUTO: 4.78 10E12/L (ref 3.8–5.2)
SODIUM SERPL-SCNC: 137 MMOL/L (ref 133–144)
TRIGL SERPL-MCNC: 161 MG/DL
WBC # BLD AUTO: 5.5 10E9/L (ref 4–11)

## 2020-01-14 PROCEDURE — 36415 COLL VENOUS BLD VENIPUNCTURE: CPT | Performed by: FAMILY MEDICINE

## 2020-01-14 PROCEDURE — 85027 COMPLETE CBC AUTOMATED: CPT | Performed by: FAMILY MEDICINE

## 2020-01-14 PROCEDURE — 83036 HEMOGLOBIN GLYCOSYLATED A1C: CPT | Performed by: FAMILY MEDICINE

## 2020-01-14 PROCEDURE — 85610 PROTHROMBIN TIME: CPT | Performed by: FAMILY MEDICINE

## 2020-01-14 PROCEDURE — 82728 ASSAY OF FERRITIN: CPT | Performed by: FAMILY MEDICINE

## 2020-01-14 PROCEDURE — 80053 COMPREHEN METABOLIC PANEL: CPT | Performed by: FAMILY MEDICINE

## 2020-01-14 PROCEDURE — 80061 LIPID PANEL: CPT | Performed by: FAMILY MEDICINE

## 2020-02-03 ENCOUNTER — OFFICE VISIT (OUTPATIENT)
Dept: FAMILY MEDICINE | Facility: CLINIC | Age: 50
End: 2020-02-03
Payer: COMMERCIAL

## 2020-02-03 VITALS
HEIGHT: 63 IN | SYSTOLIC BLOOD PRESSURE: 138 MMHG | TEMPERATURE: 99.2 F | HEART RATE: 80 BPM | WEIGHT: 147.8 LBS | BODY MASS INDEX: 26.19 KG/M2 | DIASTOLIC BLOOD PRESSURE: 80 MMHG

## 2020-02-03 DIAGNOSIS — J45.30 MILD PERSISTENT ASTHMA, UNSPECIFIED WHETHER COMPLICATED: ICD-10-CM

## 2020-02-03 DIAGNOSIS — E78.5 HYPERLIPIDEMIA WITH TARGET LDL LESS THAN 130: ICD-10-CM

## 2020-02-03 DIAGNOSIS — I49.3 PVC'S (PREMATURE VENTRICULAR CONTRACTIONS): ICD-10-CM

## 2020-02-03 DIAGNOSIS — I10 HYPERTENSION GOAL BP (BLOOD PRESSURE) < 140/90: Primary | ICD-10-CM

## 2020-02-03 DIAGNOSIS — K76.0 NON-ALCOHOLIC FATTY LIVER DISEASE: ICD-10-CM

## 2020-02-03 PROBLEM — M25.549 PAIN IN JOINT, HAND: Status: RESOLVED | Noted: 2018-08-15 | Resolved: 2020-02-03

## 2020-02-03 PROBLEM — M25.511 ACUTE PAIN OF RIGHT SHOULDER: Status: RESOLVED | Noted: 2019-03-25 | Resolved: 2020-02-03

## 2020-02-03 PROCEDURE — 99214 OFFICE O/P EST MOD 30 MIN: CPT | Mod: 25 | Performed by: FAMILY MEDICINE

## 2020-02-03 PROCEDURE — 90715 TDAP VACCINE 7 YRS/> IM: CPT | Performed by: FAMILY MEDICINE

## 2020-02-03 PROCEDURE — 90471 IMMUNIZATION ADMIN: CPT | Performed by: FAMILY MEDICINE

## 2020-02-03 RX ORDER — AMLODIPINE BESYLATE 5 MG/1
5 TABLET ORAL DAILY
Qty: 90 TABLET | Refills: 3 | Status: SHIPPED | OUTPATIENT
Start: 2020-02-03 | End: 2020-11-09

## 2020-02-03 RX ORDER — SPIRONOLACTONE 50 MG/1
50 TABLET, FILM COATED ORAL 2 TIMES DAILY
Qty: 180 TABLET | Refills: 1 | Status: SHIPPED | OUTPATIENT
Start: 2020-02-03 | End: 2020-08-19

## 2020-02-03 ASSESSMENT — MIFFLIN-ST. JEOR: SCORE: 1257.55

## 2020-02-03 NOTE — PROGRESS NOTES
Subjective     Olga Lidia Solorzano is a 49 year old female who presents to clinic today for the following health issues:    HPI   Hypertension Follow-up      Do you check your blood pressure regularly outside of the clinic? No     Are you following a low salt diet? No    Are your blood pressures ever more than 140 on the top number (systolic) OR more   than 90 on the bottom number (diastolic), for example 140/90? No      How many servings of fruits and vegetables do you eat daily?  0-1    On average, how many sweetened beverages do you drink each day (Examples: soda, juice, sweet tea, etc.  Do NOT count diet or artificially sweetened beverages)?   0    How many days per week do you exercise enough to make your heart beat faster? 3 or less    How many minutes a day do you exercise enough to make your heart beat faster? 9 or less    How many days per week do you miss taking your medication? 0    Hyperlipidemia Follow-Up      Are you regularly taking any medication or supplement to lower your cholesterol?   Yes- atorvastatin    Are you having muscle aches or other side effects that you think could be caused by your cholesterol lowering medication?  No    She had elevated liver enzymes in labs 3 weeks ago. History of VIDAL, last ultrasound was in 2015. She avoids Tylenol but still drinks about 2 low sugar alcoholic drinks a day. She has concerns about her blood sugar because her fasting blood glucose was elevated but she doesn't eat any sugars or carbohydrates. She was anemic last year but this improved with menopause.     Dermatologist gave spironolactone for hair loss and high blood pressure.     BP Readings from Last 3 Encounters:   02/03/20 138/80   10/07/19 130/78   08/28/19 130/78    Wt Readings from Last 3 Encounters:   02/03/20 67 kg (147 lb 12.8 oz)   10/28/19 66.7 kg (147 lb)   10/07/19 66.7 kg (147 lb)         Reviewed and updated as needed this visit by Provider         Review of Systems   ROS COMP:  "Constitutional, HEENT, cardiovascular, pulmonary, gi and gu systems are negative, except as otherwise noted.    This document serves as a record of the services and decisions personally performed and made by Alejandra Jarrell DO. It was created on her  behalf by Shagufta Almazan, a trained medical scribe. The creation of this document is based on the provider's statements to the medical scribe.  Shagufta Almazan 3:09 PM February 3, 2020        Objective    /80 (BP Location: Right arm, Patient Position: Chair, Cuff Size: Adult Regular)   Pulse 80   Temp 99.2  F (37.3  C) (Oral)   Ht 1.605 m (5' 3.19\")   Wt 67 kg (147 lb 12.8 oz)   LMP  (LMP Unknown)   Breastfeeding No   BMI 26.03 kg/m    Body mass index is 26.03 kg/m .     Physical Exam   GENERAL: healthy, alert and no distress  HENT: ear canals and TM's normal, nose and mouth without ulcers or lesions  NECK: no adenopathy, no asymmetry, masses, or scars and thyroid normal to palpation  RESP: lungs clear to auscultation - no rales, rhonchi or wheezes  CV: regular rate and rhythm, normal S1 S2, no S3 or S4, no murmur, click or rub, no peripheral edema  ABDOMEN: soft, nontender, no hepatosplenomegaly, no masses and bowel sounds normal  PSYCH: mentation appears normal, affect normal/bright    Diagnostic Test Results:  Labs reviewed in Epic  No results found for this or any previous visit (from the past 24 hour(s)).          ICD-10-CM    1. Hypertension goal BP (blood pressure) < 140/90 I10 spironolactone (ALDACTONE) 50 MG tablet   2. PVC's (premature ventricular contractions) I49.3 amLODIPine (NORVASC) 5 MG tablet   3. Hyperlipidemia with target LDL less than 130 E78.5    4. Non-alcoholic fatty liver disease K76.0    5. Mild persistent asthma, unspecified whether complicated J45.30    Blood pressure well controlled, continue current medication. Last LDL was within goal range, continue statin medication.   Patient given spironolactone by dermatology for hair loss and " blood pressure. Patient advised to watch alcohol intake.     The information in this document, created by the medical scribe for me, accurately reflects the services I personally performed and the decisions made by me. I have reviewed and approved this document for accuracy prior to leaving the patient care area.  February 3, 2020 3:17 PM

## 2020-02-03 NOTE — NURSING NOTE
Prior to immunization administration, verified patients identity using patient s name and date of birth. Please see Immunization Activity for additional information.     Screening Questionnaire for Adult Immunization    Are you sick today?   No   Do you have allergies to medications, food, a vaccine component or latex?   No   Have you ever had a serious reaction after receiving a vaccination?   No   Do you have a long-term health problem with heart, lung, kidney, or metabolic disease (e.g., diabetes), asthma, a blood disorder, no spleen, complement component deficiency, a cochlear implant, or a spinal fluid leak?  Are you on long-term aspirin therapy?   No   Do you have cancer, leukemia, HIV/AIDS, or any other immune system problem?   No   Do you have a parent, brother, or sister with an immune system problem?   No   In the past 3 months, have you taken medications that affect  your immune system, such as prednisone, other steroids, or anticancer drugs; drugs for the treatment of rheumatoid arthritis, Crohn s disease, or psoriasis; or have you had radiation treatments?   No   Have you had a seizure, or a brain or other nervous system problem?   No   During the past year, have you received a transfusion of blood or blood    products, or been given immune (gamma) globulin or antiviral drug?   No   For women: Are you pregnant or is there a chance you could become       pregnant during the next month?   No   Have you received any vaccinations in the past 4 weeks?   No     Immunization questionnaire answers were all negative.        Per orders of Dr. Jarrell, injection of Tdap given by Masood De Leon MA. Patient instructed to remain in clinic for 15 minutes afterwards, and to report any adverse reaction to me immediately.       Screening performed by Masood De Leon MA on 2/3/2020 at 3:43 PM.

## 2020-02-04 ASSESSMENT — ASTHMA QUESTIONNAIRES: ACT_TOTALSCORE: 24

## 2020-03-23 PROBLEM — M25.512 LEFT SHOULDER PAIN: Status: RESOLVED | Noted: 2019-04-30 | Resolved: 2020-03-23

## 2020-03-23 PROBLEM — M25.511 RIGHT SHOULDER PAIN: Status: RESOLVED | Noted: 2019-04-30 | Resolved: 2020-03-23

## 2020-03-23 NOTE — PROGRESS NOTES
Patient did not return for further treatment and no additional progress was noted.  Please refer to the progress/soap note and goal flowsheet for discharge information.

## 2020-04-21 ENCOUNTER — MYC MEDICAL ADVICE (OUTPATIENT)
Dept: FAMILY MEDICINE | Facility: CLINIC | Age: 50
End: 2020-04-21

## 2020-04-21 DIAGNOSIS — R05.9 COUGH: ICD-10-CM

## 2020-04-21 DIAGNOSIS — J20.9 ACUTE BRONCHITIS, UNSPECIFIED ORGANISM: ICD-10-CM

## 2020-04-21 DIAGNOSIS — J45.30 MILD PERSISTENT ASTHMA WITHOUT COMPLICATION: Primary | ICD-10-CM

## 2020-04-21 DIAGNOSIS — J45.31 MILD PERSISTENT ASTHMA WITH ACUTE EXACERBATION: ICD-10-CM

## 2020-04-21 DIAGNOSIS — J45.30 MILD PERSISTENT ASTHMA: ICD-10-CM

## 2020-04-21 NOTE — TELEPHONE ENCOUNTER
Routing Techpackert message to PCP to please advise.    Patient does have a history of asthma.  Last OV 2/3/20      Roxy Hong RN

## 2020-04-22 ENCOUNTER — TELEPHONE (OUTPATIENT)
Dept: FAMILY MEDICINE | Facility: CLINIC | Age: 50
End: 2020-04-22

## 2020-04-22 RX ORDER — LEVALBUTEROL TARTRATE 45 UG/1
2 AEROSOL, METERED ORAL EVERY 6 HOURS PRN
Qty: 1 INHALER | Refills: 5 | Status: SHIPPED | OUTPATIENT
Start: 2020-04-22 | End: 2023-02-08

## 2020-04-22 RX ORDER — FLUTICASONE PROPIONATE 220 UG/1
2 AEROSOL, METERED RESPIRATORY (INHALATION) 2 TIMES DAILY
Qty: 3 INHALER | Refills: 3 | Status: SHIPPED | OUTPATIENT
Start: 2020-04-22 | End: 2024-03-01

## 2020-04-22 NOTE — TELEPHONE ENCOUNTER
Review of chart shows we have only filled her FLOVENT in 2015. This is the controller medication    Has she also had albuterol?      The controller medication/folvent is pended but she needs to be aware that this is not for as needed use.    Please review with patient if she is requesting both controller and rescue medications?    We could set up a phone encounter if needed

## 2020-04-22 NOTE — TELEPHONE ENCOUNTER
Routed MyChart response to patient to clarify pharmacy and her current inhaler use.  Await response before routing back to provider for prescriptions to be sent.    Deirdre Dempsey RN  [facility]

## 2020-04-22 NOTE — TELEPHONE ENCOUNTER
Routed to provider, patient states xopenex tolerated in the past, cued up.    See patient's Mychart messages, provider can respond directly to patient via [employer].    Deirdre Dempsey RN  [facility]

## 2020-04-22 NOTE — TELEPHONE ENCOUNTER
Prior Authorization Approval    Authorization Effective Date: 4/22/2020  Authorization Expiration Date: 4/22/2021  Medication: Levalbuterol HFA inhaler  Approved Dose/Quantity:    Reference #:     Insurance Company: [employer] - Phone [phone] Fax [phone]  Expected CoPay:       CoPay Card Available:      Foundation Assistance Needed:    Which Pharmacy is filling the prescription (Not needed for infusion/clinic administered): [facility] - [address] [address]  Pharmacy Notified: Yes  Patient Notified: Yes **Instructed pharmacy to notify patient when script is ready to /ship.**

## 2020-04-22 NOTE — TELEPHONE ENCOUNTER
Please do not close this encounter until this has been addressed.  (prior auth approved/denied, prescriber refusal to complete prior auth or medication changed/discontinued)    PREREQUISITE THERAPY REQUIRED    Prior Authorization needed on: Levalbuterol HFA inhaler   Drug NDC: [address]-84     Insurance: Merged with [facility] / [employer]  Member ID: [id number]   Insurance phone #: [phone]    Pharmacy NPI: [id number]  Pharmacy Phone #: [phone]  Pharmacy Fax #: [phone]    Please let us know if the PA gets approved or denied or if medication is changed

## 2020-04-22 NOTE — TELEPHONE ENCOUNTER
Routed response to patient to clarify pharmacy and if xopenex was tolerated, that was on past med list.    Deirdre Dempsey RN  [facility]

## 2020-04-22 NOTE — TELEPHONE ENCOUNTER
Central Prior Authorization Team   Phone: [phone]      PA Initiation    Medication: Levalbuterol HFA inhaler  Insurance Company: [employer] - Phone [phone] Fax [phone]  Pharmacy Filling the Rx: [facility] - [address] [place] [address]  Filling Pharmacy Phone: [phone]  Filling Pharmacy Fax:    Start Date: 4/22/2020

## 2020-07-13 NOTE — TELEPHONE ENCOUNTER
"Requested Prescriptions   Pending Prescriptions Disp Refills     progesterone (PROMETRIUM) 100 MG capsule [Pharmacy Med Name: PROGESTERONE MICRONIZED 100MG CAPS] 90 capsule 1     Sig: TAKE ONE CAPSULE BY MOUTH AT BEDTIME       Hormone Replacement Therapy Failed - 7/13/2020  4:26 PM        Failed - Recent (12 mo) or future (30 days) visit within the authorizing provider's specialty     Patient has had an office visit with the authorizing provider or a provider within the authorizing providers department within the previous 12 mos or has a future within next 30 days. See \"Patient Info\" tab in inbasket, or \"Choose Columns\" in Meds & Orders section of the refill encounter.              Failed - Medication is active on med list        Passed - Blood pressure under 140/90 in past 12 months     BP Readings from Last 3 Encounters:   02/03/20 138/80   10/07/19 130/78   08/28/19 130/78                 Passed - Patient has mammogram in past 2 years on file if age 50-75        Passed - Patient is 18 years of age or older        Passed - No active pregnancy on record        Passed - No positive pregnancy test on record in past 12 months         Denied pt needs an appt  Alicia Pardo RN on 7/13/2020 at 4:44 PM      "

## 2020-07-16 DIAGNOSIS — N95.1 VASOMOTOR SYMPTOMS DUE TO MENOPAUSE: ICD-10-CM

## 2020-07-16 RX ORDER — ESTRADIOL 0.05 MG/D
PATCH, EXTENDED RELEASE TRANSDERMAL
Refills: 0 | OUTPATIENT
Start: 2020-07-16

## 2020-07-16 NOTE — TELEPHONE ENCOUNTER
"Requested Prescriptions   Pending Prescriptions Disp Refills     estradiol (VIVELLE-DOT) 0.05 MG/24HR bi-weekly patch [Pharmacy Med Name: ESTRADIOL 0.05MG/24HR PTTW]  0     Sig: REMOVE OLD PATCH AND APPLY ONE NEW PATCH TWICE A WEEK       Hormone Replacement Therapy Failed - 7/16/2020  4:39 PM        Failed - Recent (12 mo) or future (30 days) visit within the authorizing provider's specialty     Patient has had an office visit with the authorizing provider or a provider within the authorizing providers department within the previous 12 mos or has a future within next 30 days. See \"Patient Info\" tab in inbasket, or \"Choose Columns\" in Meds & Orders section of the refill encounter.              Failed - Medication is active on med list        Passed - Blood pressure under 140/90 in past 12 months     BP Readings from Last 3 Encounters:   02/03/20 138/80   10/07/19 130/78   08/28/19 130/78                 Passed - Patient has mammogram in past 2 years on file if age 50-75        Passed - Patient is 18 years of age or older        Passed - No active pregnancy on record        Passed - No positive pregnancy test on record in past 12 months           Last Written Prescription Date:  9/17/19  Last Fill Quantity: 24,  # refills: 0   Last office visit: 2/18/2019 with prescribing provider:  Dr peters   Future Office Visit:  None    *Not currently on med list        "

## 2020-07-16 NOTE — TELEPHONE ENCOUNTER
Last annual exam 2/18/19  Pt had reported was not taking  Removed from med profile.    Refused med; needs apt.    Siomara Guerrero RN on 7/16/2020 at 4:55 PM

## 2020-07-27 ENCOUNTER — MYC MEDICAL ADVICE (OUTPATIENT)
Dept: OBGYN | Facility: CLINIC | Age: 50
End: 2020-07-27

## 2020-07-29 ENCOUNTER — VIRTUAL VISIT (OUTPATIENT)
Dept: OBGYN | Facility: CLINIC | Age: 50
End: 2020-07-29
Payer: COMMERCIAL

## 2020-07-29 DIAGNOSIS — N95.1 VASOMOTOR SYMPTOMS DUE TO MENOPAUSE: Primary | ICD-10-CM

## 2020-07-29 DIAGNOSIS — Z79.890 HORMONE REPLACEMENT THERAPY (HRT): ICD-10-CM

## 2020-07-29 PROCEDURE — 99207 ZZC NO BILLABLE SERVICE THIS VISIT: CPT | Mod: TEL | Performed by: OBSTETRICS & GYNECOLOGY

## 2020-07-29 RX ORDER — ESTRADIOL 0.05 MG/D
PATCH, EXTENDED RELEASE TRANSDERMAL
COMMUNITY
Start: 2019-09-17 | End: 2020-07-29

## 2020-07-29 RX ORDER — ESTRADIOL 0.05 MG/D
1 PATCH, EXTENDED RELEASE TRANSDERMAL
Qty: 24 PATCH | Refills: 0 | Status: SHIPPED | OUTPATIENT
Start: 2020-07-30 | End: 2020-11-03

## 2020-07-29 NOTE — PROGRESS NOTES
"Olga Lidia Solorzano is a 50 year old female who is being evaluated via a billable telephone visit.      The patient has been notified of following:     \"This telephone visit will be conducted via a call between you and your physician/provider. We have found that certain health care needs can be provided without the need for a physical exam.  This service lets us provide the care you need with a short phone conversation.  If a prescription is necessary we can send it directly to your pharmacy.  If lab work is needed we can place an order for that and you can then stop by our lab to have the test done at a later time.    Telephone visits are billed at different rates depending on your insurance coverage. During this emergency period, for some insurers they may be billed the same as an in-person visit.  Please reach out to your insurance provider with any questions.    If during the course of the call the physician/provider feels a telephone visit is not appropriate, you will not be charged for this service.\"    Patient has given verbal consent for Telephone visit?  Yes    What phone number would you like to be contacted at? [phone]    Phone call duration: 5 minutes    Jessie Perdomo MD      SUBJECTIVE:                                                   Olga Lidia Solorzano is a 50 year old female who presents to clinic today for the following health issue(s):  Patient presents with:  Medication Follow-up: Patient would like refills      HPI:  Patient was inappropriately told that she could do just a phone visit if she just had hormone meds/refills that she needed so had planned to do that  However on review hasn't had a mammo since 10/18 and no annual since . Had appts 10/18 and  but for problem visits    No LMP recorded (lmp unknown). Patient is postmenopausal..     Patient is sexually active, .  Using menopause for contraception.    reports that she quit smoking about 7 years ago. Her smoking use " included cigarettes. She has a 10.00 pack-year smoking history. She has never used smokeless tobacco.    STD testing offered?  Declined    Health maintenance updated:  yes    Today's PHQ-2 Score:   PHQ-2 (  [employer]) 10/28/2019   Q1: Little interest or pleasure in doing things 0   Q2: Feeling down, depressed or hopeless 0   PHQ-2 Score 0   Q1: Little interest or pleasure in doing things Not at all   Q2: Feeling down, depressed or hopeless Not at all   PHQ-2 Score 0     Today's PHQ-9 Score:   PHQ-9 SCORE 2018   PHQ-9 Total Score 1     Today's TIMO-7 Score:   TIMO-7 SCORE 2018   Total Score 1       Problem list and histories reviewed & adjusted, as indicated.  Additional history: as documented.    Patient Active Problem List   Diagnosis     Hypertension goal BP (blood pressure) < 140/90     Hyperlipidemia with target LDL less than 130     Herniated vertebral disc     Mild persistent asthma     Gastroesophageal reflux disease without esophagitis     Bilateral low back pain without sciatica     Hip pain, right     Presence of intrauterine contraceptive device     IUD (intrauterine device) in place     Partial nontraumatic tear of right rotator cuff     DJD of right AC (acromioclavicular) joint     Non-alcoholic fatty liver disease     Telogen hair loss     Past Surgical History:   Procedure Laterality Date     COLONOSCOPY N/A 3/13/2017    Procedure: COLONOSCOPY;  Surgeon: Tato Child MD;  Location:  GI      VASCULAR SURGERY PROCEDURE UNLIST      vericose vein     NO HISTORY OF SURGERY        Social History     Tobacco Use     Smoking status: Former Smoker     Packs/day: 0.50     Years: 20.00     Pack years: 10.00     Types: Cigarettes     Last attempt to quit: 2012     Years since quittin.5     Smokeless tobacco: Never Used   Substance Use Topics     Alcohol use: Yes     Alcohol/week: 0.0 standard drinks     Comment: more on weekends 8 drinks per week or wine or mixed drinks       Problem  (# of Occurrences) Relation (Name,Age of Onset)    Blood Disease (1) Paternal Uncle    Breast Cancer (3) Paternal Aunt, Paternal Half-Sister (aunt), Cousin (ariela)    Hyperlipidemia (1) Father (emily)    Hypertension (3) Mother (wally), Father (emily), Maternal Grandmother (meka)    Lung Cancer (1) Mother (wally)    Other Cancer (2) Mother (wally): lung, Paternal Half-Brother (uncle): leukemia       Negative family history of: Liver Disease            Current Outpatient Medications   Medication Sig     amLODIPine (NORVASC) 5 MG tablet Take 1 tablet (5 mg) by mouth daily     atorvastatin (LIPITOR) 40 MG tablet TAKE ONE TABLET BY MOUTH ONCE DAILY     [START ON 7/30/2020] estradiol (VIVELLE-DOT) 0.05 MG/24HR bi-weekly patch Place 1 patch onto the skin twice a week     fluticasone (FLOVENT HFA) 220 MCG/ACT inhaler Inhale 2 puffs into the lungs 2 times daily     levalbuterol (XOPENEX HFA) 45 MCG/ACT inhaler Inhale 2 puffs into the lungs every 6 hours as needed for shortness of breath / dyspnea or wheezing     progesterone (PROMETRIUM) 100 MG capsule Take 1 capsule (100 mg) by mouth daily     spironolactone (ALDACTONE) 50 MG tablet Take 1 tablet (50 mg) by mouth 2 times daily     Ferrous Sulfate (IRON SUPPLEMENT PO)      No current facility-administered medications for this visit.      Allergies   Allergen Reactions     Niacin Shortness Of Breath, Other (See Comments) and Nausea and Vomiting     All over body pains (patient reported symptoms)             ASSESSMENT/PLAN:                                                        ICD-10-CM    1. Vasomotor symptoms due to menopause  N95.1 estradiol (VIVELLE-DOT) 0.05 MG/24HR bi-weekly patch     progesterone (PROMETRIUM) 100 MG capsule   2. Hormone replacement therapy (HRT)  Z79.890 estradiol (VIVELLE-DOT) 0.05 MG/24HR bi-weekly patch     progesterone (PROMETRIUM) 100 MG capsule          Visit today is not appropriate for telehealth  Patient needs to come in for annual exam, HRT  discussion and mammo.  Given a 3 month extension on her HRT to allow time to get in for that.  No charge for today    Jessie Perdomo MD  [facility] [place]

## 2020-08-18 DIAGNOSIS — I10 HYPERTENSION GOAL BP (BLOOD PRESSURE) < 140/90: ICD-10-CM

## 2020-08-19 RX ORDER — SPIRONOLACTONE 50 MG/1
TABLET, FILM COATED ORAL
Qty: 180 TABLET | Refills: 1 | Status: SHIPPED | OUTPATIENT
Start: 2020-08-19 | End: 2020-11-09

## 2020-08-19 NOTE — TELEPHONE ENCOUNTER
"  Requested Prescriptions   Pending Prescriptions Disp Refills     spironolactone (ALDACTONE) 50 MG tablet [Pharmacy Med Name: SPIRONOLACTONE 50MG TABS] 180 tablet 0     Sig: TAKE ONE TABLET BY MOUTH TWICE A DAY   Last Written Prescription Date:  2/3/2020  Last Fill Quantity: 180,  # refills: 1   Last office visit: 2/3/2020 with prescribing provider:     Future Office Visit:        Diuretics (Including Combos) Protocol Passed - 8/18/2020  6:49 PM        Passed - Blood pressure under 140/90 in past 12 months     BP Readings from Last 3 Encounters:   02/03/20 138/80   10/07/19 130/78   08/28/19 130/78           Passed - Recent (12 mo) or future (30 days) visit within the authorizing provider's specialty     Patient has had an office visit with the authorizing provider or a provider within the authorizing providers department within the previous 12 mos or has a future within next 30 days. See \"Patient Info\" tab in inbasket, or \"Choose Columns\" in Meds & Orders section of the refill encounter.              Passed - Medication is active on med list        Passed - Patient is age 18 or older        Passed - No active pregancy on record        Passed - Normal serum creatinine on file in past 12 months     Recent Labs   Lab Test 01/14/20  0657   CR 0.55              Passed - Normal serum potassium on file in past 12 months     Recent Labs   Lab Test 01/14/20  0657   POTASSIUM 4.0              Passed - Normal serum sodium on file in past 12 months     Recent Labs   Lab Test 01/14/20  0657                 Passed - No positive pregnancy test in past 12 months         Prescription approved per [facility].  Cris Rowe RN      "

## 2020-09-21 ENCOUNTER — OFFICE VISIT (OUTPATIENT)
Dept: FAMILY MEDICINE | Facility: CLINIC | Age: 50
End: 2020-09-21
Payer: COMMERCIAL

## 2020-09-21 VITALS
WEIGHT: 147.7 LBS | HEART RATE: 90 BPM | DIASTOLIC BLOOD PRESSURE: 93 MMHG | SYSTOLIC BLOOD PRESSURE: 141 MMHG | BODY MASS INDEX: 26.01 KG/M2 | OXYGEN SATURATION: 94 % | TEMPERATURE: 98.3 F

## 2020-09-21 DIAGNOSIS — H00.011 HORDEOLUM EXTERNUM OF RIGHT UPPER EYELID: Primary | ICD-10-CM

## 2020-09-21 PROCEDURE — 99213 OFFICE O/P EST LOW 20 MIN: CPT | Performed by: INTERNAL MEDICINE

## 2020-09-21 RX ORDER — AZITHROMYCIN 250 MG/1
TABLET, FILM COATED ORAL
Qty: 6 TABLET | Refills: 0 | Status: SHIPPED | OUTPATIENT
Start: 2020-09-21 | End: 2020-11-09

## 2020-09-21 RX ORDER — ERYTHROMYCIN 5 MG/G
0.5 OINTMENT OPHTHALMIC 2 TIMES DAILY
Qty: 1 TUBE | Refills: 0 | Status: SHIPPED | OUTPATIENT
Start: 2020-09-21 | End: 2020-11-09

## 2020-09-21 ASSESSMENT — ASTHMA QUESTIONNAIRES
QUESTION_1 LAST FOUR WEEKS HOW MUCH OF THE TIME DID YOUR ASTHMA KEEP YOU FROM GETTING AS MUCH DONE AT WORK, SCHOOL OR AT HOME: NONE OF THE TIME
QUESTION_2 LAST FOUR WEEKS HOW OFTEN HAVE YOU HAD SHORTNESS OF BREATH: ONCE OR TWICE A WEEK
QUESTION_3 LAST FOUR WEEKS HOW OFTEN DID YOUR ASTHMA SYMPTOMS (WHEEZING, COUGHING, SHORTNESS OF BREATH, CHEST TIGHTNESS OR PAIN) WAKE YOU UP AT NIGHT OR EARLIER THAN USUAL IN THE MORNING: NOT AT ALL
ACT_TOTALSCORE: 23
QUESTION_5 LAST FOUR WEEKS HOW WOULD YOU RATE YOUR ASTHMA CONTROL: WELL CONTROLLED
QUESTION_4 LAST FOUR WEEKS HOW OFTEN HAVE YOU USED YOUR RESCUE INHALER OR NEBULIZER MEDICATION (SUCH AS ALBUTEROL): NOT AT ALL

## 2020-09-21 NOTE — PATIENT INSTRUCTIONS
Warm compresses   Use erythromycin ophthalmic ointment   Take azithromycin as prescribed   See your eye doctor if does not improve or get worse   Monitor your blood pressure once a week  at home.  Bring those readings on your next visit.  Notify us if your blood pressure readings consistently stays greater than 140/90.  Follow up with your PCP for physical  Seek sooner medical attention if there is any worsening of symptoms or problems.        Patient Education     Sty (or Stye)  A sty is an infection of the oil gland of the eyelid. It may develop into a small pocket of pus (an abscess). This can cause pain, redness, and swelling. In early stages, a sty is treated with antibiotic cream, eye drops, or a small towel soaked in warm water (a warm compress). More severe cases may need to be opened and drained by a healthcare provider.  Home care    Eye drops or ointment are usually prescribed to treat the infection. Use these as directed.     Artificial tears may also be used to lubricate the eye and make it more comfortable. You can buy these over the counter without a prescription. Talk with your healthcare provider before using any over-the-counter treatment for a sty.    Apply a warm, damp towel to the affected eye for at least 5 minutes, 3 to 4 times a day for a week. Warm compresses open the pores and speed the healing. But if the compresses are too hot, they may burn your eyelid.    Sometimes the sty will drain with this treatment alone. If this happens, keep using the antibiotic until all the redness and swelling are gone.    Wash your hands before and after touching the infected eyelid to avoid spreading the infection.    Don t squeeze or try to break open the sty.  Follow-up care  Follow up with your healthcare provider, or as advised.   When to seek medical advice  Call your healthcare provider right away if any of these occur:    Increase in swelling or redness around the eyelid after 48 to 72  hours    Increase in eye pain or the eyelid blisters    Increase in warmth--the eyelid feels hot    Drainage of blood or thick pus from the sty    Blister on the eyelid    Inability to open the eyelid due to swelling    Fever of 100.4 F (38 C) or above, or as directed by your provider    Vision changes    Headache or stiff neck    The sty comes back  Date Last Reviewed: 8/1/2017 2000-2019 [employer]. [address] [place] [address], [place] [id number]. All rights reserved. This information is not intended as a substitute for professional medical care. Always follow your healthcare professional's instructions.

## 2020-09-21 NOTE — PROGRESS NOTES
Subjective     Olga Lidia Solorzano is a 50 year old female who presents to clinic today for the following health issues:    HPI       Eye(s) Problem  Onset/Duration: 3-4 days  Description:   Location: YES, right eye  Pain: sore to the touch  Redness: YES  Accompanying Signs & Symptoms:  Discharge/mattering: no  Swelling: YES  Visual changes: no  Fever: no  Nasal Congestion: no  Bothered by bright lights: no  History:  Trauma: no  Foreign body exposure: no  Wearing contacts: no  Precipitating or alleviating factors: None  Therapies tried and outcome: saline    Patient has history of stye in the past many years ago  This is new   Right upper eye lid is red and swollen  Eye exam is ok it is only eye-lid which is involved     Review of Systems   Constitutional, HEENT, cardiovascular, pulmonary, gi and gu systems are negative, except as otherwise noted.      Objective    BP (!) 141/93   Pulse 90   Temp 98.3  F (36.8  C) (Tympanic)   Wt 67 kg (147 lb 11.2 oz)   LMP  (LMP Unknown)   SpO2 94%   BMI 26.01 kg/m    Body mass index is 26.01 kg/m .  Physical Exam     She is nice and pleasant  She is comfortable  Not in any kind of distress  She is fully alert awake oriented  Her right upper eyelid is swollen and there is a stye on her medial side  But the whole eyelid is red and swollen  Cornea conjunctiva is fine  Left eye exam is normal              Assessment & Plan     Hordeolum externum of right upper eyelid  - erythromycin (ROMYCIN) 5 MG/GM ophthalmic ointment; Place 0.5 inches into the right eye 2 times daily  - azithromycin (ZITHROMAX) 250 MG tablet; Two tablets first day, then one tablet daily for four days.  Warm compresses  Topical antibiotic ointment  As redness is involving the whole eyelid and there is swelling I also gave her systemic antibiotic  If symptoms does not improve or get worse then she needs to see ophthalmologist  She is in agreement  Monitor your blood pressure once a week  at home.  Bring those  "readings on your next visit.  Notify us if your blood pressure readings consistently stays greater than 140/90.       BMI:   Estimated body mass index is 26.01 kg/m  as calculated from the following:    Height as of 2/3/20: 1.605 m (5' 3.19\").    Weight as of this encounter: 67 kg (147 lb 11.2 oz).           See Patient Instructions  Patient Instructions   Warm compresses   Use erythromycin ophthalmic ointment   Take azithromycin as prescribed   See your eye doctor if does not improve or get worse   Monitor your blood pressure once a week  at home.  Bring those readings on your next visit.  Notify us if your blood pressure readings consistently stays greater than 140/90.  Follow up with your PCP for physical  Seek sooner medical attention if there is any worsening of symptoms or problems.        Patient Education     Sty (or Stye)  A sty is an infection of the oil gland of the eyelid. It may develop into a small pocket of pus (an abscess). This can cause pain, redness, and swelling. In early stages, a sty is treated with antibiotic cream, eye drops, or a small towel soaked in warm water (a warm compress). More severe cases may need to be opened and drained by a healthcare provider.  Home care    Eye drops or ointment are usually prescribed to treat the infection. Use these as directed.     Artificial tears may also be used to lubricate the eye and make it more comfortable. You can buy these over the counter without a prescription. Talk with your healthcare provider before using any over-the-counter treatment for a sty.    Apply a warm, damp towel to the affected eye for at least 5 minutes, 3 to 4 times a day for a week. Warm compresses open the pores and speed the healing. But if the compresses are too hot, they may burn your eyelid.    Sometimes the sty will drain with this treatment alone. If this happens, keep using the antibiotic until all the redness and swelling are gone.    Wash your hands before and " after touching the infected eyelid to avoid spreading the infection.    Don t squeeze or try to break open the sty.  Follow-up care  Follow up with your healthcare provider, or as advised.   When to seek medical advice  Call your healthcare provider right away if any of these occur:    Increase in swelling or redness around the eyelid after 48 to 72 hours    Increase in eye pain or the eyelid blisters    Increase in warmth--the eyelid feels hot    Drainage of blood or thick pus from the sty    Blister on the eyelid    Inability to open the eyelid due to swelling    Fever of 100.4 F (38 C) or above, or as directed by your provider    Vision changes    Headache or stiff neck    The sty comes back  Date Last Reviewed: 8/1/2017 2000-2019 [employer]. [address] [place] [address], [place] [address]. All rights reserved. This information is not intended as a substitute for professional medical care. Always follow your healthcare professional's instructions.               Return in about 2 months (around 11/21/2020) for Physical Exam.    Rama Crook MD  [facility]

## 2020-09-22 ASSESSMENT — ASTHMA QUESTIONNAIRES: ACT_TOTALSCORE: 23

## 2020-11-03 DIAGNOSIS — Z79.890 HORMONE REPLACEMENT THERAPY (HRT): ICD-10-CM

## 2020-11-03 DIAGNOSIS — N95.1 VASOMOTOR SYMPTOMS DUE TO MENOPAUSE: ICD-10-CM

## 2020-11-03 RX ORDER — ESTRADIOL 0.05 MG/D
PATCH, EXTENDED RELEASE TRANSDERMAL
Qty: 8 PATCH | Refills: 0 | Status: SHIPPED | OUTPATIENT
Start: 2020-11-03 | End: 2020-11-09

## 2020-11-03 NOTE — TELEPHONE ENCOUNTER
"Requested Prescriptions   Pending Prescriptions Disp Refills     estradiol (VIVELLE-DOT) 0.05 MG/24HR bi-weekly patch [Pharmacy Med Name: ESTRADIOL 0.05MG/24HR PTTW]  0     Sig: REMOVE OLD PATCH AND APPLY ONE NEW PATCH TWICE A WEEK.       Hormone Replacement Therapy Failed - 11/3/2020  1:22 PM        Failed - Blood pressure under 140/90 in past 12 months     BP Readings from Last 3 Encounters:   09/21/20 (!) 141/93   02/03/20 138/80   10/07/19 130/78                 Failed - Patient has mammogram in past 2 years on file if age 50-75        Passed - Recent (12 mo) or future (30 days) visit within the authorizing provider's specialty     Patient has had an office visit with the authorizing provider or a provider within the authorizing providers department within the previous 12 mos or has a future within next 30 days. See \"Patient Info\" tab in inbasket, or \"Choose Columns\" in Meds & Orders section of the refill encounter.              Passed - Medication is active on med list        Passed - Patient is 18 years of age or older        Passed - No active pregnancy on record        Passed - No positive pregnancy test on record in past 12 months           progesterone (PROMETRIUM) 100 MG capsule [Pharmacy Med Name: PROGESTERONE MICRONIZED 100MG CAPS] 90 capsule 0     Sig: TAKE ONE CAPSULE BY MOUTH ONCE DAILY       Hormone Replacement Therapy Failed - 11/3/2020  1:22 PM        Failed - Blood pressure under 140/90 in past 12 months     BP Readings from Last 3 Encounters:   09/21/20 (!) 141/93   02/03/20 138/80   10/07/19 130/78                 Failed - Patient has mammogram in past 2 years on file if age 50-75        Passed - Recent (12 mo) or future (30 days) visit within the authorizing provider's specialty     Patient has had an office visit with the authorizing provider or a provider within the authorizing providers department within the previous 12 mos or has a future within next 30 days. See \"Patient Info\" tab in " "inbasket, or \"Choose Columns\" in Meds & Orders section of the refill encounter.              Passed - Medication is active on med list        Passed - Patient is 18 years of age or older        Passed - No active pregnancy on record        Passed - No positive pregnancy test on record in past 12 months           Last Written Prescription Date:  07/29/2020  Last Fill Quantity: 24,  # refills: 0   Last office visit: 2/18/2019 with prescribing provider:  Leanne   Future Office Visit:   Next 5 appointments (look out 90 days)    Nov 09, 2020  9:30 AM  PHYSICAL with Jessie Perdomo MD  [facility] [place] ([facility] [place]) [address] [place] [id number] [phone]         Prescription approved per [facility] Refill Protocol.  Kathrin Gudino RN on 11/3/2020 at 1:32 PM          "

## 2020-11-09 ENCOUNTER — OFFICE VISIT (OUTPATIENT)
Dept: OBGYN | Facility: CLINIC | Age: 50
End: 2020-11-09
Payer: COMMERCIAL

## 2020-11-09 ENCOUNTER — ANCILLARY PROCEDURE (OUTPATIENT)
Dept: MAMMOGRAPHY | Facility: CLINIC | Age: 50
End: 2020-11-09
Payer: COMMERCIAL

## 2020-11-09 VITALS
SYSTOLIC BLOOD PRESSURE: 118 MMHG | BODY MASS INDEX: 25.69 KG/M2 | WEIGHT: 145 LBS | HEIGHT: 63 IN | DIASTOLIC BLOOD PRESSURE: 70 MMHG

## 2020-11-09 DIAGNOSIS — R74.8 ELEVATED LIVER ENZYMES: ICD-10-CM

## 2020-11-09 DIAGNOSIS — L65.9 HAIR THINNING: ICD-10-CM

## 2020-11-09 DIAGNOSIS — Z13.29 SCREENING FOR THYROID DISORDER: ICD-10-CM

## 2020-11-09 DIAGNOSIS — Z01.419 ENCOUNTER FOR GYNECOLOGICAL EXAMINATION WITHOUT ABNORMAL FINDING: Primary | ICD-10-CM

## 2020-11-09 DIAGNOSIS — N95.1 VASOMOTOR SYMPTOMS DUE TO MENOPAUSE: ICD-10-CM

## 2020-11-09 DIAGNOSIS — I49.3 PVC'S (PREMATURE VENTRICULAR CONTRACTIONS): ICD-10-CM

## 2020-11-09 DIAGNOSIS — Z13.820 SCREENING FOR OSTEOPOROSIS: ICD-10-CM

## 2020-11-09 DIAGNOSIS — Z79.890 HORMONE REPLACEMENT THERAPY (HRT): ICD-10-CM

## 2020-11-09 DIAGNOSIS — Z13.21 ENCOUNTER FOR VITAMIN DEFICIENCY SCREENING: ICD-10-CM

## 2020-11-09 DIAGNOSIS — E78.2 MIXED HYPERLIPIDEMIA: ICD-10-CM

## 2020-11-09 DIAGNOSIS — N90.4 LICHEN SCLEROSUS ET ATROPHICUS OF THE VULVA: ICD-10-CM

## 2020-11-09 DIAGNOSIS — Z13.0 SCREENING FOR DISORDER OF BLOOD AND BLOOD-FORMING ORGANS: ICD-10-CM

## 2020-11-09 DIAGNOSIS — I10 HYPERTENSION, ESSENTIAL: ICD-10-CM

## 2020-11-09 DIAGNOSIS — R73.01 ELEVATED FASTING GLUCOSE: ICD-10-CM

## 2020-11-09 DIAGNOSIS — Z12.31 VISIT FOR SCREENING MAMMOGRAM: ICD-10-CM

## 2020-11-09 PROCEDURE — 99213 OFFICE O/P EST LOW 20 MIN: CPT | Mod: 25 | Performed by: OBSTETRICS & GYNECOLOGY

## 2020-11-09 PROCEDURE — 77067 SCR MAMMO BI INCL CAD: CPT | Mod: TC | Performed by: RADIOLOGY

## 2020-11-09 PROCEDURE — 99396 PREV VISIT EST AGE 40-64: CPT | Performed by: OBSTETRICS & GYNECOLOGY

## 2020-11-09 PROCEDURE — 77063 BREAST TOMOSYNTHESIS BI: CPT | Mod: TC | Performed by: RADIOLOGY

## 2020-11-09 RX ORDER — SPIRONOLACTONE 50 MG/1
50 TABLET, FILM COATED ORAL 2 TIMES DAILY
Qty: 180 TABLET | Refills: 3 | Status: SHIPPED | OUTPATIENT
Start: 2020-11-09 | End: 2022-01-10

## 2020-11-09 RX ORDER — AMLODIPINE BESYLATE 5 MG/1
5 TABLET ORAL DAILY
Qty: 90 TABLET | Refills: 3 | Status: SHIPPED | OUTPATIENT
Start: 2020-11-09 | End: 2021-10-19

## 2020-11-09 RX ORDER — ESTRADIOL 0.05 MG/D
PATCH, EXTENDED RELEASE TRANSDERMAL
Qty: 24 PATCH | Refills: 4 | Status: SHIPPED | OUTPATIENT
Start: 2020-11-09 | End: 2021-11-22

## 2020-11-09 RX ORDER — CLOBETASOL PROPIONATE 0.5 MG/G
OINTMENT TOPICAL 2 TIMES DAILY
Qty: 60 G | Refills: 1 | Status: SHIPPED | OUTPATIENT
Start: 2020-11-09 | End: 2020-11-30

## 2020-11-09 ASSESSMENT — MIFFLIN-ST. JEOR: SCORE: 1236.85

## 2020-11-09 ASSESSMENT — ANXIETY QUESTIONNAIRES
GAD7 TOTAL SCORE: 0
5. BEING SO RESTLESS THAT IT IS HARD TO SIT STILL: NOT AT ALL
IF YOU CHECKED OFF ANY PROBLEMS ON THIS QUESTIONNAIRE, HOW DIFFICULT HAVE THESE PROBLEMS MADE IT FOR YOU TO DO YOUR WORK, TAKE CARE OF THINGS AT HOME, OR GET ALONG WITH OTHER PEOPLE: NOT DIFFICULT AT ALL
7. FEELING AFRAID AS IF SOMETHING AWFUL MIGHT HAPPEN: NOT AT ALL
3. WORRYING TOO MUCH ABOUT DIFFERENT THINGS: NOT AT ALL
2. NOT BEING ABLE TO STOP OR CONTROL WORRYING: NOT AT ALL
1. FEELING NERVOUS, ANXIOUS, OR ON EDGE: NOT AT ALL
6. BECOMING EASILY ANNOYED OR IRRITABLE: NOT AT ALL

## 2020-11-09 ASSESSMENT — PATIENT HEALTH QUESTIONNAIRE - PHQ9
5. POOR APPETITE OR OVEREATING: NOT AT ALL
SUM OF ALL RESPONSES TO PHQ QUESTIONS 1-9: 1

## 2020-11-09 NOTE — PROGRESS NOTES
"  Olga Lidia is a 50 year old  female who presents for annual exam.     Besides routine health maintenance, she has no other health concerns today .    HPI:  The patient's PCP is Wan Anderson MD, but now retired so needs new PCP      Patient had her last labs on  with her PCP. Her gluc was 105 and her ALT and AST were mildly elevated. Her cholesterol was normal though with LDL of 86 but trigs still a bit high. Was intially n 20mg and then bumped her to 40mg. Feels like she has a lot of muscles aches and pains at this dose but has just tried to deal with it. Her last labs before that were 10/18 and her LFTs were elevated but slightly less than .  Is due to see a PCP in /feb but her's retired from the [facility] office. Has seen another one there once or twice and she just refilled her asthma meds for her, so may see her.    Has HTN and was on 2 BP meds.  Patient also started to have some hair thinning around menopause so her derm started her on aldactone 50mg BID and were able to get rid of one of her HTN meds with adding that. Now on norvasc and the aldactone and BPs are great.    Patient feels she is insulin resistant but \"no one will test me for it\". Discussed her A1C and fasting glucoses as how we test for that and her A1C was 5.3% on last check.    Had a paragard for years and removed  when started on HRT and started to have DUB. Has now not had a period for over one year. Doing vivelle .05mg and prometrium 100mg nightly and that's working well and no vasomotor sx. Started on 0.1mg vivelle at first and when we weaned definitely wasn't as good at first at sx control but now is.   Has some breast cancer on dad's side, his sister and patient's 1/2 sister on her dad's side. Has her mammo today. No breast complaints    Has had some pain and burning stinging at her perineum for a few months. No itching, no discharge. Had a bad tear years ago with her son but hasn't bothered her until " recently. Feels it most when wipes after using the bathroom.     Still working as a  at [email or web address]. Has had some right shoulder issues. Found a rotator cuff injury but the restrictions after surgery to repair are worse than what she has now so just dealing with it.      GYNECOLOGIC HISTORY:    No LMP recorded (lmp unknown). Patient is postmenopausal.    Her current contraception method is: menopause.  She  reports that she quit smoking about 7 years ago. Her smoking use included cigarettes. She has a 10.00 pack-year smoking history. She has never used smokeless tobacco.    Patient is sexually active.  STD testing offered?  Declined  Last PHQ-9 score on record =   PHQ-9 SCORE 2020   PHQ-9 Total Score 1     Last GAD7 score on record =   TIMO-7 SCORE 2020   Total Score 0     Alcohol Score = 5    HEALTH MAINTENANCE:  Cholesterol:   Cholesterol   Date Value Ref Range Status   2020 184 <200 mg/dL Final   10/18/2018 229 (H) <200 mg/dL Final     Comment:     Desirable:       <200 mg/dl    Last Mammo: 10/2018, Result: Normal, Next Mammo: Today   Pap:   Lab Results   Component Value Date    PAP NIL 2017 WNL HPV (-)neg  Colonoscopy:  2017, Result: Normal, Next Colonoscopy: Pt unsure but more than 5 years.  Dexa:  NA, thinks she had one for research    Health maintenance updated:  yes    HISTORY:  OB History    Para Term  AB Living   3 1 1 0 2 1   SAB TAB Ectopic Multiple Live Births   0 0 0 0 0      # Outcome Date GA Lbr Davis/2nd Weight Sex Delivery Anes PTL Lv   3 Term 99    M       2 AB            1 AB                Patient Active Problem List   Diagnosis     Hypertension goal BP (blood pressure) < 140/90     Hyperlipidemia with target LDL less than 130     Herniated vertebral disc     Mild persistent asthma     Gastroesophageal reflux disease without esophagitis     Bilateral low back pain without sciatica     Hip pain, right     Presence of intrauterine contraceptive  device     IUD (intrauterine device) in place     Partial nontraumatic tear of right rotator cuff     DJD of right AC (acromioclavicular) joint     Non-alcoholic fatty liver disease     Telogen hair loss     Past Surgical History:   Procedure Laterality Date     COLONOSCOPY N/A 2017    Procedure: COLONOSCOPY;  Surgeon: Tato Child MD;  Location: [facility] VASCULAR SURGERY PROCEDURE UNLIST      vericose vein      Social History     Tobacco Use     Smoking status: Former Smoker     Packs/day: 0.50     Years: 20.00     Pack years: 10.00     Types: Cigarettes     Quit date: 2012     Years since quittin.8     Smokeless tobacco: Never Used   Substance Use Topics     Alcohol use: Yes     Alcohol/week: 0.0 standard drinks     Comment: more on weekends 8 drinks per week or wine or mixed drinks       Problem (# of Occurrences) Relation (Name,Age of Onset)    Blood Disease (1) Paternal Uncle    Breast Cancer (3) Paternal Aunt, Paternal Half-Sister (aunt), Cousin (ariela)    Hyperlipidemia (1) Father (emily)    Hypertension (3) Mother (wally), Father (emily), Maternal Grandmother (meka)    Lung Cancer (1) Mother (wally)    No Known Problems (6) Son, Sister, Brother, Maternal Grandfather, Paternal Grandmother, Other    Other Cancer (1) Paternal Half-Brother (uncle): leukemia       Negative family history of: Liver Disease            Current Outpatient Medications   Medication Sig     amLODIPine (NORVASC) 5 MG tablet Take 1 tablet (5 mg) by mouth daily     atorvastatin (LIPITOR) 40 MG tablet TAKE ONE TABLET BY MOUTH ONCE DAILY     clobetasol (TEMOVATE) 0.05 % external ointment Apply topically 2 times daily for 21 days And can repeat with flares, 2x/day for 7-10 days thereafter     estradiol (VIVELLE-DOT) 0.05 MG/24HR bi-weekly patch REMOVE OLD PATCH AND APPLY ONE NEW PATCH TWICE A WEEK.     Ferrous Sulfate (IRON SUPPLEMENT PO)      fluticasone (FLOVENT HFA) 220 MCG/ACT inhaler Inhale 2 puffs into the  "lungs 2 times daily     levalbuterol (XOPENEX HFA) 45 MCG/ACT inhaler Inhale 2 puffs into the lungs every 6 hours as needed for shortness of breath / dyspnea or wheezing     progesterone (PROMETRIUM) 100 MG capsule TAKE ONE CAPSULE BY MOUTH ONCE DAILY     spironolactone (ALDACTONE) 50 MG tablet Take 1 tablet (50 mg) by mouth 2 times daily     No current facility-administered medications for this visit.      Allergies   Allergen Reactions     Niacin Shortness Of Breath, Other (See Comments) and Nausea and Vomiting     All over body pains (patient reported symptoms)       Past medical, surgical, social and family histories were reviewed and updated in EPIC.    ROS:   12 point review of systems negative other than symptoms noted below or in the HPI.  No urinary frequency or dysuria, bladder or kidney problems, POSITIVE for:, vaginal itching or burning    EXAM:  /70   Ht 1.6 m (5' 3\")   Wt 65.8 kg (145 lb)   LMP  (LMP Unknown)   Breastfeeding No   BMI 25.69 kg/m     BMI: Body mass index is 25.69 kg/m .    PHYSICAL EXAM:  Constitutional:   Appearance: Well nourished, well developed, alert, in no acute distress  Neck:  Lymph Nodes:  No lymphadenopathy present    Thyroid:  Gland size normal, nontender, no nodules or masses present  on palpation  Chest:  Respiratory Effort:  Breathing unlabored  Cardiovascular:    Heart: Auscultation:  Regular rate, normal rhythm, no murmurs present  Breasts: Palpation of Breasts and Axillae:  No masses present on palpation, no breast tenderness., Axillary Lymph Nodes:  No lymphadenopathy present. and No nodularity, asymmetry or nipple discharge bilaterally.  Gastrointestinal:   Abdominal Examination:  Abdomen nontender to palpation, tone normal without rigidity or guarding, no masses present, umbilicus without lesions   Liver and Spleen:  No hepatomegaly present, liver nontender to palpation    Hernias:  No hernias present  Lymphatic: Lymph Nodes:  No other lymphadenopathy " present  Skin:  General Inspection:  No rashes present, no lesions present, no areas of  discoloration  Neurologic:    Mental Status:  Oriented X3.  Normal strength and tone, sensory exam                grossly normal, mentation intact and speech normal.    Psychiatric:   Mentation appears normal and affect normal/bright.         Pelvic Exam:  External Genitalia:     Normal appearance for age, no discharge present, no tenderness present, AT THE JUNCTION OF THE SUPERIOR LABIA MAJORA THERE IS WHITENING AND COUPLE SUPERFICIAL FISSURES BUT O/W EXTERNAL GENITALIA IS NORMAL  Vagina:     Normal vaginal vault without central or paravaginal defects, no discharge present, no inflammatory lesions present, no masses present  Bladder:     Nontender to palpation  Urethra:   Urethral Body:  Urethra palpation normal, urethra structural support normal   Urethral Meatus:  No erythema or lesions present  Cervix:     Appearance healthy, no lesions present, nontender to palpation, no bleeding present  Uterus:     Uterus: firm, normal sized and nontender, anteverted in position.   Adnexa:     No adnexal tenderness present, no adnexal masses present  Perineum:     Perineum WITH SYMMETRIC WHITENING C/W L.S. COUPLE SMALL SUPERFICIAL FISSURES, no evidence of trauma, no rashes or skin lesions present  Anus:     Anus within normal limits, no hemorrhoids present  Inguinal Lymph Nodes:     No lymphadenopathy present  Pubic Hair:     Normal pubic hair distribution for age  Genitalia and Groin:     No rashes present, no lesions present, no areas of discoloration, no masses present      COUNSELING:   Reviewed preventive health counseling, as reflected in patient instructions  Special attention given to:        Regular exercise       Healthy diet/nutrition       Osteoporosis prevention/bone health       (Ashlee)menopause management    BMI: Body mass index is 25.69 kg/m .  Weight management plan: Discussed healthy diet and exercise  guidelines    ASSESSMENT:  50 year old female with satisfactory annual exam.    ICD-10-CM    1. Encounter for gynecological examination without abnormal finding  Z01.419    2. Vasomotor symptoms due to menopause  N95.1 estradiol (VIVELLE-DOT) 0.05 MG/24HR bi-weekly patch     progesterone (PROMETRIUM) 100 MG capsule   3. Hormone replacement therapy (HRT)  Z79.890 estradiol (VIVELLE-DOT) 0.05 MG/24HR bi-weekly patch     progesterone (PROMETRIUM) 100 MG capsule   4. Mixed hyperlipidemia  E78.2 Lipid Profile   5. Hypertension, essential  I10 spironolactone (ALDACTONE) 50 MG tablet   6. Elevated fasting glucose  R73.01 Comprehensive metabolic panel     Hemoglobin A1c   7. Elevated liver enzymes  R74.8 Comprehensive metabolic panel   8. Lichen sclerosus et atrophicus of the vulva  N90.4 clobetasol (TEMOVATE) 0.05 % external ointment   9. PVC's (premature ventricular contractions)  I49.3 amLODIPine (NORVASC) 5 MG tablet   10. Screening for disorder of blood and blood-forming organs  Z13.0 CBC with platelets   11. Screening for thyroid disorder  Z13.29 TSH with free T4 reflex   12. Encounter for vitamin deficiency screening  Z13.21 Vitamin D Deficiency   13. Screening for osteoporosis  Z13.820 DX Hip/Pelvis/Spine   14. Hair thinning  L65.9 spironolactone (ALDACTONE) 50 MG tablet       PLAN:  Pap is UTD for just over one more year  mammo today  Plan dexa next year    Discussed HRT with pros/cons and the risks with her fam history and in general. Patient is on a lower estradiol dose now than when started and all her DUB is gone and now officially one year w/o any bleeding.  Would like to continue on current HRT doses. Refills sent    Discussed finding a PCP and her HTN under great control but needing fasting labs and to follow a1c and LFTs, etc  Will give her a refill on her HTN meds for one year as norvasc and aldactone are working well  Will await her lipids and other labs and determine if need to adjust statin since having  some sx. Could consider decrease to 30mg from 40mg to help with LFTs but also with muscle aches/pains as her LDL was fine and her trigs were very minimally elevated. If still high then either stay on 40mg or consider diff statin or decrease dose and add fenofibrate and work on lifestyle changes with eating. However patient eats no carbs other than berries and is active and exercises so worries how much worse it would be if she did eat carbs.  Discussed insulin resistance, metformin, DM, genetics, age, etc    Patient was found to have L.S of perineum and superior labia majora accounting for her pain d/t fissures  Will start clobetasol BID for 3 weeks and then with flares BID for 7-10 days  Can use vaseline or aquaphor in between steroid courses to help with friction/moisture/etc  Explained the process, physiology, treatments for chronic flare/remit condition    Spent an additional 15 min with the patient 100% of which was in face to face counseling time, reviewing her HTN and lipid management until she can reestablish care with a new PCP as well as her lichen sclerosus.      Jessie Perdomo MD

## 2020-11-10 ASSESSMENT — ANXIETY QUESTIONNAIRES: GAD7 TOTAL SCORE: 0

## 2020-11-13 ENCOUNTER — HOSPITAL ENCOUNTER (OUTPATIENT)
Dept: LAB | Facility: CLINIC | Age: 50
Discharge: HOME | End: 2020-11-13
Attending: OBSTETRICS & GYNECOLOGY | Admitting: OBSTETRICS & GYNECOLOGY
Payer: COMMERCIAL

## 2020-11-13 DIAGNOSIS — Z13.29 SCREENING FOR THYROID DISORDER: ICD-10-CM

## 2020-11-13 DIAGNOSIS — R74.8 ELEVATED LIVER ENZYMES: ICD-10-CM

## 2020-11-13 DIAGNOSIS — R73.01 ELEVATED FASTING GLUCOSE: ICD-10-CM

## 2020-11-13 DIAGNOSIS — Z13.0 SCREENING FOR DISORDER OF BLOOD AND BLOOD-FORMING ORGANS: ICD-10-CM

## 2020-11-13 DIAGNOSIS — E78.2 MIXED HYPERLIPIDEMIA: ICD-10-CM

## 2020-11-13 DIAGNOSIS — Z13.21 ENCOUNTER FOR VITAMIN DEFICIENCY SCREENING: ICD-10-CM

## 2020-11-13 LAB
ALBUMIN SERPL-MCNC: 4.5 G/DL (ref 3.4–5)
ALP SERPL-CCNC: 50 U/L (ref 40–150)
ALT SERPL W P-5'-P-CCNC: 85 U/L (ref 0–50)
ANION GAP SERPL CALCULATED.3IONS-SCNC: 5 MMOL/L (ref 3–14)
AST SERPL W P-5'-P-CCNC: 41 U/L (ref 0–45)
BILIRUB SERPL-MCNC: 0.5 MG/DL (ref 0.2–1.3)
BUN SERPL-MCNC: 19 MG/DL (ref 7–30)
CALCIUM SERPL-MCNC: 9.2 MG/DL (ref 8.5–10.1)
CHLORIDE SERPL-SCNC: 102 MMOL/L (ref 94–109)
CHOLEST SERPL-MCNC: 251 MG/DL
CO2 SERPL-SCNC: 26 MMOL/L (ref 20–32)
CREAT SERPL-MCNC: 0.58 MG/DL (ref 0.52–1.04)
DEPRECATED CALCIDIOL+CALCIFEROL SERPL-MC: 46 UG/L (ref 20–75)
ERYTHROCYTE [DISTWIDTH] IN BLOOD BY AUTOMATED COUNT: 12.7 % (ref 10–15)
GFR SERPL CREATININE-BSD FRML MDRD: >90 ML/MIN/{1.73_M2}
GLUCOSE SERPL-MCNC: 96 MG/DL (ref 70–99)
HBA1C MFR BLD: 5 % (ref 0–5.6)
HCT VFR BLD AUTO: 41.5 % (ref 35–47)
HDLC SERPL-MCNC: 66 MG/DL
HGB BLD-MCNC: 14.5 G/DL (ref 11.7–15.7)
LDLC SERPL CALC-MCNC: 149 MG/DL
MCH RBC QN AUTO: 32.7 PG (ref 26.5–33)
MCHC RBC AUTO-ENTMCNC: 34.9 G/DL (ref 31.5–36.5)
MCV RBC AUTO: 94 FL (ref 78–100)
NONHDLC SERPL-MCNC: 185 MG/DL
PLATELET # BLD AUTO: 232 10E9/L (ref 150–450)
POTASSIUM SERPL-SCNC: 3.9 MMOL/L (ref 3.4–5.3)
PROT SERPL-MCNC: 8.3 G/DL (ref 6.8–8.8)
RBC # BLD AUTO: 4.43 10E12/L (ref 3.8–5.2)
SODIUM SERPL-SCNC: 133 MMOL/L (ref 133–144)
TRIGL SERPL-MCNC: 180 MG/DL
TSH SERPL DL<=0.005 MIU/L-ACNC: 2.47 MU/L (ref 0.4–4)
WBC # BLD AUTO: 6 10E9/L (ref 4–11)

## 2020-11-13 PROCEDURE — 80053 COMPREHEN METABOLIC PANEL: CPT | Performed by: OBSTETRICS & GYNECOLOGY

## 2020-11-13 PROCEDURE — 83036 HEMOGLOBIN GLYCOSYLATED A1C: CPT | Performed by: OBSTETRICS & GYNECOLOGY

## 2020-11-13 PROCEDURE — 85027 COMPLETE CBC AUTOMATED: CPT | Performed by: OBSTETRICS & GYNECOLOGY

## 2020-11-13 PROCEDURE — 84443 ASSAY THYROID STIM HORMONE: CPT | Performed by: OBSTETRICS & GYNECOLOGY

## 2020-11-13 PROCEDURE — 80061 LIPID PANEL: CPT | Performed by: OBSTETRICS & GYNECOLOGY

## 2020-11-13 PROCEDURE — 36415 COLL VENOUS BLD VENIPUNCTURE: CPT | Performed by: OBSTETRICS & GYNECOLOGY

## 2020-11-13 PROCEDURE — 82306 VITAMIN D 25 HYDROXY: CPT | Performed by: OBSTETRICS & GYNECOLOGY

## 2020-11-15 RX ORDER — ROSUVASTATIN CALCIUM 20 MG/1
20 TABLET, COATED ORAL DAILY
Qty: 90 TABLET | Refills: 1 | Status: SHIPPED | OUTPATIENT
Start: 2020-11-15 | End: 2021-10-19

## 2020-11-29 ENCOUNTER — HEALTH MAINTENANCE LETTER (OUTPATIENT)
Age: 50
End: 2020-11-29

## 2021-04-01 NOTE — PROGRESS NOTES
ICD-10-CM    1. Non-alcoholic fatty liver disease  K76.0 fenofibrate (TRICOR) 48 MG tablet     NUTRITION REFERRAL   2. Hyperlipidemia with target LDL less than 130  E78.5 fenofibrate (TRICOR) 48 MG tablet     NUTRITION REFERRAL   3. Hypertension goal BP (blood pressure) < 140/90  I10 NUTRITION REFERRAL   4. Family history of abdominal aortic aneurysm (AAA)  Z82.49 US Abdominal Aorta Imaging   5. History of prediabetes  Z87.898 NUTRITION REFERRAL   new patient to this provider  History of metabolic syndrome-she is struggling with balancing low carb and low fat diet.  Advised nutritionist consult  Muscle ache with many statins in the past, recently stopped med  Try fish oil daily(high Dose)  With tricor, try to increase to 2 tabs if doing well and follow up in 6-8 weeks  Trial of Pravachol if not doing well on this med  Advised cards consult if not improving      US ordered-check with your insurance as planned    Follow up with nutritionist as planned for prediabetes, high cholesterol.  Follow up in 6-8 weeks fasting      Oni Duggan is a 50 year old who presents for the following health issues  accompanied by her self:    HPI     Patient needs to establish care since Dr. Andersno left    Father had Triple A and she would like this checked out because she was told it is hereditary     Musculoskeletal problem/pain  Onset/Duration: ongoing  Description  Location: Hip - right  Joint Swelling: no  Redness: no  Pain: YES  Warmth: no  Intensity:  moderate  Progression of Symptoms:  It was pretty bad but she feels like when crestor was stopped it improved a little bit  Accompanying signs and symptoms:   Fevers: no  Numbness/tingling/weakness: no  History  Trauma to the area: no  Recent illness:  no  Previous similar problem: YES  Previous evaluation:  YES, but its been a long time. May have been hip flexor when she was checked last.   Precipitating or alleviating factors:  Aggravating factors include: stairs really  hurt, prolonged sitting. When she is walking she will get a shooting   Therapies tried and outcome: nothing      Wants to discuss statin use due to pain on current regimen. Alternative requested.     Hyperlipidemia Follow-Up      Are you regularly taking any medication or supplement to lower your cholesterol?   No she has tried many statins. Crestor was her last attempt but it was the wrost of them all.     Are you having muscle aches or other side effects that you think could be caused by your cholesterol lowering medication?  No    She has been frustrated   crestor makes her whole body hurts-is the worst  lipitor , simvastatin,also have her body hurts  She has been on statin since her 20's  Muscle pain all over    The 10-year ASCVD risk score (Earl ZHOU Jr., et al., 2013) is: 2%    Values used to calculate the score:      Age: 50 years      Sex: Female      Is Non- : No      Diabetic: No      Tobacco smoker: No      Systolic Blood Pressure: 122 mmHg      Is BP treated: Yes      HDL Cholesterol: 66 mg/dL      Total Cholesterol: 251 mg/dL    Hypertension Follow-up      Do you check your blood pressure regularly outside of the clinic? No     Are you following a low salt diet? No    Are your blood pressures ever more than 140 on the top number (systolic) OR more   than 90 on the bottom number (diastolic), for example 140/90? No    Asthma Follow-Up    Was ACT completed today?    Yes    ACT Total Scores 4/5/2021   ACT TOTAL SCORE -   ASTHMA ER VISITS -   ASTHMA HOSPITALIZATIONS -   ACT TOTAL SCORE (Goal Greater than or Equal to 20) 23   In the past 12 months, how many times did you visit the emergency room for your asthma without being admitted to the hospital? 0   In the past 12 months, how many times were you hospitalized overnight because of your asthma? 0          How many days per week do you miss taking your asthma controller medication?  0    Please describe any recent triggers for your  "asthma: None    Have you had any Emergency Room Visits, Urgent Care Visits, or Hospital Admissions since your last office visit?  No           Review of Systems   Constitutional, HEENT, cardiovascular, pulmonary, GI, , musculoskeletal, neuro, skin, endocrine and psych systems are negative, except as otherwise noted.      Objective    /68   Pulse 74   Temp 98  F (36.7  C) (Oral)   Ht 1.6 m (5' 3\")   Wt 67.1 kg (148 lb)   LMP  (LMP Unknown)   SpO2 95%   BMI 26.22 kg/m    Body mass index is 26.22 kg/m .  Physical Exam   GENERAL: healthy, alert and no distress  NECK: no adenopathy, no asymmetry, masses, or scars and thyroid normal to palpation  RESP: lungs clear to auscultation - no rales, rhonchi or wheezes  CV: regular rate and rhythm, normal S1 S2, no S3 or S4, no murmur, click or rub, no peripheral edema and peripheral pulses strong  ABDOMEN: soft, nontender, no hepatosplenomegaly, no masses and bowel sounds normal  MS: no gross musculoskeletal defects noted, no edema              "

## 2021-04-05 ENCOUNTER — OFFICE VISIT (OUTPATIENT)
Dept: FAMILY MEDICINE | Facility: CLINIC | Age: 51
End: 2021-04-05
Payer: COMMERCIAL

## 2021-04-05 VITALS
BODY MASS INDEX: 26.22 KG/M2 | TEMPERATURE: 98 F | DIASTOLIC BLOOD PRESSURE: 68 MMHG | HEART RATE: 74 BPM | WEIGHT: 148 LBS | HEIGHT: 63 IN | OXYGEN SATURATION: 95 % | SYSTOLIC BLOOD PRESSURE: 122 MMHG

## 2021-04-05 DIAGNOSIS — Z87.898 HISTORY OF PREDIABETES: ICD-10-CM

## 2021-04-05 DIAGNOSIS — Z82.49 FAMILY HISTORY OF ABDOMINAL AORTIC ANEURYSM (AAA): ICD-10-CM

## 2021-04-05 DIAGNOSIS — K76.0 NON-ALCOHOLIC FATTY LIVER DISEASE: Primary | ICD-10-CM

## 2021-04-05 DIAGNOSIS — I10 HYPERTENSION GOAL BP (BLOOD PRESSURE) < 140/90: ICD-10-CM

## 2021-04-05 DIAGNOSIS — E78.5 HYPERLIPIDEMIA WITH TARGET LDL LESS THAN 130: ICD-10-CM

## 2021-04-05 PROCEDURE — 99214 OFFICE O/P EST MOD 30 MIN: CPT | Performed by: FAMILY MEDICINE

## 2021-04-05 RX ORDER — FENOFIBRATE 48 MG/1
48 TABLET, COATED ORAL DAILY
Qty: 90 TABLET | Refills: 0 | Status: SHIPPED | OUTPATIENT
Start: 2021-04-05 | End: 2021-05-27

## 2021-04-05 ASSESSMENT — MIFFLIN-ST. JEOR: SCORE: 1250.45

## 2021-04-05 NOTE — PATIENT INSTRUCTIONS
Try fish oil daily(high Dose)  With tricor, try to increase to 2 tabs if doing well and follow up in 6-8 weeks    US ordered-check with your insurance as planned    Follow up with nutritionist as planned for prediabetes, high cholesterol.  Follow up in 6-8 weeks fasting  Trial of Pravachol if not doing well on this sugey Tejada D.O.      Imaging Center  MRI - CT - Ultrasound  Scheduling Line [phone]  Patient Education

## 2021-04-06 ASSESSMENT — ASTHMA QUESTIONNAIRES: ACT_TOTALSCORE: 23

## 2021-05-10 ENCOUNTER — MYC MEDICAL ADVICE (OUTPATIENT)
Dept: LAB | Facility: CLINIC | Age: 51
End: 2021-05-10

## 2021-05-10 NOTE — TELEPHONE ENCOUNTER
1st Attempt: Sent [employer] message to patient reminding of Lipid Panel, ALT, and AST lab. Extending out 3 months. SM

## 2021-05-14 ENCOUNTER — VIRTUAL VISIT (OUTPATIENT)
Dept: ONCOLOGY | Facility: CLINIC | Age: 51
End: 2021-05-14
Payer: COMMERCIAL

## 2021-05-14 DIAGNOSIS — K76.0 NON-ALCOHOLIC FATTY LIVER DISEASE: Primary | ICD-10-CM

## 2021-05-14 DIAGNOSIS — E78.5 HYPERLIPIDEMIA WITH TARGET LDL LESS THAN 130: ICD-10-CM

## 2021-05-14 PROCEDURE — 97802 MEDICAL NUTRITION INDIV IN: CPT | Mod: 95 | Performed by: DIETITIAN, REGISTERED

## 2021-05-14 NOTE — LETTER
"    5/14/2021         RE: Olga Lidia Solorzano  [address] [id number]        Dear Colleague,    Thank you for referring your patient, Olga Lidia Solorzano, to the [facility]. Please see a copy of my visit note below.    The patient has been notified of the following:      \"We have found that certain health care needs can be provided without the need for a face to face visit.  This service lets us provide the care you need with a phone conversation.       I will have full access to your [facility] medical record during this entire phone call.   I will be taking notes for your medical record.      Since this is like an office visit, we will bill your insurance company for this service.       There are potential benefits and risks of telephone visits (e.g. limits to patient confidentiality) that differ from in-person visits.?  Confidentiality still applies for telephone services, and nobody will record the visit.  It is important to be in a quiet, private space that is free of distractions (including cell phone or other devices) during the visit.??      If during the course of the call I believe a telephone visit is not appropriate, you will not be charged for this service\"     Consent has been obtained for this service by care team member: Yes     CLINICAL NUTRITION SERVICES - ASSESSMENT NOTE    Olga Lidia Solorzano 50 year old referred for MNT related to prediabetes and weight management     Time Spent: 60 minutes  Visit Type: phone  Referring Physician: Terrell 4/5/21  K76.0 (ICD-10-CM) - Non-alcoholic fatty liver disease   E78.5 (ICD-10-CM) - Hyperlipidemia with target LDL less than 130   I10 (ICD-10-CM) - Hypertension goal BP (blood pressure) < 140/90   Z87.898 (ICD-10-CM) - History of prediabetes     Pt accompanied by: self    NUTRITION HISTORY  Current diet: general, keto  Current appetite/intake: good    Olga Lidia presents today with questions regarding diet for " "preventing pre-diabetes and heart health.   She has been following a Keto diet and has been successful with weight loss ~25 lb.   She tells me that her BG levels did not change much with altered diet and weight loss.    She expresses her frustration with this and her fear of eating carbs/sugar.  Her diet consists mostly of protein, fats and minimal carbs.    She typically eats 3 meals/day and snacks on low carbs or chips.    Diet Recall  Breakfast Coffee cream, splenda, 2 hb eggs with 2 oz quac   Lunch Strawberries, Keto bar, Peter Reji egg sausage sdw OR fresh veggie with dill dip, Keto yogurt, sf pudding, salad with ham, salami   Dinner Ribs with sf bbq sauce   Snacks Keto bar or chips; varies from pork rinds to pickles    Beverages Water, coffee     ANTHROPOMETRICS  Height: 63\"  Weight: 148 lb/67kg  BMI: 26  Weight Status:  Overweight BMI 25-29.9  Weight History:   Wt Readings from Last 6 Encounters:   04/05/21 67.1 kg (148 lb)   11/09/20 65.8 kg (145 lb)   09/21/20 67 kg (147 lb 11.2 oz)   02/03/20 67 kg (147 lb 12.8 oz)   10/28/19 66.7 kg (147 lb)   10/07/19 66.7 kg (147 lb)     Dosing Weight: 56kg    Medications/vitamins/minerals/herbals:   Reviewed  Vitamin D, B12, Fe     Labs:  Labs reviewed - 11/13/20  Chol - 251  Trig - 180  LDL - 149  HDL - 66  A1C - 5.0  Glu - range     Physical Findings:  Not observed     ASSESSED NUTRITION NEEDS:  Estimated Energy Needs: 1400 kcals (20 Kcal/Kg)  Justification: overweight  Estimated Protein Needs: 56 grams protein (0.8-1 g pro/Kg)  Justification: maintenance    NUTRITION DIAGNOSIS:  No nutrition diagnosis at this time    INTERVENTIONS  Provided written & verbal education:   -Reviewed fiber intake (soluble and insoluble), omega 3 intake, reviewed TLC diet guidelines for lowering cholesterol (reviewed LDL, HDL, TG, total cholesterol, saturated/trans fats, mono/polyunsaturated fats, cholesterol). Encouraged to start taking Omega 3 fish oil.    -Reviewed role of fiber " in BG and heart health.  Encouraged to track fiber intake and aim for at least 25 grams/day (10g soluble fiber).    -Reviewed calorie needs for desired wt loss/wt maintenance. Encouraged to aim for 1400-1600cal/day   -Reviewed Mediterranean diet guidelines.  Encouraged this style of eating, having 1-2 carb choices with meals and monitor BG.    Provided pt with corresponding education materials/handouts on:  Mediterranean diet guidelines, Fiber content of common foods - sent via email    Pt verbalize understanding of materials provided during consult.   Patient Understanding: Excellent  Expected Compliance: Excellent      Goals  1.  Aim >25g fiber/day  2.  Start tracking daily intake on MFP or ej/website of choice    Follow-Up Plans: Pt has RD contact information for questions.      Chayo Fischer RDN, LDN          Again, thank you for allowing me to participate in the care of your patient.        Sincerely,        Chayo Fischer RD

## 2021-05-14 NOTE — PROGRESS NOTES
"The patient has been notified of the following:      \"We have found that certain health care needs can be provided without the need for a face to face visit.  This service lets us provide the care you need with a phone conversation.       I will have full access to your [facility] medical record during this entire phone call.   I will be taking notes for your medical record.      Since this is like an office visit, we will bill your insurance company for this service.       There are potential benefits and risks of telephone visits (e.g. limits to patient confidentiality) that differ from in-person visits.?  Confidentiality still applies for telephone services, and nobody will record the visit.  It is important to be in a quiet, private space that is free of distractions (including cell phone or other devices) during the visit.??      If during the course of the call I believe a telephone visit is not appropriate, you will not be charged for this service\"     Consent has been obtained for this service by care team member: Yes     CLINICAL NUTRITION SERVICES - ASSESSMENT NOTE    Olga Lidia Solorzano 50 year old referred for MNT related to prediabetes and weight management     Time Spent: 60 minutes  Visit Type: phone  Referring Physician: Terrell 4/5/21  K76.0 (ICD-10-CM) - Non-alcoholic fatty liver disease   E78.5 (ICD-10-CM) - Hyperlipidemia with target LDL less than 130   I10 (ICD-10-CM) - Hypertension goal BP (blood pressure) < 140/90   Z87.898 (ICD-10-CM) - History of prediabetes     Pt accompanied by: self    NUTRITION HISTORY  Current diet: general, keto  Current appetite/intake: patel Duggan presents today with questions regarding diet for preventing pre-diabetes and heart health.   She has been following a Keto diet and has been successful with weight loss ~25 lb.   She tells me that her BG levels did not change much with altered diet and weight loss.    She expresses her frustration with this and her fear of " "eating carbs/sugar.  Her diet consists mostly of protein, fats and minimal carbs.    She typically eats 3 meals/day and snacks on low carbs or chips.    Diet Recall  Breakfast Coffee cream, splenda, 2 hb eggs with 2 oz quac   Lunch Strawberries, Keto bar, Peter Reji egg sausage sdw OR fresh veggie with dill dip, Keto yogurt, sf pudding, salad with ham, salami   Dinner Ribs with sf bbq sauce   Snacks Keto bar or chips; varies from pork rinds to pickles    Beverages Water, coffee     ANTHROPOMETRICS  Height: 63\"  Weight: 148 lb/67kg  BMI: 26  Weight Status:  Overweight BMI 25-29.9  Weight History:   Wt Readings from Last 6 Encounters:   04/05/21 67.1 kg (148 lb)   11/09/20 65.8 kg (145 lb)   09/21/20 67 kg (147 lb 11.2 oz)   02/03/20 67 kg (147 lb 12.8 oz)   10/28/19 66.7 kg (147 lb)   10/07/19 66.7 kg (147 lb)     Dosing Weight: 56kg    Medications/vitamins/minerals/herbals:   Reviewed  Vitamin D, B12, Fe     Labs:  Labs reviewed - 11/13/20  Chol - 251  Trig - 180  LDL - 149  HDL - 66  A1C - 5.0  Glu - range     Physical Findings:  Not observed     ASSESSED NUTRITION NEEDS:  Estimated Energy Needs: 1400 kcals (20 Kcal/Kg)  Justification: overweight  Estimated Protein Needs: 56 grams protein (0.8-1 g pro/Kg)  Justification: maintenance    NUTRITION DIAGNOSIS:  No nutrition diagnosis at this time    INTERVENTIONS  Provided written & verbal education:   -Reviewed fiber intake (soluble and insoluble), omega 3 intake, reviewed TLC diet guidelines for lowering cholesterol (reviewed LDL, HDL, TG, total cholesterol, saturated/trans fats, mono/polyunsaturated fats, cholesterol). Encouraged to start taking Omega 3 fish oil.    -Reviewed role of fiber in BG and heart health.  Encouraged to track fiber intake and aim for at least 25 grams/day (10g soluble fiber).    -Reviewed calorie needs for desired wt loss/wt maintenance. Encouraged to aim for 1400-1600cal/day   -Reviewed Mediterranean diet guidelines.  Encouraged this " style of eating, having 1-2 carb choices with meals and monitor BG.    Provided pt with corresponding education materials/handouts on:  Mediterranean diet guidelines, Fiber content of common foods - sent via email    Pt verbalize understanding of materials provided during consult.   Patient Understanding: Excellent  Expected Compliance: Excellent      Goals  1.  Aim >25g fiber/day  2.  Start tracking daily intake on MFP or ej/website of choice    Follow-Up Plans: Pt has RD contact information for questions.      Chayo Fischer, IANN, LDN

## 2021-05-27 ENCOUNTER — MYC MEDICAL ADVICE (OUTPATIENT)
Dept: FAMILY MEDICINE | Facility: CLINIC | Age: 51
End: 2021-05-27

## 2021-05-27 DIAGNOSIS — K76.0 NON-ALCOHOLIC FATTY LIVER DISEASE: ICD-10-CM

## 2021-05-27 DIAGNOSIS — E78.5 HYPERLIPIDEMIA WITH TARGET LDL LESS THAN 130: ICD-10-CM

## 2021-05-27 RX ORDER — FENOFIBRATE 48 MG/1
48 TABLET, COATED ORAL DAILY
Qty: 90 TABLET | Refills: 0 | Status: SHIPPED | OUTPATIENT
Start: 2021-05-27 | End: 2021-06-01

## 2021-05-27 NOTE — TELEPHONE ENCOUNTER
Patient has missed her tricor for 2 days, infomred refill sent.  She currently taking 2 tabs of Tricor daily.    Appointment on 6/1 does not work for her, requesting later appointment as work schedule is already completed for holiday week. Apt made for 6/7    She would also like lab orders placed as she can get them done while at work (she works at a [employer])      Patient scheduled for in person visit with PCP on 6/7/21.  Lipid panel pended - she will have lab drawn that previous Friday so resulted by her appointment time.      Roxy Hong, RN

## 2021-05-27 NOTE — TELEPHONE ENCOUNTER
Ok to refill but she would need to be seen with any provider or myself if she would like to see me  Leo Tejada D.O.

## 2021-05-27 NOTE — TELEPHONE ENCOUNTER
routing [employer] message to PCP      Last OV was 4/5/21  With tricor, try to increase to 2 tabs if doing well and   follow up in 6-8 weeks      Sent [employer] message, I scheduled her for an office visit June 1st at 7:20 am so fasting labs can also be done.    Do you want to refill her tricor now or wait till OV? pended          Roxy Hong, RN

## 2021-06-01 ENCOUNTER — TELEPHONE (OUTPATIENT)
Dept: FAMILY MEDICINE | Facility: CLINIC | Age: 51
End: 2021-06-01

## 2021-06-01 DIAGNOSIS — E78.5 HYPERLIPIDEMIA WITH TARGET LDL LESS THAN 130: ICD-10-CM

## 2021-06-01 DIAGNOSIS — K76.0 NON-ALCOHOLIC FATTY LIVER DISEASE: ICD-10-CM

## 2021-06-01 RX ORDER — FENOFIBRATE 48 MG/1
TABLET, COATED ORAL
Qty: 180 TABLET | Refills: 0 | Status: SHIPPED | OUTPATIENT
Start: 2021-06-01 | End: 2021-08-24

## 2021-06-01 NOTE — TELEPHONE ENCOUNTER
Please clarify directions for Tricor 48mg, We have one tablet daily and pt states it should be two tablets daily. Thanks

## 2021-06-03 NOTE — PROGRESS NOTES
ICD-10-CM    1. Hyperlipidemia with target LDL less than 130  E78.5 pravastatin (PRAVACHOL) 20 MG tablet     Lipid panel reflex to direct LDL Fasting     **Comprehensive metabolic panel FUTURE anytime   2. Hypertension goal BP (blood pressure) < 140/90  I10    3. Non-alcoholic fatty liver disease  K76.0 pravastatin (PRAVACHOL) 20 MG tablet   4. Elevated glucose  R73.09 **A1C FUTURE 3mo     CANCELED: Hemoglobin A1c   5. Abnormal LFTs  R94.5 **Hepatitis C Screen Reflex to RNA FUTURE anytime     Hepatitis B surface antigen     GGT     CANCELED: GGT     CANCELED: Hepatitis B surface antigen     Non alcholic fatty liver sz-on tricor, failed may statins, advised pravachol as she has never tried it ,  Start statin as advised every 3 days with Tricor  Advised hep a/B shots Get shot today  Avoid alcohol and tylneol  Declined referral to hepatologist or cards to help with statin management    hypertension-well controlled    Shingles shot advised  Follow up in 10 weeks as advised , labs first    Oni   Olga Lidia is a 50 year old who presents for the following health issues  accompanied by her self:    HPI     Last seen 4/5 and asked to follow up FASTING in 6 to 8 weeks.   Patient is not fasting to day but labs done Friday     Hyperlipidemia Follow-Up      Are you regularly taking any medication or supplement to lower your cholesterol?   No stopped    Are you having muscle aches or other side effects that you think could be caused by your cholesterol lowering medication?  No  crestor , simvastatin, lipitor  Initially it is ok, starts muscle aches    Hypertension Follow-up      Do you check your blood pressure regularly outside of the clinic? No     Are you following a low salt diet? Yes    Are your blood pressures ever more than 140 on the top number (systolic) OR more   than 90 on the bottom number (diastolic), for example 140/90? No        Review of Systems   Constitutional, HEENT, cardiovascular, pulmonary, GI, ,  "musculoskeletal, neuro, skin, endocrine and psych systems are negative, except as otherwise noted.      Objective    /68   Pulse 76   Temp 98  F (36.7  C) (Oral)   Resp 16   Ht 1.6 m (5' 3\")   Wt 68 kg (150 lb)   LMP  (LMP Unknown)   SpO2 98%   BMI 26.57 kg/m    Body mass index is 26.57 kg/m .  Physical Exam   GENERAL: healthy, alert and no distress  NECK: no adenopathy, no asymmetry, masses, or scars and thyroid normal to palpation  RESP: lungs clear to auscultation - no rales, rhonchi or wheezes  CV: regular rate and rhythm, normal S1 S2, no S3 or S4, no murmur, click or rub, no peripheral edema and peripheral pulses strong  ABDOMEN: soft, nontender, no hepatosplenomegaly, no masses and bowel sounds normal  MS: no gross musculoskeletal defects noted, no edema    No results found for any visits on 06/07/21.              "

## 2021-06-04 ENCOUNTER — HOSPITAL ENCOUNTER (OUTPATIENT)
Dept: LAB | Facility: CLINIC | Age: 51
Discharge: HOME | End: 2021-06-04
Attending: FAMILY MEDICINE | Admitting: FAMILY MEDICINE
Payer: COMMERCIAL

## 2021-06-04 DIAGNOSIS — K76.0 NON-ALCOHOLIC FATTY LIVER DISEASE: ICD-10-CM

## 2021-06-04 DIAGNOSIS — E78.5 HYPERLIPIDEMIA WITH TARGET LDL LESS THAN 130: ICD-10-CM

## 2021-06-04 LAB
ALBUMIN SERPL-MCNC: 4 G/DL (ref 3.4–5)
ALP SERPL-CCNC: 44 U/L (ref 40–150)
ALT SERPL W P-5'-P-CCNC: 121 U/L (ref 0–50)
ANION GAP SERPL CALCULATED.3IONS-SCNC: 5 MMOL/L (ref 3–14)
AST SERPL W P-5'-P-CCNC: 59 U/L (ref 0–45)
BILIRUB SERPL-MCNC: 0.5 MG/DL (ref 0.2–1.3)
BUN SERPL-MCNC: 16 MG/DL (ref 7–30)
CALCIUM SERPL-MCNC: 9.5 MG/DL (ref 8.5–10.1)
CHLORIDE SERPL-SCNC: 106 MMOL/L (ref 94–109)
CHOLEST SERPL-MCNC: 285 MG/DL
CO2 SERPL-SCNC: 27 MMOL/L (ref 20–32)
CREAT SERPL-MCNC: 0.7 MG/DL (ref 0.52–1.04)
GFR SERPL CREATININE-BSD FRML MDRD: >90 ML/MIN/{1.73_M2}
GLUCOSE SERPL-MCNC: 116 MG/DL (ref 70–99)
HDLC SERPL-MCNC: 59 MG/DL
LDLC SERPL CALC-MCNC: 197 MG/DL
NONHDLC SERPL-MCNC: 226 MG/DL
POTASSIUM SERPL-SCNC: 3.8 MMOL/L (ref 3.4–5.3)
PROT SERPL-MCNC: 7.5 G/DL (ref 6.8–8.8)
SODIUM SERPL-SCNC: 138 MMOL/L (ref 133–144)
TRIGL SERPL-MCNC: 146 MG/DL

## 2021-06-04 PROCEDURE — 80061 LIPID PANEL: CPT | Performed by: FAMILY MEDICINE

## 2021-06-04 PROCEDURE — 80053 COMPREHEN METABOLIC PANEL: CPT | Performed by: FAMILY MEDICINE

## 2021-06-04 PROCEDURE — 36415 COLL VENOUS BLD VENIPUNCTURE: CPT | Performed by: FAMILY MEDICINE

## 2021-06-07 ENCOUNTER — OFFICE VISIT (OUTPATIENT)
Dept: FAMILY MEDICINE | Facility: CLINIC | Age: 51
End: 2021-06-07
Payer: COMMERCIAL

## 2021-06-07 VITALS
OXYGEN SATURATION: 98 % | HEART RATE: 76 BPM | DIASTOLIC BLOOD PRESSURE: 68 MMHG | RESPIRATION RATE: 16 BRPM | HEIGHT: 63 IN | BODY MASS INDEX: 26.58 KG/M2 | TEMPERATURE: 98 F | SYSTOLIC BLOOD PRESSURE: 120 MMHG | WEIGHT: 150 LBS

## 2021-06-07 DIAGNOSIS — R79.89 ABNORMAL LFTS: ICD-10-CM

## 2021-06-07 DIAGNOSIS — K76.0 NON-ALCOHOLIC FATTY LIVER DISEASE: ICD-10-CM

## 2021-06-07 DIAGNOSIS — E78.5 HYPERLIPIDEMIA WITH TARGET LDL LESS THAN 130: Primary | ICD-10-CM

## 2021-06-07 DIAGNOSIS — I10 HYPERTENSION GOAL BP (BLOOD PRESSURE) < 140/90: ICD-10-CM

## 2021-06-07 DIAGNOSIS — R73.09 ELEVATED GLUCOSE: ICD-10-CM

## 2021-06-07 PROCEDURE — 90636 HEP A/HEP B VACC ADULT IM: CPT | Performed by: FAMILY MEDICINE

## 2021-06-07 PROCEDURE — 99214 OFFICE O/P EST MOD 30 MIN: CPT | Mod: 25 | Performed by: FAMILY MEDICINE

## 2021-06-07 PROCEDURE — 90471 IMMUNIZATION ADMIN: CPT | Performed by: FAMILY MEDICINE

## 2021-06-07 RX ORDER — PRAVASTATIN SODIUM 20 MG
20 TABLET ORAL DAILY
Qty: 90 TABLET | Refills: 0 | Status: SHIPPED | OUTPATIENT
Start: 2021-06-07 | End: 2021-10-19

## 2021-06-07 ASSESSMENT — MIFFLIN-ST. JEOR: SCORE: 1259.53

## 2021-06-07 NOTE — PATIENT INSTRUCTIONS
Start statin as advised every 3 days with Tricor  Follow up in 10 weeks  Labs first  Get shot today  Avoid alcohol and tylneol  Follow up in 10 weeks as advised , labs first  Leo Tejaad D.O.      Patient Education

## 2021-06-07 NOTE — RESULT ENCOUNTER NOTE
Your liver function is slightly elevated , please avoid alcohol, tylenol and make sure you cholesterol is enriqueta. And recheck in 3 months.  Please contact me if you have any questions.  Take care,  Leo Tejada D.O.

## 2021-08-04 ENCOUNTER — MYC MEDICAL ADVICE (OUTPATIENT)
Dept: FAMILY MEDICINE | Facility: CLINIC | Age: 51
End: 2021-08-04

## 2021-08-10 ENCOUNTER — ALLIED HEALTH/NURSE VISIT (OUTPATIENT)
Dept: NURSING | Facility: CLINIC | Age: 51
End: 2021-08-10
Payer: COMMERCIAL

## 2021-08-10 DIAGNOSIS — Z23 NEED FOR VACCINATION: ICD-10-CM

## 2021-08-10 DIAGNOSIS — Z00.00 PREVENTATIVE HEALTH CARE: Primary | ICD-10-CM

## 2021-08-10 PROCEDURE — 90636 HEP A/HEP B VACC ADULT IM: CPT

## 2021-08-10 PROCEDURE — 90471 IMMUNIZATION ADMIN: CPT

## 2021-08-10 PROCEDURE — 99207 PR NO CHARGE NURSE ONLY: CPT

## 2021-08-10 NOTE — NURSING NOTE
Prior to immunization administration, verified patients identity using patient s name and date of birth. Please see Immunization Activity for additional information.     Screening Questionnaire for Adult Immunization    Are you sick today?   No   Do you have allergies to medications, food, a vaccine component or latex?   Yes   Have you ever had a serious reaction after receiving a vaccination?   No   Do you have a long-term health problem with heart, lung, kidney, or metabolic disease (e.g., diabetes), asthma, a blood disorder, no spleen, complement component deficiency, a cochlear implant, or a spinal fluid leak?  Are you on long-term aspirin therapy?   No   Do you have cancer, leukemia, HIV/AIDS, or any other immune system problem?   No   Do you have a parent, brother, or sister with an immune system problem?   No   In the past 3 months, have you taken medications that affect  your immune system, such as prednisone, other steroids, or anticancer drugs; drugs for the treatment of rheumatoid arthritis, Crohn s disease, or psoriasis; or have you had radiation treatments?   No   Have you had a seizure, or a brain or other nervous system problem?   No   During the past year, have you received a transfusion of blood or blood    products, or been given immune (gamma) globulin or antiviral drug?   No   For women: Are you pregnant or is there a chance you could become       pregnant during the next month?   No   Have you received any vaccinations in the past 4 weeks?   No     Immunization questionnaire was positive for at least one answer.  Notified Dr. Tejada.        Per orders of Dr. Tejada, injection of Twinrix #2 given by Mirna Rubi MA. Patient instructed to remain in clinic for 15 minutes afterwards, and to report any adverse reaction to me immediately.  Patient verified       Screening performed by Mirna Rubi MA on 8/10/2021 at 11:56 AM.

## 2021-08-24 DIAGNOSIS — K76.0 NON-ALCOHOLIC FATTY LIVER DISEASE: ICD-10-CM

## 2021-08-24 DIAGNOSIS — E78.5 HYPERLIPIDEMIA WITH TARGET LDL LESS THAN 130: ICD-10-CM

## 2021-08-24 RX ORDER — FENOFIBRATE 48 MG/1
TABLET, COATED ORAL
Qty: 180 TABLET | Refills: 0 | Status: SHIPPED | OUTPATIENT
Start: 2021-08-24 | End: 2021-11-23

## 2021-09-19 ENCOUNTER — HEALTH MAINTENANCE LETTER (OUTPATIENT)
Age: 51
End: 2021-09-19

## 2021-09-20 ENCOUNTER — LAB (OUTPATIENT)
Dept: LAB | Facility: CLINIC | Age: 51
End: 2021-09-20
Attending: FAMILY MEDICINE
Payer: COMMERCIAL

## 2021-09-20 DIAGNOSIS — R73.09 ELEVATED GLUCOSE: ICD-10-CM

## 2021-09-20 DIAGNOSIS — E78.5 HYPERLIPIDEMIA WITH TARGET LDL LESS THAN 130: ICD-10-CM

## 2021-09-20 DIAGNOSIS — R79.89 ABNORMAL LFTS: ICD-10-CM

## 2021-09-20 LAB
ALBUMIN SERPL-MCNC: 4.2 G/DL (ref 3.4–5)
ALP SERPL-CCNC: 43 U/L (ref 40–150)
ALT SERPL W P-5'-P-CCNC: 160 U/L (ref 0–50)
ANION GAP SERPL CALCULATED.3IONS-SCNC: 6 MMOL/L (ref 3–14)
AST SERPL W P-5'-P-CCNC: 72 U/L (ref 0–45)
BILIRUB SERPL-MCNC: 0.3 MG/DL (ref 0.2–1.3)
BUN SERPL-MCNC: 17 MG/DL (ref 7–30)
CALCIUM SERPL-MCNC: 9 MG/DL (ref 8.5–10.1)
CHLORIDE BLD-SCNC: 104 MMOL/L (ref 94–109)
CHOLEST SERPL-MCNC: 227 MG/DL
CO2 SERPL-SCNC: 24 MMOL/L (ref 20–32)
CREAT SERPL-MCNC: 0.69 MG/DL (ref 0.52–1.04)
FASTING STATUS PATIENT QL REPORTED: YES
GFR SERPL CREATININE-BSD FRML MDRD: >90 ML/MIN/1.73M2
GGT SERPL-CCNC: 59 U/L (ref 0–40)
GLUCOSE BLD-MCNC: 119 MG/DL (ref 70–99)
HBA1C MFR BLD: 5.4 % (ref 0–5.6)
HBV SURFACE AG SERPL QL IA: NONREACTIVE
HCV AB SERPL QL IA: NONREACTIVE
HDLC SERPL-MCNC: 55 MG/DL
LDLC SERPL CALC-MCNC: 144 MG/DL
NONHDLC SERPL-MCNC: 172 MG/DL
POTASSIUM BLD-SCNC: 3.9 MMOL/L (ref 3.4–5.3)
PROT SERPL-MCNC: 7.9 G/DL (ref 6.8–8.8)
SODIUM SERPL-SCNC: 134 MMOL/L (ref 133–144)
TRIGL SERPL-MCNC: 141 MG/DL

## 2021-09-20 PROCEDURE — 87340 HEPATITIS B SURFACE AG IA: CPT

## 2021-09-20 PROCEDURE — 83036 HEMOGLOBIN GLYCOSYLATED A1C: CPT

## 2021-09-20 PROCEDURE — 36415 COLL VENOUS BLD VENIPUNCTURE: CPT

## 2021-09-20 PROCEDURE — 80053 COMPREHEN METABOLIC PANEL: CPT

## 2021-09-20 PROCEDURE — 82977 ASSAY OF GGT: CPT

## 2021-09-20 PROCEDURE — 80061 LIPID PANEL: CPT

## 2021-09-20 PROCEDURE — 86803 HEPATITIS C AB TEST: CPT

## 2021-09-30 ENCOUNTER — TELEPHONE (OUTPATIENT)
Dept: FAMILY MEDICINE | Facility: CLINIC | Age: 51
End: 2021-09-30

## 2021-09-30 NOTE — TELEPHONE ENCOUNTER
Called and LM for patient to return call- must have apt to discuss further with PCP.    Roxy Hong, RN          ----- Message from Leo Tejada DO sent at 9/30/2021 12:24 PM CDT -----  Elevated cholesterol and elevated LFT, I would recommend a visit to discuss further and recheck in 2-3 weeks.  Avoid alcohol and tylenol for now.  She no showed her last appoint with me  Leo Tejada D.O.

## 2021-10-01 ENCOUNTER — MYC MEDICAL ADVICE (OUTPATIENT)
Dept: FAMILY MEDICINE | Facility: CLINIC | Age: 51
End: 2021-10-01

## 2021-10-18 NOTE — PATIENT INSTRUCTIONS
Follow up with hepatologists as discussed  Labs as planned      Mammogram Scheduling  [place] [phone] or [phone]  [place] [phone]        Patient Education

## 2021-10-18 NOTE — PROGRESS NOTES
ICD-10-CM    1. Encounter for screening mammogram for breast cancer  Z12.31 MA SCREENING DIGITAL BILAT - Future  (s+30)   2. PVC's (premature ventricular contractions)  I49.3 amLODIPine (NORVASC) 5 MG tablet   3. Hyperlipidemia with target LDL less than 130  E78.5 pravastatin (PRAVACHOL) 20 MG tablet     CK total     Lipid panel reflex to direct LDL Fasting   4. Non-alcoholic fatty liver disease  K76.0 pravastatin (PRAVACHOL) 20 MG tablet     CK total     Comprehensive metabolic panel (BMP + Alb, Alk Phos, ALT, AST, Total. Bili, TP)     GASTROENTEROLOGY ADULT REF CONSULT ONLY     GGT     History of pvc-stable, no sx, doing stable  Fatty liver disease, she does occasional drink etoh as well, reports that her last labs was after going to a party and had drinks.  Will recheck her LFT's today, her elevation may be due to etoh unlikely due to statin.  She is worried about long term use of statin.  In the past did not tolerate statin but taking 40mg Pravachol 3-4 times a week, she has no real symptoms  She is also working on life style changes.  Recheck lipids in few months    She has not had a liver scan in years, advised one now or seeing hepatologist and having them order it.  Since she has more questions about the use of stain and the need to lower her lipids, she would prefer seeing GI. I am in agreement with this.   I did update her hep A/B shot and she is to finish the series on her own at the pharmacy      Subjective   Olga Lidia is a 51 year old who presents for the following health issues  accompanied by her self:    HPI     Hyperlipidemia Follow-Up      Are you regularly taking any medication or supplement to lower your cholesterol?   Yes- statin therapy    Are you having muscle aches or other side effects that you think could be caused by your cholesterol lowering medication?  No    Hypertension Follow-up      Do you check your blood pressure regularly outside of the clinic? No     Are you following a low salt  diet? No    Are your blood pressures ever more than 140 on the top number (systolic) OR more   than 90 on the bottom number (diastolic), for example 140/90? No    Asthma Follow-Up    Was ACT completed today?    Yes    ACT Total Scores 10/19/2021   ACT TOTAL SCORE -   ASTHMA ER VISITS -   ASTHMA HOSPITALIZATIONS -   ACT TOTAL SCORE (Goal Greater than or Equal to 20) 23   In the past 12 months, how many times did you visit the emergency room for your asthma without being admitted to the hospital? 0   In the past 12 months, how many times were you hospitalized overnight because of your asthma? 0          How many days per week do you miss taking your asthma controller medication?  0    Please describe any recent triggers for your asthma: None    Have you had any Emergency Room Visits, Urgent Care Visits, or Hospital Admissions since your last office visit?  No    ravastatin 2 tabs every 3 days-tolerating it well  tricor 2 pills daily    No etoh for over 1 month, ? She may have been using alcohol        Review of Systems   Constitutional, HEENT, cardiovascular, pulmonary, GI, , musculoskeletal, neuro, skin, endocrine and psych systems are negative, except as otherwise noted.      Objective    LMP  (LMP Unknown)   There is no height or weight on file to calculate BMI.  Physical Exam   GENERAL: healthy, alert and no distress  NECK: no adenopathy, no asymmetry, masses, or scars and thyroid normal to palpation  RESP: lungs clear to auscultation - no rales, rhonchi or wheezes  CV: regular rate and rhythm, normal S1 S2, no S3 or S4, no murmur, click or rub, no peripheral edema and peripheral pulses strong  ABDOMEN: soft, nontender, no hepatosplenomegaly, no masses and bowel sounds normal  MS: no gross musculoskeletal defects noted, no edema

## 2021-10-19 ENCOUNTER — OFFICE VISIT (OUTPATIENT)
Dept: FAMILY MEDICINE | Facility: CLINIC | Age: 51
End: 2021-10-19
Payer: COMMERCIAL

## 2021-10-19 ENCOUNTER — LAB (OUTPATIENT)
Dept: LAB | Facility: CLINIC | Age: 51
End: 2021-10-19
Payer: COMMERCIAL

## 2021-10-19 DIAGNOSIS — E78.5 HYPERLIPIDEMIA WITH TARGET LDL LESS THAN 130: ICD-10-CM

## 2021-10-19 DIAGNOSIS — K76.0 NON-ALCOHOLIC FATTY LIVER DISEASE: ICD-10-CM

## 2021-10-19 DIAGNOSIS — Z12.31 ENCOUNTER FOR SCREENING MAMMOGRAM FOR BREAST CANCER: Primary | ICD-10-CM

## 2021-10-19 DIAGNOSIS — I49.3 PVC'S (PREMATURE VENTRICULAR CONTRACTIONS): ICD-10-CM

## 2021-10-19 LAB
ALBUMIN SERPL-MCNC: 4.2 G/DL (ref 3.4–5)
ALP SERPL-CCNC: 43 U/L (ref 40–150)
ALT SERPL W P-5'-P-CCNC: 121 U/L (ref 0–50)
ANION GAP SERPL CALCULATED.3IONS-SCNC: 6 MMOL/L (ref 3–14)
AST SERPL W P-5'-P-CCNC: 52 U/L (ref 0–45)
BILIRUB SERPL-MCNC: 0.4 MG/DL (ref 0.2–1.3)
BUN SERPL-MCNC: 16 MG/DL (ref 7–30)
CALCIUM SERPL-MCNC: 9 MG/DL (ref 8.5–10.1)
CHLORIDE BLD-SCNC: 106 MMOL/L (ref 94–109)
CK SERPL-CCNC: 55 U/L (ref 30–225)
CO2 SERPL-SCNC: 26 MMOL/L (ref 20–32)
CREAT SERPL-MCNC: 0.8 MG/DL (ref 0.52–1.04)
GFR SERPL CREATININE-BSD FRML MDRD: 84 ML/MIN/1.73M2
GGT SERPL-CCNC: 64 U/L (ref 0–40)
GLUCOSE BLD-MCNC: 109 MG/DL (ref 70–99)
POTASSIUM BLD-SCNC: 3.9 MMOL/L (ref 3.4–5.3)
PROT SERPL-MCNC: 7.7 G/DL (ref 6.8–8.8)
SODIUM SERPL-SCNC: 138 MMOL/L (ref 133–144)

## 2021-10-19 PROCEDURE — 82550 ASSAY OF CK (CPK): CPT

## 2021-10-19 PROCEDURE — 82374 ASSAY BLOOD CARBON DIOXIDE: CPT

## 2021-10-19 PROCEDURE — 99214 OFFICE O/P EST MOD 30 MIN: CPT | Performed by: FAMILY MEDICINE

## 2021-10-19 PROCEDURE — 82977 ASSAY OF GGT: CPT

## 2021-10-19 PROCEDURE — 36415 COLL VENOUS BLD VENIPUNCTURE: CPT

## 2021-10-19 RX ORDER — AMLODIPINE BESYLATE 5 MG/1
5 TABLET ORAL DAILY
Qty: 90 TABLET | Refills: 3 | Status: SHIPPED | OUTPATIENT
Start: 2021-10-19 | End: 2022-11-16

## 2021-10-19 RX ORDER — PRAVASTATIN SODIUM 20 MG
20 TABLET ORAL DAILY
Qty: 90 TABLET | Refills: 0 | Status: SHIPPED | OUTPATIENT
Start: 2021-10-19 | End: 2022-02-09

## 2021-10-20 ASSESSMENT — ASTHMA QUESTIONNAIRES: ACT_TOTALSCORE: 23

## 2021-11-01 NOTE — TELEPHONE ENCOUNTER
RECORDS RECEIVED FROM: Internal   Appt Date: 11.02.2021    NOTES STATUS DETAILS   OFFICE NOTE from referring provider Internal 10.19.2021 Consult Leo Tejada DO   OFFICE NOTES from other specialists Internal 05.14.2021 Chayo Parson, IAN     DISCHARGE SUMMARY from hospital N/A    MEDICATION LIST Internal    LIVER BIOSPY (IF APPLICABLE)      PATHOLOGY REPORTS  N/A    IMAGING     ENDOSCOPY (IF AVAILABLE) N/A    COLONOSCOPY (IF AVAILABLE) N/A    ULTRASOUND LIVER N/A    CT OF ABDOMEN N/A    MRI OF LIVER N/A    FIBROSCAN, US ELASTOGRAPHY, FIBROSIS SCAN, MR ELASTOGRAPHY N/A    LABS     HEPATIC PANEL (LIVER PANEL) Internal 06.05.2018   BASIC METABOLIC PANEL Internal 06.05.2018   COMPLETE METABOLIC PANEL Internal 11.13.2020   COMPLETE BLOOD COUNT (CBC) Internal 11.13.2020   INTERNATIONAL NORMALIZED RATIO (INR) Internal 01.14.2020   HEPATITIS C ANTIBODY Internal 09.20.2021   HEPATITIS C VIRAL LOAD/PCR N/A    HEPATITIS C GENOTYPE N/A    HEPATITIS B SURFACE ANTIGEN Internal 09.20.2021   HEPATITIS B SURFACE ANTIBODY N/A    HEPATITIS B DNA QUANT LEVEL N/A    HEPATITIS B CORE ANTIBODY N/A

## 2021-11-02 ENCOUNTER — OFFICE VISIT (OUTPATIENT)
Dept: GASTROENTEROLOGY | Facility: CLINIC | Age: 51
End: 2021-11-02
Attending: FAMILY MEDICINE
Payer: COMMERCIAL

## 2021-11-02 ENCOUNTER — PRE VISIT (OUTPATIENT)
Dept: GASTROENTEROLOGY | Facility: CLINIC | Age: 51
End: 2021-11-02

## 2021-11-02 VITALS
WEIGHT: 152 LBS | BODY MASS INDEX: 26.93 KG/M2 | HEART RATE: 76 BPM | SYSTOLIC BLOOD PRESSURE: 125 MMHG | OXYGEN SATURATION: 95 % | DIASTOLIC BLOOD PRESSURE: 83 MMHG

## 2021-11-02 DIAGNOSIS — K76.0 NON-ALCOHOLIC FATTY LIVER DISEASE: ICD-10-CM

## 2021-11-02 PROCEDURE — 99204 OFFICE O/P NEW MOD 45 MIN: CPT | Performed by: INTERNAL MEDICINE

## 2021-11-02 PROCEDURE — G0463 HOSPITAL OUTPT CLINIC VISIT: HCPCS

## 2021-11-02 NOTE — LETTER
11/2/2021         RE: Olga Lidia Solorzano  [address] [address]        Dear Colleague,    Thank you for referring your patient, Olga Lidia Solorzano, to the [facility] [place]. Please see a copy of my visit note below.    [facility]    Hepatology New Patient Visit    Referring provider:  Leo Tejada      51 year old female    Chief complaint:  Abnormal liver function tests    HPI:  Patient re-presents for evaluation and management of nonalcoholic fatty liver disease.  The patient has intermittently been off statin therapy due to side effects of myalgias.  She has tried multiple statins in the past including atorvastatin and pravastatin.  She is currently on 20 mg of pravastatin every 72 hours.  The patient was started on hormone replacement therapy last year.  No ER visits or hospital admissions since last clinic visit.    The patient is well today.  She denies any symptoms of jaundice, abdominal distention, lower extremity edema, lethargy or confusion.    No history of melena, hematemesis or hematochezia.    The patient denies fever, sweats or chills.  She is fully vaccinated against COVID-19.    Weight has been stable.  Her BMI is around 26.  Appetite is good.  The patient has been able to lose weight in the short term but always reverts back to an overweight baseline.    History of hypertension, dyslipidemia noted.  Recent blood sugars have been in the prediabetic range.  No history of CAD, CVA, PAD or WOODY.    The patient has not drank any alcohol since 09/21/2021.  She typically will drink 3 mixed drinks on each day of the weekend.      The patient does not smoke and does not use any illicit drugs.    Medical hx Surgical hx   Past Medical History:   Diagnosis Date     Anemia 09/2018     DJD of right AC (acromioclavicular) joint 04/10/2019     Hyperlipidemia LDL goal < 130      Hypertension goal BP (blood pressure) <  140/90      Insulin resistance      IUD (intrauterine device) in place ??2012 or 2013-2/18/19    Paragard     NAFLD (nonalcoholic fatty liver disease)      Obesity      Scoliosis      Uncomplicated asthma           Past Surgical History:   Procedure Laterality Date     COLONOSCOPY N/A 03/13/2017    Procedure: COLONOSCOPY;  Surgeon: Tato Child MD;  Location:  GI     HC VASCULAR SURGERY PROCEDURE UNLIST      vericose vein          Medications  Prior to Admission medications    Medication Sig Start Date End Date Taking? Authorizing Provider   amLODIPine (NORVASC) 5 MG tablet Take 1 tablet (5 mg) by mouth daily 10/19/21  Yes Leo Tejada DO   estradiol (VIVELLE-DOT) 0.05 MG/24HR bi-weekly patch REMOVE OLD PATCH AND APPLY ONE NEW PATCH TWICE A WEEK. 11/9/20  Yes Jessie Perdomo MD   fenofibrate (TRICOR) 48 MG tablet TAKE TWO TABLETS BY MOUTH ONCE DAILY 8/24/21  Yes Leo Tejada DO   fluticasone (FLOVENT HFA) 220 MCG/ACT inhaler Inhale 2 puffs into the lungs 2 times daily 4/22/20  Yes Wan Anderson MD   levalbuterol (XOPENEX HFA) 45 MCG/ACT inhaler Inhale 2 puffs into the lungs every 6 hours as needed for shortness of breath / dyspnea or wheezing 4/22/20  Yes Wan Anderson MD   pravastatin (PRAVACHOL) 20 MG tablet Take 1 tablet (20 mg) by mouth daily 10/19/21  Yes Leo Tejada DO   progesterone (PROMETRIUM) 100 MG capsule TAKE ONE CAPSULE BY MOUTH ONCE DAILY 11/9/20  Yes Jessie Perdomo MD   spironolactone (ALDACTONE) 50 MG tablet Take 1 tablet (50 mg) by mouth 2 times daily 11/9/20  Yes Jessie Perdomo MD       Allergies  Allergies   Allergen Reactions     Niacin Shortness Of Breath, Other (See Comments) and Nausea and Vomiting     All over body pains (patient reported symptoms)       Family hx Social hx   Family History   Problem Relation Age of Onset     Hypertension Mother      Lung Cancer Mother      Hypertension Father      Hyperlipidemia Father       No Known Problems Son      Blood Disease Paternal Uncle      Breast Cancer Paternal Aunt      Hypertension Maternal Grandmother      Breast Cancer Paternal Half-Sister      Breast Cancer Cousin      Other Cancer Paternal Half-Brother         leukemia     No Known Problems Sister      No Known Problems Brother      No Known Problems Maternal Grandfather      No Known Problems Paternal Grandmother      No Known Problems Other      Liver Disease No family hx of       Social History     Tobacco Use     Smoking status: Former Smoker     Packs/day: 0.50     Years: 20.00     Pack years: 10.00     Types: Cigarettes     Quit date: 2012     Years since quittin.8     Smokeless tobacco: Never Used   Substance Use Topics     Alcohol use: Yes     Alcohol/week: 0.0 standard drinks     Comment: more on weekends 8 drinks per week or wine or mixed drinks      Drug use: No     Lives in [facility].  Works as  at [employer].       Review of systems  A 10-point review of systems was negative.    Examination  /83   Pulse 76   Wt 68.9 kg (152 lb)   LMP  (LMP Unknown)   SpO2 95%   BMI 26.93 kg/m    Body mass index is 26.93 kg/m .    Gen- well, NAD, A+Ox3, normal color  Eye- EOMI  ENT- MMM, normal oropharynx  Lym- no palpable lymphadenopathy  CVS- S1, S2 normal, no added sounds, RRR  RS- CTA  Abd- overweight, striae, soft, non-tender, palpable liver edge, no ascites or splenomegaly on palpation or percussion, BS+  Extr- pulses good, no CHIKI  MS- hands normal- no clubbing  Neuro- A+Ox3, no asterixis  Skin- no rash or jaundice  Psych- normal mood    Laboratory  Lab Results   Component Value Date     10/19/2021     2021    POTASSIUM 3.9 10/19/2021    POTASSIUM 3.8 2021    CHLORIDE 106 10/19/2021    CHLORIDE 106 2021    CO2 26 10/19/2021    CO2 27 2021    BUN 16 10/19/2021    BUN 16 2021    CR 0.80 10/19/2021    CR 0.70 2021       Lab Results   Component Value Date    BILITOTAL  0.4 10/19/2021    BILITOTAL 0.5 06/04/2021     10/19/2021     06/04/2021    AST 52 10/19/2021    AST 59 06/04/2021    ALKPHOS 43 10/19/2021    ALKPHOS 44 06/04/2021       Lab Results   Component Value Date    ALBUMIN 4.2 10/19/2021    ALBUMIN 4.0 06/04/2021    PROTTOTAL 7.7 10/19/2021    PROTTOTAL 7.5 06/04/2021        Lab Results   Component Value Date    WBC 6.0 11/13/2020    HGB 14.5 11/13/2020    MCV 94 11/13/2020     11/13/2020       Lab Results   Component Value Date    INR 0.88 01/14/2020       Radiology  Nil recent    Assessment  51-year-old female with metabolic syndrome presents for further evaluation and management of non-alcoholic fatty liver disease.  No clinical or biochemical evidence of progression to cirrhosis.  Management of non-alcoholic fatty liver disease is primarily through weight loss and modification of risk factors, including dyslipidemia.  However, if the patient does not tolerate statin therapy due to side effects, would manage with highest dose tolerable.  Will refer to the medical weight management clinic for multidisciplinary approach to weight loss and management of metabolic syndrome.    Plan  1.  Weight loss with diet and exercise  2.  Medical weight management referral  3.  OK to use statin as tolerated  4.  Follow-up with PCP    Chaitanya Santos MD  Hepatology  [facility]        error      Again, thank you for allowing me to participate in the care of your patient.        Sincerely,        Chaitanya Santos MD

## 2021-11-02 NOTE — PROGRESS NOTES
[facility]    Hepatology New Patient Visit    Referring provider:  Leo Meyersie      51 year old female    Chief complaint:  Abnormal liver function tests    HPI:  Patient re-presents for evaluation and management of nonalcoholic fatty liver disease.  The patient has intermittently been off statin therapy due to side effects of myalgias.  She has tried multiple statins in the past including atorvastatin and pravastatin.  She is currently on 20 mg of pravastatin every 72 hours.  The patient was started on hormone replacement therapy last year.  No ER visits or hospital admissions since last clinic visit.    The patient is well today.  She denies any symptoms of jaundice, abdominal distention, lower extremity edema, lethargy or confusion.    No history of melena, hematemesis or hematochezia.    The patient denies fever, sweats or chills.  She is fully vaccinated against COVID-19.    Weight has been stable.  Her BMI is around 26.  Appetite is good.  The patient has been able to lose weight in the short term but always reverts back to an overweight baseline.    History of hypertension, dyslipidemia noted.  Recent blood sugars have been in the prediabetic range.  No history of CAD, CVA, PAD or WOODY.    The patient has not drank any alcohol since 09/21/2021.  She typically will drink 3 mixed drinks on each day of the weekend.      The patient does not smoke and does not use any illicit drugs.    Medical hx Surgical hx   Past Medical History:   Diagnosis Date     Anemia 09/2018     DJD of right AC (acromioclavicular) joint 04/10/2019     Hyperlipidemia LDL goal < 130      Hypertension goal BP (blood pressure) < 140/90      Insulin resistance      IUD (intrauterine device) in place ??2012 or 2013-2/18/19    Paragard     NAFLD (nonalcoholic fatty liver disease)      Obesity      Scoliosis      Uncomplicated asthma           Past Surgical History:   Procedure Laterality Date      COLONOSCOPY N/A 03/13/2017    Procedure: COLONOSCOPY;  Surgeon: Tato Child MD;  Location:  GI     HC VASCULAR SURGERY PROCEDURE UNLIST      vericose vein          Medications  Prior to Admission medications    Medication Sig Start Date End Date Taking? Authorizing Provider   amLODIPine (NORVASC) 5 MG tablet Take 1 tablet (5 mg) by mouth daily 10/19/21  Yes Leo Tejada DO   estradiol (VIVELLE-DOT) 0.05 MG/24HR bi-weekly patch REMOVE OLD PATCH AND APPLY ONE NEW PATCH TWICE A WEEK. 11/9/20  Yes Jessie Perdomo MD   fenofibrate (TRICOR) 48 MG tablet TAKE TWO TABLETS BY MOUTH ONCE DAILY 8/24/21  Yes Leo Tejada DO   fluticasone (FLOVENT HFA) 220 MCG/ACT inhaler Inhale 2 puffs into the lungs 2 times daily 4/22/20  Yes Wan Anderson MD   levalbuterol (XOPENEX HFA) 45 MCG/ACT inhaler Inhale 2 puffs into the lungs every 6 hours as needed for shortness of breath / dyspnea or wheezing 4/22/20  Yes Wan Anderson MD   pravastatin (PRAVACHOL) 20 MG tablet Take 1 tablet (20 mg) by mouth daily 10/19/21  Yes Leo Tejada DO   progesterone (PROMETRIUM) 100 MG capsule TAKE ONE CAPSULE BY MOUTH ONCE DAILY 11/9/20  Yes Jessie Perdomo MD   spironolactone (ALDACTONE) 50 MG tablet Take 1 tablet (50 mg) by mouth 2 times daily 11/9/20  Yes Jessie Perdomo MD       Allergies  Allergies   Allergen Reactions     Niacin Shortness Of Breath, Other (See Comments) and Nausea and Vomiting     All over body pains (patient reported symptoms)       Family hx Social hx   Family History   Problem Relation Age of Onset     Hypertension Mother      Lung Cancer Mother      Hypertension Father      Hyperlipidemia Father      No Known Problems Son      Blood Disease Paternal Uncle      Breast Cancer Paternal Aunt      Hypertension Maternal Grandmother      Breast Cancer Paternal Half-Sister      Breast Cancer Cousin      Other Cancer Paternal Half-Brother         leukemia     No Known  Problems Sister      No Known Problems Brother      No Known Problems Maternal Grandfather      No Known Problems Paternal Grandmother      No Known Problems Other      Liver Disease No family hx of       Social History     Tobacco Use     Smoking status: Former Smoker     Packs/day: 0.50     Years: 20.00     Pack years: 10.00     Types: Cigarettes     Quit date: 2012     Years since quittin.8     Smokeless tobacco: Never Used   Substance Use Topics     Alcohol use: Yes     Alcohol/week: 0.0 standard drinks     Comment: more on weekends 8 drinks per week or wine or mixed drinks      Drug use: No     Lives in [facility].  Works as  at [employer].       Review of systems  A 10-point review of systems was negative.    Examination  /83   Pulse 76   Wt 68.9 kg (152 lb)   LMP  (LMP Unknown)   SpO2 95%   BMI 26.93 kg/m    Body mass index is 26.93 kg/m .    Gen- well, NAD, A+Ox3, normal color  Eye- EOMI  ENT- MMM, normal oropharynx  Lym- no palpable lymphadenopathy  CVS- S1, S2 normal, no added sounds, RRR  RS- CTA  Abd- overweight, striae, soft, non-tender, palpable liver edge, no ascites or splenomegaly on palpation or percussion, BS+  Extr- pulses good, no CHIKI  MS- hands normal- no clubbing  Neuro- A+Ox3, no asterixis  Skin- no rash or jaundice  Psych- normal mood    Laboratory  Lab Results   Component Value Date     10/19/2021     2021    POTASSIUM 3.9 10/19/2021    POTASSIUM 3.8 2021    CHLORIDE 106 10/19/2021    CHLORIDE 106 2021    CO2 26 10/19/2021    CO2 27 2021    BUN 16 10/19/2021    BUN 16 2021    CR 0.80 10/19/2021    CR 0.70 2021       Lab Results   Component Value Date    BILITOTAL 0.4 10/19/2021    BILITOTAL 0.5 2021     10/19/2021     2021    AST 52 10/19/2021    AST 59 2021    ALKPHOS 43 10/19/2021    ALKPHOS 44 2021       Lab Results   Component Value Date    ALBUMIN 4.2 10/19/2021    ALBUMIN 4.0  06/04/2021    PROTTOTAL 7.7 10/19/2021    PROTTOTAL 7.5 06/04/2021        Lab Results   Component Value Date    WBC 6.0 11/13/2020    HGB 14.5 11/13/2020    MCV 94 11/13/2020     11/13/2020       Lab Results   Component Value Date    INR 0.88 01/14/2020       Radiology  Nil recent    Assessment  51-year-old female with metabolic syndrome presents for further evaluation and management of non-alcoholic fatty liver disease.  No clinical or biochemical evidence of progression to cirrhosis.  Management of non-alcoholic fatty liver disease is primarily through weight loss and modification of risk factors, including dyslipidemia.  However, if the patient does not tolerate statin therapy due to side effects, would manage with highest dose tolerable.  Will refer to the medical weight management clinic for multidisciplinary approach to weight loss and management of metabolic syndrome.    Plan  1.  Weight loss with diet and exercise  2.  Medical weight management referral  3.  OK to use statin as tolerated  4.  Follow-up with PCP    Chaitanya Santos MD  Hepatology  [facility]

## 2021-11-02 NOTE — NURSING NOTE
Chief Complaint   Patient presents with     RECHECK     follow up fatty liver disease         /83   Pulse 76   Wt 68.9 kg (152 lb)   LMP  (LMP Unknown)   SpO2 95%   BMI 26.93 kg/m          Cristina RANGEL CMA

## 2021-11-22 DIAGNOSIS — K76.0 NON-ALCOHOLIC FATTY LIVER DISEASE: ICD-10-CM

## 2021-11-22 DIAGNOSIS — N95.1 VASOMOTOR SYMPTOMS DUE TO MENOPAUSE: ICD-10-CM

## 2021-11-22 DIAGNOSIS — E78.5 HYPERLIPIDEMIA WITH TARGET LDL LESS THAN 130: ICD-10-CM

## 2021-11-22 DIAGNOSIS — Z79.890 HORMONE REPLACEMENT THERAPY (HRT): ICD-10-CM

## 2021-11-22 RX ORDER — ESTRADIOL 0.05 MG/D
PATCH, EXTENDED RELEASE TRANSDERMAL
Qty: 8 PATCH | Refills: 0 | Status: SHIPPED | OUTPATIENT
Start: 2021-11-22 | End: 2022-01-17

## 2021-11-22 NOTE — TELEPHONE ENCOUNTER
"Requested Prescriptions   Pending Prescriptions Disp Refills     KURT 0.05 MG/24HR bi-weekly patch [Pharmacy Med Name: KURT 0.05MG/24HR PTTW] 24 patch 4     Sig: REMOVE OLD PATCH AND APPLY ONE NEW PATCH TWICE A WEEK.       Hormone Replacement Therapy Failed - 11/22/2021  6:04 AM        Failed - Recent (12 mo) or future (30 days) visit within the authorizing provider's specialty     Patient has had an office visit with the authorizing provider or a provider within the authorizing providers department within the previous 12 mos or has a future within next 30 days. See \"Patient Info\" tab in inbasket, or \"Choose Columns\" in Meds & Orders section of the refill encounter.              Passed - Blood pressure under 140/90 in past 12 months     BP Readings from Last 3 Encounters:   11/02/21 125/83   06/07/21 120/68   04/05/21 122/68                 Passed - Patient has mammogram in past 2 years on file if age 50-75        Passed - Medication is active on med list        Passed - Patient is 18 years of age or older        Passed - No active pregnancy on record        Passed - No positive pregnancy test on record in past 12 months           Refill approved   Appointment needed for further refills   Alicia Pardo RN on 11/22/2021 at 9:15 AM    "

## 2021-11-23 RX ORDER — FENOFIBRATE 48 MG/1
TABLET, COATED ORAL
Qty: 180 TABLET | Refills: 0 | Status: SHIPPED | OUTPATIENT
Start: 2021-11-23 | End: 2022-02-23

## 2021-11-24 DIAGNOSIS — E88.810 METABOLIC SYNDROME: ICD-10-CM

## 2021-11-24 DIAGNOSIS — K76.0 NON-ALCOHOLIC FATTY LIVER DISEASE: Primary | ICD-10-CM

## 2021-12-01 ENCOUNTER — TELEPHONE (OUTPATIENT)
Dept: ENDOCRINOLOGY | Facility: CLINIC | Age: 51
End: 2021-12-01
Payer: COMMERCIAL

## 2021-12-01 NOTE — TELEPHONE ENCOUNTER
Can someone call pt.  She has a referral for weight mgmt, but bmi only 26.  However, her md in hepatology put in the referral and told her that Dr. Ulloa might still work with her despite bmi.  Request id: [id number]    [phone]  (also can call work number as secure VM)  **OK to leave detailed VM

## 2021-12-03 ENCOUNTER — TELEPHONE (OUTPATIENT)
Dept: ENDOCRINOLOGY | Facility: CLINIC | Age: 51
End: 2021-12-03
Payer: COMMERCIAL

## 2021-12-03 NOTE — TELEPHONE ENCOUNTER
12/3  Left  for pt...    I got approval from Anya for her to schedule MWM with one of the endo docs (Ellen Smith Sibley and Mandy)  Even thought bmi 26+pre-diabetes.

## 2021-12-09 ENCOUNTER — ALLIED HEALTH/NURSE VISIT (OUTPATIENT)
Dept: FAMILY MEDICINE | Facility: CLINIC | Age: 51
End: 2021-12-09
Payer: COMMERCIAL

## 2021-12-09 DIAGNOSIS — Z23 NEED FOR VACCINATION: Primary | ICD-10-CM

## 2021-12-09 PROCEDURE — 90636 HEP A/HEP B VACC ADULT IM: CPT

## 2021-12-09 PROCEDURE — 99207 PR NO CHARGE LOS: CPT

## 2021-12-09 PROCEDURE — 90471 IMMUNIZATION ADMIN: CPT

## 2021-12-09 PROCEDURE — 99207 PR NO CHARGE NURSE ONLY: CPT

## 2021-12-13 ENCOUNTER — VIRTUAL VISIT (OUTPATIENT)
Dept: ENDOCRINOLOGY | Facility: CLINIC | Age: 51
End: 2021-12-13
Payer: COMMERCIAL

## 2021-12-13 VITALS — HEIGHT: 64 IN | WEIGHT: 153 LBS | BODY MASS INDEX: 26.12 KG/M2

## 2021-12-13 DIAGNOSIS — E88.810 METABOLIC SYNDROME: ICD-10-CM

## 2021-12-13 DIAGNOSIS — K76.0 NON-ALCOHOLIC FATTY LIVER DISEASE: ICD-10-CM

## 2021-12-13 PROCEDURE — 99203 OFFICE O/P NEW LOW 30 MIN: CPT | Mod: 95 | Performed by: INTERNAL MEDICINE

## 2021-12-13 ASSESSMENT — PAIN SCALES - GENERAL: PAINLEVEL: NO PAIN (0)

## 2021-12-13 ASSESSMENT — MIFFLIN-ST. JEOR: SCORE: 1284

## 2021-12-13 NOTE — NURSING NOTE
"Chief Complaint   Patient presents with     Consult     [facility]       Vitals:    12/13/21 0819   Weight: 69.4 kg (153 lb)   Height: 1.626 m (5' 4\")       Body mass index is 26.26 kg/m .          SARAY DENNIS EMT    "

## 2021-12-13 NOTE — PROGRESS NOTES
"    New Medical Weight Management Consult    PATIENT:  Olga Lidia Solorzano  MRN:         [id number]  :         1970  CHIN:         2021    Dear Leo Tejada DO,    I had the pleasure of seeing your patient, Olga Lidia Solorzano.  Full intake/assessment done to determine barriers to weight loss success and develop a treatment plan.  Olga Lidia Solorzano is a 51 year old female interested in treatment of medical problems associated with weight.  Her weight today is 153 lbs 0 oz, Body mass index is 26.26 kg/m ., and she has the following co-morbidities:     2021   I have the following health issues associated with obesity: Pre-Diabetes, High Blood Pressure, High Cholesterol, Fatty Liver   I have the following symptoms associated with obesity: None of the above     Patient Goals 2021   I am interested in having a healthier weight to diminish current health problems: Yes   I am interested in having a healthier weight in order to prevent future health problems: Yes   I am interested in having a healthier weight in order to have a future surgery: No     Referring Provider 2021   Please name the provider who referred you to Medical Weight Management.  If you do not know, please answer: \"I Don't Know\". Dr Santos     Wt Readings from Last 4 Encounters:   21 69.4 kg (153 lb)   21 68.9 kg (152 lb)   21 68 kg (150 lb)   21 67.1 kg (148 lb)     Weight History 2021   How concerned are you about your weight? Very Concerned   Would you describe your weight gain as gradual? No   I became overweight: As an Adult   The following factors have contributed to my weight gain:  Eating Wrong Types of Food, Genetic (Runs in the Family), Other   Please list the other factors.  insulin resistance   I have tried the following methods to lose weight: Other   Please list the other methods.  low carb /keto diet.   My lowest weight since age 16 was: 115   My highest weight since age 16 " was: 172   The most weight I have ever lost was: (lbs) 20   I have the following family history of obesity/being overweight:  My mother is overweight, Many of my relatives are overweight   Has anyone in your family had weight loss surgery? No   How has your weight changed over the last year?  Gained   How many pounds? 15     Diet Recall 12/12/2021   Glass juice/day 0   Glass milk/day 0   Glass sugary drink/day 0   How many cans/bottles of sugar pop/soda/tea/sports drinks do you drink in a day? 0   Diet drink/day 2   Alcohol 2-3 TImes a Week   Drinks/day 1 or 2     Eating Habits 12/12/2021   Generally, my meals include foods like these: bread, pasta, rice, potatoes, corn, crackers, sweet dessert, pop, or juice. Never   Generally, my meals include foods like these: fried meats, brats, burgers, french fries, pizza, cheese, chips, or ice cream. Less Than Weekly   Eat fast food (like [employer], [employer], [employer]aco [employer]). Never   Eat at a buffet or sit-down restaurant. A Few Times a Week   Eat most of my meals in front of the TV or computer. A Few Times a Week   Often skip meals, eat at random times, have no regular eating times. Never   Rarely sit down for a meal but snack or graze throughout.  Less Than Weekly   Eat extra snacks between meals. A Few Times a Week   Eat most of my food at the end of the day. Never   Eat in the middle of the night or wake up at night to eat. Never   Eat extra snacks to prevent or correct low blood sugar. Never   Eat to prevent acid reflux or stomach pain. A Few Times a Week   Worry about not having enough food to eat. Never   Have you been to the food shelf at least a few times this year? No   I eat when I am depressed. Never   I eat when I am stressed. Never   I eat when I am bored. Almost Everyday   I eat when I am anxious. Never   I eat when I am happy or as a reward. A Few Times a Week   I feel hungry all the time even if I just have eaten. Less Than Weekly   Feeling full is important to  me. Never   I finish all the food on my plate even if I am already full. Once a Week   I can't resist eating delicious food or walk past the good food/smell. Once a Week   I eat/snack without noticing that I am eating. Less Than Weekly   I eat when I am preparing the meal. Never   I eat more than usual when I see others eating. Less Than Weekly   I have trouble not eating sweets, ice cream, cookies, or chips if they are around the house. Once a Week   I think about food all day. Once a Week   What foods, if any, do you crave? Chips/Crackers   Please list any other foods you crave? pizza, ketchup,sauces     Activity/Exercise History 12/12/2021   How much of a typical 12 hour day do you spend sitting? Half the Day   How much of a typical 12 hour day do you spend lying down? Less Than Half the Day   How much of a typical day do you spend walking/standing? Half the Day   How many hours (not including work) do you spend on the TV/Video Games/Computer/Tablet/Phone? 2-3 Hours   How many times a week are you active for the purpose of exercise? Never   What keeps you from being more active? Pain, Too tired   How many total minutes do you spend doing some activity for the purpose of exercising when you exercise? None     PAST MEDICAL HISTORY:  Past Medical History:   Diagnosis Date     Anemia 09/2018     DJD of right AC (acromioclavicular) joint 04/10/2019     Hyperlipidemia LDL goal < 130      Hypertension goal BP (blood pressure) < 140/90      Insulin resistance      IUD (intrauterine device) in place ??2012 or 2013-2/18/19    Paragard     NAFLD (nonalcoholic fatty liver disease)      Obesity      Scoliosis      Uncomplicated asthma      Work/Social History Reviewed With Patient 12/12/2021   My employment status is: Full-Time   My job is: lab tech   How much of your job is spent on the computer or phone? Less Than 50%   How many hours do you spend commuting to work daily?  1   What is your marital status? /In a  Relationship   If in a relationship, is your significant other overweight? No   Do you have children? Yes   If you have children, are they overweight? No   Who do you live with?  SO   Are they supportive of your health goals? Yes   Who does the food shopping?  me     Mental Health History Reviewed With Patient 12/12/2021   Have you ever been physically or sexually abused? No   If yes, do you feel that the abuse is affecting your weight? No   If yes, would you like to talk to a counselor about the abuse? N/A   How often in the past 2 weeks have you felt little interest or pleasure in doing things? Nearly Everyday   Over the past 2 weeks how often have you felt down, depressed, or hopeless? Not at all     Sleep History Reviewed With Patient 12/12/2021   How many hours do you sleep at night? 6   Do you think that you snore loudly or has anybody ever heard you snore loudly (louder than talking or so loud it can be heard behind a shut door)? No   Has anyone seen or heard you stop breathing during your sleep? No   Do you often feel tired, fatigued, or sleepy during the day? Yes   Do you have a TV/Computer in your bedroom? No     MEDICATIONS:   Current Outpatient Medications   Medication Sig Dispense Refill     amLODIPine (NORVASC) 5 MG tablet Take 1 tablet (5 mg) by mouth daily 90 tablet 3     KURT 0.05 MG/24HR bi-weekly patch REMOVE OLD PATCH AND APPLY ONE NEW PATCH TWICE A WEEK. 8 patch 0     fenofibrate (TRICOR) 48 MG tablet TAKE TWO TABLETS BY MOUTH ONCE DAILY 180 tablet 0     fluticasone (FLOVENT HFA) 220 MCG/ACT inhaler Inhale 2 puffs into the lungs 2 times daily 3 Inhaler 3     levalbuterol (XOPENEX HFA) 45 MCG/ACT inhaler Inhale 2 puffs into the lungs every 6 hours as needed for shortness of breath / dyspnea or wheezing 1 Inhaler 5     pravastatin (PRAVACHOL) 20 MG tablet Take 1 tablet (20 mg) by mouth daily 90 tablet 0     progesterone (PROMETRIUM) 100 MG capsule TAKE ONE CAPSULE BY MOUTH ONCE DAILY 90 capsule 3  "    spironolactone (ALDACTONE) 50 MG tablet Take 1 tablet (50 mg) by mouth 2 times daily 180 tablet 3     ALLERGIES:   Allergies   Allergen Reactions     Niacin Shortness Of Breath, Other (See Comments) and Nausea and Vomiting     All over body pains (patient reported symptoms)     PHYSICAL EXAM:  Ht 1.626 m (5' 4\")   Wt 69.4 kg (153 lb)   LMP  (LMP Unknown)   BMI 26.26 kg/m     A & O x 3  HEENT: NCAT, mucous membranes moist  Respirations unlabored  Location of obesity: Mixed Obesity    ASSESSMENT:  Olga Lidia is a patient with mature onset overweight with significant element of familial/genetic influence and with current health consequences. She does need aggressive weight loss plan due to Pre-Diabetes, High Blood Pressure, High Cholesterol, Fatty Liver.  Referral is mainly for fatty liver. Also has high cholesterol and statin intolerance.    Olga Lidia Solorzano eats a high fat diet. Has trouble maintaining eating only meat and cheese.    Her problem is complicated by an endocrine disorder. Her main problem is fatty liver and high cholesterol without strong family history of early heart disease. Her diet goals of lean protein and veg are best choice for her situation, but could be loosened to all colored veg and some fruit. Consider GLP-1 for fatty liver and PCSK9i for cholesterol.    PLAN:    Volumetrics eating plan  Meal planning    Programmatic/Healthy Living  Ancillary testing:  N/A.  Food Plan:  Volumetrics and High protein/low carbohydrate.  Activity Plan:  Activity journal.  Supplementary:   Dispel the popular myths about weight loss (eating many small meals, etc).    Medication: Would consider a GLP-1 for treatment of fatty liver, which would also benefit glucose tolerance and weight.    RTC:    12 weeks.    Sincerely,  Sylvester Hughes MD      "

## 2021-12-13 NOTE — PROGRESS NOTES
Olga Lidia is a 51 year old who is being evaluated via a billable video visit.      How would you like to obtain your AVS? MyChart  If the video visit is dropped, the invitation should be resent by: Text to cell phone: [phone]  Will anyone else be joining your video visit? No    Video-Visit Details    Type of service:  Video Visit    Start: 12/13/2021 08:38 am  Stop: 12/13/2021 09:02 am    Originating Location (pt. Location): Home    Distant Location (provider location):  [facility] [place]     Platform used for Video Visit: [employer]

## 2021-12-13 NOTE — LETTER
"2021       RE: Olga Lidia Solorzano  [address] [id number]     Dear Colleague,    Thank you for referring your patient, Olga Lidia Solorzano, to the [facility]. Please see a copy of my visit note below.    Olga Lidia is a 51 year old who is being evaluated via a billable video visit.      How would you like to obtain your AVS? MyChart  If the video visit is dropped, the invitation should be resent by: Text to cell phone: [phone]  Will anyone else be joining your video visit? No    New Medical Weight Management Consult    PATIENT:  Olga Lidia Solorzano  MRN:         [id number]  :         1970  CHIN:         2021    Dear Leo Tejada DO,    I had the pleasure of seeing your patient, Olga Lidia Solorzano.  Full intake/assessment done to determine barriers to weight loss success and develop a treatment plan.  Olga Lidia Solorzano is a 51 year old female interested in treatment of medical problems associated with weight.  Her weight today is 153 lbs 0 oz, Body mass index is 26.26 kg/m ., and she has the following co-morbidities:     2021   I have the following health issues associated with obesity: Pre-Diabetes, High Blood Pressure, High Cholesterol, Fatty Liver   I have the following symptoms associated with obesity: None of the above     Patient Goals 2021   I am interested in having a healthier weight to diminish current health problems: Yes   I am interested in having a healthier weight in order to prevent future health problems: Yes   I am interested in having a healthier weight in order to have a future surgery: No     Referring Provider 2021   Please name the provider who referred you to Medical Weight Management.  If you do not know, please answer: \"I Don't Know\". Dr Santos     Wt Readings from Last 4 Encounters:   21 69.4 kg (153 lb)   21 68.9 kg " (152 lb)   06/07/21 68 kg (150 lb)   04/05/21 67.1 kg (148 lb)     Weight History 12/12/2021   How concerned are you about your weight? Very Concerned   Would you describe your weight gain as gradual? No   I became overweight: As an Adult   The following factors have contributed to my weight gain:  Eating Wrong Types of Food, Genetic (Runs in the Family), Other   Please list the other factors.  insulin resistance   I have tried the following methods to lose weight: Other   Please list the other methods.  low carb /keto diet.   My lowest weight since age 16 was: 115   My highest weight since age 16 was: 172   The most weight I have ever lost was: (lbs) 20   I have the following family history of obesity/being overweight:  My mother is overweight, Many of my relatives are overweight   Has anyone in your family had weight loss surgery? No   How has your weight changed over the last year?  Gained   How many pounds? 15     Diet Recall 12/12/2021   Glass juice/day 0   Glass milk/day 0   Glass sugary drink/day 0   How many cans/bottles of sugar pop/soda/tea/sports drinks do you drink in a day? 0   Diet drink/day 2   Alcohol 2-3 TImes a Week   Drinks/day 1 or 2     Eating Habits 12/12/2021   Generally, my meals include foods like these: bread, pasta, rice, potatoes, corn, crackers, sweet dessert, pop, or juice. Never   Generally, my meals include foods like these: fried meats, brats, burgers, french fries, pizza, cheese, chips, or ice cream. Less Than Weekly   Eat fast food (like [employer], [employer], [employer] Bell). Never   Eat at a buffet or sit-down restaurant. A Few Times a Week   Eat most of my meals in front of the TV or computer. A Few Times a Week   Often skip meals, eat at random times, have no regular eating times. Never   Rarely sit down for a meal but snack or graze throughout.  Less Than Weekly   Eat extra snacks between meals. A Few Times a Week   Eat most of my food at the end of the day. Never   Eat in the  middle of the night or wake up at night to eat. Never   Eat extra snacks to prevent or correct low blood sugar. Never   Eat to prevent acid reflux or stomach pain. A Few Times a Week   Worry about not having enough food to eat. Never   Have you been to the food shelf at least a few times this year? No   I eat when I am depressed. Never   I eat when I am stressed. Never   I eat when I am bored. Almost Everyday   I eat when I am anxious. Never   I eat when I am happy or as a reward. A Few Times a Week   I feel hungry all the time even if I just have eaten. Less Than Weekly   Feeling full is important to me. Never   I finish all the food on my plate even if I am already full. Once a Week   I can't resist eating delicious food or walk past the good food/smell. Once a Week   I eat/snack without noticing that I am eating. Less Than Weekly   I eat when I am preparing the meal. Never   I eat more than usual when I see others eating. Less Than Weekly   I have trouble not eating sweets, ice cream, cookies, or chips if they are around the house. Once a Week   I think about food all day. Once a Week   What foods, if any, do you crave? Chips/Crackers   Please list any other foods you crave? pizza, ketchup,sauces     Activity/Exercise History 12/12/2021   How much of a typical 12 hour day do you spend sitting? Half the Day   How much of a typical 12 hour day do you spend lying down? Less Than Half the Day   How much of a typical day do you spend walking/standing? Half the Day   How many hours (not including work) do you spend on the TV/Video Games/Computer/Tablet/Phone? 2-3 Hours   How many times a week are you active for the purpose of exercise? Never   What keeps you from being more active? Pain, Too tired   How many total minutes do you spend doing some activity for the purpose of exercising when you exercise? None     PAST MEDICAL HISTORY:  Past Medical History:   Diagnosis Date     Anemia 09/2018     DJD of right AC  (acromioclavicular) joint 04/10/2019     Hyperlipidemia LDL goal < 130      Hypertension goal BP (blood pressure) < 140/90      Insulin resistance      IUD (intrauterine device) in place ??2012 or 2013-2/18/19    Paragard     NAFLD (nonalcoholic fatty liver disease)      Obesity      Scoliosis      Uncomplicated asthma      Work/Social History Reviewed With Patient 12/12/2021   My employment status is: Full-Time   My job is: lab tech   How much of your job is spent on the computer or phone? Less Than 50%   How many hours do you spend commuting to work daily?  1   What is your marital status? /In a Relationship   If in a relationship, is your significant other overweight? No   Do you have children? Yes   If you have children, are they overweight? No   Who do you live with?  SO   Are they supportive of your health goals? Yes   Who does the food shopping?  me     Mental Health History Reviewed With Patient 12/12/2021   Have you ever been physically or sexually abused? No   If yes, do you feel that the abuse is affecting your weight? No   If yes, would you like to talk to a counselor about the abuse? N/A   How often in the past 2 weeks have you felt little interest or pleasure in doing things? Nearly Everyday   Over the past 2 weeks how often have you felt down, depressed, or hopeless? Not at all     Sleep History Reviewed With Patient 12/12/2021   How many hours do you sleep at night? 6   Do you think that you snore loudly or has anybody ever heard you snore loudly (louder than talking or so loud it can be heard behind a shut door)? No   Has anyone seen or heard you stop breathing during your sleep? No   Do you often feel tired, fatigued, or sleepy during the day? Yes   Do you have a TV/Computer in your bedroom? No     MEDICATIONS:   Current Outpatient Medications   Medication Sig Dispense Refill     amLODIPine (NORVASC) 5 MG tablet Take 1 tablet (5 mg) by mouth daily 90 tablet 3     KURT 0.05 MG/24HR bi-weekly  "patch REMOVE OLD PATCH AND APPLY ONE NEW PATCH TWICE A WEEK. 8 patch 0     fenofibrate (TRICOR) 48 MG tablet TAKE TWO TABLETS BY MOUTH ONCE DAILY 180 tablet 0     fluticasone (FLOVENT HFA) 220 MCG/ACT inhaler Inhale 2 puffs into the lungs 2 times daily 3 Inhaler 3     levalbuterol (XOPENEX HFA) 45 MCG/ACT inhaler Inhale 2 puffs into the lungs every 6 hours as needed for shortness of breath / dyspnea or wheezing 1 Inhaler 5     pravastatin (PRAVACHOL) 20 MG tablet Take 1 tablet (20 mg) by mouth daily 90 tablet 0     progesterone (PROMETRIUM) 100 MG capsule TAKE ONE CAPSULE BY MOUTH ONCE DAILY 90 capsule 3     spironolactone (ALDACTONE) 50 MG tablet Take 1 tablet (50 mg) by mouth 2 times daily 180 tablet 3     ALLERGIES:   Allergies   Allergen Reactions     Niacin Shortness Of Breath, Other (See Comments) and Nausea and Vomiting     All over body pains (patient reported symptoms)     PHYSICAL EXAM:  Ht 1.626 m (5' 4\")   Wt 69.4 kg (153 lb)   LMP  (LMP Unknown)   BMI 26.26 kg/m     A & O x 3  HEENT: NCAT, mucous membranes moist  Respirations unlabored  Location of obesity: Mixed Obesity    ASSESSMENT:  Olga Lidia is a patient with mature onset overweight with significant element of familial/genetic influence and with current health consequences. She does need aggressive weight loss plan due to Pre-Diabetes, High Blood Pressure, High Cholesterol, Fatty Liver.  Referral is mainly for fatty liver. Also has high cholesterol and statin intolerance.    Olga Lidia Solorzano eats a high fat diet. Has trouble maintaining eating only meat and cheese.    Her problem is complicated by an endocrine disorder. Her main problem is fatty liver and high cholesterol without strong family history of early heart disease. Her diet goals of lean protein and veg are best choice for her situation, but could be loosened to all colored veg and some fruit. Consider GLP-1 for fatty liver and PCSK9i for cholesterol.    PLAN:    Volumetrics eating " plan  Meal planning    Programmatic/Healthy Living  Ancillary testing:  N/A.  Food Plan:  Volumetrics and High protein/low carbohydrate.  Activity Plan:  Activity journal.  Supplementary:   Dispel the popular myths about weight loss (eating many small meals, etc).    Medication: Would consider a GLP-1 for treatment of fatty liver, which would also benefit glucose tolerance and weight.    RTC:    12 weeks.          Again, thank you for allowing me to participate in the care of your patient.      Sincerely,    Sylvester Hughes MD

## 2021-12-21 DIAGNOSIS — Z79.890 HORMONE REPLACEMENT THERAPY (HRT): Primary | ICD-10-CM

## 2021-12-21 RX ORDER — PROGESTERONE 100 MG/1
CAPSULE ORAL
Qty: 90 CAPSULE | Refills: 0 | Status: SHIPPED | OUTPATIENT
Start: 2021-12-21 | End: 2022-02-25

## 2021-12-21 NOTE — TELEPHONE ENCOUNTER
"Requested Prescriptions   Pending Prescriptions Disp Refills     progesterone (PROMETRIUM) 100 MG capsule [Pharmacy Med Name: PROGESTERONE 100MG CAPS] 90 capsule 3     Sig: TAKE ONE CAPSULE BY MOUTH ONCE DAILY       Hormone Replacement Therapy Failed - 12/21/2021 12:35 PM        Failed - Recent (12 mo) or future (30 days) visit within the authorizing provider's specialty     Patient has had an office visit with the authorizing provider or a provider within the authorizing providers department within the previous 12 mos or has a future within next 30 days. See \"Patient Info\" tab in inbasket, or \"Choose Columns\" in Meds & Orders section of the refill encounter.              Failed - Medication is active on med list        Passed - Blood pressure under 140/90 in past 12 months     BP Readings from Last 3 Encounters:   11/02/21 125/83   06/07/21 120/68   04/05/21 122/68                 Passed - Patient has mammogram in past 2 years on file if age 50-75        Passed - Patient is 18 years of age or older        Passed - No active pregnancy on record        Passed - No positive pregnancy test on record in past 12 months           Last Written Prescription Date:  11/9/20  Last Fill Quantity: 90,  # refills: 3   Last office visit: 11/9/2020 with prescribing provider:  Leanne   Future Office Visit:   Next 5 appointments (look out 90 days)    Feb 25, 2022  2:30 PM  PHYSICAL with Jessie Perdomo MD  [facility] [place] ([facility] ) [address] [place] [id number] [phone]         Medication is being filled for 1 time refill only due to:  Patient needs to be seen and that appointment is scheduled.    Dipika Green RN on 12/21/2021 at 12:58 PM            "

## 2022-01-09 ENCOUNTER — HEALTH MAINTENANCE LETTER (OUTPATIENT)
Age: 52
End: 2022-01-09

## 2022-01-10 DIAGNOSIS — I10 HYPERTENSION, ESSENTIAL: ICD-10-CM

## 2022-01-10 DIAGNOSIS — L65.9 HAIR THINNING: ICD-10-CM

## 2022-01-10 RX ORDER — SPIRONOLACTONE 50 MG/1
TABLET, FILM COATED ORAL
Qty: 180 TABLET | Refills: 0 | Status: SHIPPED | OUTPATIENT
Start: 2022-01-10 | End: 2022-02-25

## 2022-01-10 NOTE — TELEPHONE ENCOUNTER
"Requested Prescriptions   Pending Prescriptions Disp Refills     spironolactone (ALDACTONE) 50 MG tablet [Pharmacy Med Name: SPIRONOLACTONE 50MG TABS] 180 tablet 3     Sig: TAKE ONE TABLET BY MOUTH TWICE A DAY       Diuretics (Including Combos) Protocol Failed - 1/10/2022  9:19 AM        Failed - Recent (12 mo) or future (30 days) visit within the authorizing provider's specialty     Patient has had an office visit with the authorizing provider or a provider within the authorizing providers department within the previous 12 mos or has a future within next 30 days. See \"Patient Info\" tab in inbasket, or \"Choose Columns\" in Meds & Orders section of the refill encounter.              Passed - Blood pressure under 140/90 in past 12 months     BP Readings from Last 3 Encounters:   11/02/21 125/83   06/07/21 120/68   04/05/21 122/68                 Passed - Medication is active on med list        Passed - Patient is age 18 or older        Passed - No active pregancy on record        Passed - Normal serum creatinine on file in past 12 months     Recent Labs   Lab Test 10/19/21  0925   CR 0.80              Passed - Normal serum potassium on file in past 12 months     Recent Labs   Lab Test 10/19/21  0925   POTASSIUM 3.9                    Passed - Normal serum sodium on file in past 12 months     Recent Labs   Lab Test 10/19/21  0925                 Passed - No positive pregnancy test in past 12 months           Last Written Prescription Date:  11/09/2020  Last Fill Quantity: 180,  # refills: 3   Last office visit: 11/9/2020 with prescribing provider:  Dr. Perdomo   Future Office Visit:   Next 5 appointments (look out 90 days)    Feb 25, 2022  2:30 PM  PHYSICAL with Jessie Perdomo MD  [facility] ([facility] [place] ) [address] [address] [phone]               "

## 2022-01-10 NOTE — TELEPHONE ENCOUNTER
Medication is being filled for 1 time refill only due to:  Patient needs to be seen because it has been more than one year since last visit.  Siomara Guerrero RN on 1/10/2022 at 12:24 PM

## 2022-01-17 ENCOUNTER — MYC REFILL (OUTPATIENT)
Dept: OBGYN | Facility: CLINIC | Age: 52
End: 2022-01-17
Payer: COMMERCIAL

## 2022-01-17 DIAGNOSIS — Z79.890 HORMONE REPLACEMENT THERAPY (HRT): ICD-10-CM

## 2022-01-17 DIAGNOSIS — N95.1 VASOMOTOR SYMPTOMS DUE TO MENOPAUSE: ICD-10-CM

## 2022-01-17 RX ORDER — ESTRADIOL 0.05 MG/D
PATCH, EXTENDED RELEASE TRANSDERMAL
Qty: 8 PATCH | Refills: 0 | Status: SHIPPED | OUTPATIENT
Start: 2022-01-17 | End: 2022-02-26

## 2022-01-17 NOTE — TELEPHONE ENCOUNTER
"  Requested Prescriptions   Signed Prescriptions Disp Refills    estradiol (KURT) 0.05 MG/24HR bi-weekly patch 8 patch 0     Sig: REMOVE OLD PATCH AND APPLY ONE NEW PATCH TWICE A WEEK.       Hormone Replacement Therapy Failed - 1/17/2022  1:18 PM        Failed - Recent (12 mo) or future (30 days) visit within the authorizing provider's specialty     Patient has had an office visit with the authorizing provider or a provider within the authorizing providers department within the previous 12 mos or has a future within next 30 days. See \"Patient Info\" tab in inbasket, or \"Choose Columns\" in Meds & Orders section of the refill encounter.              Passed - Blood pressure under 140/90 in past 12 months     BP Readings from Last 3 Encounters:   11/02/21 125/83   06/07/21 120/68   04/05/21 122/68                 Passed - Patient has mammogram in past 2 years on file if age 50-75        Passed - Medication is active on med list        Passed - Patient is 18 years of age or older        Passed - No active pregnancy on record        Passed - No positive pregnancy test on record in past 12 months             Medication is being filled for 1 time refill only due to:  Patient needs to be seen because it has been more than one year since last visit.Appt scheduled for 2/25/2022 with .    Prescription approved per [facility] Refill Protocol.    Radha Salas RN    "

## 2022-02-25 ENCOUNTER — OFFICE VISIT (OUTPATIENT)
Dept: OBGYN | Facility: CLINIC | Age: 52
End: 2022-02-25
Payer: COMMERCIAL

## 2022-02-25 VITALS
WEIGHT: 151 LBS | DIASTOLIC BLOOD PRESSURE: 80 MMHG | HEIGHT: 63 IN | SYSTOLIC BLOOD PRESSURE: 138 MMHG | BODY MASS INDEX: 26.75 KG/M2

## 2022-02-25 DIAGNOSIS — N90.4 LICHEN SCLEROSUS ET ATROPHICUS OF THE VULVA: ICD-10-CM

## 2022-02-25 DIAGNOSIS — K76.0 FATTY LIVER: ICD-10-CM

## 2022-02-25 DIAGNOSIS — Z79.890 HORMONE REPLACEMENT THERAPY (HRT): ICD-10-CM

## 2022-02-25 DIAGNOSIS — L65.9 HAIR THINNING: ICD-10-CM

## 2022-02-25 DIAGNOSIS — Z01.419 ENCOUNTER FOR GYNECOLOGICAL EXAMINATION WITHOUT ABNORMAL FINDING: Primary | ICD-10-CM

## 2022-02-25 DIAGNOSIS — I10 HYPERTENSION, ESSENTIAL: ICD-10-CM

## 2022-02-25 DIAGNOSIS — R73.03 PREDIABETES: ICD-10-CM

## 2022-02-25 DIAGNOSIS — E78.2 MIXED HYPERLIPIDEMIA: ICD-10-CM

## 2022-02-25 DIAGNOSIS — N95.1 VASOMOTOR SYMPTOMS DUE TO MENOPAUSE: ICD-10-CM

## 2022-02-25 PROCEDURE — 99396 PREV VISIT EST AGE 40-64: CPT | Performed by: OBSTETRICS & GYNECOLOGY

## 2022-02-25 PROCEDURE — 87624 HPV HI-RISK TYP POOLED RSLT: CPT | Performed by: OBSTETRICS & GYNECOLOGY

## 2022-02-25 PROCEDURE — 99214 OFFICE O/P EST MOD 30 MIN: CPT | Mod: 25 | Performed by: OBSTETRICS & GYNECOLOGY

## 2022-02-25 PROCEDURE — G0145 SCR C/V CYTO,THINLAYER,RESCR: HCPCS | Performed by: OBSTETRICS & GYNECOLOGY

## 2022-02-25 RX ORDER — ESTRADIOL 0.07 MG/D
1 FILM, EXTENDED RELEASE TRANSDERMAL
Qty: 24 PATCH | Refills: 3 | Status: SHIPPED | OUTPATIENT
Start: 2022-02-28 | End: 2023-01-23

## 2022-02-25 RX ORDER — ESTRADIOL 0.05 MG/D
PATCH, EXTENDED RELEASE TRANSDERMAL
Qty: 8 PATCH | Refills: 0 | Status: CANCELLED | OUTPATIENT
Start: 2022-02-25

## 2022-02-25 RX ORDER — PROGESTERONE 100 MG/1
CAPSULE ORAL
Qty: 90 CAPSULE | Refills: 3 | Status: SHIPPED | OUTPATIENT
Start: 2022-02-25 | End: 2023-02-27

## 2022-02-25 RX ORDER — CLOBETASOL PROPIONATE 0.5 MG/G
OINTMENT TOPICAL 2 TIMES DAILY
Qty: 60 G | Refills: 0 | Status: SHIPPED | OUTPATIENT
Start: 2022-02-25 | End: 2024-03-01

## 2022-02-25 RX ORDER — SPIRONOLACTONE 50 MG/1
50 TABLET, FILM COATED ORAL 2 TIMES DAILY
Qty: 180 TABLET | Refills: 3 | Status: SHIPPED | OUTPATIENT
Start: 2022-02-25 | End: 2023-03-02

## 2022-02-25 RX ORDER — METFORMIN HCL 500 MG
TABLET, EXTENDED RELEASE 24 HR ORAL
Qty: 270 TABLET | Refills: 3 | Status: SHIPPED | OUTPATIENT
Start: 2022-02-25 | End: 2022-11-25

## 2022-02-25 ASSESSMENT — ANXIETY QUESTIONNAIRES
5. BEING SO RESTLESS THAT IT IS HARD TO SIT STILL: NOT AT ALL
GAD7 TOTAL SCORE: 0
1. FEELING NERVOUS, ANXIOUS, OR ON EDGE: NOT AT ALL
3. WORRYING TOO MUCH ABOUT DIFFERENT THINGS: NOT AT ALL
7. FEELING AFRAID AS IF SOMETHING AWFUL MIGHT HAPPEN: NOT AT ALL
6. BECOMING EASILY ANNOYED OR IRRITABLE: NOT AT ALL
2. NOT BEING ABLE TO STOP OR CONTROL WORRYING: NOT AT ALL

## 2022-02-25 ASSESSMENT — PATIENT HEALTH QUESTIONNAIRE - PHQ9
5. POOR APPETITE OR OVEREATING: NOT AT ALL
SUM OF ALL RESPONSES TO PHQ QUESTIONS 1-9: 2

## 2022-02-25 NOTE — PROGRESS NOTES
"Kalin is a 51 year old  female who presents for annual exam.     Besides routine health maintenance, she has no other health concerns today.    HPI:  The patient's PCP is Dr. Leo Tejada DO.      Patient does have a PCP that is managing her HTN and her lipids and her pre-DM.  Has fatty liver and was sent to liver specialist when LFTs were elevated and told that was what it was and needed to lose weight  Hardly eats any carbs or sweets as it is. BMI is 26 lost weight a year or two ago when these dx all started and got to 140s doing keto but couldn't sustain that long term as so restrictive.  Liver doc sent to bariatric clinic who said her weight wasn't high enough for meds but too high with her normal habits that had metabolic syndrome. Finally was able to do a virtual consult with tabby who discussed several options and GLP-1 meds was one type though don't see metformin discussed  Has a friend that takes \"an epilepsy med\" that works well for her but that sounds scary to kalin.    Patient has high LDL and trigs. On pravachol 20mg for the former and that is as high as she can tolerate w/just mild myalgias. Any higher dose and can't get out of bed b/c such bad pain. Has tried every other statin and all have this effect. One non statin new med on market is what is being recommended but too high of cost even with insurance coverage and insurance many not cover it so not sure what to do.    Initially in menopause started on vivelle and prometrium and had DUB. Cut her back to 0.05mg vivelle and daily prometrium and that seemed to stop any bleeding and has done that since. However still having night sweats and day time hotflashes on that compared to what she had when on 0.1mg vivelle. Wondering about dose increase.    Also dx'd with MAYA last year. Used clobetasol BID for 3 weeks and all her itching/burning went away. Had some periclitoral fissures at that time and some perineal issues in particular. Since " then hasn't had those sx though occasionally will feel just a tiny mild pinching sensation. Uses the clobetasol prior to running out of it, for a day or two and sx resolve again for the most part. Wondering if supposed to use it forever, prn, and what dosing      GYNECOLOGIC HISTORY:    No LMP recorded (lmp unknown). Patient is postmenopausal.    Her current contraception method is: menopause.  She  reports that she quit smoking about 9 years ago. Her smoking use included cigarettes. She has a 10.00 pack-year smoking history. She has never used smokeless tobacco.    Patient is sexually active.  STD testing offered?  Declined    Last PHQ-9 score on record =   PHQ-9 SCORE 2022   PHQ-9 Total Score 2     Last GAD7 score on record =   TIMO-7 SCORE 2022   Total Score 0     Alcohol Score = 3    HEALTH MAINTENANCE:  Cholesterol:   Recent Labs   Lab Test 21  0725 21  0715 03/25/15  0605 14  0636   CHOL 227* 285* 188 157   HDL 55 59 41* 37*   * 197* 76 68   TRIG 141 146 357* 258*   CHOLHDLRATIO  --   --  4.6 4.2    < > = values in this interval not displayed.     TSH   Date Value Ref Range Status   2020 2.47 0.40 - 4.00 mU/L Final     Last Mammo: 20, Result: Normal, Next Mammo: 22  Pap:   Lab Results   Component Value Date    PAP NIL NEG-HPV 2017      Colonoscopy:  17, Result: Normal, Next Colonoscopy:   Dexa: N/A    Health maintenance updated:  yes    HISTORY:  OB History    Para Term  AB Living   3 1 1 0 2 1   SAB IAB Ectopic Multiple Live Births   0 0 0 0 1      # Outcome Date GA Lbr Davis/2nd Weight Sex Delivery Anes PTL Lv   3 Term 99    M       2 AB            1 AB                Patient Active Problem List   Diagnosis     Hypertension, essential     Mixed hyperlipidemia     Herniated vertebral disc     Mild persistent asthma     Gastroesophageal reflux disease without esophagitis     Bilateral low back pain without sciatica     Hip  pain, right     Partial nontraumatic tear of right rotator cuff     DJD of right AC (acromioclavicular) joint     Fatty liver     Telogen hair loss     Vasomotor symptoms due to menopause     Hormone replacement therapy (HRT)     Prediabetes     Lichen sclerosus et atrophicus of the vulva     Past Surgical History:   Procedure Laterality Date     COLONOSCOPY N/A 2017    Procedure: COLONOSCOPY;  Surgeon: Tato Child MD;  Location:  GI     [facility] VASCULAR SURGERY PROCEDURE UNLIST      vericose vein      Social History     Tobacco Use     Smoking status: Former Smoker     Packs/day: 0.50     Years: 20.00     Pack years: 10.00     Types: Cigarettes     Quit date: 2012     Years since quittin.1     Smokeless tobacco: Never Used   Substance Use Topics     Alcohol use: Yes     Alcohol/week: 0.0 standard drinks     Comment: more on weekends 8 drinks per week or wine or mixed drinks       Problem (# of Occurrences) Relation (Name,Age of Onset)    Blood Disease (1) Paternal Uncle    Breast Cancer (3) Paternal Aunt, Paternal Half-Sister (aunt), Cousin (ariela)    Hyperlipidemia (1) Father (emliy)    Hypertension (3) Mother (wally), Father (emily), Maternal Grandmother (meka)    Lung Cancer (1) Mother (wally)    No Known Problems (6) Sister, Brother, Maternal Grandfather, Paternal Grandmother, Son, Other    Other Cancer (1) Paternal Half-Brother (uncle): leukemia       Negative family history of: Liver Disease            Current Outpatient Medications   Medication Sig     amLODIPine (NORVASC) 5 MG tablet Take 1 tablet (5 mg) by mouth daily     clobetasol (TEMOVATE) 0.05 % external ointment Apply topically 2 times daily     [START ON 2022] estradiol (VIVELLE-DOT) 0.075 MG/24HR BIW patch Place 1 patch onto the skin twice a week     fenofibrate (TRICOR) 48 MG tablet TAKE TWO TABLETS BY MOUTH ONCE DAILY     fluticasone (FLOVENT HFA) 220 MCG/ACT inhaler Inhale 2 puffs into the lungs 2 times daily      "levalbuterol (XOPENEX HFA) 45 MCG/ACT inhaler Inhale 2 puffs into the lungs every 6 hours as needed for shortness of breath / dyspnea or wheezing     metFORMIN (GLUCOPHAGE-XR) 500 MG 24 hr tablet Take one tab po with meals and increase every week, as tolerated, by 500mg to a max of 2000/day or 1000 mg BID     pravastatin (PRAVACHOL) 20 MG tablet TAKE 1 TABLET (20 MG) BY MOUTH DAILY     progesterone (PROMETRIUM) 100 MG capsule TAKE ONE CAPSULE BY MOUTH ONCE DAILY     spironolactone (ALDACTONE) 50 MG tablet Take 1 tablet (50 mg) by mouth 2 times daily     No current facility-administered medications for this visit.     Allergies   Allergen Reactions     Niacin Shortness Of Breath, Other (See Comments) and Nausea and Vomiting     All over body pains (patient reported symptoms)       Past medical, surgical, social and family histories were reviewed and updated in EPIC.    ROS:   12 point review of systems negative other than symptoms noted below or in the HPI.  No urinary frequency or dysuria, bladder or kidney problems, POSITIVE for:, vaginal itching or burning, hot flashes/nightsweats    EXAM:  /80   Ht 1.607 m (5' 3.25\")   Wt 68.5 kg (151 lb)   LMP  (LMP Unknown)   BMI 26.54 kg/m     BMI: Body mass index is 26.54 kg/m .    PHYSICAL EXAM:  Constitutional:   Appearance: Well nourished, well developed, alert, in no acute distress  Neck:  Lymph Nodes:  No lymphadenopathy present    Thyroid:  Gland size normal, nontender, no nodules or masses present  on palpation  Chest:  Respiratory Effort:  Breathing unlabored, CTA bilaterally  Cardiovascular:    Heart: Auscultation:  Regular rate, normal rhythm, no murmurs present  Breasts: Palpation of Breasts and Axillae:  No masses present on palpation, no breast tenderness., Axillary Lymph Nodes:  No lymphadenopathy present. and No nodularity, asymmetry or nipple discharge bilaterally.  Gastrointestinal:   Abdominal Examination:  Abdomen nontender to palpation, tone " normal without rigidity or guarding, no masses present, umbilicus without lesions   Liver and Spleen:  No hepatomegaly present, liver nontender to palpation    Hernias:  No hernias present  Lymphatic: Lymph Nodes:  No other lymphadenopathy present  Skin:  General Inspection:  No rashes present, no lesions present, no areas of  discoloration  Neurologic:    Mental Status:  Oriented X3.  Normal strength and tone, sensory exam                grossly normal, mentation intact and speech normal.    Psychiatric:   Mentation appears normal and affect normal/bright.         Pelvic Exam:  External Genitalia:     Normal appearance for age, no discharge present, no tenderness present, no inflammatory lesions present, MILDLY ERYTHEMATOUS ON INNER LABIA MAJORA AND OUTER MINORA. NO FISSURING AT THE CLITORIS BUT JUST AT THE JUNCTION TO HAIR BEARING AREA THERE IS A SMALL AMOUNT OF WHITENING AND SHININESS APPEARANCE  Vagina:     Normal vaginal vault without central or paravaginal defects, ATROPHIC  Bladder:     Nontender to palpation  Urethra:   Urethral Body:  Urethra palpation normal, urethra structural support normal   Urethral Meatus:  No erythema or lesions present  Cervix:     Appearance healthy, no lesions present, nontender to palpation, no bleeding present  Uterus:     Nontender to palpation, no masses present, position anteflexed, mobility: normal  Adnexa:     No adnexal tenderness present, no adnexal masses present  Perineum:     Perineum within normal limits, no evidence of trauma, no rashes or skin lesions present, ON WHITENING, L.S, ABNORMALITIES  Inguinal Lymph Nodes:     No lymphadenopathy present      COUNSELING:   Reviewed preventive health counseling, as reflected in patient instructions  Special attention given to:        lichen sclerosus, weight, metabolic syndrome       Regular exercise       Healthy diet/nutrition       (Ashlee)menopause management    BMI: Body mass index is 26.54 kg/m .      ASSESSMENT:  51 year  old female with satisfactory annual exam.    ICD-10-CM    1. Encounter for gynecological examination without abnormal finding  Z01.419 Pap screen with HPV - recommended age 30 - 65 years   2. Hypertension, essential  I10 spironolactone (ALDACTONE) 50 MG tablet   3. Hair thinning  L65.9 spironolactone (ALDACTONE) 50 MG tablet   4. Vasomotor symptoms due to menopause  N95.1 estradiol (VIVELLE-DOT) 0.075 MG/24HR BIW patch   5. Hormone replacement therapy (HRT)  Z79.890 progesterone (PROMETRIUM) 100 MG capsule     estradiol (VIVELLE-DOT) 0.075 MG/24HR BIW patch   6. Prediabetes  R73.03 metFORMIN (GLUCOPHAGE-XR) 500 MG 24 hr tablet   7. Fatty liver  K76.0 metFORMIN (GLUCOPHAGE-XR) 500 MG 24 hr tablet   8. Mixed hyperlipidemia  E78.2    9. Lichen sclerosus et atrophicus of the vulva  N90.4 clobetasol (TEMOVATE) 0.05 % external ointment       PLAN:  Pap today  mammo next Monday  Should discuss dexa next year    Patient was having some DUB on her initial HRT regimen and cut her estrogen back to 0.05mg and the DUB stopped. However more likely it was when prometrium became generic and many were having DUB on the typical 100mg at bedtime and eventually realized that either 200mg at bedtime or provera was needed, rather than decreasing ERT component.  Has had no bleeding at all in recent years and yet her v.m sx aren't well controlled  Will bump her vivelle up to 0.075mg 2x/week and keep her prometrium at 100mg at bedtime for now. If any PMB then will let me know and if sx still not well controlled then could consider increase to 0.1mg. however with her other metabolic syndrome issues, lower VTE risk with lower HRT is best within balancing quality of life and sx control  Transdermal E2 should also not affect her lipids, DM, etc    Aldactone 50mg BID has been used for slowing rapid hair thinning likely at menopause time. Has been stable with less active loss and no electrolyte issues so refill sent in    Discussed her L.S and  seems to be improved. The area near the mons may not be L.S as doesn't typically affect hair bearing regions. Not typical of HSV and may just be other dermatitis  Refill on clobetasol sent as can use it regardless of dermatitis issue on vulva.   If no response then may need to assess for other cause in this location  Also some reddening of labia that is likely more atrophy related. Increased E2 may help but could consider vaginal E2 as well  Discussed that if has typical L.S then BID for 7-10 days rather than once a day spot treatment as then just never in remission and keeps flaring    Discussed her weight and already quite low carb/sugar intake and her BMI being only 26 yet having metabolic syndrome and not quite qualifying for medication mgmt of weight despite having all the comorbidities  Could always consider phentermine despite BMI of only 26 though off label usage  GLP-1 may also be beneficial given pre-dm and defer that to endo  However could consider metformin as easier to tolerate, more cost effective, despite nearly normal weight clearly has insulin resistance and it could help with weight loss and pre-DM mgmt  Discussed metformin and starting with 500g and titrating up to 1500/day or even 2000/day if tolerating GI side effects. Can do all at once or do 1000 BID and take with food to help with GI issues  Will see how she's doing in terms of weight and A1C and fasting glucose number with her PCP when sees them next and can continue on it or change to something else.  dsicussed her friends topamax as likely what she was referring to and could certainly consider that or contrave as well but have had fairly limited benefit in my patients who have used it, but not contraindicated for her either.    On the date of the encounter, 30 additional minutes, on top of her preventative gyn annual,  was spent on reviewing imaging, lab work, and other provider notes, along with direct management of the patient's medical  issues as above.      Jessie Perdomo MD

## 2022-02-26 PROBLEM — N95.1 VASOMOTOR SYMPTOMS DUE TO MENOPAUSE: Status: ACTIVE | Noted: 2022-02-26

## 2022-02-26 PROBLEM — Z79.890 HORMONE REPLACEMENT THERAPY (HRT): Status: ACTIVE | Noted: 2022-02-26

## 2022-02-26 PROBLEM — N90.4 LICHEN SCLEROSUS ET ATROPHICUS OF THE VULVA: Status: ACTIVE | Noted: 2022-02-26

## 2022-02-26 PROBLEM — Z97.5 PRESENCE OF INTRAUTERINE CONTRACEPTIVE DEVICE: Status: RESOLVED | Noted: 2017-02-23 | Resolved: 2022-02-26

## 2022-02-26 PROBLEM — R73.03 PREDIABETES: Status: ACTIVE | Noted: 2022-02-26

## 2022-02-26 ASSESSMENT — ANXIETY QUESTIONNAIRES: GAD7 TOTAL SCORE: 0

## 2022-02-28 ENCOUNTER — ANCILLARY PROCEDURE (OUTPATIENT)
Dept: MAMMOGRAPHY | Facility: CLINIC | Age: 52
End: 2022-02-28
Payer: COMMERCIAL

## 2022-02-28 DIAGNOSIS — Z12.31 VISIT FOR SCREENING MAMMOGRAM: ICD-10-CM

## 2022-02-28 PROCEDURE — 77063 BREAST TOMOSYNTHESIS BI: CPT | Mod: TC | Performed by: RADIOLOGY

## 2022-02-28 PROCEDURE — 77067 SCR MAMMO BI INCL CAD: CPT | Mod: TC | Performed by: RADIOLOGY

## 2022-03-02 LAB
BKR LAB AP GYN ADEQUACY: NORMAL
BKR LAB AP GYN INTERPRETATION: NORMAL
BKR LAB AP HPV REFLEX: NORMAL
BKR LAB AP PREVIOUS ABNORMAL: NORMAL
HUMAN PAPILLOMA VIRUS 16 DNA: NEGATIVE
HUMAN PAPILLOMA VIRUS 18 DNA: NEGATIVE
HUMAN PAPILLOMA VIRUS FINAL DIAGNOSIS: NORMAL
HUMAN PAPILLOMA VIRUS OTHER HR: NEGATIVE
PATH REPORT.COMMENTS IMP SPEC: NORMAL
PATH REPORT.COMMENTS IMP SPEC: NORMAL
PATH REPORT.RELEVANT HX SPEC: NORMAL

## 2022-03-09 ENCOUNTER — OFFICE VISIT (OUTPATIENT)
Dept: FAMILY MEDICINE | Facility: CLINIC | Age: 52
End: 2022-03-09
Payer: COMMERCIAL

## 2022-03-09 VITALS
BODY MASS INDEX: 26.93 KG/M2 | SYSTOLIC BLOOD PRESSURE: 124 MMHG | TEMPERATURE: 98 F | HEART RATE: 76 BPM | OXYGEN SATURATION: 98 % | HEIGHT: 63 IN | WEIGHT: 152 LBS | RESPIRATION RATE: 16 BRPM | DIASTOLIC BLOOD PRESSURE: 74 MMHG

## 2022-03-09 DIAGNOSIS — K76.0 NON-ALCOHOLIC FATTY LIVER DISEASE: ICD-10-CM

## 2022-03-09 DIAGNOSIS — Z87.891 SMOKING HISTORY: ICD-10-CM

## 2022-03-09 DIAGNOSIS — M79.644 PAIN IN FINGER OF BOTH HANDS: Primary | ICD-10-CM

## 2022-03-09 DIAGNOSIS — S46.011S TRAUMATIC TEAR OF RIGHT ROTATOR CUFF, UNSPECIFIED TEAR EXTENT, SEQUELA: ICD-10-CM

## 2022-03-09 DIAGNOSIS — Z80.1 FAMILY HISTORY OF LUNG CANCER: ICD-10-CM

## 2022-03-09 DIAGNOSIS — Z87.898 HISTORY OF PREDIABETES: ICD-10-CM

## 2022-03-09 DIAGNOSIS — M79.645 PAIN IN FINGER OF BOTH HANDS: Primary | ICD-10-CM

## 2022-03-09 DIAGNOSIS — Z13.29 SCREENING FOR THYROID DISORDER: ICD-10-CM

## 2022-03-09 DIAGNOSIS — M25.512 LEFT SHOULDER PAIN, UNSPECIFIED CHRONICITY: ICD-10-CM

## 2022-03-09 DIAGNOSIS — E78.5 HYPERLIPIDEMIA WITH TARGET LDL LESS THAN 130: ICD-10-CM

## 2022-03-09 PROCEDURE — 99214 OFFICE O/P EST MOD 30 MIN: CPT | Performed by: FAMILY MEDICINE

## 2022-03-09 RX ORDER — PRAVASTATIN SODIUM 20 MG
20 TABLET ORAL DAILY
Qty: 90 TABLET | Refills: 0 | Status: CANCELLED | OUTPATIENT
Start: 2022-03-09

## 2022-03-09 ASSESSMENT — PAIN SCALES - GENERAL: PAINLEVEL: MODERATE PAIN (4)

## 2022-03-09 NOTE — PATIENT INSTRUCTIONS
Work with weight management  Continue current   Get labs now  If labs are unchanged  Increase statin to 2 tabs and sent me a message    Follow up with hand specialist    Recheck in 3 months      Patient Education

## 2022-03-09 NOTE — PROGRESS NOTES
ICD-10-CM    1. Pain in finger of both hands  M79.645 Orthopedic  Referral    M79.644 Physical Therapy Referral     Rheumatoid factor     ESR: Erythrocyte sedimentation rate   2. Hyperlipidemia with target LDL less than 130  E78.5    3. Non-alcoholic fatty liver disease  K76.0 Lipid panel reflex to direct LDL Fasting     Hepatic panel (Albumin, ALT, AST, Bili, Alk Phos, TP)   4. Left shoulder pain, unspecified chronicity  M25.512 Orthopedic  Referral     Physical Therapy Referral   5. Traumatic tear of right rotator cuff, unspecified tear extent, sequela  S46.011S Physical Therapy Referral   6. History of prediabetes  Z87.898    7. Smoking history  Z87.891 CT Chest Lung Cancer Scrn Low Dose wo   8. Family history of lung cancer  Z80.1 CT Chest Lung Cancer Scrn Low Dose wo   9. Screening for thyroid disorder  Z13.29 TSH with free T4 reflex     Hand pain-looks more arthritis changes, advised hand specialist if persiting pain, physical therapy    History of shoulder pain-physical therapy and follow up with ortho    NAFLD-with mild lft changes, she is on statin, advised increasing statin if lipids are still high    History of prediabetes/fatty liver dz-saw endocrine for weight loss, she is on metformin, if not improving consider GLP-1, not sure insurance will cover it    History of smoking -ct low dose ordered    Work with weight management  Continue current   Get labs now  If labs are unchanged  Increase statin to 2 tabs and sent me a message    Follow up with hand specialist  Oni   Olga Lidia is a 51 year old who presents for the following health issues  accompanied by her self.    History of Present Illness       Reason for visit:  Pain and medication  Symptom onset:  More than a month  Symptoms include:  Pain in hands and shoulders  Symptom intensity:  Severe  Symptom progression:  Worsening  Had these symptoms before:  Yes  Has tried/received treatment for these symptoms:  No  What makes it  worse:  Moving  What makes it better:  No    She eats 2-3 servings of fruits and vegetables daily.She consumes 0 sweetened beverage(s) daily.She exercises with enough effort to increase her heart rate 10 to 19 minutes per day.  She exercises with enough effort to increase her heart rate 3 or less days per week.   She is taking medications regularly.       Discuss Endocrinology recommendations  Started Metformin by OBGYN.   Patient is taking pravastatin currently and wants to discuss alternative     Pain History:  When did you first notice your pain? - 1 to 6 weeks   Have you seen any provider previously for this issue? No  How has your pain affected your ability to work? Bothersome, can still work   Where in your body do you have pain? Musculoskeletal problem/pain  Onset/Duration: over one month  Description  Location: Right shoulder (torn rotater cuff) -  Left arm started to begin bothwering her more recently. Hard to put on coat and move arm back without sharp pain   Both hands are bothersome, possible arthritis   Joint Swelling: YES and freezing of joints in hands   Redness: no  Pain: YES, pain in hands is constant  Warmth: no  Intensity:  severe  Progression of Symptoms:  worsening  Accompanying signs and symptoms:   Fevers: no  Numbness/tingling/weakness: no  History  Trauma to the area: no  Recent illness:  no  Previous similar problem: no  Previous evaluation:  no  Precipitating or alleviating factors:  Aggravating factors include: moving      Hand pain progressive, all finges, no trauma, repetitive work  Decrease mobility  Wakes her up at night now, progressive  No real swell  No family history of RA,       Right shoulder with history of rotator cuff, MRI was done and was told to have sugery , did not get better, did not do surgery  Now  Left shoulder hurts, can not go back , kavitha, no injury , it just started few weeks ago, no real weakness, no neck pain, no trauma,    Hard to put coat on    Both shoulder  nigh time pain, no real weakness    IMPRESSION:  1. 1.7 cm linear longitudinal tear involving the supraspinatus tendon  and adjacent infraspinatus tendon. This has near full-thickness  extension.  2. 0.7 cm linear interstitial infraspinatus tendon tear.     BRANDO QIU MD      Fatty liver disease  Weight management clinic referral , not fat enough      High cholesterol , taking tricor, and pravachol    She is using it for metformin for weight loss, just started it,       Review of Systems   Constitutional, HEENT, cardiovascular, pulmonary, GI, , musculoskeletal, neuro, skin, endocrine and psych systems are negative, except as otherwise noted.      Objective    LMP  (LMP Unknown)   There is no height or weight on file to calculate BMI.  Physical Exam   GENERAL: healthy, alert and no distress  NECK: no adenopathy, no asymmetry, masses, or scars and thyroid normal to palpation  RESP: lungs clear to auscultation - no rales, rhonchi or wheezes  CV: regular rate and rhythm, normal S1 S2, no S3 or S4, no murmur, click or rub, no peripheral edema and peripheral pulses strong  ABDOMEN: soft, nontender, no hepatosplenomegaly, no masses and bowel sounds normal  MS: arthritis bony changes, ,normal hand mvt,  no gross musculoskeletal defects noted, no edema

## 2022-03-15 ENCOUNTER — HOSPITAL ENCOUNTER (OUTPATIENT)
Dept: CT IMAGING | Facility: CLINIC | Age: 52
Discharge: HOME | End: 2022-03-15
Attending: FAMILY MEDICINE | Admitting: FAMILY MEDICINE
Payer: COMMERCIAL

## 2022-03-15 DIAGNOSIS — Z87.891 SMOKING HISTORY: ICD-10-CM

## 2022-03-15 DIAGNOSIS — Z80.1 FAMILY HISTORY OF LUNG CANCER: ICD-10-CM

## 2022-03-15 PROCEDURE — 71271 CT THORAX LUNG CANCER SCR C-: CPT

## 2022-03-16 ENCOUNTER — LAB (OUTPATIENT)
Dept: LAB | Facility: CLINIC | Age: 52
End: 2022-03-16
Attending: FAMILY MEDICINE
Payer: COMMERCIAL

## 2022-03-16 ENCOUNTER — MYC MEDICAL ADVICE (OUTPATIENT)
Dept: FAMILY MEDICINE | Facility: CLINIC | Age: 52
End: 2022-03-16

## 2022-03-16 DIAGNOSIS — R73.9 ELEVATED BLOOD SUGAR: ICD-10-CM

## 2022-03-16 DIAGNOSIS — M79.644 PAIN IN FINGER OF BOTH HANDS: ICD-10-CM

## 2022-03-16 DIAGNOSIS — M79.645 PAIN IN FINGER OF BOTH HANDS: ICD-10-CM

## 2022-03-16 DIAGNOSIS — R73.9 ELEVATED BLOOD SUGAR: Primary | ICD-10-CM

## 2022-03-16 DIAGNOSIS — K76.0 NON-ALCOHOLIC FATTY LIVER DISEASE: ICD-10-CM

## 2022-03-16 DIAGNOSIS — Z13.29 SCREENING FOR THYROID DISORDER: ICD-10-CM

## 2022-03-16 LAB
ALBUMIN SERPL-MCNC: 4.3 G/DL (ref 3.4–5)
ALP SERPL-CCNC: 42 U/L (ref 40–150)
ALT SERPL W P-5'-P-CCNC: 112 U/L (ref 0–50)
AST SERPL W P-5'-P-CCNC: 57 U/L (ref 0–45)
BILIRUB DIRECT SERPL-MCNC: 0.1 MG/DL (ref 0–0.2)
BILIRUB SERPL-MCNC: 0.4 MG/DL (ref 0.2–1.3)
CHOLEST SERPL-MCNC: 205 MG/DL
ERYTHROCYTE [SEDIMENTATION RATE] IN BLOOD BY WESTERGREN METHOD: 9 MM/HR (ref 0–30)
FASTING STATUS PATIENT QL REPORTED: YES
FASTING STATUS PATIENT QL REPORTED: YES
GLUCOSE BLD-MCNC: 115 MG/DL (ref 70–99)
HDLC SERPL-MCNC: 58 MG/DL
LDLC SERPL CALC-MCNC: 117 MG/DL
NONHDLC SERPL-MCNC: 147 MG/DL
PROT SERPL-MCNC: 7.8 G/DL (ref 6.8–8.8)
RHEUMATOID FACT SER NEPH-ACNC: <6 IU/ML
TRIGL SERPL-MCNC: 149 MG/DL
TSH SERPL DL<=0.005 MIU/L-ACNC: 1.81 MU/L (ref 0.4–4)

## 2022-03-16 PROCEDURE — 36415 COLL VENOUS BLD VENIPUNCTURE: CPT

## 2022-03-16 PROCEDURE — 84443 ASSAY THYROID STIM HORMONE: CPT

## 2022-03-16 PROCEDURE — 86431 RHEUMATOID FACTOR QUANT: CPT

## 2022-03-16 PROCEDURE — 80076 HEPATIC FUNCTION PANEL: CPT

## 2022-03-16 PROCEDURE — 85652 RBC SED RATE AUTOMATED: CPT

## 2022-03-16 PROCEDURE — 80061 LIPID PANEL: CPT

## 2022-03-16 PROCEDURE — 82947 ASSAY GLUCOSE BLOOD QUANT: CPT

## 2022-03-16 NOTE — TELEPHONE ENCOUNTER
Routing My Chart message to [place] providers.    Lab pended. Please click on yellow so lab is added on    Patient wanting glucose added to this AM labs.    Provider willing to add on in Dr. Tejada absence.    Rian Valencia, RN, BSN, PHN  [facility]

## 2022-03-16 NOTE — PROGRESS NOTES
CHIEF COMPLAINT:  Pain of the Left Hand and Pain of the Right Hand     HISTORY OF PRESENT ILLNESS  Ms. Solorzano is a pleasant 51 year old year old female who presents to clinic today with bilateral hand pain.  Olga Lidia explains that worsening generalized bilateral hand pain, majority of discomfort at night.  Patient is right hand dominant.    Onset: gradual  Location: bilateral hand - 3rd - 5th fingers  Quality:  aching and throbbing  Duration: 2+ years   Severity: 7/10 at worst  Timing:constant  Modifying factors:  resting and non-use makes it better, movement and use makes it worse  Associated signs & symptoms: pain and soft tissue mass  Previous similar pain: Yes  Treatments to date:OTC pain medications    Additional history: She also notes small nodes on her hand that appear to be thickened skin.  Mom with history of Dupuytren's Contracture.    Review of Systems: A 10-point review of systems was obtained and is negative except for as noted in the HPI.     MEDICAL HISTORY  Patient Active Problem List   Diagnosis     Hypertension, essential     Mixed hyperlipidemia     Herniated vertebral disc     Mild persistent asthma     Gastroesophageal reflux disease without esophagitis     Bilateral low back pain without sciatica     Hip pain, right     Partial nontraumatic tear of right rotator cuff     DJD of right AC (acromioclavicular) joint     Fatty liver     Telogen hair loss     Vasomotor symptoms due to menopause     Hormone replacement therapy (HRT)     Prediabetes     Lichen sclerosus et atrophicus of the vulva       Current Outpatient Medications   Medication Sig Dispense Refill     amLODIPine (NORVASC) 5 MG tablet Take 1 tablet (5 mg) by mouth daily 90 tablet 3     clobetasol (TEMOVATE) 0.05 % external ointment Apply topically 2 times daily 60 g 0     estradiol (VIVELLE-DOT) 0.075 MG/24HR BIW patch Place 1 patch onto the skin twice a week 24 patch 3     fenofibrate (TRICOR) 48 MG tablet TAKE TWO TABLETS BY MOUTH  ONCE DAILY 180 tablet 0     fluticasone (FLOVENT HFA) 220 MCG/ACT inhaler Inhale 2 puffs into the lungs 2 times daily 3 Inhaler 3     levalbuterol (XOPENEX HFA) 45 MCG/ACT inhaler Inhale 2 puffs into the lungs every 6 hours as needed for shortness of breath / dyspnea or wheezing 1 Inhaler 5     metFORMIN (GLUCOPHAGE-XR) 500 MG 24 hr tablet Take one tab po with meals and increase every week, as tolerated, by 500mg to a max of 2000/day or 1000 mg  tablet 3     pravastatin (PRAVACHOL) 20 MG tablet TAKE 1 TABLET (20 MG) BY MOUTH DAILY 90 tablet 0     progesterone (PROMETRIUM) 100 MG capsule TAKE ONE CAPSULE BY MOUTH ONCE DAILY 90 capsule 3     spironolactone (ALDACTONE) 50 MG tablet Take 1 tablet (50 mg) by mouth 2 times daily 180 tablet 3       Allergies   Allergen Reactions     Niacin Shortness Of Breath, Other (See Comments) and Nausea and Vomiting     All over body pains (patient reported symptoms)       Family History   Problem Relation Age of Onset     Hypertension Mother      Lung Cancer Mother      Hypertension Father      Hyperlipidemia Father      No Known Problems Sister      No Known Problems Brother      Hypertension Maternal Grandmother      No Known Problems Maternal Grandfather      No Known Problems Paternal Grandmother      No Known Problems Son      Breast Cancer Paternal Aunt      Blood Disease Paternal Uncle      Other Cancer Paternal Half-Brother         leukemia     Breast Cancer Paternal Half-Sister      Breast Cancer Cousin      No Known Problems Other      Liver Disease No family hx of        Additional medical/Social/Surgical histories reviewed in [facility] and updated as appropriate.       PHYSICAL EXAM  LMP  (LMP Unknown)     Musculoskeletal: Bilateral hand  Inspection: Left second and third digits 0.5mm nodule at palmar aspect. Right hand palm 3rd and fourth digits with 0.5mm flesh colored nodule.  Palpation:  Nodules are nontender to palpation.   Nontender throughout the remainder of  hand and fingers. No tenderness to palpation overlying anatomical snuffbox.   Range of motion: Good overall range of motion of thumb in all planes.  Fingers with normal flexion and extension.    No rotational malalignment or scissoring of digits.  Strength: Full strength of flexion and extension of digits.   strength normal compared bilaterally.   Special Tests:   -Finkelstein Test: Negative  -Tinel's sign: Negative  -Median nerve compression test: Negative  -Phalen's test: Negative  -Medial and lateral stress of fingers: No laxity  Neurologic: Sensation intact throughout hand and fingers  Vascular: Capillary refill <2s.  Digits warm, well perfused.     IMAGING : XR hands 3V. Final results and radiologist's interpretation, available in the [facility] health record. Images were reviewed with the patient/family members in the office today. My personal interpretation of the performed imaging is no acute osseous abnormalities.     ASSESSMENT & PLAN  Ms. Solorzano is a 51 year old year old female who presents to clinic today with bilateral hand pain, small nodules and left shoulder pain. DDx hand pain includes early OA, ulnar neuropathy, or tendinitis.    Suspected early dupuytren's disease as cause for small nodules without cords or contractures. +family hx. I do not believe this to be cause for her hand pain though.     Left shoulder pain noted as well.  Possibly rotator cuff tendinopathy however we will consider imaging/addressing more thoroughly at follow up.    Diagnosis:  Pain of fingers of both hands  Nodules of hands  Pain of left shoulder    -Start hand therapy - OT for strengthening, ROM and modalities  -Monitor nodules for progression, discussed typical treatment options if this does progress to classic dupuytren's  -Follow up 6 weeks    It was a pleasure seeing Olga Lidia today.    Hiren Barnett DO, [facility]  Primary Care Sports Medicine

## 2022-03-16 NOTE — LETTER
"March 16, 2022      Olga Lidia Solorzano  [address] [address]        Dear ,    We are writing to inform you of your test results.  Your cholesterol is abnormal, please use the recommendations below and recheck labs in 6-12 months.     Ways to improve your cholesterol...     1- Eats less saturated fats (including avoiding \"trans\" fats).     2 - Eat more unsaturated fats  - found in vegetables, grains, and tree nuts.   Also by replacing butter with canola oil or olive oil.     3 - Eat more nuts. 1-2 ounces (a small handful) of almonds, walnuts, hazelnuts or pecans once a day in place of other less healthy snacks.     4 - Eat more high fiber foods - vegetables and whole grains including oat bran, oats, beans, peas, and flax seed.     5 - Eat more fish - such as salmon, tuna, mackerel, and sardines.  1 or 2 six ounce servings per week is a healthy replacement for other proteins.     6 - Exercise for at least 120 minutes per week - which is equal to 30 minutes 4 days per week.     Resulted Orders   Lipid panel reflex to direct LDL Fasting   Result Value Ref Range    Cholesterol 205 (H) <200 mg/dL    Triglycerides 149 <150 mg/dL    Direct Measure HDL 58 >=50 mg/dL    LDL Cholesterol Calculated 117 (H) <=100 mg/dL    Non HDL Cholesterol 147 (H) <130 mg/dL    Patient Fasting > 8hrs? Yes     Narrative    Cholesterol  Desirable:  <200 mg/dL    Triglycerides  Normal:  Less than 150 mg/dL  Borderline High:  150-199 mg/dL  High:  200-499 mg/dL  Very High:  Greater than or equal to 500 mg/dL    Direct Measure HDL  Female:  Greater than or equal to 50 mg/dL   Male:  Greater than or equal to 40 mg/dL    LDL Cholesterol  Desirable:  <100mg/dL  Above Desirable:  100-129 mg/dL   Borderline High:  130-159 mg/dL   High:  160-189 mg/dL   Very High:  >= 190 mg/dL    Non HDL Cholesterol  Desirable:  130 mg/dL  Above Desirable:  130-159 mg/dL  Borderline High:  160-189 mg/dL  High:  190-219 mg/dL  Very " High:  Greater than or equal to 220 mg/dL   Hepatic panel (Albumin, ALT, AST, Bili, Alk Phos, TP)   Result Value Ref Range    Bilirubin Total 0.4 0.2 - 1.3 mg/dL    Bilirubin Direct 0.1 0.0 - 0.2 mg/dL    Protein Total 7.8 6.8 - 8.8 g/dL    Albumin 4.3 3.4 - 5.0 g/dL    Alkaline Phosphatase 42 40 - 150 U/L    AST 57 (H) 0 - 45 U/L     (H) 0 - 50 U/L   ESR: Erythrocyte sedimentation rate   Result Value Ref Range    Erythrocyte Sedimentation Rate 9 0 - 30 mm/hr   TSH with free T4 reflex   Result Value Ref Range    TSH 1.81 0.40 - 4.00 mU/L     If you have any questions or concerns, please call the clinic at the number listed above.     Sincerely,    Leo Tejada DO/kb

## 2022-03-16 NOTE — RESULT ENCOUNTER NOTE
"Your cholesterol is abnormal, please use the recommendations below and recheck labs in 6-12 months.    Ways to improve your cholesterol...    1- Eats less saturated fats (including avoiding \"trans\" fats).    2 - Eat more unsaturated fats  - found in vege  tables, grains, and tree nuts.   Also by replacing butter with canola oil or olive oil.    3 - Eat more nuts.   1-2 ounces (a small handful) of almonds, walnuts, hazelnuts or pecans once a  day in place of other less healthy snacks.    4 - Eat more high   fiber foods - vegetables and whole grains including oat bran, oats, beans, peas, and flax seed.    5 - Eat more fish - such as salmon, tuna, mackerel, and sardines.  1 or 2 six ounce servings per week is a healthy replacement for other proteins.    6 - E  xercise for at least 120 minutes per week - which is equal to 30 minutes 4 days per week.    Leo Tejada D.O.    "

## 2022-03-24 ENCOUNTER — OFFICE VISIT (OUTPATIENT)
Dept: ORTHOPEDICS | Facility: CLINIC | Age: 52
End: 2022-03-24
Payer: COMMERCIAL

## 2022-03-24 ENCOUNTER — ANCILLARY PROCEDURE (OUTPATIENT)
Dept: GENERAL RADIOLOGY | Facility: CLINIC | Age: 52
End: 2022-03-24
Attending: FAMILY MEDICINE
Payer: COMMERCIAL

## 2022-03-24 VITALS — BODY MASS INDEX: 26.93 KG/M2 | HEIGHT: 63 IN | WEIGHT: 152 LBS

## 2022-03-24 DIAGNOSIS — M79.645 PAIN IN FINGER OF BOTH HANDS: ICD-10-CM

## 2022-03-24 DIAGNOSIS — M79.644 PAIN IN FINGER OF BOTH HANDS: ICD-10-CM

## 2022-03-24 PROCEDURE — 73130 X-RAY EXAM OF HAND: CPT | Mod: RT | Performed by: INTERNAL MEDICINE

## 2022-03-24 PROCEDURE — 99203 OFFICE O/P NEW LOW 30 MIN: CPT | Performed by: FAMILY MEDICINE

## 2022-03-24 NOTE — LETTER
3/24/2022         RE: Olga Lidia Solorzano  [address] [address]        Dear Colleague,    Thank you for referring your patient, Olga Lidia Solorzano, to the [facility] [place]. Please see a copy of my visit note below.    CHIEF COMPLAINT:  Pain of the Left Hand and Pain of the Right Hand     HISTORY OF PRESENT ILLNESS  Ms. Solorzano is a pleasant 51 year old year old female who presents to clinic today with bilateral hand pain.  Olga Lidia explains that worsening generalized bilateral hand pain, majority of discomfort at night.  Patient is right hand dominant.    Onset: gradual  Location: bilateral hand - 3rd - 5th fingers  Quality:  aching and throbbing  Duration: 2+ years   Severity: 7/10 at worst  Timing:constant  Modifying factors:  resting and non-use makes it better, movement and use makes it worse  Associated signs & symptoms: pain and soft tissue mass  Previous similar pain: Yes  Treatments to date:OTC pain medications    Additional history: She also notes small nodes on her hand that appear to be thickened skin.  Mom with history of Dupuytren's Contracture.    Review of Systems: A 10-point review of systems was obtained and is negative except for as noted in the HPI.     MEDICAL HISTORY  Patient Active Problem List   Diagnosis     Hypertension, essential     Mixed hyperlipidemia     Herniated vertebral disc     Mild persistent asthma     Gastroesophageal reflux disease without esophagitis     Bilateral low back pain without sciatica     Hip pain, right     Partial nontraumatic tear of right rotator cuff     DJD of right AC (acromioclavicular) joint     Fatty liver     Telogen hair loss     Vasomotor symptoms due to menopause     Hormone replacement therapy (HRT)     Prediabetes     Lichen sclerosus et atrophicus of the vulva       Current Outpatient Medications   Medication Sig Dispense Refill     amLODIPine (NORVASC) 5 MG tablet Take 1 tablet (5 mg) by mouth  daily 90 tablet 3     clobetasol (TEMOVATE) 0.05 % external ointment Apply topically 2 times daily 60 g 0     estradiol (VIVELLE-DOT) 0.075 MG/24HR BIW patch Place 1 patch onto the skin twice a week 24 patch 3     fenofibrate (TRICOR) 48 MG tablet TAKE TWO TABLETS BY MOUTH ONCE DAILY 180 tablet 0     fluticasone (FLOVENT HFA) 220 MCG/ACT inhaler Inhale 2 puffs into the lungs 2 times daily 3 Inhaler 3     levalbuterol (XOPENEX HFA) 45 MCG/ACT inhaler Inhale 2 puffs into the lungs every 6 hours as needed for shortness of breath / dyspnea or wheezing 1 Inhaler 5     metFORMIN (GLUCOPHAGE-XR) 500 MG 24 hr tablet Take one tab po with meals and increase every week, as tolerated, by 500mg to a max of 2000/day or 1000 mg  tablet 3     pravastatin (PRAVACHOL) 20 MG tablet TAKE 1 TABLET (20 MG) BY MOUTH DAILY 90 tablet 0     progesterone (PROMETRIUM) 100 MG capsule TAKE ONE CAPSULE BY MOUTH ONCE DAILY 90 capsule 3     spironolactone (ALDACTONE) 50 MG tablet Take 1 tablet (50 mg) by mouth 2 times daily 180 tablet 3       Allergies   Allergen Reactions     Niacin Shortness Of Breath, Other (See Comments) and Nausea and Vomiting     All over body pains (patient reported symptoms)       Family History   Problem Relation Age of Onset     Hypertension Mother      Lung Cancer Mother      Hypertension Father      Hyperlipidemia Father      No Known Problems Sister      No Known Problems Brother      Hypertension Maternal Grandmother      No Known Problems Maternal Grandfather      No Known Problems Paternal Grandmother      No Known Problems Son      Breast Cancer Paternal Aunt      Blood Disease Paternal Uncle      Other Cancer Paternal Half-Brother         leukemia     Breast Cancer Paternal Half-Sister      Breast Cancer Cousin      No Known Problems Other      Liver Disease No family hx of        Additional medical/Social/Surgical histories reviewed in [facility] and updated as appropriate.       PHYSICAL EXAM  LMP  (LMP  Unknown)     Musculoskeletal: Bilateral hand  Inspection: Left second and third digits 0.5mm nodule at palmar aspect. Right hand palm 3rd and fourth digits with 0.5mm flesh colored nodule.  Palpation:  Nodules are nontender to palpation.   Nontender throughout the remainder of hand and fingers. No tenderness to palpation overlying anatomical snuffbox.   Range of motion: Good overall range of motion of thumb in all planes.  Fingers with normal flexion and extension.    No rotational malalignment or scissoring of digits.  Strength: Full strength of flexion and extension of digits.   strength normal compared bilaterally.   Special Tests:   -Finkelstein Test: Negative  -Tinel's sign: Negative  -Median nerve compression test: Negative  -Phalen's test: Negative  -Medial and lateral stress of fingers: No laxity  Neurologic: Sensation intact throughout hand and fingers  Vascular: Capillary refill <2s.  Digits warm, well perfused.     IMAGING : XR hands 3V. Final results and radiologist's interpretation, available in the [facility] health record. Images were reviewed with the patient/family members in the office today. My personal interpretation of the performed imaging is no acute osseous abnormalities.     ASSESSMENT & PLAN  Ms. Solorzano is a 51 year old year old female who presents to clinic today with bilateral hand pain, small nodules and left shoulder pain. DDx hand pain includes early OA, ulnar neuropathy, or tendinitis.    Suspected early dupuytren's disease as cause for small nodules without cords or contractures. +family hx. I do not believe this to be cause for her hand pain though.     Left shoulder pain noted as well.  Possibly rotator cuff tendinopathy however we will consider imaging/addressing more thoroughly at follow up.    Diagnosis:  Pain of fingers of both hands  Nodules of hands  Pain of left shoulder    -Start hand therapy - OT for strengthening, ROM and modalities  -Monitor nodules for progression,  discussed typical treatment options if this does progress to classic dupuytren's  -Follow up 6 weeks    It was a pleasure seeing Olga Lidia today.    Hiren Barnett DO, [facility]  Primary Care Sports Medicine        Again, thank you for allowing me to participate in the care of your patient.        Sincerely,        Hiren Barnett DO

## 2022-03-24 NOTE — PATIENT INSTRUCTIONS
Thank you for choosing [facility]    [facility] LOCATIONS  [place] [address] [address], [address] [place]   [place], [place], [address] [place] [address]   [phone]       APPOINTMENTS: [phone]    CARE QUESTIONS: [phone], #3    BILLING QUESTIONS: [phone]    FAX NUMBER: [phone]        Follow up:  6 - 8 weeks after starting Hand Therapy      Hand Therapy orders have been placed with [facility] (formally [facility])  You can call [phone] to schedule at your convenience.

## 2022-03-30 ENCOUNTER — THERAPY VISIT (OUTPATIENT)
Dept: PHYSICAL THERAPY | Facility: CLINIC | Age: 52
End: 2022-03-30
Attending: FAMILY MEDICINE
Payer: COMMERCIAL

## 2022-03-30 DIAGNOSIS — M79.644 PAIN IN FINGER OF BOTH HANDS: ICD-10-CM

## 2022-03-30 DIAGNOSIS — M25.512 LEFT SHOULDER PAIN, UNSPECIFIED CHRONICITY: ICD-10-CM

## 2022-03-30 DIAGNOSIS — S46.011S TRAUMATIC TEAR OF RIGHT ROTATOR CUFF, UNSPECIFIED TEAR EXTENT, SEQUELA: ICD-10-CM

## 2022-03-30 DIAGNOSIS — M79.645 PAIN IN FINGER OF BOTH HANDS: ICD-10-CM

## 2022-03-30 PROCEDURE — 97110 THERAPEUTIC EXERCISES: CPT | Mod: GP | Performed by: PHYSICAL THERAPIST

## 2022-03-30 PROCEDURE — 97161 PT EVAL LOW COMPLEX 20 MIN: CPT | Mod: GP | Performed by: PHYSICAL THERAPIST

## 2022-03-30 NOTE — PROGRESS NOTES
Physical Therapy Initial Evaluation  Subjective:  The history is provided by the patient.   Patient Health History  Olga Lidia Solorzano being seen for Hand pain and L shoulder pain.     Problem began: 3/9/2022.   Problem occurred: Hx of RCR tear on R, L started feeling similar about a month ago.   Pain is reported as 3/10 on pain scale.  General health as reported by patient is good.  Pertinent medical history includes: asthma, high blood pressure, menopausal and osteoarthritis.   Red flags:  Pain at rest/night.  Medical allergies: none.   Surgeries include:  None.    Current medications:  High blood pressure medication and hormone replacement therapy. Other medications details: Statins.    Current occupation is .   Primary job tasks include:  Computer work, prolonged standing, repetitive tasks and other.                  Therapist Generated HPI Evaluation         Type of problem:  Left shoulder.    This is a chronic condition.  Condition occurred with:  Repetition/overuse.  Where condition occurred: other.  Patient reports pain:  Anterior and lateral.  Pain is described as sharp and aching and is intermittent.  Pain radiates to:  Upper arm. Pain timing: activity dependent.  Since onset symptoms are gradually worsening.  Associated symptoms:  Loss of motion/stiffness. Symptoms are exacerbated by lying on extremity, using arm at shoulder level, lifting and carrying  and relieved by rest.                              Objective:  System                   Shoulder Evaluation:  ROM:  AROM:    Flexion:  Left:  Wnl, end range pain    Right:  Wnl    Abduction:  Left: painful arc mid range   Right:  Wnl    Internal Rotation:  Left:  Dec end range    Right:  Wnl  External Rotation:  Left:  Dec end range, inc pain    Right:  Wnl                      Strength:  : L shoulder pain with flex, abd, ER. Mild biceps weakness.                        Special Tests:    Left shoulder positive for the following special tests:   Impingement  Left shoulder negative for the following special tests:  Rotator cuff tear and Acromioclavicular    Palpation:    Left shoulder tenderness present at:  Subscapularis and Upper Trap    Mobility Tests:                Scapulohumeral rhythm right:  Hypomobile                                     General     ROS    Assessment/Plan:    Patient is a 51 year old female with left side shoulder complaints.    Patient has the following significant findings with corresponding treatment plan.                Diagnosis 1:  L mechanical shoulder pain    Pain -  hot/cold therapy, manual therapy, self management, education and home program  Decreased ROM/flexibility - manual therapy, therapeutic exercise and home program  Decreased joint mobility - manual therapy, therapeutic exercise and home program  Decreased strength - therapeutic exercise, therapeutic activities and home program  Impaired posture - neuro re-education, therapeutic activities and home program    Therapy Evaluation Codes:   Cumulative Therapy Evaluation is: Low complexity.    Previous and current functional limitations:  (See Goal Flow Sheet for this information)    Short term and Long term goals: (See Goal Flow Sheet for this information)     Communication ability:  Patient appears to be able to clearly communicate and understand verbal and written communication and follow directions correctly.  Treatment Explanation - The following has been discussed with the patient:   RX ordered/plan of care  Anticipated outcomes  Possible risks and side effects  This patient would benefit from PT intervention to resume normal activities.   Rehab potential is good.    Frequency:  1 X week, once daily  Duration:  for 4 weeks tapering to 2 X a month over 6 weeks  Discharge Plan:  Achieve all LTG.  Independent in home treatment program.  Reach maximal therapeutic benefit.    Please refer to the daily flowsheet for treatment today, total treatment time and time spent  performing 1:1 timed codes.

## 2022-04-04 NOTE — PROGRESS NOTES
Hand Therapy Initial Evaluation  Current Date:  4/12/2022    Diagnosis: Bilateral Hand Pain  DOI:MD leal 3/24/2022  Post:  2 years per patient report, worsening over past month    Precautions: B Shoulder Pain    Subjective:  Olga Lidia Solorzano is a 51 year old female.    Patient reports symptoms of the bilateral hands which occurred due to unknown, patient reports worsening of symptoms over past few month.  General health as reported by patient is good.  Pertinent medical history includes:asthma, high blood pressure, menopausal and osteoarthritis.   Medical allergies:none.  Surgical history: none.  Medication history: High blood pressure medication and hormone replacement therapy. Other medications details: Statins..    Current occupation is Lab - part management and part   Job Tasks: computer work, opening caps, pipetting    Occupational Profile Information:  Right hand dominant  Prior functional level:  no limitations  Patient reports symptoms of pain, stiffness/loss of motion, weakness/loss of strength and edema  Special tests:  x-ray.    Previous treatment: compression gloves, voltaren gel  Barriers include:none  Mobility: No difficulty  Transportation: drives  Currently working in normal job without restrictions  Other: Lives with boyfriend, available to help as needed    Functional Outcome Measure:   Upper Extremity Functional Index Score:  SCORE:   Column Totals: /80: 66   (A lower score indicates greater disability.)    Objective:    Pain Level (Scale 0-10):   4/12/2022   At Rest R: 2  L: 2   With Use R: 4  L: 4     Pain Description:  Date 4/12/2022   Location Bilateral Hands - IPJ's (R Thumb IPJ, SF IPJ's   Pain Quality Aching, sharp with the wrong motion or hitting fingers   Frequency intermittent, constant or daily     Pain is worst Daytime, can keep up at night if really painful   Exacerbated by Gripping, opening jars, opening jars   Relieved by Compression gloves, rest, topical ointment    Progression Recently worsening     ROM  Hand 4/12/2022 4/12/2022   AROM(PROM) Left Right   Index MP 0/92 0/89   PIP 0/107 0/107   DIP 0/70 -10/66   HERRERA     Long MP 0/97 0/95   PIP 0/105 0/102   DIP 0/88 0/80   HERRERA     Ring MP 0/95 0/85   PIP 0/103 0/105   DIP 0/85 0/75   HERRERA     Small MP 0/90 0/90   PIP 0/105 0/101   DIP 0/85 0/75   HERRERA       ROM  Thumb 4/12/2022 4/12/2022   AROM  (PROM) Left Right   MP -20/47 -25/47   IP +/57 +/65   RABD 60 55   PABD 55 60     Strength   (Measured in pounds)  Pain Report: - none  + mild    ++ moderate    +++ severe    4/12/2022 4/12/2022   Trials Left Right   1  2  3 45 55   Average 45 55     Lat Pinch 4/12/2022 4/12/2022   Trials Left Right   1  2  3 16 14   Average 16 14     3 Pt Pinch 4/12/2022 4/12/2022   Trials Left Right   1  2  3 12 10+ thumb   Average 12 10     Edema (Circumference measured in cm)   4/12/2022 4/12/2022    Left Right   Index P1 5.1 5.1   PIP 5.4 5.1   P2 4.4 4.6   Long P1 5.1 5.2   PIP 5.2 5.4   P2 4.6 4.8   Ring P1 4.6 4.7   PIP 4.7 4.9   P2 4.1 4.2   Small P1 4.2 4.2   PIP 4.1 4.1   P2 3.6 3.7   Thumb P1 4.4 4.9   IP 5.4 5.4     Scar/Wound:  N/A    Sensation:   WNL throughout all nerve distributions; per patient report    Appearance:   Vin Nodes: neg bilaterally  Heberden Nodes: R Small Finger  Soft tissue nodules observed: Right - Mid palm under LF; Left - Mid palm under RF, volar IF-RF PIPJ    Assessment:  Patient presents with symptoms consistent with diagnosis as listed above with conservative intervention.     Patient's limitations or Problem List includes:  Pain, Decreased ROM/motion, Increased edema, Weakness, Decreased stability, Decreased , Decreased pinch and Tightness in musculature of the bilateral hand, thumb, index finger, long finger, ring finger and small finger which interferes with the patient's ability to perform Self Care Tasks (dressing, eating, bathing, hygiene/toileting), Work Tasks, Recreational Activities and  Household Chores as compared to previous level of function.    Rehab Potential:  Good - Return to full activity, some limitations    Patient will benefit from skilled Occupational Therapy to increase ROM,  strength and coordination and decrease pain and edema to return to previous activity level and resume normal daily tasks and to reach their rehab potential.    Barriers to Learning:  No barrier    Communication Issues:  Patient appears to be able to clearly communicate and understand verbal and written communication and follow directions correctly.    Chart Review: Chart Review and Detailed history review with patient    Identified Performance Deficits: dressing, hygiene and grooming, home establishment and management, meal preparation and cleanup, work and leisure activities    Assessment of Occupational Performance:  5 or more Performance Deficits    Clinical Decision Making (Complexity): Low complexity    Treatment Explanation:  The following has been discussed with the patient:  RX ordered/plan of care  Anticipated outcomes  Possible risks and side effects    Plan:  Frequency:  1 X week, once daily  Duration:  for 4 weeks    Treatment Plan:  Modalities:  Paraffin  Therapeutic Exercise:  AROM, AAROM, PROM, Tendon Gliding, Extensor Tracking and Stabilization  Manual Techniques:  Myofascial release and Manual edema mobilization  Orthotic Fabrication:  Finger based and Hand based  Self Care:  Self Care Tasks    Home Program:  Right HB resting splint for night  IMAK compression gloves  Heat/Epsom Salt soaks  Finger ABD/ADD  Tendon Gliding Fist Series  Extensor tracking on table    Next visit:   Check R Resting Hand splint - consider fabrication of L?  Paraffin trial  STM (retrograde - add for HEP and ball massage to palm)  Future visits - thumb splints, thumb exercises    Discharge Plan:  Achieve all LTG.  Independent in home treatment program.  Reach maximal therapeutic benefit.    Please see daily flow sheet  for treatment and 1:1 time provided today.

## 2022-04-07 ENCOUNTER — THERAPY VISIT (OUTPATIENT)
Dept: PHYSICAL THERAPY | Facility: CLINIC | Age: 52
End: 2022-04-07
Payer: COMMERCIAL

## 2022-04-07 DIAGNOSIS — M79.645 PAIN IN FINGER OF BOTH HANDS: Primary | ICD-10-CM

## 2022-04-07 DIAGNOSIS — M25.512 LEFT SHOULDER PAIN, UNSPECIFIED CHRONICITY: ICD-10-CM

## 2022-04-07 DIAGNOSIS — M79.644 PAIN IN FINGER OF BOTH HANDS: Primary | ICD-10-CM

## 2022-04-07 DIAGNOSIS — S46.011S TRAUMATIC TEAR OF RIGHT ROTATOR CUFF, UNSPECIFIED TEAR EXTENT, SEQUELA: ICD-10-CM

## 2022-04-07 PROCEDURE — 97110 THERAPEUTIC EXERCISES: CPT | Mod: GP | Performed by: PHYSICAL THERAPIST

## 2022-04-07 PROCEDURE — 97112 NEUROMUSCULAR REEDUCATION: CPT | Mod: GP | Performed by: PHYSICAL THERAPIST

## 2022-04-12 ENCOUNTER — THERAPY VISIT (OUTPATIENT)
Dept: OCCUPATIONAL THERAPY | Facility: CLINIC | Age: 52
End: 2022-04-12
Attending: FAMILY MEDICINE
Payer: COMMERCIAL

## 2022-04-12 DIAGNOSIS — M79.644 PAIN IN FINGER OF BOTH HANDS: ICD-10-CM

## 2022-04-12 DIAGNOSIS — M79.645 PAIN IN FINGER OF BOTH HANDS: ICD-10-CM

## 2022-04-12 PROCEDURE — 97165 OT EVAL LOW COMPLEX 30 MIN: CPT | Mod: GO | Performed by: OCCUPATIONAL THERAPIST

## 2022-04-12 PROCEDURE — 97110 THERAPEUTIC EXERCISES: CPT | Mod: GO | Performed by: OCCUPATIONAL THERAPIST

## 2022-04-12 PROCEDURE — 97760 ORTHOTIC MGMT&TRAING 1ST ENC: CPT | Mod: GO | Performed by: OCCUPATIONAL THERAPIST

## 2022-04-14 ENCOUNTER — THERAPY VISIT (OUTPATIENT)
Dept: PHYSICAL THERAPY | Facility: CLINIC | Age: 52
End: 2022-04-14
Payer: COMMERCIAL

## 2022-04-14 DIAGNOSIS — S46.011S TRAUMATIC TEAR OF RIGHT ROTATOR CUFF, UNSPECIFIED TEAR EXTENT, SEQUELA: ICD-10-CM

## 2022-04-14 DIAGNOSIS — M79.644 PAIN IN FINGER OF BOTH HANDS: Primary | ICD-10-CM

## 2022-04-14 DIAGNOSIS — M79.645 PAIN IN FINGER OF BOTH HANDS: Primary | ICD-10-CM

## 2022-04-14 DIAGNOSIS — M25.512 LEFT SHOULDER PAIN, UNSPECIFIED CHRONICITY: ICD-10-CM

## 2022-04-14 PROCEDURE — 97110 THERAPEUTIC EXERCISES: CPT | Mod: GP | Performed by: PHYSICAL THERAPIST

## 2022-04-14 PROCEDURE — 97112 NEUROMUSCULAR REEDUCATION: CPT | Mod: GP | Performed by: PHYSICAL THERAPIST

## 2022-04-21 NOTE — PROGRESS NOTES
SOAP Note Objective Information for 4/25/2022:    Pain Level (Scale 0-10):   4/12/2022 4/25/22   At Rest R: 2  L: 2 R: 2  L: 2   With Use R: 4  L: 4 R: 4  L: 4     Pain Description:  Date 4/12/2022   Location Bilateral Hands - IPJ's (R Thumb IPJ, SF IPJ's   Pain Quality Aching, sharp with the wrong motion or hitting fingers   Frequency intermittent, constant or daily     Pain is worst Daytime, can keep up at night if really painful   Exacerbated by Gripping, opening jars, opening jars   Relieved by Compression gloves, rest, topical ointment   Progression Recently worsening     Home Program:  Right HB resting splint for night  IMAK compression gloves  Heat/Epsom Salt soaks vs home paraffin unit  Finger ABD/ADD  Tendon Gliding Fist Series  Extensor tracking on table  4/25/22  Digicap trial for thumbs  Thumb opposition  STM to palm with ball    Next visit:   Check response to digicap, STM and opposition  Paraffin  STM   A/PROM  Consider thumb splint for R IPJ?    Please see daily flow sheet for treatment and 1:1 time provided today.

## 2022-04-25 ENCOUNTER — E-VISIT (OUTPATIENT)
Dept: FAMILY MEDICINE | Facility: CLINIC | Age: 52
End: 2022-04-25

## 2022-04-25 ENCOUNTER — THERAPY VISIT (OUTPATIENT)
Dept: OCCUPATIONAL THERAPY | Facility: CLINIC | Age: 52
End: 2022-04-25
Payer: COMMERCIAL

## 2022-04-25 DIAGNOSIS — M79.644 PAIN IN FINGER OF BOTH HANDS: Primary | ICD-10-CM

## 2022-04-25 DIAGNOSIS — M79.645 PAIN IN FINGER OF BOTH HANDS: Primary | ICD-10-CM

## 2022-04-25 DIAGNOSIS — N39.0 ACUTE UTI (URINARY TRACT INFECTION): Primary | ICD-10-CM

## 2022-04-25 PROCEDURE — 97018 PARAFFIN BATH THERAPY: CPT | Mod: GO | Performed by: OCCUPATIONAL THERAPIST

## 2022-04-25 PROCEDURE — 99421 OL DIG E/M SVC 5-10 MIN: CPT | Performed by: FAMILY MEDICINE

## 2022-04-25 PROCEDURE — 97110 THERAPEUTIC EXERCISES: CPT | Mod: GO | Performed by: OCCUPATIONAL THERAPIST

## 2022-04-25 PROCEDURE — 97140 MANUAL THERAPY 1/> REGIONS: CPT | Mod: GO | Performed by: OCCUPATIONAL THERAPIST

## 2022-04-25 RX ORDER — SULFAMETHOXAZOLE/TRIMETHOPRIM 800-160 MG
1 TABLET ORAL 2 TIMES DAILY
Qty: 6 TABLET | Refills: 0 | Status: SHIPPED | OUTPATIENT
Start: 2022-04-25 | End: 2022-04-28

## 2022-04-25 NOTE — PATIENT INSTRUCTIONS
Dear Olga Lidia Solorzano    After reviewing your responses, I've been able to diagnose you with a urinary tract infection, which is a common infection of the bladder with bacteria.  This is not a sexually transmitted infection, though urinating immediately after intercourse can help prevent infections.  Drinking lots of fluids is also helpful to clear your current infection and prevent the next one.      I have sent a prescription for antibiotics to your pharmacy to treat this infection.    It is important that you take all of your prescribed medication even if your symptoms are improving after a few doses.  Taking all of your medicine helps prevent the symptoms from returning.     If your symptoms worsen, you develop pain in your back or stomach, develop fevers, or are not improving in 5 days, please contact your primary care provider for an appointment or visit any of our convenient Walk-in or Urgent Care Centers to be seen, which can be found on our website here.    Thanks again for choosing us as your health care partner,    Kendelljenny Tejada, DO    Urinary Tract Infections in Women  Urinary tract infections (UTIs) are most often caused by bacteria. These bacteria enter the urinary tract. The bacteria may come from inside the body. Or they may travel from the skin outside the rectum or vagina into the urethra. Female anatomy makes it easy for bacteria from the bowel to enter a woman s urinary tract, which is the most common source of UTI. This means women develop UTIs more often than men. Pain in or around the urinary tract is a common UTI symptom. But the only way to know for sure if you have a UTI for the healthcare provider to test your urine. The two tests that may be done are the urinalysis and urine culture.     Types of UTIs    Cystitis. A bladder infection (cystitis) is the most common UTI in women. You may have urgent or frequent need to pee. You may also have pain, burning when you pee, and bloody  urine.    Urethritis. This is an inflamed urethra, which is the tube that carries urine from the bladder to outside the body. You may have lower stomach or back pain. You may also have urgent or frequent need to pee.    Pyelonephritis. This is a kidney infection. If not treated, it can be serious and damage your kidneys. In severe cases, you may need to stay in the hospital. You may have a fever and lower back pain.    Medicines to treat a UTI  Most UTIs are treated with antibiotics. These kill the bacteria. The length of time you need to take them depends on the type of infection. It may be as short as 3 days. If you have repeated UTIs, you may need a low-dose antibiotic for several months. Take antibiotics exactly as directed. Don t stop taking them until all of the medicine is gone. If you stop taking the antibiotic too soon, the infection may not go away. You may also develop a resistance to the antibiotic. This can make it much harder to treat.   Lifestyle changes to treat and prevent UTIs   The lifestyle changes below will help get rid of your UTI. They may also help prevent future UTIs.     Drink plenty of fluids. This includes water, juice, or other caffeine-free drinks. Fluids help flush bacteria out of your body.    Empty your bladder. Always empty your bladder when you feel the urge to pee. And always pee before going to sleep. Urine that stays in your bladder can lead to infection. Try to pee before and after sex as well.    Practice good personal hygiene. Wipe yourself from front to back after using the toilet. This helps keep bacteria from getting into the urethra.    Use condoms during sex. These help prevent UTIs caused by sexually transmitted bacteria. Also don't use spermicides during sex. These can increase the risk for UTIs. Choose other forms of birth control instead. For women who tend to get UTIs after sex, a low-dose of a preventive antibiotic may be used. Be sure to discuss this option with  your healthcare provider.    Follow up with your healthcare provider as directed. He or she may test to make sure the infection has cleared. If needed, more treatment may be started.  [employer] last reviewed this educational content on 7/1/2019 2000-2021 [employer]. All rights reserved. This information is not intended as a substitute for professional medical care. Always follow your healthcare professional's instructions.

## 2022-04-26 ENCOUNTER — THERAPY VISIT (OUTPATIENT)
Dept: PHYSICAL THERAPY | Facility: CLINIC | Age: 52
End: 2022-04-26
Payer: COMMERCIAL

## 2022-04-26 DIAGNOSIS — M25.512 LEFT SHOULDER PAIN, UNSPECIFIED CHRONICITY: ICD-10-CM

## 2022-04-26 DIAGNOSIS — S46.011S TRAUMATIC TEAR OF RIGHT ROTATOR CUFF, UNSPECIFIED TEAR EXTENT, SEQUELA: Primary | ICD-10-CM

## 2022-04-26 PROCEDURE — 97140 MANUAL THERAPY 1/> REGIONS: CPT | Mod: GP | Performed by: PHYSICAL THERAPIST

## 2022-04-26 PROCEDURE — 97110 THERAPEUTIC EXERCISES: CPT | Mod: GP | Performed by: PHYSICAL THERAPIST

## 2022-05-10 ENCOUNTER — THERAPY VISIT (OUTPATIENT)
Dept: PHYSICAL THERAPY | Facility: CLINIC | Age: 52
End: 2022-05-10
Payer: COMMERCIAL

## 2022-05-10 DIAGNOSIS — M25.512 LEFT SHOULDER PAIN, UNSPECIFIED CHRONICITY: ICD-10-CM

## 2022-05-10 DIAGNOSIS — M79.644 PAIN IN FINGER OF BOTH HANDS: ICD-10-CM

## 2022-05-10 DIAGNOSIS — M79.645 PAIN IN FINGER OF BOTH HANDS: ICD-10-CM

## 2022-05-10 DIAGNOSIS — S46.011S TRAUMATIC TEAR OF RIGHT ROTATOR CUFF, UNSPECIFIED TEAR EXTENT, SEQUELA: Primary | ICD-10-CM

## 2022-05-10 PROCEDURE — 97110 THERAPEUTIC EXERCISES: CPT | Mod: GP | Performed by: PHYSICAL THERAPIST

## 2022-05-10 PROCEDURE — 97535 SELF CARE MNGMENT TRAINING: CPT | Mod: GP | Performed by: PHYSICAL THERAPIST

## 2022-05-18 ENCOUNTER — MYC MEDICAL ADVICE (OUTPATIENT)
Dept: FAMILY MEDICINE | Facility: CLINIC | Age: 52
End: 2022-05-18
Payer: COMMERCIAL

## 2022-05-18 DIAGNOSIS — E78.2 MIXED HYPERLIPIDEMIA: Primary | ICD-10-CM

## 2022-05-18 DIAGNOSIS — R73.9 ELEVATED RANDOM BLOOD GLUCOSE LEVEL: ICD-10-CM

## 2022-05-18 NOTE — TELEPHONE ENCOUNTER
RN replied to patient via [employer]. See message for details.     Will hold open for response    Rian Valencia RN, BSN, PHN  [facility]: [place]

## 2022-05-18 NOTE — TELEPHONE ENCOUNTER
"Routing to PCP- see corie castaneda    Looks like per last lab draw done 3/16/22- \"Your liver function is stable, your cholesterol is better this time.  Lets continue on current meds, recheck in 3 months and discuss at the visit.\"    Pt scheduled to see you 6/30/22. Do you want to repeat lipids and liver prior to this visit?    Lab orders pending if appropriate    Niya Arredondo RN    "

## 2022-05-23 NOTE — PROGRESS NOTES
CHIEF COMPLAINT:  Pain of the Left Shoulder       HISTORY OF PRESENT ILLNESS  Ms. Solorzano is a pleasant 51 year old year old female who presents to clinic today with left shoulder pain.  Olga Lidia explains that her pain has been progressing over the last 4-5 months with no known injury. She reports history of rotator cuff tear in right shoulder. She is right hand dominant.     Onset: gradual, worsening  Location: left shoulder  Quality:  aching and sharp  Duration: 4-5 months   Severity: 8/10 at worst  Timing:constant  Modifying factors:  resting and non-use makes it better, movement and use makes it worse  Associated signs & symptoms: constant lateral sided shoulder pain that radiates to elbow, pain that worsens with motions about shoulder height and reaching behind her, limited range of motion due to pain  Previous similar pain: Yes  Treatments to date: physical therapy, heat, modifying ADLs. No improvement in strength pain or ROM after five sessions of PT. Recommended patient follow up with physician for evaluation.      Additional history: as documented    Review of Systems: A 10-point review of systems was obtained and is negative except for as noted in the HPI.       MEDICAL HISTORY  Patient Active Problem List   Diagnosis     Hypertension, essential     Mixed hyperlipidemia     Herniated vertebral disc     Mild persistent asthma     Gastroesophageal reflux disease without esophagitis     Bilateral low back pain without sciatica     Hip pain, right     Partial nontraumatic tear of right rotator cuff     DJD of right AC (acromioclavicular) joint     Fatty liver     Telogen hair loss     Vasomotor symptoms due to menopause     Hormone replacement therapy (HRT)     Prediabetes     Lichen sclerosus et atrophicus of the vulva     Pain in finger of both hands     Left shoulder pain, unspecified chronicity       Current Outpatient Medications   Medication Sig Dispense Refill     amLODIPine (NORVASC) 5 MG tablet Take 1  tablet (5 mg) by mouth daily 90 tablet 3     clobetasol (TEMOVATE) 0.05 % external ointment Apply topically 2 times daily 60 g 0     estradiol (VIVELLE-DOT) 0.075 MG/24HR BIW patch Place 1 patch onto the skin twice a week 24 patch 3     fenofibrate (TRICOR) 48 MG tablet TAKE TWO TABLETS BY MOUTH ONCE DAILY 180 tablet 0     fluticasone (FLOVENT HFA) 220 MCG/ACT inhaler Inhale 2 puffs into the lungs 2 times daily 3 Inhaler 3     levalbuterol (XOPENEX HFA) 45 MCG/ACT inhaler Inhale 2 puffs into the lungs every 6 hours as needed for shortness of breath / dyspnea or wheezing 1 Inhaler 5     metFORMIN (GLUCOPHAGE-XR) 500 MG 24 hr tablet Take one tab po with meals and increase every week, as tolerated, by 500mg to a max of 2000/day or 1000 mg  tablet 3     pravastatin (PRAVACHOL) 20 MG tablet TAKE 1 TABLET (20 MG) BY MOUTH DAILY 90 tablet 0     progesterone (PROMETRIUM) 100 MG capsule TAKE ONE CAPSULE BY MOUTH ONCE DAILY 90 capsule 3     spironolactone (ALDACTONE) 50 MG tablet Take 1 tablet (50 mg) by mouth 2 times daily 180 tablet 3       Allergies   Allergen Reactions     Niacin Shortness Of Breath, Other (See Comments) and Nausea and Vomiting     All over body pains (patient reported symptoms)       Family History   Problem Relation Age of Onset     Hypertension Mother      Lung Cancer Mother      Hypertension Father      Hyperlipidemia Father      No Known Problems Sister      No Known Problems Brother      Hypertension Maternal Grandmother      No Known Problems Maternal Grandfather      No Known Problems Paternal Grandmother      No Known Problems Son      Breast Cancer Paternal Aunt      Blood Disease Paternal Uncle      Other Cancer Paternal Half-Brother         leukemia     Breast Cancer Paternal Half-Sister      Breast Cancer Cousin      No Known Problems Other      Liver Disease No family hx of        Additional medical/Social/Surgical histories reviewed in [facility] and updated as appropriate.       PHYSICAL  "EXAM  /84   Ht 1.619 m (5' 3.75\")   Wt 69.9 kg (154 lb)   LMP  (LMP Unknown)   BMI 26.64 kg/m      General  - normal appearance, in no obvious distress  Musculoskeletal - left  shoulder  - inspection: normal bone and joint alignment, no obvious deformity, no scapular winging, no AC step-off,   - palpation: Tenderness cuff insertion at tuberosity, normal clavicle, non-tender AC  - ROM:  Shoulder hiking with abduction, painful and limited forward elevation, abduction and internal rotation. External rotation limited to 45 degrees, more profound IR loss.   - strength: 5/5  strength, 4/5 shoulder strength in abduction*.  5/5 ER and IR  - special tests:  (-) Speed's  (o) Neer unable to passively FE past 100 to complete test, but painful  (+) Hawkin's  (+) Kenya's  (-) Medina's  (-) apprehension  (-) subscap lift-off  Neuro  - no sensory or motor deficit, grossly normal coordination, normal muscle tone  Skin  - no ecchymosis, erythema, warmth, or induration, no obvious rash  Psych  - interactive, appropriate, normal mood and affect       IMAGING : Left shoulder 4V. Final results and radiologist's interpretation, available in the [facility] health record. Images were reviewed with the patient/family members in the office today. My personal interpretation of the performed imaging is no acute osseous abnormality or significant degenerative changes of joint.       ASSESSMENT & PLAN  Ms. Solorzano is a 51 year old year old female who presents to clinic today with chronic left shoulder pain recalcitrant to physical therapy.  History of right cuff tear near complete that she opted not to have repaired, but did improve over time.    Quality and limited shoulder movement concerning for adhesive capsulitis, cuff weakness and hx cannot rule out cuff tear as well.    Diagnosis: Chronic pain of left shoulder    - MRI left shoulder without contrast  - Consideration for glenohumeral vs subacromial injection depending on " pathology  - Continue with HEP for stretching capsule at this point  - Discussed nature and frustration involved with dx frozen shoulder  - Rx diclofenac BID  - Follow up after MRI    It was a pleasure seeing Olga Lidia today.    Hiren Barnett DO, CAQSM  Primary Care Sports Medicine

## 2022-05-26 ENCOUNTER — ANCILLARY PROCEDURE (OUTPATIENT)
Dept: GENERAL RADIOLOGY | Facility: CLINIC | Age: 52
End: 2022-05-26
Attending: FAMILY MEDICINE
Payer: COMMERCIAL

## 2022-05-26 ENCOUNTER — OFFICE VISIT (OUTPATIENT)
Dept: ORTHOPEDICS | Facility: CLINIC | Age: 52
End: 2022-05-26

## 2022-05-26 VITALS
BODY MASS INDEX: 26.29 KG/M2 | HEIGHT: 64 IN | SYSTOLIC BLOOD PRESSURE: 130 MMHG | DIASTOLIC BLOOD PRESSURE: 84 MMHG | WEIGHT: 154 LBS

## 2022-05-26 DIAGNOSIS — G89.29 CHRONIC PAIN IN LEFT SHOULDER: Primary | ICD-10-CM

## 2022-05-26 DIAGNOSIS — M25.519 PAIN IN JOINT, SHOULDER REGION: ICD-10-CM

## 2022-05-26 DIAGNOSIS — M25.512 CHRONIC PAIN IN LEFT SHOULDER: Primary | ICD-10-CM

## 2022-05-26 PROCEDURE — 99214 OFFICE O/P EST MOD 30 MIN: CPT | Performed by: FAMILY MEDICINE

## 2022-05-26 PROCEDURE — 73030 X-RAY EXAM OF SHOULDER: CPT | Mod: TC | Performed by: RADIOLOGY

## 2022-05-26 RX ORDER — DICLOFENAC SODIUM 75 MG/1
75 TABLET, DELAYED RELEASE ORAL 2 TIMES DAILY
Qty: 20 TABLET | Refills: 0 | Status: SHIPPED | OUTPATIENT
Start: 2022-05-26 | End: 2022-11-25

## 2022-05-26 NOTE — PATIENT INSTRUCTIONS
Thank you for choosing [facility]    [facility] LOC[facility]  [place] [address] [address] [facility], [address]th [place], [place], [address] [place] [address]   [phone]       APPOINTMENTS: [phone]    CARE QUESTIONS: [phone],    BILLING QUESTIONS: [phone]    FAX NUMBER: [phone]        Advanced imaging is done by appointment. Please call [place] and [place]: [phone] to schedule your left shoulder MRI.     Depending on your availability you can usually schedule within the next 1-2 days.    Some insurance companies may require a prior authorization to be completed which can delay the time until you are able to schedule your appointment.       If you are active on [employer], you may have access to your test results before your provider is able to review the study and advise on next steps.      Please make a follow up appointment in the clinic at least 2 days following your MRI by calling [phone].        1. Chronic pain in left shoulder

## 2022-05-26 NOTE — LETTER
5/26/2022         RE: Olga Lidia Solorzano  [address] [facility] [address] [address]        Dear Colleague,    Thank you for referring your patient, Olga Lidia Solorzano, to the [facility] [place]. Please see a copy of my visit note below.    CHIEF COMPLAINT:  Pain of the Left Shoulder       HISTORY OF PRESENT ILLNESS  Ms. Solorzano is a pleasant 51 year old year old female who presents to clinic today with left shoulder pain.  Olga Lidia explains that her pain has been progressing over the last 4-5 months with no known injury. She reports history of rotator cuff tear in right shoulder. She is right hand dominant.     Onset: gradual, worsening  Location: left shoulder  Quality:  aching and sharp  Duration: 4-5 months   Severity: 8/10 at worst  Timing:constant  Modifying factors:  resting and non-use makes it better, movement and use makes it worse  Associated signs & symptoms: constant lateral sided shoulder pain that radiates to elbow, pain that worsens with motions about shoulder height and reaching behind her, limited range of motion due to pain  Previous similar pain: Yes  Treatments to date: physical therapy, heat, modifying ADLs. No improvement in strength pain or ROM after five sessions of PT. Recommended patient follow up with physician for evaluation.      Additional history: as documented    Review of Systems: A 10-point review of systems was obtained and is negative except for as noted in the HPI.       MEDICAL HISTORY  Patient Active Problem List   Diagnosis     Hypertension, essential     Mixed hyperlipidemia     Herniated vertebral disc     Mild persistent asthma     Gastroesophageal reflux disease without esophagitis     Bilateral low back pain without sciatica     Hip pain, right     Partial nontraumatic tear of right rotator cuff     DJD of right AC (acromioclavicular) joint     Fatty liver     Telogen hair loss     Vasomotor symptoms due to menopause     Hormone  replacement therapy (HRT)     Prediabetes     Lichen sclerosus et atrophicus of the vulva     Pain in finger of both hands     Left shoulder pain, unspecified chronicity       Current Outpatient Medications   Medication Sig Dispense Refill     amLODIPine (NORVASC) 5 MG tablet Take 1 tablet (5 mg) by mouth daily 90 tablet 3     clobetasol (TEMOVATE) 0.05 % external ointment Apply topically 2 times daily 60 g 0     estradiol (VIVELLE-DOT) 0.075 MG/24HR BIW patch Place 1 patch onto the skin twice a week 24 patch 3     fenofibrate (TRICOR) 48 MG tablet TAKE TWO TABLETS BY MOUTH ONCE DAILY 180 tablet 0     fluticasone (FLOVENT HFA) 220 MCG/ACT inhaler Inhale 2 puffs into the lungs 2 times daily 3 Inhaler 3     levalbuterol (XOPENEX HFA) 45 MCG/ACT inhaler Inhale 2 puffs into the lungs every 6 hours as needed for shortness of breath / dyspnea or wheezing 1 Inhaler 5     metFORMIN (GLUCOPHAGE-XR) 500 MG 24 hr tablet Take one tab po with meals and increase every week, as tolerated, by 500mg to a max of 2000/day or 1000 mg  tablet 3     pravastatin (PRAVACHOL) 20 MG tablet TAKE 1 TABLET (20 MG) BY MOUTH DAILY 90 tablet 0     progesterone (PROMETRIUM) 100 MG capsule TAKE ONE CAPSULE BY MOUTH ONCE DAILY 90 capsule 3     spironolactone (ALDACTONE) 50 MG tablet Take 1 tablet (50 mg) by mouth 2 times daily 180 tablet 3       Allergies   Allergen Reactions     Niacin Shortness Of Breath, Other (See Comments) and Nausea and Vomiting     All over body pains (patient reported symptoms)       Family History   Problem Relation Age of Onset     Hypertension Mother      Lung Cancer Mother      Hypertension Father      Hyperlipidemia Father      No Known Problems Sister      No Known Problems Brother      Hypertension Maternal Grandmother      No Known Problems Maternal Grandfather      No Known Problems Paternal Grandmother      No Known Problems Son      Breast Cancer Paternal Aunt      Blood Disease Paternal Uncle      Other  "Cancer Paternal Half-Brother         leukemia     Breast Cancer Paternal Half-Sister      Breast Cancer Cousin      No Known Problems Other      Liver Disease No family hx of        Additional medical/Social/Surgical histories reviewed in [facility] and updated as appropriate.       PHYSICAL EXAM  /84   Ht 1.619 m (5' 3.75\")   Wt 69.9 kg (154 lb)   LMP  (LMP Unknown)   BMI 26.64 kg/m      General  - normal appearance, in no obvious distress  Musculoskeletal - left  shoulder  - inspection: normal bone and joint alignment, no obvious deformity, no scapular winging, no AC step-off,   - palpation: Tenderness cuff insertion at tuberosity, normal clavicle, non-tender AC  - ROM:  Shoulder hiking with abduction, painful and limited forward elevation, abduction and internal rotation. External rotation limited to 45 degrees, more profound IR loss.   - strength: 5/5  strength, 4/5 shoulder strength in abduction*.  5/5 ER and IR  - special tests:  (-) Speed's  (o) Neer unable to passively FE past 100 to complete test, but painful  (+) Hawkin's  (+) [place]'s  (-) [place]'s  (-) apprehension  (-) subscap lift-off  Neuro  - no sensory or motor deficit, grossly normal coordination, normal muscle tone  Skin  - no ecchymosis, erythema, warmth, or induration, no obvious rash  Psych  - interactive, appropriate, normal mood and affect       IMAGING : Left shoulder 4V. Final results and radiologist's interpretation, available in the [facility] health record. Images were reviewed with the patient/family members in the office today. My personal interpretation of the performed imaging is no acute osseous abnormality or significant degenerative changes of joint.       ASSESSMENT & PLAN  Ms. Solorzano is a 51 year old year old female who presents to clinic today with chronic left shoulder pain recalcitrant to physical therapy.  History of right cuff tear near complete that she opted not to have repaired, but did improve over time.    Quality " and limited shoulder movement concerning for adhesive capsulitis, cuff weakness and hx cannot rule out cuff tear as well.    Diagnosis: Chronic pain of left shoulder    - MRI left shoulder without contrast  - Consideration for glenohumeral vs subacromial injection depending on pathology  - Continue with HEP for stretching capsule at this point  - Discussed nature and frustration involved with dx frozen shoulder  - Rx diclofenac BID  - Follow up after MRI    It was a pleasure seeing Olga Lidia today.    Hiren Barnett DO, [facility]  Primary Care Sports Medicine        Again, thank you for allowing me to participate in the care of your patient.        Sincerely,        Hiren Barnett DO

## 2022-06-02 ENCOUNTER — ANCILLARY PROCEDURE (OUTPATIENT)
Dept: MRI IMAGING | Facility: CLINIC | Age: 52
End: 2022-06-02
Attending: FAMILY MEDICINE
Payer: COMMERCIAL

## 2022-06-02 DIAGNOSIS — G89.29 CHRONIC PAIN IN LEFT SHOULDER: ICD-10-CM

## 2022-06-02 DIAGNOSIS — M25.512 CHRONIC PAIN IN LEFT SHOULDER: ICD-10-CM

## 2022-06-02 PROCEDURE — 73221 MRI JOINT UPR EXTREM W/O DYE: CPT | Mod: LT

## 2022-06-15 ENCOUNTER — OFFICE VISIT (OUTPATIENT)
Dept: ORTHOPEDICS | Facility: CLINIC | Age: 52
End: 2022-06-15
Payer: COMMERCIAL

## 2022-06-15 VITALS
BODY MASS INDEX: 25.81 KG/M2 | DIASTOLIC BLOOD PRESSURE: 88 MMHG | SYSTOLIC BLOOD PRESSURE: 146 MMHG | WEIGHT: 151.2 LBS | HEIGHT: 64 IN

## 2022-06-15 DIAGNOSIS — G89.29 CHRONIC PAIN IN LEFT SHOULDER: Primary | ICD-10-CM

## 2022-06-15 DIAGNOSIS — M25.512 CHRONIC PAIN IN LEFT SHOULDER: Primary | ICD-10-CM

## 2022-06-15 PROCEDURE — 20611 DRAIN/INJ JOINT/BURSA W/US: CPT | Mod: LT | Performed by: FAMILY MEDICINE

## 2022-06-15 RX ORDER — LIDOCAINE HYDROCHLORIDE 10 MG/ML
4 INJECTION, SOLUTION INFILTRATION; PERINEURAL
Status: DISCONTINUED | OUTPATIENT
Start: 2022-06-15 | End: 2024-09-04

## 2022-06-15 RX ORDER — METHYLPREDNISOLONE ACETATE 80 MG/ML
80 INJECTION, SUSPENSION INTRA-ARTICULAR; INTRALESIONAL; INTRAMUSCULAR; SOFT TISSUE
Status: DISCONTINUED | OUTPATIENT
Start: 2022-06-15 | End: 2024-09-04

## 2022-06-15 RX ORDER — LIDOCAINE HYDROCHLORIDE 10 MG/ML
3 INJECTION, SOLUTION INFILTRATION; PERINEURAL
Status: DISCONTINUED | OUTPATIENT
Start: 2022-06-15 | End: 2024-09-04

## 2022-06-15 RX ADMIN — LIDOCAINE HYDROCHLORIDE 4 ML: 10 INJECTION, SOLUTION INFILTRATION; PERINEURAL at 17:20

## 2022-06-15 RX ADMIN — METHYLPREDNISOLONE ACETATE 80 MG: 80 INJECTION, SUSPENSION INTRA-ARTICULAR; INTRALESIONAL; INTRAMUSCULAR; SOFT TISSUE at 17:20

## 2022-06-15 RX ADMIN — LIDOCAINE HYDROCHLORIDE 3 ML: 10 INJECTION, SOLUTION INFILTRATION; PERINEURAL at 17:20

## 2022-06-15 NOTE — LETTER
6/15/2022         RE: Olga Lidia Solorzano  [address] [address]        Dear Colleague,    Thank you for referring your patient, Olga Lidia Solorzano, to the [facility] [place]. Please see a copy of my visit note below.    PROCEDURE ENCOUNTER    [facility]  Orthopedics  Hiren Barnett DO  Elizabeth 15, 2022     Name: Olga Lidia Solorzano  MRN: [id number]  Age: 51 year old  : 1970    Referring provider: GEOVANNI  Diagnosis: Adhesive Capsulitis of Left Shoulder    Glenohumeral Injection - Ultrasound Guided  The patient was informed of the risks and the benefits of the procedure and a written consent was signed.  The patient s left shoulder was prepped with chlorhexidine in sterile fashion.   Local anesthesia was performed using a 27-gauge 1.5-inch needle to administer 3 mL of 1% lidocaine without epi.  80 mg of methylprednisolone suspension was drawn up into a 5 mL syringe with 3 mL of 1% lidocaine w/o Epi.  Injection was performed using sterile technique.  Under ultrasound guidance a 3.5-inch 22-gauge needle was used to enter the left glenohumeral joint.  Posterior approach was used with the patient in lateral recumbent position, arm in neutral position at the side.  Needle placement was visualized and documented with ultrasound.  Ultrasound visualization was necessary due to the small joint space entered.  Injection performed long axis to the probe.  Injection solution visualized within the joint space.  Images were permanently stored for the patient's record.  There were no complications. The patient tolerated the procedure well. There was negligible bleeding.   Therapy scheduled to follow for mobilization.  The patient was instructed to call or go to the emergency room with any unusual pain, swelling, redness, or if otherwise concerned.  Hiren Barnett DO CAQSM  Primary Care Sports Medicine  [facility]    Large Joint Injection/Arthocentesis: L  glenohumeral joint    Date/Time: 6/15/2022 5:20 PM  Performed by: Hiren Barnett DO  Authorized by: Hiren Barnett DO     Indications:  Pain  Needle Size:  22 G  Guidance: ultrasound    Location:  Shoulder      Site:  L glenohumeral joint  Medications:  3 mL lidocaine 1 %; 4 mL lidocaine 1 %; 80 mg methylPREDNISolone 80 MG/ML  Medications comment:  3 mL lidocaine used as local anesthetic  Outcome:  Tolerated well, no immediate complications  Procedure discussed: discussed risks, benefits, and alternatives    Consent Given by:  Patient  Timeout: timeout called immediately prior to procedure    Prep: patient was prepped and draped in usual sterile fashion          It was a pleasure seeing Kimberly Barnett DO, [facility]  Primary Care Sports Medicine            Again, thank you for allowing me to participate in the care of your patient.        Sincerely,        Hiren Barnett DO

## 2022-06-15 NOTE — PROGRESS NOTES
PROCEDURE ENCOUNTER    [facility]  Orthopedics  Hiren Barnett DO  Elizabeth 15, 2022     Name: Olga Lidai Solorzano  MRN: [id number]  Age: 51 year old  : 1970    Referring provider: GEOVANNI  Diagnosis: Adhesive Capsulitis of Left Shoulder    Glenohumeral Injection - Ultrasound Guided  The patient was informed of the risks and the benefits of the procedure and a written consent was signed.  The patient s left shoulder was prepped with chlorhexidine in sterile fashion.   Local anesthesia was performed using a 27-gauge 1.5-inch needle to administer 3 mL of 1% lidocaine without epi.  80 mg of methylprednisolone suspension was drawn up into a 5 mL syringe with 3 mL of 1% lidocaine w/o Epi.  Injection was performed using sterile technique.  Under ultrasound guidance a 3.5-inch 22-gauge needle was used to enter the left glenohumeral joint.  Posterior approach was used with the patient in lateral recumbent position, arm in neutral position at the side.  Needle placement was visualized and documented with ultrasound.  Ultrasound visualization was necessary due to the small joint space entered.  Injection performed long axis to the probe.  Injection solution visualized within the joint space.  Images were permanently stored for the patient's record.  There were no complications. The patient tolerated the procedure well. There was negligible bleeding.   Therapy scheduled to follow for mobilization.  The patient was instructed to call or go to the emergency room with any unusual pain, swelling, redness, or if otherwise concerned.  Hiren Barnett DO CAM  Primary Care Sports Medicine  [facility]    Large Joint Injection/Arthocentesis: L glenohumeral joint    Date/Time: 6/15/2022 5:20 PM  Performed by: Hiren Barnett DO  Authorized by: Hiren Barnett DO     Indications:  Pain  Needle Size:  22 G  Guidance: ultrasound    Location:  Shoulder      Site:  L glenohumeral joint  Medications:  3 mL lidocaine 1 %;  4 mL lidocaine 1 %; 80 mg methylPREDNISolone 80 MG/ML  Medications comment:  3 mL lidocaine used as local anesthetic  Outcome:  Tolerated well, no immediate complications  Procedure discussed: discussed risks, benefits, and alternatives    Consent Given by:  Patient  Timeout: timeout called immediately prior to procedure    Prep: patient was prepped and draped in usual sterile fashion          It was a pleasure seeing Olga Lidia.    Hiren Barnett DO, CAQSM  Primary Care Sports Medicine

## 2022-06-15 NOTE — PATIENT INSTRUCTIONS
Thank you for choosing [facility]    [facility]IN[facility] LOCATIONS  [place] [address] [address] [facility], [address]th [place]   [place], [place], [address] [place] [address]   [phone]       APPOINTMENTS: [phone]    CARE QUESTIONS: [phone],    BILLING QUESTIONS: [phone]    FAX NUMBER: [phone]        Follow up: as needed    Steroid Injection Information:    A corticosteroid injection was administered at your visit today.  The area of injection may be sore, slightly swollen for 1-2 days afterward.  Immediately after injection, you may have an area of numbness, which is caused by the local anesthetic, lidocaine (similar to novacaine) in the shot.  This medicine will wear off in about 4 hours.  In addition to the lidocaine, a steroid medication was injected, called triamcinolone acetate.  This medication is intended to provide long-acting antiinflammatory / pain relief.  It may take 2-5 days for this medication to provide noticeable relief.      After an injection, Dr. Barnett recommends:    Protecting the area wearing a bandage or gauze pad for at least 24 hours.    Using ice; ice bag or frozen vegetables over the injection site every one to two hours when able (for 15 minutes at a time).      Avoid any strenuous activity even if the knee is already feeling better immediately afterward. Light stretching is encouraged but refrain from home exercise program and increasing activity level for about 48 hours.    Avoid soaking in a hot tub, bath, or pool for 48 hours after injection. Showering is fine!    Watch for signs of infection, including increasing pain, redness and swelling that last more than 48 hours after injection.

## 2022-07-23 ENCOUNTER — E-VISIT (OUTPATIENT)
Dept: URGENT CARE | Facility: CLINIC | Age: 52
End: 2022-07-23
Payer: COMMERCIAL

## 2022-07-23 DIAGNOSIS — N39.0 ACUTE UTI (URINARY TRACT INFECTION): Primary | ICD-10-CM

## 2022-07-23 PROCEDURE — 99421 OL DIG E/M SVC 5-10 MIN: CPT | Performed by: NURSE PRACTITIONER

## 2022-07-23 RX ORDER — NITROFURANTOIN 25; 75 MG/1; MG/1
100 CAPSULE ORAL 2 TIMES DAILY
Qty: 10 CAPSULE | Refills: 0 | Status: SHIPPED | OUTPATIENT
Start: 2022-07-23 | End: 2022-07-28

## 2022-07-23 NOTE — PATIENT INSTRUCTIONS
Dear Olga Lidia Solorzano    After reviewing your responses, I've been able to diagnose you with a urinary tract infection, which is a common infection of the bladder with bacteria.  This is not a sexually transmitted infection, though urinating immediately after intercourse can help prevent infections.  Drinking lots of fluids is also helpful to clear your current infection and prevent the next one.      I have sent a prescription for antibiotics to your pharmacy to treat this infection.    It is important that you take all of your prescribed medication even if your symptoms are improving after a few doses.  Taking all of your medicine helps prevent the symptoms from returning.     If your symptoms worsen, you develop pain in your back or stomach, develop fevers, or are not improving in 5 days, please contact your primary care provider for an appointment or visit any of our convenient Walk-in or Urgent Care Centers to be seen, which can be found on our website here.    Thanks again for choosing us as your health care partner,    TONYA Perez CNP

## 2022-08-02 NOTE — PROGRESS NOTES
Olga Lidia is a 52 year old who is being evaluated via a billable video visit.      How would you like to obtain your AVS? Mail a copy  If the video visit is dropped, the invitation should be resent by: Text to cell phone: [phone]  Will anyone else be joining your video visit? No        This is a VIDEO ( using [employer])  encounter with the patient.       Location of the provider : office   Location of the patient : home      07:38 --- 07:54          Dr Rivera's note      Patient's instructions / PLAN:                                                      Plan:  1. Bladder ultrasound -- To schedule this test you may call Scheduling center at [phone]    2. Urine test today   3. Urology referral       ASSESSMENT & PLAN:                                                      (D66.445) Voiding difficulty  (primary encounter diagnosis)  Comment:   Plan: Adult Urology  Referral, UA with         Microscopic reflex to Culture, US Bladder w Pre        and Post Void Residual               Chief complaint:                                                      Urine symptoms      SUBJECTIVE:                                                    History of present illness:    Treated for urine symptoms in April ( Bactrim )and July ( Nitrofurantoin)  with Abx -- no urine culture was done. They helped temporarily    Urine symptoms  -- feeling of constant bladder pressure in the bladder and feeling she needs to urinate all the time.   -- no burning   -- no foul smell  -- no blood  -- normal pelvic exam w OBGYN Feb 2022. No cystocele no enlarged uterus    -- no back pain   -- no fever      Subjective   Olga Lidia is a 52 year old, presenting for the following health issues:  Patient is being seen to discuss urinary frequency after 2 rounds of antibiotics.    History of Present Illness       Reason for visit:  Bladder pain    She eats 0-1 servings of fruits and vegetables daily.She consumes 0 sweetened beverage(s) daily.She exercises  with enough effort to increase her heart rate 9 or less minutes per day.  She exercises with enough effort to increase her heart rate 7 days per week.   She is taking medications regularly.         Review of Systems:                                                      ROS: negative for fever, chills, cough, wheezes, chest pain, shortness of breath, vomiting, abdominal pain, leg swelling     OBJECTIVE:           NAD    PMHx: reviewed  Past Medical History:   Diagnosis Date     Anemia 09/2018     DJD of right AC (acromioclavicular) joint 04/10/2019     Fatty liver 2/1/2016     Hormone replacement therapy (HRT) 2/26/2022     Hyperlipidemia LDL goal < 130      Hypertension goal BP (blood pressure) < 140/90      Insulin resistance      IUD (intrauterine device) in place ??2012 or 2013-2/18/19    Paragard     Lichen sclerosus et atrophicus of the vulva 2/26/2022     Mixed hyperlipidemia 1/11/2012    Diagnosis updated by automated process. Provider to review and confirm.     NAFLD (nonalcoholic fatty liver disease)      Obesity      Prediabetes 2/26/2022     Scoliosis      Uncomplicated asthma      Vasomotor symptoms due to menopause 2/26/2022      PSHx: reviewed  Past Surgical History:   Procedure Laterality Date     COLONOSCOPY N/A 03/13/2017    Procedure: COLONOSCOPY;  Surgeon: Tato Child MD;  Location:  GI      VASCULAR SURGERY PROCEDURE UNLIST      vericose vein        Meds: reviewed  Current Outpatient Medications   Medication Sig Dispense Refill     amLODIPine (NORVASC) 5 MG tablet Take 1 tablet (5 mg) by mouth daily 90 tablet 3     clobetasol (TEMOVATE) 0.05 % external ointment Apply topically 2 times daily 60 g 0     diclofenac (VOLTAREN) 75 MG EC tablet Take 1 tablet (75 mg) by mouth 2 times daily 20 tablet 0     estradiol (VIVELLE-DOT) 0.075 MG/24HR BIW patch Place 1 patch onto the skin twice a week 24 patch 3     fenofibrate (TRICOR) 48 MG tablet TAKE TWO TABLETS BY MOUTH ONCE DAILY 180 tablet 0      fluticasone (FLOVENT HFA) 220 MCG/ACT inhaler Inhale 2 puffs into the lungs 2 times daily 3 Inhaler 3     levalbuterol (XOPENEX HFA) 45 MCG/ACT inhaler Inhale 2 puffs into the lungs every 6 hours as needed for shortness of breath / dyspnea or wheezing 1 Inhaler 5     metFORMIN (GLUCOPHAGE-XR) 500 MG 24 hr tablet Take one tab po with meals and increase every week, as tolerated, by 500mg to a max of 2000/day or 1000 mg  tablet 3     pravastatin (PRAVACHOL) 20 MG tablet TAKE 1 TABLET (20 MG) BY MOUTH DAILY 90 tablet 0     progesterone (PROMETRIUM) 100 MG capsule TAKE ONE CAPSULE BY MOUTH ONCE DAILY 90 capsule 3     spironolactone (ALDACTONE) 50 MG tablet Take 1 tablet (50 mg) by mouth 2 times daily 180 tablet 3       Soc Hx: reviewed  Fam Hx: reviewed          Belle Rivera MD  Internal Medicine

## 2022-08-03 ENCOUNTER — APPOINTMENT (OUTPATIENT)
Dept: LAB | Facility: CLINIC | Age: 52
End: 2022-08-03
Payer: COMMERCIAL

## 2022-08-03 ENCOUNTER — VIRTUAL VISIT (OUTPATIENT)
Dept: INTERNAL MEDICINE | Facility: CLINIC | Age: 52
End: 2022-08-03
Payer: COMMERCIAL

## 2022-08-03 ENCOUNTER — MYC MEDICAL ADVICE (OUTPATIENT)
Dept: ORTHOPEDICS | Facility: CLINIC | Age: 52
End: 2022-08-03

## 2022-08-03 DIAGNOSIS — R39.198 VOIDING DIFFICULTY: Primary | ICD-10-CM

## 2022-08-03 LAB
ALBUMIN UR-MCNC: NEGATIVE MG/DL
APPEARANCE UR: CLEAR
BILIRUB UR QL STRIP: NEGATIVE
COLOR UR AUTO: ABNORMAL
GLUCOSE UR STRIP-MCNC: NEGATIVE MG/DL
HGB UR QL STRIP: NEGATIVE
KETONES UR STRIP-MCNC: NEGATIVE MG/DL
LEUKOCYTE ESTERASE UR QL STRIP: NEGATIVE
MUCOUS THREADS #/AREA URNS LPF: PRESENT /LPF
NITRATE UR QL: NEGATIVE
PH UR STRIP: 6 [PH] (ref 5–7)
RBC URINE: <1 /HPF
SP GR UR STRIP: 1.02 (ref 1–1.03)
UROBILINOGEN UR STRIP-MCNC: NORMAL MG/DL
WBC URINE: 1 /HPF

## 2022-08-03 PROCEDURE — 99214 OFFICE O/P EST MOD 30 MIN: CPT | Mod: 95 | Performed by: INTERNAL MEDICINE

## 2022-08-03 PROCEDURE — 81001 URINALYSIS AUTO W/SCOPE: CPT | Performed by: INTERNAL MEDICINE

## 2022-08-03 ASSESSMENT — ASTHMA QUESTIONNAIRES
QUESTION_2 LAST FOUR WEEKS HOW OFTEN HAVE YOU HAD SHORTNESS OF BREATH: NOT AT ALL
ACT_TOTALSCORE: 25
QUESTION_1 LAST FOUR WEEKS HOW MUCH OF THE TIME DID YOUR ASTHMA KEEP YOU FROM GETTING AS MUCH DONE AT WORK, SCHOOL OR AT HOME: NONE OF THE TIME
QUESTION_4 LAST FOUR WEEKS HOW OFTEN HAVE YOU USED YOUR RESCUE INHALER OR NEBULIZER MEDICATION (SUCH AS ALBUTEROL): NOT AT ALL
ACT_TOTALSCORE: 25
QUESTION_3 LAST FOUR WEEKS HOW OFTEN DID YOUR ASTHMA SYMPTOMS (WHEEZING, COUGHING, SHORTNESS OF BREATH, CHEST TIGHTNESS OR PAIN) WAKE YOU UP AT NIGHT OR EARLIER THAN USUAL IN THE MORNING: NOT AT ALL
QUESTION_5 LAST FOUR WEEKS HOW WOULD YOU RATE YOUR ASTHMA CONTROL: COMPLETELY CONTROLLED

## 2022-08-03 NOTE — PATIENT INSTRUCTIONS
Plan:  1. Bladder ultrasound -- To schedule this test you may call Scheduling center at [phone]    2. Urine test today   3. Urology referral

## 2022-08-04 NOTE — TELEPHONE ENCOUNTER
See patient MyChart message and advise on response.     Clair Ibarra MSA, ATC  Certified Athletic Trainer

## 2022-08-04 NOTE — TELEPHONE ENCOUNTER
I would consider pain management referral for a suprascapular nerve block.  This would be for pain mostly.  We could repeat injection with higher volume to expand the capsule as well.  This could be done in mid September, 3 months from last injection. Otherwise continued PT exercises.    Thanks

## 2022-08-05 NOTE — TELEPHONE ENCOUNTER
Patient asking if nerve block would help improve ROM. Please advise on response.     Clair Ibarra MSA, ATC  Certified Athletic Trainer

## 2022-08-10 ENCOUNTER — HOSPITAL ENCOUNTER (OUTPATIENT)
Dept: ULTRASOUND IMAGING | Facility: CLINIC | Age: 52
Discharge: HOME | End: 2022-08-10
Attending: INTERNAL MEDICINE | Admitting: INTERNAL MEDICINE
Payer: COMMERCIAL

## 2022-08-10 DIAGNOSIS — R39.198 VOIDING DIFFICULTY: ICD-10-CM

## 2022-08-10 PROCEDURE — 76857 US EXAM PELVIC LIMITED: CPT

## 2022-08-19 ENCOUNTER — OFFICE VISIT (OUTPATIENT)
Dept: UROLOGY | Facility: CLINIC | Age: 52
End: 2022-08-19
Payer: COMMERCIAL

## 2022-08-19 VITALS — OXYGEN SATURATION: 97 % | DIASTOLIC BLOOD PRESSURE: 90 MMHG | SYSTOLIC BLOOD PRESSURE: 150 MMHG | HEART RATE: 68 BPM

## 2022-08-19 DIAGNOSIS — R39.198 VOIDING DIFFICULTY: ICD-10-CM

## 2022-08-19 DIAGNOSIS — R35.0 URINARY FREQUENCY: Primary | ICD-10-CM

## 2022-08-19 PROCEDURE — 99203 OFFICE O/P NEW LOW 30 MIN: CPT | Mod: 25 | Performed by: STUDENT IN AN ORGANIZED HEALTH CARE EDUCATION/TRAINING PROGRAM

## 2022-08-19 PROCEDURE — 51798 US URINE CAPACITY MEASURE: CPT | Performed by: STUDENT IN AN ORGANIZED HEALTH CARE EDUCATION/TRAINING PROGRAM

## 2022-08-19 RX ORDER — OXYBUTYNIN CHLORIDE 5 MG/1
5 TABLET, EXTENDED RELEASE ORAL DAILY
Qty: 30 TABLET | Refills: 3 | Status: SHIPPED | OUTPATIENT
Start: 2022-08-19 | End: 2022-11-25

## 2022-08-19 NOTE — NURSING NOTE
"Initial BP (!) 150/90 (BP Location: Left arm, Patient Position: Chair, Cuff Size: Adult Regular)   Pulse 68   LMP  (LMP Unknown)   SpO2 97%  Estimated body mass index is 26.36 kg/m  as calculated from the following:    Height as of 6/15/22: 1.613 m (5' 3.5\").    Weight as of 6/15/22: 68.6 kg (151 lb 3.2 oz). .    Lenora Vazquez MA on 8/19/2022 at 2:39 PM  "

## 2022-08-19 NOTE — PROGRESS NOTES
Chief Complaint:   Urinary frequency         History of Present Illness:   Olga Lidia Solorzano is a 52 year old female with a history of HLD, HTN, prediabetes, and asthma who presents for evaluation of urinary frequency.    Patient reports a 4-5 month history of urinary frequency, nocturia x 2-6, and sensation of incomplete bladder emptying. Some days, her symptoms will be better controlled. She denies dysuria, gross hematuria, and urge incontinence. She reports occasional stress incontinence.      UA from 8/03/2022 was unremarkable. She denies recent history of UTIs and kidney stones.     She has regular bowel movements. She drinks water and sugar-free Powerade.            Past Medical History:     Past Medical History:   Diagnosis Date     Anemia 09/2018     DJD of right AC (acromioclavicular) joint 04/10/2019     Fatty liver 2/1/2016     Hormone replacement therapy (HRT) 2/26/2022     Hyperlipidemia LDL goal < 130      Hypertension goal BP (blood pressure) < 140/90      Insulin resistance      IUD (intrauterine device) in place ??2012 or 2013-2/18/19    Paragard     Lichen sclerosus et atrophicus of the vulva 2/26/2022     Mixed hyperlipidemia 1/11/2012    Diagnosis updated by automated process. Provider to review and confirm.     NAFLD (nonalcoholic fatty liver disease)      Obesity      Prediabetes 2/26/2022     Scoliosis      Uncomplicated asthma      Vasomotor symptoms due to menopause 2/26/2022            Past Surgical History:     Past Surgical History:   Procedure Laterality Date     COLONOSCOPY N/A 03/13/2017    Procedure: COLONOSCOPY;  Surgeon: Tato Child MD;  Location:  GI     [facility] VASCULAR SURGERY PROCEDURE UNLIST      vericose vein            Medications     Current Outpatient Medications   Medication     amLODIPine (NORVASC) 5 MG tablet     clobetasol (TEMOVATE) 0.05 % external ointment     diclofenac (VOLTAREN) 75 MG EC tablet     estradiol (VIVELLE-DOT) 0.075 MG/24HR BIW patch      fenofibrate (TRICOR) 48 MG tablet     fluticasone (FLOVENT HFA) 220 MCG/ACT inhaler     levalbuterol (XOPENEX HFA) 45 MCG/ACT inhaler     metFORMIN (GLUCOPHAGE-XR) 500 MG 24 hr tablet     pravastatin (PRAVACHOL) 20 MG tablet     progesterone (PROMETRIUM) 100 MG capsule     spironolactone (ALDACTONE) 50 MG tablet     Current Facility-Administered Medications   Medication     lidocaine 1 % injection 3 mL     lidocaine 1 % injection 4 mL     methylPREDNISolone (DEPO-MEDROL) injection 80 mg            Allergies:   Niacin         Review of Systems:  From intake questionnaire   Negative 14 system review except as noted on HPI, nurse's note.         Physical Exam:   Patient is a 52 year old  female   Vitals: Blood pressure (!) 150/90, pulse 68, SpO2 97 %, not currently breastfeeding.  General Appearance Adult: Alert, no acute distress, oriented.  Lungs: Non-labored breathing.  Heart: No obvious jugular venous distension present.  Neuro: Alert, oriented, speech and mentation normal    PVR: 19 mL      Labs and Pathology:    I personally reviewed all applicable laboratory data and went over findings with patient  Significant for:     BMP RESULTS:  Recent Labs   Lab Test 03/16/22  0723 10/19/21  0925 09/20/21  0725 06/04/21  0715 11/13/20  0651 01/14/20  0657 10/18/18  0716   NA  --  138 134 138 133 137 138   POTASSIUM  --  3.9 3.9 3.8 3.9 4.0 3.8   CHLORIDE  --  106 104 106 102 105 103   CO2  --  26 24 27 26 27 28   ANIONGAP  --  6 6 5 5 5 7   * 109* 119* 116* 96 105* 101*   BUN  --  16 17 16 19 13 22   CR  --  0.80 0.69 0.70 0.58 0.55 0.56   GFRESTIMATED  --  84 >90 >90 >90 >90 >90   GFRESTBLACK  --   --   --  >90 >90 >90 >90   SU  --  9.0 9.0 9.5 9.2 9.6 9.2       UA RESULTS:   Recent Labs   Lab Test 08/03/22  0852   SG 1.018   URINEPH 6.0   NITRITE Negative   RBCU <1   WBCU 1           Imaging:    I personally reviewed all applicable imaging and went over findings with patient.  Significant for:    Results for  orders placed or performed during the hospital encounter of 08/10/22   US Bladder w Pre and Post Void Residual    Narrative    US BLADDER W PRE AND POST VOID RESIDUAL 8/10/2022 7:26 AM     HISTORY: Voiding difficulty  TECHNIQUE: Routine  COMPARISON: None    FINDINGS: The bladder is unremarkable. Post void residual in the  bladder measuring 40 cc.      Impression    IMPRESSION:  1.  Small to moderate postvoid residual in the bladder.    JACQUELINE BENÍTEZ MD         SYSTEM ID:  [id number]            Assessment and Plan:     Assessment: 52 year old female with a 4 month history of urinary frequency, nocturia, and sensation of incomplete bladder emptying.  We discussed possible etiologies including overactive bladder and pelvic floor hypercontractility.  We then discussed possible treatment options including anticholinergics versus pelvic floor PT.    At this time, patient would like to try medication.    Plan:  1.  Start oxybutynin XR 5 mg daily.  Side effects reviewed.  Patient was advised that this medication can take several weeks to notice benefit.  2.  Follow-up in 3 months.    JEANA RODRÍGUEZ PA-C  Department of Urology

## 2022-08-19 NOTE — NURSING NOTE
Active order to obtain bladder scan? Yes   Name of ordering provider:  Charity Ortez  Bladder scan preformed post void Yes.  Bladder scan reveled 19ML  Provider notified?  Yes    Lenora Vazquez CMA

## 2022-09-15 ENCOUNTER — MYC MEDICAL ADVICE (OUTPATIENT)
Dept: ORTHOPEDICS | Facility: CLINIC | Age: 52
End: 2022-09-15

## 2022-09-15 NOTE — TELEPHONE ENCOUNTER
Please see patient MyChart message and advise on response. Please place referral for nerve block.     Clair Ibarra MSA, ATC  Certified Athletic Trainer

## 2022-09-15 NOTE — TELEPHONE ENCOUNTER
We could consider seeing if she has a pinched nerve in the neck or somewhere else.  Difficult to discern without seeing her back.  Can we find an appointment for her as I would like to see the biceps issue (could be worrisome).

## 2022-09-16 NOTE — TELEPHONE ENCOUNTER
Spoke with patient and scheduled follow up visit per Dr. Barnett request for 9/21/22.     Clair Ibarra MSA, ATC  Certified Athletic Trainer

## 2022-09-21 ENCOUNTER — OFFICE VISIT (OUTPATIENT)
Dept: ORTHOPEDICS | Facility: CLINIC | Age: 52
End: 2022-09-21
Payer: COMMERCIAL

## 2022-09-21 VITALS — SYSTOLIC BLOOD PRESSURE: 138 MMHG | DIASTOLIC BLOOD PRESSURE: 90 MMHG | HEIGHT: 64 IN | BODY MASS INDEX: 26.36 KG/M2

## 2022-09-21 DIAGNOSIS — M25.512 CHRONIC PAIN IN LEFT SHOULDER: Primary | ICD-10-CM

## 2022-09-21 DIAGNOSIS — M75.02 ADHESIVE CAPSULITIS OF LEFT SHOULDER: ICD-10-CM

## 2022-09-21 DIAGNOSIS — G89.29 CHRONIC PAIN IN LEFT SHOULDER: Primary | ICD-10-CM

## 2022-09-21 PROCEDURE — 99214 OFFICE O/P EST MOD 30 MIN: CPT | Performed by: FAMILY MEDICINE

## 2022-09-21 RX ORDER — GABAPENTIN 300 MG/1
300 CAPSULE ORAL AT BEDTIME
Qty: 30 CAPSULE | Refills: 1 | Status: SHIPPED | OUTPATIENT
Start: 2022-09-21 | End: 2022-11-21

## 2022-09-21 ASSESSMENT — PAIN SCALES - GENERAL: PAINLEVEL: MODERATE PAIN (4)

## 2022-09-21 NOTE — LETTER
9/21/2022         RE: Olga Lidia Solorzano  [address] [id number]        Dear Colleague,    Thank you for referring your patient, Olga Lidia Solorzano, to the [facility] [place]. Please see a copy of my visit note below.    ESTABLISHED PATIENT FOLLOW-UP:  Follow Up and Pain of the Left Shoulder       HISTORY OF PRESENT ILLNESS  Ms. Solorzano is a pleasant 52 year old year old female who presents to clinic today for follow-up of left shoulder pain.    Date of injury: ongoing 8-9 months   Date last seen: 6/15/22  Following Therapeutic Plan: cortisone injection, acupuncture, massage, OTCs as needed  Pain: 4/10 today, worsening, constant  Function: worsening, limited ROM   Interval History: Cortisone injection at last visit provided no relief. Pain is constant, increases with use. Pain radiates in to forearm, especially after sleeping. She notes that her left bicep has atrophied. She has difficulty sleeping due to pain.      She denies neck pain, does have a history of cervical DDD, unsure which level    Additional medical/Social/Surgical histories reviewed in [facility] and updated as appropriate.    REVIEW OF SYSTEMS (9/21/2022)  CONSTITUTIONAL: Denies fever and weight loss  GASTROINTESTINAL: Denies abdominal pain, nausea, vomiting  MUSCULOSKELETAL: See HPI  SKIN: Denies any recent rash or lesion  NEUROLOGICAL: Denies numbness or focal weakness     PHYSICAL EXAM  LMP  (LMP Unknown)     General  - normal appearance, in no obvious distress  HEENT  - conjunctivae not injected, moist mucous membranes  CV  - normal radial pulse  Pulm  - normal respiratory pattern, non-labored  Musculoskeletal - cervical spine  - inspection: normal bone and joint alignment, no obvious kyphosis  - palpation: no paravertebral or bony tenderness  - ROM: normal and painless flexion, extension, left and right sidebending and rotation  - strength: upper extremities 5/5 in all planes  - special  tests:  (-) Spurling bilaterally  Musculoskeletal - left  shoulder  - inspection: normal bone and joint alignment, no obvious deformity, no scapular winging, +shoulder hiking  - palpation: Tenderness cuff insertion at tuberosity, normal clavicle, non-tender AC  - ROM:  Shoulder hiking with abduction, painful and limited forward elevation 100, abduction and internal rotation s profoacrum. External rotation limited to 40 degrees with pain.  - strength: 5/5  strength, 4/5 shoulder strength in abduction*.  5/5 ER and IR  Neuro  - no sensory or motor deficit, grossly normal coordination, normal muscle tone  Skin  - no ecchymosis, erythema, warmth, or induration, no obvious rash  Psych  - interactive, appropriate, normal mood and affect       IMAGING :     EXAM: MR SHOULDER LEFT W/O CONTRAST  LOCATION: [facility]  DATE/TIME: 6/2/2022 6:54 AM     INDICATION: Shoulder pain, adhesive capsulitis suspected, x-ray done.  COMPARISON: None.  TECHNIQUE: Unenhanced.     FINDINGS:     ROTATOR CUFF:  -Supraspinatus: Mild tendinopathy within the anterior supraspinatus. No tearing or retraction. No atrophy.  -Infraspinatus: No tendon tear, tendinopathy or fatty atrophy.  -Subscapularis: No tendon tear, tendinopathy or fatty atrophy.  -Teres minor: No tendon tear, tendinopathy or fatty atrophy.     CORACOACROMIAL ARCH:  -Morphology: Type II acromion. No subacromial spur. Subacromial and subcoracoid space are normal.   -Bursa: Trace fluid in the subacromial-subdeltoid bursa. The coracoacromial and coracoclavicular ligaments are negative.     ACROMIOCLAVICULAR JOINT:   -Normal.      LONG HEAD OF BICEPS TENDON:   -No tendinopathy or tearing. Mild tenosynovitis.     GLENOHUMERAL JOINT:   -Labrum: No labral tear. No paralabral cyst.   -Cartilage: Normal.   -Joint space: No effusion or synovitis.  -Glenohumeral ligaments and capsule: Sprain of the inferior glenohumeral ligaments. No tearing or  retraction.     BONES:   -No fracture or concerning marrow replacing lesion.     SOFT TISSUES:   -Normal deltoid muscle bulk. Normal visualized chest wall and axilla.                                                                      IMPRESSION:  1.  Mild tendinopathy within the anterior supraspinatus. No tearing or retraction. No atrophy.  2.  The rotator cuff tendons are otherwise negative.  3.  Trace fluid in the subacromial-subdeltoid bursa.  4.  Sprain of the inferior glenohumeral ligament. The chronicity of this finding is uncertain. No tearing.  5.  No evidence for fracture.  6.  No evidence for labral tear.         ASSESSMENT & PLAN  Ms. Solorzano is a 52 year old year old female who presents to clinic today with Follow Up and Pain of the Left Shoulder    Continued stiffness and pain of left shoulder, additional increasing burning pain at lateral arm to elbow.  Decreased bicep bulk.  Will rule out  Concomitant cervical radiculitis/impingemenet (reported history of DDD) in addition to ongoing adhesive capsulitis.    Diagnosis:  Adhesive capsulitis of  Left shoulder  Chronic pain of left shoulder  History of Cervical DDD    -Gabapentin start 300mg at bedtime  -MRI cervical spine ordered  -PT order placed, renew due to worsening stiffness  -Consideration for brisement procedure, subacromial injection (diagnostic) vs. Suprascapular nerve block for shoulder ad capsulitis.  -Follow up after MRI completed    35 minutes on date of the encounter doing chart review, history and examination, independent imaging review, documentation, and additional activities noted above.    It was a pleasure seeing Olga Lidiazoila Barnett DO, [facility]  Primary Care Sports Medicine          Again, thank you for allowing me to participate in the care of your patient.        Sincerely,        Hiren Barnett DO

## 2022-09-21 NOTE — PROGRESS NOTES
ESTABLISHED PATIENT FOLLOW-UP:  Follow Up and Pain of the Left Shoulder       HISTORY OF PRESENT ILLNESS  Ms. Solorzano is a pleasant 52 year old year old female who presents to clinic today for follow-up of left shoulder pain.    Date of injury: ongoing 8-9 months   Date last seen: 6/15/22  Following Therapeutic Plan: cortisone injection, acupuncture, massage, OTCs as needed  Pain: 4/10 today, worsening, constant  Function: worsening, limited ROM   Interval History: Cortisone injection at last visit provided no relief. Pain is constant, increases with use. Pain radiates in to forearm, especially after sleeping. She notes that her left bicep has atrophied. She has difficulty sleeping due to pain.      She denies neck pain, does have a history of cervical DDD, unsure which level    Additional medical/Social/Surgical histories reviewed in [facility] and updated as appropriate.    REVIEW OF SYSTEMS (9/21/2022)  CONSTITUTIONAL: Denies fever and weight loss  GASTROINTESTINAL: Denies abdominal pain, nausea, vomiting  MUSCULOSKELETAL: See HPI  SKIN: Denies any recent rash or lesion  NEUROLOGICAL: Denies numbness or focal weakness     PHYSICAL EXAM  LMP  (LMP Unknown)     General  - normal appearance, in no obvious distress  HEENT  - conjunctivae not injected, moist mucous membranes  CV  - normal radial pulse  Pulm  - normal respiratory pattern, non-labored  Musculoskeletal - cervical spine  - inspection: normal bone and joint alignment, no obvious kyphosis  - palpation: no paravertebral or bony tenderness  - ROM: normal and painless flexion, extension, left and right sidebending and rotation  - strength: upper extremities 5/5 in all planes  - special tests:  (-) Spurling bilaterally  Musculoskeletal - left  shoulder  - inspection: normal bone and joint alignment, no obvious deformity, no scapular winging, +shoulder hiking  - palpation: Tenderness cuff insertion at tuberosity, normal clavicle, non-tender AC  - ROM:  Shoulder  hiking with abduction, painful and limited forward elevation 100, abduction and internal rotation s profoacrum. External rotation limited to 40 degrees with pain.  - strength: 5/5  strength, 4/5 shoulder strength in abduction*.  5/5 ER and IR  Neuro  - no sensory or motor deficit, grossly normal coordination, normal muscle tone  Skin  - no ecchymosis, erythema, warmth, or induration, no obvious rash  Psych  - interactive, appropriate, normal mood and affect       IMAGING :     EXAM: MR SHOULDER LEFT W/O CONTRAST  LOCATION: [facility]  DATE/TIME: 6/2/2022 6:54 AM     INDICATION: Shoulder pain, adhesive capsulitis suspected, x-ray done.  COMPARISON: None.  TECHNIQUE: Unenhanced.     FINDINGS:     ROTATOR CUFF:  -Supraspinatus: Mild tendinopathy within the anterior supraspinatus. No tearing or retraction. No atrophy.  -Infraspinatus: No tendon tear, tendinopathy or fatty atrophy.  -Subscapularis: No tendon tear, tendinopathy or fatty atrophy.  -Teres minor: No tendon tear, tendinopathy or fatty atrophy.     CORACOACROMIAL ARCH:  -Morphology: Type II acromion. No subacromial spur. Subacromial and subcoracoid space are normal.   -Bursa: Trace fluid in the subacromial-subdeltoid bursa. The coracoacromial and coracoclavicular ligaments are negative.     ACROMIOCLAVICULAR JOINT:   -Normal.      LONG HEAD OF BICEPS TENDON:   -No tendinopathy or tearing. Mild tenosynovitis.     GLENOHUMERAL JOINT:   -Labrum: No labral tear. No paralabral cyst.   -Cartilage: Normal.   -Joint space: No effusion or synovitis.  -Glenohumeral ligaments and capsule: Sprain of the inferior glenohumeral ligaments. No tearing or retraction.     BONES:   -No fracture or concerning marrow replacing lesion.     SOFT TISSUES:   -Normal deltoid muscle bulk. Normal visualized chest wall and axilla.                                                                      IMPRESSION:  1.  Mild tendinopathy within the anterior  supraspinatus. No tearing or retraction. No atrophy.  2.  The rotator cuff tendons are otherwise negative.  3.  Trace fluid in the subacromial-subdeltoid bursa.  4.  Sprain of the inferior glenohumeral ligament. The chronicity of this finding is uncertain. No tearing.  5.  No evidence for fracture.  6.  No evidence for labral tear.         ASSESSMENT & PLAN  Ms. Solorzano is a 52 year old year old female who presents to clinic today with Follow Up and Pain of the Left Shoulder    Continued stiffness and pain of left shoulder, additional increasing burning pain at lateral arm to elbow.  Decreased bicep bulk.  Will rule out  Concomitant cervical radiculitis/impingemenet (reported history of DDD) in addition to ongoing adhesive capsulitis.    Diagnosis:  Adhesive capsulitis of  Left shoulder  Chronic pain of left shoulder  History of Cervical DDD    -Gabapentin start 300mg at bedtime  -MRI cervical spine ordered  -PT order placed, renew due to worsening stiffness  -Consideration for brisement procedure, subacromial injection (diagnostic) vs. Suprascapular nerve block for shoulder ad capsulitis.  -Follow up after MRI completed    35 minutes on date of the encounter doing chart review, history and examination, independent imaging review, documentation, and additional activities noted above.    It was a pleasure seeing Kimberly Barnett DO, [facility]  Primary Care Sports Medicine

## 2022-09-23 NOTE — PROGRESS NOTES
"Physical Therapy Initial Examination/Evaluation  September 27, 2022    Olga Lidia Solorzano is a 52 year old female referred to physical therapy by Hiren Barnett DO for treatment of   Chronic pain in left shoulder   Adhesive capsulitis of left shoulder   with Precautions/Restrictions/MD instructions Eval and treat.     Therapist Impression:   Patient has complaints of L shoulder pain that has been ongoing since 11/2021 with no incident. She was seen in PT until May for L shoulder pain as well, was not improving. Since then she was diagnosed with adhesive capsulitis and referred back to PT. Her pain is constant, worse with reaching overhead, interrupts her sleep. She presents with significant pain and restricted L shoulder and cervical ROM. Patient given HEP with wand flexion and ER as well as pendulum with education on sleeping with shoulder pain.    Subjective:  DOI/onset: 11/2021  Acute Injury or Gradual Onset?: Gradual injury over time  Mechanism of Injury: unknown  Related PMH: None   Imaging: x-ray and MRI  Chief Complaint/Functional Limitations:  L shoulder pain with limited ROM and see below in therapy evaluation codes   Pain: rest 1 /10, activity 8/10 Location: lateral/anterior bicep Frequency: Constant Described as: aching, dull, sharp and shooting Previous Treatment: PT, acupuncture, GH injection, gabapentin Effect of prior treatment: fair Alleviated by: Nothing Progression of Symptoms: Unchanged Time of day when pain is worse: All times of the day/constant  Sleeping: Interrupted due to current issue  Occupation: Lab teach  Job duties: prolonged standing, repetitive tasks  Current HEP/exercise regimen: None  Patient's goals are see chief complaints \"To loosen up my shoulder and eliminate pain\"     Other pertinent PMH/Red Flags: Anemia, Asthma, Concussions/Dizziness, High Blood Pressure, Menopausal, Pain at Night/Rest   Barriers at home/work: None as reported by patient  Pertinent Surgical History: " None  Medications: Gabapentin, high blood pressure, hormone replacement   General health as reported by patient: good  Return to MD:  PRN    SHOULDER EXAMINATION  Diagnosis: L shoulder pain / adhesive capsulitis    R handedness    CERVICAL SCREEN  AROM:   R rot 72  L rot 42*  R SB 32*  L SB 22*    SHOULDER RANGE OF MOTION * = painful  AROM Flexion Abduction ER   Base Ext/IR   Left 70* 90* 30* L glute   Right 120* WNL 70* T6       PROM Flexion Abd   Left 110* 95*     JOINT MOBILITY  Hypomobile AP glides, inferior glides      Assessment/Plan:  Patient is a 52 year old female with left side shoulder complaints.    Patient has the following significant findings with corresponding treatment plan.                Diagnosis 1:  L shoulder pain  Pain -  manual therapy and splint/taping/bracing/orthotics  Decreased ROM/flexibility - manual therapy and therapeutic exercise  Decreased joint mobility - manual therapy and therapeutic exercise  Impaired muscle performance - neuro re-education  Decreased function - therapeutic activities  Impaired posture - neuro re-education    Therapy Evaluation Codes:   1) History comprised of:   Personal factors that impact the plan of care:      Time since onset of symptoms.    Comorbidity factors that impact the plan of care are:      Anemia, Asthma, Concussions/Dizziness, High Blood Pressure, Menopausal, Pain at Night/Rest .     Medications impacting care: Gabapentin, high blood pressure, hormone replacement .  2) Examination of Body Systems comprised of:   Body structures and functions that impact the plan of care:      Cervical spine and Shoulder.   Activity limitations that impact the plan of care are:      Bathing, Cooking, Driving, Dressing, Lifting, Reading/Computer work, Working, Sleeping and Laying down.  3) Clinical presentation characteristics are:   Evolving/Changing.  4) Decision-Making    Low complexity using standardized patient assessment instrument and/or measureable  assessment of functional outcome.  Cumulative Therapy Evaluation is: Low complexity.    Previous and current functional limitations:  (See Goal Flow Sheet for this information)    Short term and Long term goals: (See Goal Flow Sheet for this information)     Communication ability:  Patient appears to be able to clearly communicate and understand verbal and written communication and follow directions correctly.  Treatment Explanation - The following has been discussed with the patient:   RX ordered/plan of care  Anticipated outcomes  Possible risks and side effects  This patient would benefit from PT intervention to resume normal activities.   Rehab potential is good.    Frequency:  1 X week, once daily  Duration:  for 8 weeks  Discharge Plan:  Achieve all LTG.  Independent in home treatment program.  Reach maximal therapeutic benefit.    Please refer to the daily flowsheet for treatment today, total treatment time and time spent performing 1:1 timed codes.

## 2022-09-27 ENCOUNTER — THERAPY VISIT (OUTPATIENT)
Dept: PHYSICAL THERAPY | Facility: CLINIC | Age: 52
End: 2022-09-27
Attending: FAMILY MEDICINE
Payer: COMMERCIAL

## 2022-09-27 DIAGNOSIS — G89.29 CHRONIC PAIN IN LEFT SHOULDER: ICD-10-CM

## 2022-09-27 DIAGNOSIS — M75.02 ADHESIVE CAPSULITIS OF LEFT SHOULDER: ICD-10-CM

## 2022-09-27 DIAGNOSIS — M25.512 CHRONIC PAIN IN LEFT SHOULDER: ICD-10-CM

## 2022-09-27 PROCEDURE — 97110 THERAPEUTIC EXERCISES: CPT | Mod: GP

## 2022-09-27 PROCEDURE — 97140 MANUAL THERAPY 1/> REGIONS: CPT | Mod: GP

## 2022-09-27 PROCEDURE — 97161 PT EVAL LOW COMPLEX 20 MIN: CPT | Mod: GP

## 2022-10-05 ENCOUNTER — IMMUNIZATION (OUTPATIENT)
Dept: FAMILY MEDICINE | Facility: CLINIC | Age: 52
End: 2022-10-05
Payer: COMMERCIAL

## 2022-10-05 ENCOUNTER — HOSPITAL ENCOUNTER (OUTPATIENT)
Dept: MRI IMAGING | Facility: CLINIC | Age: 52
Discharge: HOME | End: 2022-10-05
Attending: FAMILY MEDICINE | Admitting: FAMILY MEDICINE
Payer: COMMERCIAL

## 2022-10-05 DIAGNOSIS — Z23 NEED FOR PROPHYLACTIC VACCINATION AND INOCULATION AGAINST INFLUENZA: Primary | ICD-10-CM

## 2022-10-05 DIAGNOSIS — G96.89: Primary | ICD-10-CM

## 2022-10-05 DIAGNOSIS — G89.29 CHRONIC PAIN IN LEFT SHOULDER: ICD-10-CM

## 2022-10-05 DIAGNOSIS — M25.512 CHRONIC PAIN IN LEFT SHOULDER: ICD-10-CM

## 2022-10-05 PROCEDURE — 99207 PR NO CHARGE NURSE ONLY: CPT

## 2022-10-05 PROCEDURE — 90471 IMMUNIZATION ADMIN: CPT

## 2022-10-05 PROCEDURE — 90682 RIV4 VACC RECOMBINANT DNA IM: CPT

## 2022-10-05 PROCEDURE — 72141 MRI NECK SPINE W/O DYE: CPT

## 2022-10-06 ENCOUNTER — THERAPY VISIT (OUTPATIENT)
Dept: PHYSICAL THERAPY | Facility: CLINIC | Age: 52
End: 2022-10-06
Payer: COMMERCIAL

## 2022-10-06 DIAGNOSIS — M75.02 ADHESIVE CAPSULITIS OF LEFT SHOULDER: ICD-10-CM

## 2022-10-06 DIAGNOSIS — M25.512 CHRONIC PAIN IN LEFT SHOULDER: Primary | ICD-10-CM

## 2022-10-06 DIAGNOSIS — G89.29 CHRONIC PAIN IN LEFT SHOULDER: Primary | ICD-10-CM

## 2022-10-06 PROCEDURE — 97140 MANUAL THERAPY 1/> REGIONS: CPT | Mod: GP | Performed by: PHYSICAL THERAPIST

## 2022-10-06 PROCEDURE — 97110 THERAPEUTIC EXERCISES: CPT | Mod: GP | Performed by: PHYSICAL THERAPIST

## 2022-10-11 DIAGNOSIS — E78.5 HYPERLIPIDEMIA WITH TARGET LDL LESS THAN 130: ICD-10-CM

## 2022-10-11 DIAGNOSIS — K76.0 NON-ALCOHOLIC FATTY LIVER DISEASE: ICD-10-CM

## 2022-10-12 DIAGNOSIS — G96.89: Primary | ICD-10-CM

## 2022-10-13 RX ORDER — PRAVASTATIN SODIUM 20 MG
20 TABLET ORAL DAILY
Qty: 90 TABLET | Refills: 0 | Status: SHIPPED | OUTPATIENT
Start: 2022-10-13 | End: 2022-11-16

## 2022-10-14 ENCOUNTER — THERAPY VISIT (OUTPATIENT)
Dept: PHYSICAL THERAPY | Facility: CLINIC | Age: 52
End: 2022-10-14
Payer: COMMERCIAL

## 2022-10-14 DIAGNOSIS — G89.29 CHRONIC PAIN IN LEFT SHOULDER: Primary | ICD-10-CM

## 2022-10-14 DIAGNOSIS — M75.02 ADHESIVE CAPSULITIS OF LEFT SHOULDER: ICD-10-CM

## 2022-10-14 DIAGNOSIS — M25.512 CHRONIC PAIN IN LEFT SHOULDER: Primary | ICD-10-CM

## 2022-10-14 PROCEDURE — 97140 MANUAL THERAPY 1/> REGIONS: CPT | Mod: GP | Performed by: PHYSICAL THERAPIST

## 2022-10-20 ENCOUNTER — THERAPY VISIT (OUTPATIENT)
Dept: PHYSICAL THERAPY | Facility: CLINIC | Age: 52
End: 2022-10-20
Payer: COMMERCIAL

## 2022-10-20 DIAGNOSIS — M75.02 ADHESIVE CAPSULITIS OF LEFT SHOULDER: ICD-10-CM

## 2022-10-20 DIAGNOSIS — M25.512 CHRONIC PAIN IN LEFT SHOULDER: Primary | ICD-10-CM

## 2022-10-20 DIAGNOSIS — G89.29 CHRONIC PAIN IN LEFT SHOULDER: Primary | ICD-10-CM

## 2022-10-20 PROCEDURE — 97035 APP MDLTY 1+ULTRASOUND EA 15: CPT | Mod: GP | Performed by: PHYSICAL THERAPIST

## 2022-10-20 PROCEDURE — 97140 MANUAL THERAPY 1/> REGIONS: CPT | Mod: GP | Performed by: PHYSICAL THERAPIST

## 2022-10-20 PROCEDURE — 97110 THERAPEUTIC EXERCISES: CPT | Mod: GP | Performed by: PHYSICAL THERAPIST

## 2022-10-27 ENCOUNTER — THERAPY VISIT (OUTPATIENT)
Dept: PHYSICAL THERAPY | Facility: CLINIC | Age: 52
End: 2022-10-27
Payer: COMMERCIAL

## 2022-10-27 ENCOUNTER — HOSPITAL ENCOUNTER (OUTPATIENT)
Dept: MRI IMAGING | Facility: CLINIC | Age: 52
Discharge: HOME | End: 2022-10-27
Attending: FAMILY MEDICINE | Admitting: FAMILY MEDICINE
Payer: COMMERCIAL

## 2022-10-27 DIAGNOSIS — G89.29 CHRONIC PAIN IN LEFT SHOULDER: Primary | ICD-10-CM

## 2022-10-27 DIAGNOSIS — G96.89: ICD-10-CM

## 2022-10-27 DIAGNOSIS — M75.02 ADHESIVE CAPSULITIS OF LEFT SHOULDER: ICD-10-CM

## 2022-10-27 DIAGNOSIS — M25.512 CHRONIC PAIN IN LEFT SHOULDER: Primary | ICD-10-CM

## 2022-10-27 PROCEDURE — 97140 MANUAL THERAPY 1/> REGIONS: CPT | Mod: GP | Performed by: PHYSICAL THERAPIST

## 2022-10-27 PROCEDURE — 97035 APP MDLTY 1+ULTRASOUND EA 15: CPT | Mod: GP | Performed by: PHYSICAL THERAPIST

## 2022-10-27 PROCEDURE — 255N000002 HC RX 255 OP 636: Performed by: FAMILY MEDICINE

## 2022-10-27 PROCEDURE — A9585 GADOBUTROL INJECTION: HCPCS | Performed by: FAMILY MEDICINE

## 2022-10-27 PROCEDURE — 72156 MRI NECK SPINE W/O & W/DYE: CPT

## 2022-10-27 RX ORDER — GADOBUTROL 604.72 MG/ML
7 INJECTION INTRAVENOUS ONCE
Status: COMPLETED | OUTPATIENT
Start: 2022-10-27 | End: 2022-10-27

## 2022-10-27 RX ADMIN — GADOBUTROL 7 ML: 604.72 INJECTION INTRAVENOUS at 06:25

## 2022-11-03 ENCOUNTER — THERAPY VISIT (OUTPATIENT)
Dept: PHYSICAL THERAPY | Facility: CLINIC | Age: 52
End: 2022-11-03
Payer: COMMERCIAL

## 2022-11-03 DIAGNOSIS — M75.02 ADHESIVE CAPSULITIS OF LEFT SHOULDER: ICD-10-CM

## 2022-11-03 DIAGNOSIS — G89.29 CHRONIC PAIN IN LEFT SHOULDER: Primary | ICD-10-CM

## 2022-11-03 DIAGNOSIS — M25.512 CHRONIC PAIN IN LEFT SHOULDER: Primary | ICD-10-CM

## 2022-11-03 PROCEDURE — 97140 MANUAL THERAPY 1/> REGIONS: CPT | Mod: GP | Performed by: PHYSICAL THERAPIST

## 2022-11-03 PROCEDURE — 97035 APP MDLTY 1+ULTRASOUND EA 15: CPT | Mod: GP | Performed by: PHYSICAL THERAPIST

## 2022-11-15 DIAGNOSIS — K76.0 NON-ALCOHOLIC FATTY LIVER DISEASE: ICD-10-CM

## 2022-11-15 DIAGNOSIS — E78.5 HYPERLIPIDEMIA WITH TARGET LDL LESS THAN 130: ICD-10-CM

## 2022-11-15 DIAGNOSIS — I49.3 PVC'S (PREMATURE VENTRICULAR CONTRACTIONS): ICD-10-CM

## 2022-11-16 RX ORDER — AMLODIPINE BESYLATE 5 MG/1
TABLET ORAL
Qty: 240 TABLET | Refills: 0 | Status: SHIPPED | OUTPATIENT
Start: 2022-11-16 | End: 2023-06-14

## 2022-11-16 RX ORDER — PRAVASTATIN SODIUM 20 MG
20 TABLET ORAL DAILY
Qty: 90 TABLET | Refills: 0 | Status: SHIPPED | OUTPATIENT
Start: 2022-11-16 | End: 2022-11-25

## 2022-11-16 RX ORDER — FENOFIBRATE 48 MG/1
TABLET, COATED ORAL
Qty: 180 TABLET | Refills: 0 | Status: SHIPPED | OUTPATIENT
Start: 2022-11-16 | End: 2022-11-25

## 2022-11-18 ENCOUNTER — LAB (OUTPATIENT)
Dept: LAB | Facility: CLINIC | Age: 52
End: 2022-11-18
Payer: COMMERCIAL

## 2022-11-18 ENCOUNTER — THERAPY VISIT (OUTPATIENT)
Dept: PHYSICAL THERAPY | Facility: CLINIC | Age: 52
End: 2022-11-18
Payer: COMMERCIAL

## 2022-11-18 DIAGNOSIS — R73.9 ELEVATED RANDOM BLOOD GLUCOSE LEVEL: ICD-10-CM

## 2022-11-18 DIAGNOSIS — M75.02 ADHESIVE CAPSULITIS OF LEFT SHOULDER: ICD-10-CM

## 2022-11-18 DIAGNOSIS — E78.2 MIXED HYPERLIPIDEMIA: ICD-10-CM

## 2022-11-18 DIAGNOSIS — M25.512 CHRONIC PAIN IN LEFT SHOULDER: Primary | ICD-10-CM

## 2022-11-18 DIAGNOSIS — G89.29 CHRONIC PAIN IN LEFT SHOULDER: Primary | ICD-10-CM

## 2022-11-18 LAB
ALBUMIN SERPL-MCNC: 4.1 G/DL (ref 3.4–5)
ALP SERPL-CCNC: 43 U/L (ref 40–150)
ALT SERPL W P-5'-P-CCNC: 91 U/L (ref 0–50)
AST SERPL W P-5'-P-CCNC: 45 U/L (ref 0–45)
BILIRUB DIRECT SERPL-MCNC: <0.1 MG/DL (ref 0–0.2)
BILIRUB SERPL-MCNC: 0.3 MG/DL (ref 0.2–1.3)
CHOLEST SERPL-MCNC: 189 MG/DL
FASTING STATUS PATIENT QL REPORTED: YES
HBA1C MFR BLD: 5.3 % (ref 0–5.6)
HDLC SERPL-MCNC: 47 MG/DL
LDLC SERPL CALC-MCNC: 101 MG/DL
NONHDLC SERPL-MCNC: 142 MG/DL
PROT SERPL-MCNC: 7.7 G/DL (ref 6.8–8.8)
TRIGL SERPL-MCNC: 204 MG/DL

## 2022-11-18 PROCEDURE — 80061 LIPID PANEL: CPT

## 2022-11-18 PROCEDURE — 97035 APP MDLTY 1+ULTRASOUND EA 15: CPT | Mod: GP | Performed by: PHYSICAL THERAPIST

## 2022-11-18 PROCEDURE — 83036 HEMOGLOBIN GLYCOSYLATED A1C: CPT

## 2022-11-18 PROCEDURE — 97110 THERAPEUTIC EXERCISES: CPT | Mod: GP | Performed by: PHYSICAL THERAPIST

## 2022-11-18 PROCEDURE — 36415 COLL VENOUS BLD VENIPUNCTURE: CPT

## 2022-11-18 PROCEDURE — 97140 MANUAL THERAPY 1/> REGIONS: CPT | Mod: GP | Performed by: PHYSICAL THERAPIST

## 2022-11-18 PROCEDURE — 80076 HEPATIC FUNCTION PANEL: CPT

## 2022-11-18 NOTE — LETTER
"November 21, 2022      Olga Lidia S Jeanine  [address] [facility] [address]        Dear Olga Lidia,     Your cholesterol is abnormal, please use the recommendations below and recheck labs in 3-6months.  Liver function is improving. Recheck in 3 months       Ways to improve your cholesterol...     1- Eats less saturated fats (including avoiding \"trans\" fats).     2 - Eat more unsaturated fats  - found in vegetables, grains, and tree nuts.   Also by replacing butter with canola oil or olive oil.     3 - Eat more nuts. 1-2 ounces (a small handful) of almonds, walnuts, hazelnuts or pecans once a day in place of other less healthy snacks.     4 - Eat more high fiber foods - vegetables and whole grains including oat bran, oats, beans, peas, and flax seed.     5 - Eat more fish - such as salmon, tuna, mackerel, and sardines.  1 or 2 six ounce servings per week is a healthy replacement for other proteins.     6 - Exercise for at least 120 minutes per week - which is equal to 30 minutes 4 days per week.         Sincerely,        Leo Tejada DO    Results for orders placed or performed in visit on 11/18/22   Hepatic panel (Albumin, ALT, AST, Bili, Alk Phos, TP)     Status: Abnormal   Result Value Ref Range    Bilirubin Total 0.3 0.2 - 1.3 mg/dL    Bilirubin Direct <0.1 0.0 - 0.2 mg/dL    Protein Total 7.7 6.8 - 8.8 g/dL    Albumin 4.1 3.4 - 5.0 g/dL    Alkaline Phosphatase 43 40 - 150 U/L    AST 45 0 - 45 U/L    ALT 91 (H) 0 - 50 U/L   Lipid Profile (Chol, Trig, HDL, LDL calc)     Status: Abnormal   Result Value Ref Range    Cholesterol 189 <200 mg/dL    Triglycerides 204 (H) <150 mg/dL    Direct Measure HDL 47 (L) >=50 mg/dL    LDL Cholesterol Calculated 101 (H) <=100 mg/dL    Non HDL Cholesterol 142 (H) <130 mg/dL    Patient Fasting > 8hrs? Yes     Narrative    Cholesterol  Desirable:  <200 mg/dL    Triglycerides  Normal:  Less than 150 mg/dL  Borderline High:  150-199 mg/dL  High:  200-499 mg/dL  Very High:  " Greater than or equal to 500 mg/dL    Direct Measure HDL  Female:  Greater than or equal to 50 mg/dL   Male:  Greater than or equal to 40 mg/dL    LDL Cholesterol  Desirable:  <100mg/dL  Above Desirable:  100-129 mg/dL   Borderline High:  130-159 mg/dL   High:  160-189 mg/dL   Very High:  >= 190 mg/dL    Non HDL Cholesterol  Desirable:  130 mg/dL  Above Desirable:  130-159 mg/dL  Borderline High:  160-189 mg/dL  High:  190-219 mg/dL  Very High:  Greater than or equal to 220 mg/dL   Hemoglobin A1c     Status: Normal   Result Value Ref Range    Hemoglobin A1C 5.3 0.0 - 5.6 %

## 2022-11-21 NOTE — RESULT ENCOUNTER NOTE
"Your cholesterol is abnormal, please use the recommendations below and recheck labs in 3-6months.  Liver function is improving. Recheck in 3 months      Ways to improve your cholesterol...    1- Eats less saturated fats (including avoiding \"trans\" fats).    2 - Eat more unsaturated fats  - found in vege  tables, grains, and tree nuts.   Also by replacing butter with canola oil or olive oil.    3 - Eat more nuts.   1-2 ounces (a small handful) of almonds, walnuts, hazelnuts or pecans once a  day in place of other less healthy snacks.    4 - Eat more high   fiber foods - vegetables and whole grains including oat bran, oats, beans, peas, and flax seed.    5 - Eat more fish - such as salmon, tuna, mackerel, and sardines.  1 or 2 six ounce servings per week is a healthy replacement for other proteins.    6 - E  xercise for at least 120 minutes per week - which is equal to 30 minutes 4 days per week.    Leo Tejada D.O.    "

## 2022-11-25 ENCOUNTER — OFFICE VISIT (OUTPATIENT)
Dept: FAMILY MEDICINE | Facility: CLINIC | Age: 52
End: 2022-11-25
Payer: COMMERCIAL

## 2022-11-25 VITALS
WEIGHT: 153.6 LBS | DIASTOLIC BLOOD PRESSURE: 78 MMHG | SYSTOLIC BLOOD PRESSURE: 114 MMHG | TEMPERATURE: 98.5 F | RESPIRATION RATE: 20 BRPM | HEIGHT: 64 IN | BODY MASS INDEX: 26.22 KG/M2 | OXYGEN SATURATION: 98 % | HEART RATE: 87 BPM

## 2022-11-25 DIAGNOSIS — Z20.822 SUSPECTED COVID-19 VIRUS INFECTION: Primary | ICD-10-CM

## 2022-11-25 DIAGNOSIS — K76.0 NON-ALCOHOLIC FATTY LIVER DISEASE: ICD-10-CM

## 2022-11-25 DIAGNOSIS — M25.512 CHRONIC PAIN IN LEFT SHOULDER: ICD-10-CM

## 2022-11-25 DIAGNOSIS — R35.0 URINARY FREQUENCY: ICD-10-CM

## 2022-11-25 DIAGNOSIS — R05.1 ACUTE COUGH: ICD-10-CM

## 2022-11-25 DIAGNOSIS — R73.03 PREDIABETES: ICD-10-CM

## 2022-11-25 DIAGNOSIS — E78.5 HYPERLIPIDEMIA WITH TARGET LDL LESS THAN 130: ICD-10-CM

## 2022-11-25 DIAGNOSIS — I10 HYPERTENSION, ESSENTIAL: ICD-10-CM

## 2022-11-25 DIAGNOSIS — G89.29 CHRONIC PAIN IN LEFT SHOULDER: ICD-10-CM

## 2022-11-25 PROCEDURE — 99214 OFFICE O/P EST MOD 30 MIN: CPT | Mod: CS

## 2022-11-25 RX ORDER — GABAPENTIN 300 MG/1
300 CAPSULE ORAL AT BEDTIME
Qty: 30 CAPSULE | Refills: 3 | Status: SHIPPED | OUTPATIENT
Start: 2022-12-19 | End: 2023-05-22

## 2022-11-25 RX ORDER — PRAVASTATIN SODIUM 20 MG
20 TABLET ORAL DAILY
Qty: 90 TABLET | Refills: 1 | Status: SHIPPED | OUTPATIENT
Start: 2022-11-25 | End: 2023-07-06

## 2022-11-25 RX ORDER — METFORMIN HCL 500 MG
TABLET, EXTENDED RELEASE 24 HR ORAL
Qty: 270 TABLET | Refills: 3 | Status: SHIPPED | OUTPATIENT
Start: 2022-11-25 | End: 2023-02-27

## 2022-11-25 RX ORDER — FENOFIBRATE 48 MG/1
TABLET, COATED ORAL
Qty: 180 TABLET | Refills: 0 | Status: SHIPPED | OUTPATIENT
Start: 2023-02-20 | End: 2023-04-27

## 2022-11-25 RX ORDER — OXYBUTYNIN CHLORIDE 5 MG/1
5 TABLET, EXTENDED RELEASE ORAL DAILY
Qty: 30 TABLET | Refills: 3 | Status: SHIPPED | OUTPATIENT
Start: 2022-11-25 | End: 2023-07-17

## 2022-11-25 RX ORDER — BENZONATATE 100 MG/1
100 CAPSULE ORAL 3 TIMES DAILY PRN
Qty: 30 CAPSULE | Refills: 0 | Status: SHIPPED | OUTPATIENT
Start: 2022-11-25 | End: 2023-02-27

## 2022-11-25 ASSESSMENT — PAIN SCALES - GENERAL: PAINLEVEL: NO PAIN (0)

## 2022-11-25 NOTE — PROGRESS NOTES
Assessment & Plan     Suspected COVID-19 virus infection  Acute cough  Acute. No + test yet but partner + at home. Good candidate for Paxlovid treatment as patient is < 5 days of symptom onset, patient has chronic HTN, HLD, asthma, obesity, liver disease  which increases their risk for serious illness and/or complications from covid 19 infection. Patient has normal kidney function and is prescribed normal Paxlovid dose pack accordingly.  Discussed risks and benefits of Paxlovid. Medication list reviewed with patient for Paxlovid interactions. Medications requiring changes are noted under associated chronic diagnosis below.   - nirmatrelvir and ritonavir (PAXLOVID) therapy pack  Dispense: 30 each; Refill: 0  - benzonatate (TESSALON) 100 MG capsule  Dispense: 30 capsule; Refill: 0    Hyperlipidemia with target LDL less than 130  Chronic. Stable. Hold statin while on paxlovid.   - pravastatin (PRAVACHOL) 20 MG tablet  Dispense: 90 tablet; Refill: 1  - Med Therapy Management Referral  - fenofibrate (TRICOR) 48 MG tablet  Dispense: 180 tablet; Refill: 0    Non-alcoholic fatty liver disease  Chronic, stable. Normal AST, Mild ALT elevation, normal albumin, bilirubin, Alk phos. Will need to hold pravastatin while on paxlovid.  - pravastatin (PRAVACHOL) 20 MG tablet  Dispense: 90 tablet; Refill: 1  - Med Therapy Management Referral  - fenofibrate (TRICOR) 48 MG tablet  Dispense: 180 tablet; Refill: 0    Hypertension, essential  Chronic. Stable. Risk of increased concentration/effect of amlodipine w paxlovid. Skip amlodipine dose if feeling dizzy/lightheaded while taking paxlovid. Ensure adequate hydration.     Prediabetes  Chronic. Stable. Continue metformin.   - Med Therapy Management Referral  - metFORMIN (GLUCOPHAGE XR) 500 MG 24 hr tablet  Dispense: 270 tablet; Refill: 3    Urinary frequency  Chronic. Stable. Monitor for anticholinergic side effects while on paxlovid, consider skipping dose.   - Med Therapy Management  "Referral  - oxybutynin ER (DITROPAN XL) 5 MG 24 hr tablet  Dispense: 30 tablet; Refill: 3    Chronic pain in left shoulder  Stable/unchanged with gabapentin, PT, topicals. Continue gabapentin - significantly improves pt pain at night and ability to sleep. Patient to schedule a follow up if pain is worsening/affecting ADLs for adjustment in medication ? Pain mgmt referral.   - Med Therapy Management Referral  - gabapentin (NEURONTIN) 300 MG capsule  Dispense: 30 capsule; Refill: 3    MTM referral for multiple comorbid conditions with chronic pain - pt believes adverse affect of statin (has tried other meds). Has difficulty losing weight, saw wt.mgmt in the past and referral was rejected due to not overweight enough per pt ? Candidate for jardiance to assist in weight loss? A1c is well controlled ? Decrease meformin?    Prescription drug management         BMI:   Estimated body mass index is 26.78 kg/m  as calculated from the following:    Height as of this encounter: 1.613 m (5' 3.5\").    Weight as of this encounter: 69.7 kg (153 lb 9.6 oz).   Weight management plan: Discussed healthy diet and exercise guidelines MTM    See Patient Instructions    No follow-ups on file.    TONYA Osorio CNP  [facility] is a 52 year old, presenting for the following health issues:  Hypertension    Pt says partner tested covid-19 positive this morning.    History of Present Illness       Hypertension: She presents for follow up of hypertension.  She does not check blood pressure  regularly outside of the clinic. Outpatient blood pressures have not been over 140/90. She does not follow a low salt diet.    BP meds amlodipine and spirolactone.  No chest pain, palpitations, shortness of breath. No vision changes. No persistent headaches. No dizziness or lightheadedness.   No lower extremity edema.  No diarrhea or nausea.    Hyperlipidemia Follow-Up    Are you regularly taking any " medication or supplement to lower your cholesterol?   Yes- Pravastatin    Are you having muscle aches or other side effects that you think could be caused by your cholesterol lowering medication?  No    How many servings of fruits and vegetables do you eat daily?  2-3    On average, how many sweetened beverages do you drink each day (Examples: soda, juice, sweet tea, etc.  Do NOT count diet or artificially sweetened beverages)?   0    How many days per week do you exercise enough to make your heart beat faster? 3 or less    How many minutes a day do you exercise enough to make your heart beat faster? 9 or less    How many days per week do you miss taking your medication? 0  + body aches related to statin,     Pain History:  When did you first notice your pain? - Chronic Pain   Have you seen this provider for your pain in the past?   No   Where in your body do your have pain? Both shoulders, left shoulder is worse  Are you seeing anyone else for your pain? No, seeing ortho and PT  What makes your pain better? None  What makes your pain worse? None  How has pain affected your ability to work? 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 Partner    Shoulder pain - doing heat, ultrasound, PT with home program, taking gabapentin at night which is very helpful. Denies daytime fatigue.         PHQ-9 SCORE 4/16/2018 11/9/2020 2/25/2022   PHQ-9 Total Score 1 1 2       TIMO-7 SCORE 4/16/2018 11/9/2020 2/25/2022   Total Score 1 0 0       PEG Score 11/25/2022   PEG Total Score 4.67       COVID-19 Symptom Review  How many days ago did these symptoms start? 1    Are any of the following symptoms significant for you?  New or worsening difficulty breathing? Yes    Please describe what kind of difficulty you are having breathing:Mild dyspnea (able to do ADLs without difficulty, mild shortness of breath with activities such as climbing one or two  "flights of stairs or walking briskly)    Worsening cough? No    Fever or chills? I do not know    Headache: No + sinus congestion.    Sore throat: YES - mild    Chest pain: YES - chest tightness. No pressure, jaw/arm pain, lightheadedness, palpitations, edema.     Diarrhea: No    Body aches? No    What treatments has patient tried? none   Does patient live in a nursing home, group home, or shelter? No  Does patient have a way to get food/medications during quarantined? Yes, I have a friend or family member who can help me.        Review of Systems   Constitutional, HEENT, cardiovascular, pulmonary, gi and gu systems are negative, except as otherwise noted.      Objective    /78 (BP Location: Right arm, Patient Position: Chair, Cuff Size: Adult Regular)   Pulse 87   Temp 98.5  F (36.9  C) (Tympanic)   Resp 20   Ht 1.613 m (5' 3.5\")   Wt 69.7 kg (153 lb 9.6 oz)   LMP  (LMP Unknown)   SpO2 98%   BMI 26.78 kg/m    Body mass index is 26.78 kg/m .  Physical Exam   GENERAL: healthy, alert and no distress  EYES: Eyes grossly normal to inspection, PERRL and conjunctivae and sclerae normal  HENT: ear canals and TM's normal, nose and mouth without ulcers or lesions  NECK: no adenopathy, no asymmetry, masses, or scars and thyroid normal to palpation  RESP: lungs clear to auscultation - no rales, rhonchi or wheezes  BREAST: deferred.  CV: regular rate and rhythm, normal S1 S2, no S3 or S4, no murmur, click or rub, no peripheral edema and peripheral pulses strong  ABDOMEN: soft, nontender, no hepatosplenomegaly, no masses and bowel sounds normal  MS: no gross musculoskeletal defects noted, no edema  SKIN: no suspicious lesions or rashes  NEURO: Normal strength and tone, mentation intact and speech normal  PSYCH: mentation appears normal, affect normal/bright  LYMPH: no cervical, supraclavicular, axillary, or inguinal adenopathy    No results found for this or any previous visit (from the past 24 hour(s)).        "

## 2022-11-25 NOTE — PATIENT INSTRUCTIONS
1200 mg guaifenesin twice daily  Tessalon for cough    MED CHANGES IF YOU TAKE PAXLOVID  Risk of increased concentration/effect of amlodipine w paxlovid. Decrease amlodipine dose by 50% (take half tablet if possible) or skip amlodipine dose if feeling dizzy/lightheaded while taking paxlovid. Ensure adequate hydration to avoid low blood pressure.     Stop pravastatin while taking paxlovid - increased risk of rhabdomyolysis with paxlovid + statin.     Risk of increased concentration of oxybutynin levels w paxlovid. Increase risk of side effects of oxybutynin, monitor for

## 2022-11-29 ENCOUNTER — TELEPHONE (OUTPATIENT)
Dept: FAMILY MEDICINE | Facility: CLINIC | Age: 52
End: 2022-11-29

## 2022-11-29 ENCOUNTER — MYC MEDICAL ADVICE (OUTPATIENT)
Dept: FAMILY MEDICINE | Facility: CLINIC | Age: 52
End: 2022-11-29

## 2022-11-29 DIAGNOSIS — Z20.822 SUSPECTED COVID-19 VIRUS INFECTION: Primary | ICD-10-CM

## 2022-11-29 RX ORDER — CODEINE PHOSPHATE/GUAIFENESIN 10-100MG/5
5 LIQUID (ML) ORAL EVERY 6 HOURS PRN
Qty: 180 ML | Refills: 0 | Status: SHIPPED | OUTPATIENT
Start: 2022-11-29 | End: 2023-02-27

## 2022-11-29 NOTE — TELEPHONE ENCOUNTER
Covering provider.  Rx for guaifenesin with codeine (please go over medication instruction with patient).  Advise not to be driving while on prescription.  May discontinue tessalon capsules.     1. Suspected COVID-19 virus infection  - guaiFENesin-codeine (GUAIFENESIN AC) 100-10 MG/5ML syrup; Take 5 mLs by mouth every 6 hours as needed for congestion or cough  Dispense: 180 mL; Refill: 0

## 2022-11-29 NOTE — TELEPHONE ENCOUNTER
Routing My Chart message to Oscar Tinoco/ [place] providers to review/advise    Patient continues with Covid symptoms.    Treatment visit 11/25    Acute cough  Acute. No + test yet but partner + at home. Good candidate for Paxlovid treatment as patient is < 5 days of symptom onset, patient has chronic HTN, HLD, asthma, obesity, liver disease  which increases their risk for serious illness and/or complications from covid 19 infection. Patient has normal kidney function and is prescribed normal Paxlovid dose pack accordingly.  Discussed risks and benefits of Paxlovid. Medication list reviewed with patient for Paxlovid interactions. Medications requiring changes are noted under associated chronic diagnosis below.   - nirmatrelvir and ritonavir (PAXLOVID) therapy pack  Dispense: 30 each; Refill: 0  - benzonatate (TESSALON) 100 MG capsule  Dispense: 30 capsule; Refill: 0      Rian Valencia RN, BSN, PHN  [facility]

## 2022-11-29 NOTE — TELEPHONE ENCOUNTER
MTM referral from: [facility] visit (referral by provider)    MTM referral outreach attempt #2 on November 29, 2022 at 12:09 PM      Outcome: Patient not reachable after several attempts, will route to MTM Pharmacist/Provider as an FYI.  MT scheduling number is [phone].  Thank you for the referral.    Shawn Mcclendon, MTM coordinator

## 2022-12-07 ENCOUNTER — THERAPY VISIT (OUTPATIENT)
Dept: PHYSICAL THERAPY | Facility: CLINIC | Age: 52
End: 2022-12-07
Payer: COMMERCIAL

## 2022-12-07 DIAGNOSIS — G89.29 CHRONIC PAIN IN LEFT SHOULDER: Primary | ICD-10-CM

## 2022-12-07 DIAGNOSIS — M75.02 ADHESIVE CAPSULITIS OF LEFT SHOULDER: ICD-10-CM

## 2022-12-07 DIAGNOSIS — M25.512 CHRONIC PAIN IN LEFT SHOULDER: Primary | ICD-10-CM

## 2022-12-07 PROCEDURE — 97110 THERAPEUTIC EXERCISES: CPT | Mod: GP | Performed by: PHYSICAL THERAPIST

## 2022-12-07 PROCEDURE — 97140 MANUAL THERAPY 1/> REGIONS: CPT | Mod: GP | Performed by: PHYSICAL THERAPIST

## 2023-01-22 DIAGNOSIS — N95.1 VASOMOTOR SYMPTOMS DUE TO MENOPAUSE: ICD-10-CM

## 2023-01-22 DIAGNOSIS — Z79.890 HORMONE REPLACEMENT THERAPY (HRT): ICD-10-CM

## 2023-01-23 RX ORDER — ESTRADIOL 0.07 MG/D
1 FILM, EXTENDED RELEASE TRANSDERMAL
Qty: 24 PATCH | Refills: 0 | Status: SHIPPED | OUTPATIENT
Start: 2023-01-23 | End: 2023-02-27

## 2023-01-23 NOTE — TELEPHONE ENCOUNTER
"Requested Prescriptions   Pending Prescriptions Disp Refills     estradiol (VIVELLE-DOT) 0.075 MG/24HR BIW patch [Pharmacy Med Name: ESTRADIOL 0.075MG/24HR PTTW] 24 patch 3     Sig: PLACE 1 PATCH ONTO THE SKIN TWICE A WEEK       Hormone Replacement Therapy Passed - 1/22/2023  5:52 PM        Passed - Blood pressure under 140/90 in past 12 months     BP Readings from Last 3 Encounters:   11/25/22 114/78   09/21/22 (!) 138/90   08/19/22 (!) 150/90                 Passed - Recent (12 mo) or future (30 days) visit within the authorizing provider's specialty     Patient has had an office visit with the authorizing provider or a provider within the authorizing providers department within the previous 12 mos or has a future within next 30 days. See \"Patient Info\" tab in inbasket, or \"Choose Columns\" in Meds & Orders section of the refill encounter.              Passed - Patient has mammogram in past 2 years on file if age 50-75        Passed - Medication is active on med list        Passed - Patient is 18 years of age or older        Passed - No active pregnancy on record        Passed - No positive pregnancy test on record in past 12 months           Next 5 appointments (look out 90 days)    Feb 27, 2023  3:15 PM  PHYSICAL with Jessie Perdomo MD  [facility] ([facility] ) [address] [place] [id number]  [phone]        Prescription approved per [facility] Refill Protocol.  Alicia Pardo RN on 1/23/2023 at 10:14 AM    "

## 2023-02-07 ENCOUNTER — MYC MEDICAL ADVICE (OUTPATIENT)
Dept: FAMILY MEDICINE | Facility: CLINIC | Age: 53
End: 2023-02-07
Payer: COMMERCIAL

## 2023-02-08 ENCOUNTER — NURSE TRIAGE (OUTPATIENT)
Dept: FAMILY MEDICINE | Facility: CLINIC | Age: 53
End: 2023-02-08

## 2023-02-08 ENCOUNTER — VIRTUAL VISIT (OUTPATIENT)
Dept: FAMILY MEDICINE | Facility: CLINIC | Age: 53
End: 2023-02-08
Payer: COMMERCIAL

## 2023-02-08 DIAGNOSIS — U07.1 INFECTION DUE TO 2019 NOVEL CORONAVIRUS: Primary | ICD-10-CM

## 2023-02-08 DIAGNOSIS — J45.30 MILD PERSISTENT ASTHMA WITHOUT COMPLICATION: ICD-10-CM

## 2023-02-08 DIAGNOSIS — E78.2 MIXED HYPERLIPIDEMIA: ICD-10-CM

## 2023-02-08 DIAGNOSIS — R35.0 URINARY FREQUENCY: ICD-10-CM

## 2023-02-08 DIAGNOSIS — R73.03 PREDIABETES: ICD-10-CM

## 2023-02-08 DIAGNOSIS — I10 HYPERTENSION, ESSENTIAL: ICD-10-CM

## 2023-02-08 PROCEDURE — 99214 OFFICE O/P EST MOD 30 MIN: CPT | Mod: CS

## 2023-02-08 RX ORDER — LEVALBUTEROL TARTRATE 45 UG/1
2 AEROSOL, METERED ORAL EVERY 6 HOURS PRN
Qty: 15 G | Refills: 1 | Status: SHIPPED | OUTPATIENT
Start: 2023-02-08 | End: 2024-03-01

## 2023-02-08 ASSESSMENT — ASTHMA QUESTIONNAIRES
QUESTION_5 LAST FOUR WEEKS HOW WOULD YOU RATE YOUR ASTHMA CONTROL: COMPLETELY CONTROLLED
QUESTION_1 LAST FOUR WEEKS HOW MUCH OF THE TIME DID YOUR ASTHMA KEEP YOU FROM GETTING AS MUCH DONE AT WORK, SCHOOL OR AT HOME: NONE OF THE TIME
ACT_TOTALSCORE: 24
ACT_TOTALSCORE: 24
QUESTION_2 LAST FOUR WEEKS HOW OFTEN HAVE YOU HAD SHORTNESS OF BREATH: ONCE OR TWICE A WEEK
QUESTION_4 LAST FOUR WEEKS HOW OFTEN HAVE YOU USED YOUR RESCUE INHALER OR NEBULIZER MEDICATION (SUCH AS ALBUTEROL): NOT AT ALL
QUESTION_3 LAST FOUR WEEKS HOW OFTEN DID YOUR ASTHMA SYMPTOMS (WHEEZING, COUGHING, SHORTNESS OF BREATH, CHEST TIGHTNESS OR PAIN) WAKE YOU UP AT NIGHT OR EARLIER THAN USUAL IN THE MORNING: NOT AT ALL

## 2023-02-08 NOTE — PATIENT INSTRUCTIONS
Instructions for Patients      What are the symptoms of COVID-19?  Symptoms can include fever, cough, shortness of breath, chills, headache, muscle pain sore throat, fatigue, runny or stuffy nose, and loss of taste and smell. Other less common symptoms include nausea, vomiting, or diarrhea (watery stools).    Know when to call 911. Emergency warning signs include:    Trouble breathing or shortness of breath    Pain or pressure in the chest that doesn't go away    Feeling confused like you haven't felt before, or not being able to wake up    Bluish-colored lips or face    How can I take care of myself?  1. Get lots of rest. Drink extra fluids (unless a doctor has told you not to).  2. Take Tylenol (acetaminophen) for fever or pain. If you have liver or kidney problems, ask your family doctor if it's okay to take Tylenol   Adults can take either:   650 mg (two 325 mg pills or tablets) every 4 to 6 hours, or...   1,000 mg (two 500 mg pills) every 8 hours as needed.  Note: Don't take more than 3,000 mg in one day. Acetaminophen is found in many medicines (both prescribed and over the counter). Read all labels to be sure you don't take too much.  For children, check the Tylenol bottle for the right dose. The dose is based on the child's age or weight.  3. Take over the counter medicines for your symptoms as needed. Talk to your pharmacist.  4. If you have other health problems (like cancer, heart failure, an organ transplant, or severe kidney disease): Call your specialty clinic if you don't feel better in the next 2 days.    Where can I get more information?     [employer] COVID-19 Resource Hub: [email or web address]/     CDC Quarantine & Isolation: https://www.cdc.gov/coronavirus/2019-ncov/your-health/quarantine-isolation.html     CDC - What to Do If You're Sick: https://www.cdc.gov/coronavirus/2019-ncov/if-you-are-sick/index.html    Learn about the ACTIV-6 Clinical Trial: [email or web address] or call  [phone])-[phone]

## 2023-02-08 NOTE — TELEPHONE ENCOUNTER
RN replied to patient via [employer]. See message for details.     Rian Valencia RN, BSN, PHN  [facility]: [place]

## 2023-02-08 NOTE — PROGRESS NOTES
Olga Lidia is a 52 year old who is being evaluated via a billable video visit.      How would you like to obtain your AVS? MyChart  If the video visit is dropped, the invitation should be resent by: Text to cell phone: [phone]  Will anyone else be joining your video visit? No        Assessment & Plan     Infection due to 2019 novel coronavirus  Acute. + test at home yesterday. Good candidate for Paxlovid treatment as patient is < 5 days of symptom onset, patient has chronic asthma, HTN, HLD,  which increases their risk for serious illness and/or complications from covid 19 infection. Patient has stable/normal kidney function and is prescribed normal Paxlovid dose pack accordingly.  Discussed risks and benefits of Paxlovid. Medication list reviewed with patient for Paxlovid interactions. Medications requiring changes are noted under associated chronic diagnosis below.     - nirmatrelvir and ritonavir (PAXLOVID) therapy pack  Dispense: 30 tablet; Refill: 0    Mild persistent asthma without complication  Chronic. Refilling inhaler. OK to use every 4 hours while sick with covid.   - levalbuterol (XOPENEX HFA) 45 MCG/ACT inhaler  Dispense: 15 g; Refill: 1    Hypertension, essential  Chronic. Well controlled.   - take 1/2 amlodipine dose (cut pills at home) while taking paxlovid.     Urinary frequency  Chronic. Takes oxybutynin daily. Patient should monitor for side effects while taking paxlovid since concentration of oxybutynin can increase. Skip dose if side effects occur.     Mixed hyperlipidemia  Chronic. Takes pravastatin and fenofibrate. Discussed increased monitoring for myopathy while taking statin/fenofibrate with paxlovid. OK to hold these meds while taking paxlovid.       Prescription drug management         See Patient Instructions    No follow-ups on file.    TONYA Osorio [facility]    Subjective   Olga Lidia is a 52 year old, presenting for the following health issues:  COVID  Treatment      History of Present Illness       Reason for visit:  Covid meds  Symptom onset:  1-3 days ago  Symptoms include:  Sinus congestion  Symptom intensity:  Moderate  Symptom progression:  Worsening  Had these symptoms before:  Yes  Has tried/received treatment for these symptoms:  Yes  Previous treatment was successful:  No    She eats 0-1 servings of fruits and vegetables daily.She consumes 0 sweetened beverage(s) daily.She exercises with enough effort to increase her heart rate 9 or less minutes per day.  She exercises with enough effort to increase her heart rate 3 or less days per week.   She is taking medications regularly.         COVID-19 Symptom Review  How many days ago did these symptoms start? Monday 2/6    Are any of the following symptoms significant for you?    New or worsening difficulty breathing? No    Worsening cough? No    Fever or chills? No    Headache: YES    Sore throat: No    Chest pain: No    Diarrhea: No    Body aches? No  Patient also has nasal congestion    What treatments has patient tried? OTC monae seltzer cold  Does patient live in a nursing home, group home, or shelter? No  Does patient have a way to get food/medications during quarantined? Yes            Review of Systems   Constitutional, HEENT, cardiovascular, pulmonary, gi and gu systems are negative, except as otherwise noted.      Objective           Vitals:  No vitals were obtained today due to virtual visit.    Physical Exam   GENERAL: Healthy, alert and no distress  EYES: Eyes grossly normal to inspection.  No discharge or erythema, or obvious scleral/conjunctival abnormalities.  RESP: No audible wheeze, cough, or visible cyanosis.  No visible retractions or increased work of breathing.    SKIN: Visible skin clear. No significant rash, abnormal pigmentation or lesions.  NEURO: Cranial nerves grossly intact.  Mentation and speech appropriate for age.  PSYCH: Mentation appears normal, affect normal/bright, judgement  and insight intact, normal speech and appearance well-groomed.    Lab on 11/18/2022   Component Date Value Ref Range Status     Bilirubin Total 11/18/2022 0.3  0.2 - 1.3 mg/dL Final     Bilirubin Direct 11/18/2022 <0.1  0.0 - 0.2 mg/dL Final     Protein Total 11/18/2022 7.7  6.8 - 8.8 g/dL Final     Albumin 11/18/2022 4.1  3.4 - 5.0 g/dL Final     Alkaline Phosphatase 11/18/2022 43  40 - 150 U/L Final     AST 11/18/2022 45  0 - 45 U/L Final     ALT 11/18/2022 91 (H)  0 - 50 U/L Final     Cholesterol 11/18/2022 189  <200 mg/dL Final     Triglycerides 11/18/2022 204 (H)  <150 mg/dL Final     Direct Measure HDL 11/18/2022 47 (L)  >=50 mg/dL Final     LDL Cholesterol Calculated 11/18/2022 101 (H)  <=100 mg/dL Final     Non HDL Cholesterol 11/18/2022 142 (H)  <130 mg/dL Final     Patient Fasting > 8hrs? 11/18/2022 Yes   Final     Hemoglobin A1C 11/18/2022 5.3  0.0 - 5.6 % Final    Normal <5.7%   Prediabetes 5.7-6.4%    Diabetes 6.5% or higher     Note: Adopted from ADA consensus guidelines.               Video-Visit Details    Type of service:  Video Visit     Originating Location (pt. Location): Home  Distant Location (provider location):  On-site  Platform used for Video Visit: Beth

## 2023-02-08 NOTE — TELEPHONE ENCOUNTER
RN called and triage.  See separate encounter.    Rian Valencia RN, BSN, PHN  [facility]

## 2023-02-08 NOTE — TELEPHONE ENCOUNTER
RN COVID TREATMENT VISIT  02/08/23    Olga Lidia Solorzano  52 year old  Current weight? 150    Has the patient been seen by a primary care provider at an [facility] or [facility] within the past two years? Yes.   Have you been in close proximity to/do you have a known exposure to a person with a confirmed case of influenza? No.     Date of positive COVID test (PCR or at home)?  2/7/2023    Current COVID symptoms: headache, sore throat and congestion or runny nose    Date COVID symptoms began: 2/6/2023    Do you have any of the following conditions that place you at risk of being very sick from COVID-19? chronic lung disease and overweight (BMI>25)    Is patient eligible to continue? Yes, established patient, 12 years or older weighing at least 88.2 lbs, who has COVID symptoms that started in the past 5 days and is at risk for being very sick from COVID-19.       Have you received monoclonal antibodies or oral antiviral medications since testing positive to COVID-19? No    Are you currently hospitalized for COVID-19? No    Do you have a history of hepatitis? No    Are you currently pregnant or nursing? No    Do you have a clinically significant hypersensitivity to nirmatrelvir, ritonavir, or molnupiravir? No    Do you have any history of severe renal impairment (eGFR < 30mL/min)? No    Do you have any history of hepatic impairment or abnormalities (e.g. hepatic panel, ALT, AST, ALK Phos, bilirubin)? YES    Have you had a coronary stent placed in the previous 6 months? No    Is patient eligible to continue?   Yes, patient meets all eligibility requirements for the RN COVID treatment (as denoted by all no responses above).     Current Outpatient Medications   Medication Sig Dispense Refill     amLODIPine (NORVASC) 5 MG tablet TAKE ONE TABLET BY MOUTH ONCE DAILY 240 tablet 0     benzonatate (TESSALON) 100 MG capsule Take 1 capsule (100 mg) by mouth 3 times daily as needed for cough 30 capsule  0     clobetasol (TEMOVATE) 0.05 % external ointment Apply topically 2 times daily 60 g 0     estradiol (VIVELLE-DOT) 0.075 MG/24HR BIW patch PLACE 1 PATCH ONTO THE SKIN TWICE A WEEK 24 patch 0     [START ON 2/20/2023] fenofibrate (TRICOR) 48 MG tablet TAKE TWO TABLETS BY MOUTH ONCE DAILY 180 tablet 0     fluticasone (FLOVENT HFA) 220 MCG/ACT inhaler Inhale 2 puffs into the lungs 2 times daily 3 Inhaler 3     gabapentin (NEURONTIN) 300 MG capsule Take 1 capsule (300 mg) by mouth At Bedtime for 120 days 30 capsule 3     guaiFENesin-codeine (GUAIFENESIN AC) 100-10 MG/5ML syrup Take 5 mLs by mouth every 6 hours as needed for congestion or cough 180 mL 0     levalbuterol (XOPENEX HFA) 45 MCG/ACT inhaler Inhale 2 puffs into the lungs every 6 hours as needed for shortness of breath / dyspnea or wheezing 1 Inhaler 5     metFORMIN (GLUCOPHAGE XR) 500 MG 24 hr tablet Take one tab po with meals and increase every week, as tolerated, by 500mg to a max of 2000/day or 1000 mg  tablet 3     oxybutynin ER (DITROPAN XL) 5 MG 24 hr tablet Take 1 tablet (5 mg) by mouth daily 30 tablet 3     pravastatin (PRAVACHOL) 20 MG tablet Take 1 tablet (20 mg) by mouth daily 90 tablet 1     progesterone (PROMETRIUM) 100 MG capsule TAKE ONE CAPSULE BY MOUTH ONCE DAILY 90 capsule 3     spironolactone (ALDACTONE) 50 MG tablet Take 1 tablet (50 mg) by mouth 2 times daily 180 tablet 3       Medications from List 1 of the standing order (on medications that exclude the use of Paxlovid) that patient is taking: NONE. Is patient taking Bora's Wort? No  Is patient taking Bora's Wort or any meds from List 1? No.   Medications from List 2 of the standing order (on meds that provider needs to adjust) that patient is taking: amlodipine (Norvasc), explained a provider visit is necessary to discuss medication adjustments while taking Paxlovid. Is patient on any of the meds from List 2? Yes. Patient will be transferred to a  at the end of  "this call.   Cookie Valencia, RN         Answer Assessment - Initial Assessment Questions  1. COVID-19 DIAGNOSIS: \"Who made your COVID-19 diagnosis?\" \"Was it confirmed by a positive lab test or self-test?\" If not diagnosed by a doctor (or NP/PA), ask \"Are there lots of cases (community spread) where you live?\" Note: See public health department website, if unsure.      Home test.  Took 3 home tests  2. COVID-19 EXPOSURE: \"Was there any known exposure to COVID before the symptoms began?\" CDC Definition of close contact: within 6 feet (2 meters) for a total of 15 minutes or more over a 24-hour period.       Co- workers.  Works in hospital  3. ONSET: \"When did the COVID-19 symptoms start?\"       Monday  4. WORST SYMPTOM: \"What is your worst symptom?\" (e.g., cough, fever, shortness of breath, muscle aches)      Head ache, stuffy nose.  Little bit of sore throat.  5. COUGH: \"Do you have a cough?\" If Yes, ask: \"How bad is the cough?\"        Denies  denies  6. FEVER: \"Do you have a fever?\" If Yes, ask: \"What is your temperature, how was it measured, and when did it start?\"        7. RESPIRATORY STATUS: \"Describe your breathing?\" (e.g., shortness of breath, wheezing, unable to speak)       No not this time  8. BETTER-SAME-WORSE: \"Are you getting better, staying the same or getting worse compared to yesterday?\"  If getting worse, ask, \"In what way?\"      Started as sniffle and now headache more stuffy  9. HIGH RISK DISEASE: \"Do you have any chronic medical problems?\" (e.g., asthma, heart or lung disease, weak immune system, obesity, etc.)      yes  10. VACCINE: \"Have you had the COVID-19 vaccine?\" If Yes, ask: \"Which one, how many shots, when did you get it?\"        yes  11. BOOSTER: \"Have you received your COVID-19 booster?\" If Yes, ask: \"Which one and when did you get it?\"        Yes 4/5  12. PREGNANCY: \"Is there any chance you are pregnant?\" \"When was your last menstrual period?\"        NA  13. OTHER SYMPTOMS: \"Do you " "have any other symptoms?\"  (e.g., chills, fatigue, headache, loss of smell or taste, muscle pain, sore throat)        headche  14. O2 SATURATION MONITOR:  \"Do you use an oxygen saturation monitor (pulse oximeter) at home?\" If Yes, ask \"What is your reading (oxygen level) today?\" \"What is your usual oxygen saturation reading?\" (e.g., 95%)        Dies not monitor    Protocols used: CORONAVIRUS (COVID-19) DIAGNOSED OR [id number]    Rian Valencia, RN, BSN, PHN  [facility]  "

## 2023-02-09 NOTE — PROGRESS NOTES
[facility]    Outpatient Physical Therapy Discharge Note  Patient: Olga Lidia Solorzano  : 1970    Patient was seen for 8 visits for L shoulder from 22 to 22 with no follow up appointments.      Plan:  Discharge from therapy.     Reason for Discharge: No further expectation of progress.  Patient chooses to discontinue therapy.  Patient has not made expected progress due to interrupted treatment attendance.  Patient has failed to schedule further appointments.    Discharge Plan: Patient to continue home program.     If patient would like to return to therapy, they will need a new PT order from referring provider.    Karol Rizzo, PT   2023

## 2023-02-27 ENCOUNTER — OFFICE VISIT (OUTPATIENT)
Dept: OBGYN | Facility: CLINIC | Age: 53
End: 2023-02-27
Payer: COMMERCIAL

## 2023-02-27 ENCOUNTER — ANCILLARY PROCEDURE (OUTPATIENT)
Dept: MAMMOGRAPHY | Facility: CLINIC | Age: 53
End: 2023-02-27
Payer: COMMERCIAL

## 2023-02-27 VITALS
HEIGHT: 63 IN | BODY MASS INDEX: 26.44 KG/M2 | DIASTOLIC BLOOD PRESSURE: 66 MMHG | SYSTOLIC BLOOD PRESSURE: 110 MMHG | WEIGHT: 149.2 LBS

## 2023-02-27 DIAGNOSIS — K76.0 FATTY LIVER: ICD-10-CM

## 2023-02-27 DIAGNOSIS — Z79.890 HORMONE REPLACEMENT THERAPY (HRT): ICD-10-CM

## 2023-02-27 DIAGNOSIS — Z12.31 VISIT FOR SCREENING MAMMOGRAM: ICD-10-CM

## 2023-02-27 DIAGNOSIS — N95.1 VASOMOTOR SYMPTOMS DUE TO MENOPAUSE: ICD-10-CM

## 2023-02-27 DIAGNOSIS — E66.3 OVERWEIGHT (BMI 25.0-29.9): ICD-10-CM

## 2023-02-27 DIAGNOSIS — Z01.419 ENCOUNTER FOR GYNECOLOGICAL EXAMINATION WITHOUT ABNORMAL FINDING: Primary | ICD-10-CM

## 2023-02-27 DIAGNOSIS — L29.2 VULVAR ITCHING: ICD-10-CM

## 2023-02-27 DIAGNOSIS — R73.03 PREDIABETES: ICD-10-CM

## 2023-02-27 PROCEDURE — 77063 BREAST TOMOSYNTHESIS BI: CPT | Mod: TC | Performed by: RADIOLOGY

## 2023-02-27 PROCEDURE — 99396 PREV VISIT EST AGE 40-64: CPT | Performed by: OBSTETRICS & GYNECOLOGY

## 2023-02-27 PROCEDURE — 99213 OFFICE O/P EST LOW 20 MIN: CPT | Mod: 25 | Performed by: OBSTETRICS & GYNECOLOGY

## 2023-02-27 PROCEDURE — 87205 SMEAR GRAM STAIN: CPT | Performed by: OBSTETRICS & GYNECOLOGY

## 2023-02-27 PROCEDURE — 87102 FUNGUS ISOLATION CULTURE: CPT | Performed by: OBSTETRICS & GYNECOLOGY

## 2023-02-27 PROCEDURE — 77067 SCR MAMMO BI INCL CAD: CPT | Mod: TC | Performed by: RADIOLOGY

## 2023-02-27 RX ORDER — METFORMIN HCL 500 MG
TABLET, EXTENDED RELEASE 24 HR ORAL
Qty: 270 TABLET | Refills: 3 | Status: SHIPPED | OUTPATIENT
Start: 2023-02-27 | End: 2024-02-01

## 2023-02-27 RX ORDER — PROGESTERONE 100 MG/1
CAPSULE ORAL
Qty: 90 CAPSULE | Refills: 3 | Status: SHIPPED | OUTPATIENT
Start: 2023-02-27 | End: 2024-03-01

## 2023-02-27 RX ORDER — ESTRADIOL 0.07 MG/D
1 FILM, EXTENDED RELEASE TRANSDERMAL
Qty: 24 PATCH | Refills: 3 | Status: SHIPPED | OUTPATIENT
Start: 2023-02-27 | End: 2024-03-01

## 2023-02-27 RX ORDER — PHENTERMINE HYDROCHLORIDE 15 MG/1
15 CAPSULE ORAL EVERY MORNING
Qty: 60 CAPSULE | Refills: 0 | Status: SHIPPED | OUTPATIENT
Start: 2023-02-27 | End: 2023-03-27

## 2023-02-27 ASSESSMENT — ANXIETY QUESTIONNAIRES
GAD7 TOTAL SCORE: 1
1. FEELING NERVOUS, ANXIOUS, OR ON EDGE: NOT AT ALL
3. WORRYING TOO MUCH ABOUT DIFFERENT THINGS: SEVERAL DAYS
IF YOU CHECKED OFF ANY PROBLEMS ON THIS QUESTIONNAIRE, HOW DIFFICULT HAVE THESE PROBLEMS MADE IT FOR YOU TO DO YOUR WORK, TAKE CARE OF THINGS AT HOME, OR GET ALONG WITH OTHER PEOPLE: NOT DIFFICULT AT ALL
GAD7 TOTAL SCORE: 1
7. FEELING AFRAID AS IF SOMETHING AWFUL MIGHT HAPPEN: NOT AT ALL
6. BECOMING EASILY ANNOYED OR IRRITABLE: NOT AT ALL
2. NOT BEING ABLE TO STOP OR CONTROL WORRYING: NOT AT ALL
5. BEING SO RESTLESS THAT IT IS HARD TO SIT STILL: NOT AT ALL

## 2023-02-27 ASSESSMENT — PATIENT HEALTH QUESTIONNAIRE - PHQ9
SUM OF ALL RESPONSES TO PHQ QUESTIONS 1-9: 1
5. POOR APPETITE OR OVEREATING: NOT AT ALL

## 2023-02-27 NOTE — PROGRESS NOTES
Olga Lidia is a 52 year old  female who presents for annual exam.     Besides routine health maintenance,  she would like to discuss metformin not doing much. Discuss hormone replacement medication     HPI:  The patient's PCP is Dr. Leo Tejada DO.      Patient present for an annual physical exam, and would like to discuss her metformin and HRT.     Has her PCP and had lipids, lfts and a1c done in November. Her trigs are mildly elevated in the 200s and the LDL is normal for non diabetes and ALT is elevated to 91 with normal ast c/w known fatty liver.    Has struggled with trigs and pre-DM and fatty liver even though her BMI is nonly 26 today and has been normal for years before that. Eats healthy and tries to be really low sugar and carbs and still hasn't been able to lose weight or get trigs and sugars improved enough. Saw GI and was told her weight isn't high enough to qualify for any weight loss meds or surgery though she would be more than happy to try something so that her efforts actually pay off in keeping her healthy. Started on metformin for pre-DM and to see if would help with weight but A1C is normal, fasting glucose usually 110-120s and weight hasn't improved. Tolerating it fine though so plans to stay on it to keep from progressing to full DM    Menopausal transition started earlier than typical  But had some DUB in 2019 and now no period since around summer 2019 or so. Started having significant v.m sx so placed on vivelle 0.1mg and prometrium 100mg at bedtime and that helped her sx but then some DUB started. Changed hormones a bit, at one point decreased her vivelle to 0.05mg but bleeding eventually stopped, v.m sx not well enough controlled on the lower dose and so now at 0.1mg again and all sx controlled. Wondering if she needs to continue doing HRT or if its indicated to do for only some specific amount of time and then stop b/c [place] are allowed to do it  only 3 yrs and that's it. Sometimes forgets to swap her patch at 3.5 days and leaves it longer and then she'll notice the v.m sx ramp up and switches it out.     dx'd with [facility]A 1-2 yrs ago. Given rx for  Clobetasol and did that BID for  a week and definitely helped her sx. Hasn't really used it since. On occasion will have a little itching on her perineum but short lived and resolves on it's own. No discharge or skin changes or other issues. Denies any specific bladder issues.     Had a mammo just before this. Results pending. Patient not aware of any abnormalities but noted something in right axilla that she doesn't feel is any different from her baseline    Patient does have HTN but well controlled on her meds    GYNECOLOGIC HISTORY:    No LMP recorded (lmp unknown). Patient is postmenopausal.    Her current contraception method is: menopause.  She  reports that she quit smoking about 10 years ago. Her smoking use included cigarettes. She has a 10.00 pack-year smoking history. She has never used smokeless tobacco.    Patient is sexually active.  STD testing offered?  Declined  Last PHQ-9 score on record =   PHQ-9 SCORE 2023   PHQ-9 Total Score 1     Last GAD7 score on record =   TIMO-7 SCORE 2023   Total Score 1     Alcohol Score = 3    HEALTH MAINTENANCE:  Cholesterol:   Recent Labs   Lab Test 22  0642 22  0723 16  0707 03/25/15  0605   CHOL 189 205*   < > 188   HDL 47* 58   < > 41*   * 117*   < > 76   TRIG 204* 149   < > 357*   CHOLHDLRATIO  --   --   --  4.6    < > = values in this interval not displayed.     Last Mammo: One year ago, Result: Normal, Next Mammo: Today   Pap:  Lab Results   Component Value Date    GYNINTERP  2022     Negative for Intraepithelial Lesion or Malignancy (NILM)    PAP NIL 2017     Colonoscopy:  2017, Result: Normal, Next Colonoscopy: 10 years.  Dexa:  NA    Health maintenance updated:  Yes     HISTORY:  OB History    Para Term   AB Living   3 1 1 0 2 1   SAB IAB Ectopic Multiple Live Births   0 0 0 0 1      # Outcome Date GA Lbr Davis/2nd Weight Sex Delivery Anes PTL Lv   3 Term 99    M       2 AB            1 AB                Patient Active Problem List   Diagnosis     Hypertension, essential     Mixed hyperlipidemia     Herniated vertebral disc     Mild persistent asthma     Bilateral low back pain without sciatica     Hip pain, right     Partial nontraumatic tear of right rotator cuff     DJD of right AC (acromioclavicular) joint     Fatty liver     Telogen hair loss     Vasomotor symptoms due to menopause     Hormone replacement therapy (HRT)     Prediabetes     Lichen sclerosus et atrophicus of the vulva     Pain in finger of both hands     Chronic pain in left shoulder     Adhesive capsulitis of left shoulder     Past Surgical History:   Procedure Laterality Date     COLONOSCOPY N/A 2017    Procedure: COLONOSCOPY;  Surgeon: Tato Child MD;  Location: [facility] VASCULAR SURGERY PROCEDURE UNLIST      vericose vein      Social History     Tobacco Use     Smoking status: Former     Packs/day: 0.50     Years: 20.00     Pack years: 10.00     Types: Cigarettes     Quit date: 2012     Years since quitting: 10.1     Smokeless tobacco: Never   Substance Use Topics     Alcohol use: Yes     Alcohol/week: 0.0 standard drinks     Comment: more on weekends 8 drinks per week or wine or mixed drinks       Problem (# of Occurrences) Relation (Name,Age of Onset)    Blood Disease (1) Paternal Uncle    Hypertension (3) Mother (wally), Father (emily), Maternal Grandmother (meka)    Breast Cancer (3) Paternal Aunt, Paternal Half-Sister (aunt), Cousin (ariela)    Other Cancer (1) Paternal Half-Brother (uncle): leukemia    Hyperlipidemia (1) Father (emiyl)    Lung Cancer (1) Mother (wally)    No Known Problems (6) Sister, Brother, Maternal Grandfather, Paternal Grandmother, Son, Other       Negative family history of: Liver  Disease            Current Outpatient Medications   Medication Sig     amLODIPine (NORVASC) 5 MG tablet TAKE ONE TABLET BY MOUTH ONCE DAILY     clobetasol (TEMOVATE) 0.05 % external ointment Apply topically 2 times daily     estradiol (VIVELLE-DOT) 0.075 MG/24HR BIW patch Place 1 patch onto the skin twice a week     fenofibrate (TRICOR) 48 MG tablet TAKE TWO TABLETS BY MOUTH ONCE DAILY     gabapentin (NEURONTIN) 300 MG capsule Take 1 capsule (300 mg) by mouth At Bedtime for 120 days     metFORMIN (GLUCOPHAGE XR) 500 MG 24 hr tablet Take one tab po with meals and increase every week, as tolerated, by 500mg to a max of 2000/day or 1000 mg BID     oxybutynin ER (DITROPAN XL) 5 MG 24 hr tablet Take 1 tablet (5 mg) by mouth daily     phentermine (ADIPEX-P) 15 MG capsule Take 1 capsule (15 mg) by mouth every morning     pravastatin (PRAVACHOL) 20 MG tablet Take 1 tablet (20 mg) by mouth daily     progesterone (PROMETRIUM) 100 MG capsule TAKE ONE CAPSULE BY MOUTH ONCE DAILY     spironolactone (ALDACTONE) 50 MG tablet Take 1 tablet (50 mg) by mouth 2 times daily     fluticasone (FLOVENT HFA) 220 MCG/ACT inhaler Inhale 2 puffs into the lungs 2 times daily (Patient not taking: Reported on 2/27/2023)     levalbuterol (XOPENEX HFA) 45 MCG/ACT inhaler Inhale 2 puffs into the lungs every 6 hours as needed for shortness of breath or wheezing (Patient not taking: Reported on 2/27/2023)     Current Facility-Administered Medications   Medication     lidocaine 1 % injection 3 mL     lidocaine 1 % injection 4 mL     methylPREDNISolone (DEPO-MEDROL) injection 80 mg     Allergies   Allergen Reactions     Niacin Shortness Of Breath, Other (See Comments) and Nausea and Vomiting     All over body pains (patient reported symptoms)       Past medical, surgical, social and family histories were reviewed and updated in EPIC.    ROS:   12 point review of systems negative other than symptoms noted below or in the HPI.  No urinary frequency or  "dysuria, bladder or kidney problems    EXAM:  /66   Ht 1.6 m (5' 3\")   Wt 67.7 kg (149 lb 3.2 oz)   LMP  (LMP Unknown)   BMI 26.43 kg/m     BMI: Body mass index is 26.43 kg/m .    PHYSICAL EXAM:  Constitutional:   Appearance: Well nourished, well developed, alert, in no acute distress  Neck:  Lymph Nodes:  No lymphadenopathy present    Thyroid:  Gland size normal, nontender, no nodules or masses present  on palpation  Chest:  Respiratory Effort:  Breathing unlabored, CTA Bilaterally  Cardiovascular:    Heart: Auscultation:  Regular rate, normal rhythm, no murmurs present  Breasts: Inspection of Breasts:  No lymphadenopathy present., Palpation of Breasts and Axillae:  No masses present on palpation, no breast tenderness., Axillary Lymph Nodes:  No lymphadenopathy present. and No nodularity, asymmetry or nipple discharge bilaterally.  Gastrointestinal:   Abdominal Examination:  Abdomen nontender to palpation, tone normal without rigidity or guarding, no masses present, umbilicus without lesions   Liver and Spleen:  No hepatomegaly present, liver nontender to palpation    Hernias:  No hernias present  Lymphatic: Lymph Nodes:  No other lymphadenopathy present  Skin:  General Inspection:  No rashes present, no lesions present, no areas of  discoloration  Neurologic:    Mental Status:  Oriented X3.  Normal strength and tone, sensory exam                grossly normal, mentation intact and speech normal.    Psychiatric:   Mentation appears normal and affect normal/bright.         Pelvic Exam:  External Genitalia:     Normal appearance for age, no discharge present, no tenderness present, no inflammatory lesions present, color normal  Vagina:     Normal vaginal vault without central or paravaginal defects, no discharge present, no inflammatory lesions present, no masses present  Bladder:     Nontender to palpation  Urethra:   Urethral Body:  Urethra palpation normal, urethra structural support normal   Urethral " Meatus:  No erythema or lesions present  Cervix:     Appearance healthy, no lesions present, nontender to palpation, no bleeding present  Uterus:     Uterus: firm, normal sized and nontender, anteverted in position.   Adnexa:     No adnexal tenderness present, no adnexal masses present  Perineum:     Perineum within normal limits, no evidence of trauma, no rashes or skin lesions present  Anus:     Anus within normal limits, no hemorrhoids present  Inguinal Lymph Nodes:     No lymphadenopathy present  Pubic Hair:     Normal pubic hair distribution for age  Genitalia and Groin:     No rashes present, no lesions present, no areas of discoloration, no masses present      COUNSELING:   Reviewed preventive health counseling, as reflected in patient instructions  Special attention given to:        HRT    BMI: Body mass index is 26.43 kg/m .  Weight management plan: Discussed healthy diet and exercise guidelines    ASSESSMENT:  52 year old female with satisfactory annual exam.    ICD-10-CM    1. Encounter for gynecological examination without abnormal finding  Z01.419       2. Hormone replacement therapy (HRT)  Z79.890 progesterone (PROMETRIUM) 100 MG capsule     estradiol (VIVELLE-DOT) 0.075 MG/24HR BIW patch      3. Vasomotor symptoms due to menopause  N95.1 estradiol (VIVELLE-DOT) 0.075 MG/24HR BIW patch      4. Prediabetes  R73.03 metFORMIN (GLUCOPHAGE XR) 500 MG 24 hr tablet     phentermine (ADIPEX-P) 15 MG capsule      5. Overweight (BMI 25.0-29.9)  E66.3 phentermine (ADIPEX-P) 15 MG capsule      6. Fatty liver  K76.0 phentermine (ADIPEX-P) 15 MG capsule      7. Vulvar itching  L29.2 Bacterial Vaginosis Smear     Yeast culture          PLAN:  Pap/hpv utd for 4 more years.     Mammo today and will await those results. If normal then can address if want to do a separate right axillary U/S but do think it is most likely some fatty tissue or accessory breast tissue in the axilla with a small but more pronounced/protruding  normal lymph node at top margin  However likely safer to image with additional U/S since mammo may not be adequate for axilla     Fasting labs will be done by her PCP though I have done the Rx on metformin to date so will refill that for her    Reviewed her HRT and WHI, the more recent extension study showing quite minimal increases in breast cancer and typically early stage and the risks of VTE and stroke given her other comorbidities.  Discussed weighing pros/cons of potential risks with quality of life, weaning off HRT slowly over time to see if tolerated and staying on whatever dose adequately controls sx and given quality of life vs stopping over time if sx are manageable.  Patient prefers to stay on it b/c sx were quite bad. Already changing her patch less often than 3.5 days so in a sense doing less than the amount actually Rx'd.  Refills will be sent and at any point could always cut patch down slightly or try 0.075mg in time.     Discussed that despite her very minimally elevated BMI she does have genetic predisposition to all of the syndrome X comorbidities.  Reviewed insulin resistance and weight and genetics and aging and menopause and how that impacts metabolic rate as well.  Discussed adjuncts to lifestyle modifications such as phentermine, vyvanse for BED, GLP-1 meds, etc.   Discussed how these meds work, their pros/cons, side effects, expected effects, costs, etc  Patient is very interested in trying phentermine, starting lower dose at 15mg, to see if can jump start her baseline exercise and healthy eating efforts  Given HTN, though well controlled on meds, will need close f/up of this  rx given for 1 month with 1 RF and will f/u in 1 month to see how it's going with a home weight and BP prior to tthat visit.      Discussed what L.S is and flare/remit or chronic course however on exam does not have any signs of that.  Has some slight erythema and a heavier creamy white discharge so vaginal swab/yeast  cx was obtained and will treat if that is positive  Discussed keeping area clean and dry, vaseline/aquaphor topically to improve moisture in postmenopausal setting but also to act as a skin barrier to help decrease other moisture/friction/irritation that could cause irritation or trigger to L.S flare    On the same DOS, 20 additional minutes on top of preventative gyn care, were spent on direct management of the patient's medical issues as above as well as chart review including: imaging, lab work, previous visit notes by this provider,  and other provider notes, and chart completion       Jessie Perdomo MD

## 2023-02-28 ENCOUNTER — MYC MEDICAL ADVICE (OUTPATIENT)
Dept: OBGYN | Facility: CLINIC | Age: 53
End: 2023-02-28
Payer: COMMERCIAL

## 2023-02-28 DIAGNOSIS — R22.31 AXILLARY MASS, RIGHT: Primary | ICD-10-CM

## 2023-02-28 LAB
NUGENT SCORE: 0
WHITE BLOOD CELLS: NORMAL

## 2023-02-28 NOTE — TELEPHONE ENCOUNTER
2/27/23: Leanne  Found a small nodule on her breast    Patient wondering if US or if any further testing is recommended?  MA results not complete at this time.  Routing to provider to advise  Dipika Green RN on 2/28/2023 at 10:26 AM

## 2023-02-28 NOTE — RESULT ENCOUNTER NOTE
Olga Lidia,    So the mammogram is normal which is great. However, let's just be on the safe side and do the right axillary imaging to make sure nothing else is going on specifically there.    I will place the order and you can call the  breast center to schedule it at your earliest convenience.    Jessie Perdomo MD

## 2023-03-01 NOTE — TELEPHONE ENCOUNTER
I put in the order for axillary U/S and send her a [employer] message. Not sure if breast center calls her or she has to call them but order is in place

## 2023-03-02 DIAGNOSIS — L65.9 HAIR THINNING: ICD-10-CM

## 2023-03-02 DIAGNOSIS — I10 HYPERTENSION, ESSENTIAL: ICD-10-CM

## 2023-03-02 RX ORDER — SPIRONOLACTONE 50 MG/1
50 TABLET, FILM COATED ORAL 2 TIMES DAILY
Qty: 180 TABLET | Refills: 0 | Status: SHIPPED | OUTPATIENT
Start: 2023-03-02 | End: 2023-05-30

## 2023-03-02 NOTE — TELEPHONE ENCOUNTER
"Requested Prescriptions   Pending Prescriptions Disp Refills     spironolactone (ALDACTONE) 50 MG tablet [Pharmacy Med Name: SPIRONOLACTONE 50MG TABS] 180 tablet 3     Sig: TAKE 1 TABLET (50 MG) BY MOUTH 2 TIMES DAILY       Diuretics (Including Combos) Protocol Failed - 3/2/2023 11:14 AM        Failed - Normal serum creatinine on file in past 12 months     Recent Labs   Lab Test 10/19/21  0925   CR 0.80              Failed - Normal serum potassium on file in past 12 months     Recent Labs   Lab Test 10/19/21  0925   POTASSIUM 3.9                    Failed - Normal serum sodium on file in past 12 months     Recent Labs   Lab Test 10/19/21  0925                 Passed - Blood pressure under 140/90 in past 12 months     BP Readings from Last 3 Encounters:   02/27/23 110/66   11/25/22 114/78   09/21/22 (!) 138/90                 Passed - Recent (12 mo) or future (30 days) visit within the authorizing provider's specialty     Patient has had an office visit with the authorizing provider or a provider within the authorizing providers department within the previous 12 mos or has a future within next 30 days. See \"Patient Info\" tab in inbasket, or \"Choose Columns\" in Meds & Orders section of the refill encounter.              Passed - Medication is active on med list        Passed - Patient is age 18 or older        Passed - No active pregancy on record        Passed - No positive pregnancy test in past 12 months           Appt scheduled for 3/27/23  Prescription approved per [facility] Refill Protocol.  Alicia Pardo RN on 3/2/2023 at 11:17 AM    "

## 2023-03-04 LAB — BACTERIA SPEC CULT: NO GROWTH

## 2023-03-07 ENCOUNTER — HOSPITAL ENCOUNTER (OUTPATIENT)
Dept: MAMMOGRAPHY | Facility: CLINIC | Age: 53
Discharge: HOME | End: 2023-03-07
Attending: OBSTETRICS & GYNECOLOGY | Admitting: OBSTETRICS & GYNECOLOGY
Payer: COMMERCIAL

## 2023-03-07 DIAGNOSIS — R22.31 AXILLARY MASS, RIGHT: ICD-10-CM

## 2023-03-07 PROCEDURE — 76882 US LMTD JT/FCL EVL NVASC XTR: CPT | Mod: RT

## 2023-03-22 NOTE — PROGRESS NOTES
Olga Lidia Solorzano is a 52 year old female who is being evaluated via a billable telephone visit.      What phone number would you like to be contacted at? [phone]  How would you like to obtain your AVS? Samina      Originating Location (pt. Location): HOME      Distant Location (provider location):  On-site      SUBJECTIVE:                                                   Olga Lidia Solorzano is a 52 year old female who presents for virtual visit today for the following health issue(s):  Patient presents with:  Medication Follow-up  Recheck Medication      Additional information: ok she guess. Not the results she expected 146.0lb      HPI:  Patient is doing a virtual visit by phone after one month of being on phentermine for weight loss.    Patient's weight and BMI have never been significantly abnormal. Patient reports that she eats minimal carbs and exercises and really overall health.  Despite this she has pre-DM and has fatty liver with LFT elevations and for years already  B/c her weight and BMI have been frankly normal for years she never qualified for anything to help with weight loss and despite really doing all she could on her own, and yet having comorbidities of CVD/DM she couldn't lose any weight and had basically resigned herself to being around 150# and just continuing to do her normal lifestyle factors    When we spoke at her annual we decided to try phentermine despite her BMI being only slightly into the overweight range mostly to see if metabolic rate with her routine diet/exercise, could help with even slight weight loss so as to improve her LFTs and A1C    Patient has had normal BPs the last several years and well controlled on norvasc.     Has been on phentermine 15 mg for about a month or so. Isn't overwhelmed with results. Was 149# on clinic scale and at home today is 146# so likely exactly the same  Feels like maybe slightly less hungry in the morning. Maybe very slightly smaller portions  b/c feels full a little faster when does eat but really nothing more than that  No dramatic increase in energy or focus or any dramatic impact on metabolic rate, weight or appetite  Not having any s.e at all and no HAs, palpitations, insomnia. Very minimal dry mouth    No LMP recorded (lmp unknown). Patient is postmenopausal..     Patient is sexually active, .  Using menopause for contraception.    reports that she quit smoking about 10 years ago. Her smoking use included cigarettes. She has a 10.00 pack-year smoking history. She has never used smokeless tobacco.      Health maintenance updated:  yes    Today's PHQ-2 Score:       2023     3:57 PM   PHQ-2 (  [employer])   Q1: Little interest or pleasure in doing things 0   Q2: Feeling down, depressed or hopeless 0   PHQ-2 Score 0     Today's PHQ-9 Score:       2023     3:57 PM   PHQ-9 SCORE   PHQ-9 Total Score 1     Today's TIMO-7 Score:       2023     3:57 PM   TIMO-7 SCORE   Total Score 1       Problem list and histories reviewed & adjusted, as indicated.  Additional history: as documented.    Patient Active Problem List   Diagnosis     Hypertension, essential     Mixed hyperlipidemia     Herniated vertebral disc     Mild persistent asthma     Bilateral low back pain without sciatica     Hip pain, right     Partial nontraumatic tear of right rotator cuff     DJD of right AC (acromioclavicular) joint     Fatty liver     Telogen hair loss     Vasomotor symptoms due to menopause     Hormone replacement therapy (HRT)     Prediabetes     Lichen sclerosus et atrophicus of the vulva     Pain in finger of both hands     Chronic pain in left shoulder     Adhesive capsulitis of left shoulder     Past Surgical History:   Procedure Laterality Date     COLONOSCOPY N/A 2017    Procedure: COLONOSCOPY;  Surgeon: Tato Child MD;  Location:  GI     [facility] VASCULAR SURGERY PROCEDURE UNLIST      vericose vein      Social History     Tobacco Use      Smoking status: Former     Packs/day: 0.50     Years: 20.00     Pack years: 10.00     Types: Cigarettes     Quit date: 12/31/2012     Years since quitting: 10.2     Smokeless tobacco: Never   Vaping Use     Vaping status: Never Used     Passive vaping exposure: Yes   Substance Use Topics     Alcohol use: Yes     Alcohol/week: 0.0 standard drinks of alcohol     Comment: more on weekends 8 drinks per week or wine or mixed drinks       Problem (# of Occurrences) Relation (Name,Age of Onset)    Blood Disease (1) Paternal Uncle    Hypertension (3) Mother (wally), Father (emily), Maternal Grandmother (meka)    Breast Cancer (3) Paternal Aunt, Paternal Half-Sister (aunt), Cousin (ariela)    Other Cancer (1) Paternal Half-Brother (uncle): leukemia    Hyperlipidemia (1) Father (emily)    Lung Cancer (1) Mother (wally)    No Known Problems (6) Sister, Brother, Maternal Grandfather, Paternal Grandmother, Son, Other       Negative family history of: Liver Disease            Current Outpatient Medications   Medication Sig     amLODIPine (NORVASC) 5 MG tablet TAKE ONE TABLET BY MOUTH ONCE DAILY     clobetasol (TEMOVATE) 0.05 % external ointment Apply topically 2 times daily     estradiol (VIVELLE-DOT) 0.075 MG/24HR BIW patch Place 1 patch onto the skin twice a week     fenofibrate (TRICOR) 48 MG tablet TAKE TWO TABLETS BY MOUTH ONCE DAILY     fluticasone (FLOVENT HFA) 220 MCG/ACT inhaler Inhale 2 puffs into the lungs 2 times daily     gabapentin (NEURONTIN) 300 MG capsule Take 1 capsule (300 mg) by mouth At Bedtime for 120 days     levalbuterol (XOPENEX HFA) 45 MCG/ACT inhaler Inhale 2 puffs into the lungs every 6 hours as needed for shortness of breath or wheezing     metFORMIN (GLUCOPHAGE XR) 500 MG 24 hr tablet Take one tab po with meals and increase every week, as tolerated, by 500mg to a max of 2000/day or 1000 mg BID     oxybutynin ER (DITROPAN XL) 5 MG 24 hr tablet Take 1 tablet (5 mg) by mouth daily     phentermine  (ADIPEX-P) 30 MG capsule Take 1 capsule (30 mg) by mouth every morning     pravastatin (PRAVACHOL) 20 MG tablet Take 1 tablet (20 mg) by mouth daily     progesterone (PROMETRIUM) 100 MG capsule TAKE ONE CAPSULE BY MOUTH ONCE DAILY     spironolactone (ALDACTONE) 50 MG tablet TAKE 1 TABLET (50 MG) BY MOUTH 2 TIMES DAILY     Current Facility-Administered Medications   Medication     lidocaine 1 % injection 3 mL     lidocaine 1 % injection 4 mL     methylPREDNISolone (DEPO-MEDROL) injection 80 mg     Allergies   Allergen Reactions     Niacin Shortness Of Breath, Other (See Comments) and Nausea and Vomiting     All over body pains (patient reported symptoms)         OBJECTIVE:     No vitals were obtained today due to virtual visit.    Physical Exam  GENERAL: Healthy, alert and no distress    PSYCH: Mentation appears normal, affect normal/bright, judgement and insight intact, normal speech           ASSESSMENT/PLAN:                                                      Phone call duration: 10 minutes with an additional 5 minutes of chart review and completion, for a total of 15 minutes on the same DOS      ICD-10-CM    1. Overweight (BMI 25.0-29.9)  E66.3 phentermine (ADIPEX-P) 30 MG capsule      2. Prediabetes  R73.03 phentermine (ADIPEX-P) 30 MG capsule      3. Fatty liver  K76.0 phentermine (ADIPEX-P) 30 MG capsule          Patient had fairly minimal impact or effect from low dose phentermine at 15mg but also no side effects of any kind.  Has actually not checked her BP since started on the 15mg but no palpitations or other sx that would be concerning for significant increase.    Discussed increasing her to 30mg, making sure to check some BPs in the next 1-2 weeks after increasing it to make sure normotensive, and seeing if has more pronounced effect from that dose.  Reviewed si/sx to expect and what should stabilize/plateau and what she should stop the med for right away and contact me.  Reviewed dosing first thing in  AM, fully empty stomach, reviewed overall dietary guidelines with low sugar/carbs, cardio of 30 min 5x/week but intermixed strength and other types of exercise.    Will send #60 and should do another f/up with me within 2 months to reassess progress/effect/etc    Jessie Perdomo MD  [facility] [place]

## 2023-03-27 ENCOUNTER — VIRTUAL VISIT (OUTPATIENT)
Dept: OBGYN | Facility: CLINIC | Age: 53
End: 2023-03-27
Payer: COMMERCIAL

## 2023-03-27 DIAGNOSIS — K76.0 FATTY LIVER: ICD-10-CM

## 2023-03-27 DIAGNOSIS — R73.03 PREDIABETES: ICD-10-CM

## 2023-03-27 DIAGNOSIS — E66.3 OVERWEIGHT (BMI 25.0-29.9): Primary | ICD-10-CM

## 2023-03-27 PROCEDURE — 99213 OFFICE O/P EST LOW 20 MIN: CPT | Mod: 93 | Performed by: OBSTETRICS & GYNECOLOGY

## 2023-03-27 RX ORDER — PHENTERMINE HYDROCHLORIDE 30 MG/1
30 CAPSULE ORAL EVERY MORNING
Qty: 60 CAPSULE | Refills: 0 | Status: SHIPPED | OUTPATIENT
Start: 2023-03-27 | End: 2023-05-22

## 2023-04-13 VITALS — WEIGHT: 146 LBS | BODY MASS INDEX: 24.92 KG/M2 | HEIGHT: 64 IN

## 2023-04-27 ENCOUNTER — MYC REFILL (OUTPATIENT)
Dept: FAMILY MEDICINE | Facility: CLINIC | Age: 53
End: 2023-04-27
Payer: COMMERCIAL

## 2023-04-27 DIAGNOSIS — E78.5 HYPERLIPIDEMIA WITH TARGET LDL LESS THAN 130: ICD-10-CM

## 2023-04-27 DIAGNOSIS — K76.0 NON-ALCOHOLIC FATTY LIVER DISEASE: ICD-10-CM

## 2023-04-27 RX ORDER — FENOFIBRATE 48 MG/1
TABLET, COATED ORAL
Qty: 180 TABLET | Refills: 0 | Status: SHIPPED | OUTPATIENT
Start: 2023-04-27 | End: 2023-07-06

## 2023-04-27 RX ORDER — FENOFIBRATE 48 MG/1
TABLET, COATED ORAL
Qty: 90 TABLET | Refills: 0 | Status: SHIPPED | OUTPATIENT
Start: 2023-04-27 | End: 2023-04-27

## 2023-05-08 NOTE — RESULT ENCOUNTER NOTE
Olga Lidia,    The bacterial swab is not showing any bacterial vaginosis nor yeast. The yeast culture is still in progress and typically is allowed to culture for 5 days total, and currently we're at 3 days, but so far it's showing no yeast infection signs.    Jessie Perdomo MD   Female Completion Statement: FEMALE PATIENT - FINAL VISIT \\n- Discussed that we will be discontinuing isotretinoin today.\\n- Reminded that it is still imperative to maintain 100% compliance with the same methods of birth control she has been using throughout this course of isotretinoin for at least 30 days beyond her final pill. \\n- Advise that a final urine pregnancy test will be necessary to complete the retirements of the [employer] program in 30-37 days from now. \\n- Advised to schedule a nurse visit for this final UPT.   \\n- Reviewed the possible expectations for her acne from here on out.\\n- Discussed maintenance options.\\n- Urine pregnant test performed today.

## 2023-05-16 NOTE — PROGRESS NOTES
Olga Lidia Solorzano is a 52 year old female who is being evaluated via a billable telephone visit.      What phone number would you like to be contacted at? [phone]  How would you like to obtain your AVS? Samina      Originating Location (pt. Location): Home      Distant Location (provider location):  On-site      SUBJECTIVE:                                                   Olga Lidia Solorzano is a 52 year old female who presents for virtual visit today for the following health issue(s):  Patient presents with:  Medication Follow-up: phentermine        HPI:    Pt having a virtual visit by phone for Phentermine f/up.      Pt never had a BMI outside of overweight range, so always <30. However despite very healthy eating and nearly no carbs and regular exercise, was unable to lose any weight and had pre-DM and fatty liver already.    Started on metformin 2/22 to see if would help these issues or help with weight loss but really didn't notice anything. Started on phentermine 15mg 2/23 and then a month or so later bumped to 30mg and this is now about 2 months or just shy of being on that.  Felt nearly nothing on 15mg and had really no weight loss at all either.    Takes Phentermine at 5:30am with only coffee that she delays about an hour after her med. She reports fairly minimal  appetite suppression in the morning and ironically feels like she is less hungry and smaller portion size in the afternoon/evening compared to morning.  However she is able to go until noon without a meal. The impact isn't huge but definitely a bit less hungry and portions are smaller  Still eating healthy and low carb like she always has  Isn't feeling any significant increase in her energy or motivation or fatigue but definitely not worse  No negative effects.     Since the increase 2 months ago she has lost 6# and 9# overall since February when we started it. When started metformin a year ago was 154# and now 140# which is a BMI of  24.9    States she has not checked her BP since starting on phentermine, or since bumping the dose. Has no HAs or tachycardia or insomnia. Very mild dry mouth    Hasn't had the overwhelming effects she thought she might but when discussed her overall weight to start and loss, age, etc she can appreciate that hadn't lost any weight when eating just as healthy and exercising the same so does realize there must be some benefit  Definitely would like to stay on it    No LMP recorded (lmp unknown). Patient is postmenopausal..     Patient is sexually active, .  Using menopause for contraception.    reports that she quit smoking about 10 years ago. Her smoking use included cigarettes. She has a 10.00 pack-year smoking history. She has never used smokeless tobacco.      Health maintenance updated:  yes    Today's PHQ-2 Score:       2023     3:57 PM   PHQ-2 (  [employer])   Q1: Little interest or pleasure in doing things 0   Q2: Feeling down, depressed or hopeless 0   PHQ-2 Score 0     Today's PHQ-9 Score:       2023     3:57 PM   PHQ-9 SCORE   PHQ-9 Total Score 1     Today's TIMO-7 Score:       2023     3:57 PM   TIMO-7 SCORE   Total Score 1       Problem list and histories reviewed & adjusted, as indicated.  Additional history: as documented.    Patient Active Problem List   Diagnosis     Hypertension, essential     Mixed hyperlipidemia     Herniated vertebral disc     Mild persistent asthma     Bilateral low back pain without sciatica     Hip pain, right     Partial nontraumatic tear of right rotator cuff     DJD of right AC (acromioclavicular) joint     Fatty liver     Telogen hair loss     Vasomotor symptoms due to menopause     Hormone replacement therapy (HRT)     Prediabetes     Lichen sclerosus et atrophicus of the vulva     Pain in finger of both hands     Chronic pain in left shoulder     Adhesive capsulitis of left shoulder     Past Surgical History:   Procedure Laterality Date     COLONOSCOPY  N/A 03/13/2017    Procedure: COLONOSCOPY;  Surgeon: Tato Child MD;  Location: [facility] VASCULAR SURGERY PROCEDURE UNLIST      vericose vein      Social History     Tobacco Use     Smoking status: Former     Packs/day: 0.50     Years: 20.00     Pack years: 10.00     Types: Cigarettes     Quit date: 12/31/2012     Years since quitting: 10.4     Smokeless tobacco: Never   Vaping Use     Vaping status: Never Used     Passive vaping exposure: Yes   Substance Use Topics     Alcohol use: Yes     Alcohol/week: 0.0 standard drinks of alcohol     Comment: more on weekends 8 drinks per week or wine or mixed drinks       Problem (# of Occurrences) Relation (Name,Age of Onset)    Blood Disease (1) Paternal Uncle    Hypertension (3) Mother (wally), Father (emily), Maternal Grandmother (meka)    Breast Cancer (3) Paternal Aunt, Paternal Half-Sister (aunt), Cousin (ariela)    Other Cancer (1) Paternal Half-Brother (uncle): leukemia    Hyperlipidemia (1) Father (emily)    Lung Cancer (1) Mother (wally)    No Known Problems (6) Sister, Brother, Maternal Grandfather, Paternal Grandmother, Son, Other       Negative family history of: Liver Disease            Current Outpatient Medications   Medication Sig     amLODIPine (NORVASC) 5 MG tablet TAKE ONE TABLET BY MOUTH ONCE DAILY     clobetasol (TEMOVATE) 0.05 % external ointment Apply topically 2 times daily     estradiol (VIVELLE-DOT) 0.075 MG/24HR BIW patch Place 1 patch onto the skin twice a week     fenofibrate (TRICOR) 48 MG tablet TAKE TWO TABLETS BY MOUTH ONCE DAILY     fluticasone (FLOVENT HFA) 220 MCG/ACT inhaler Inhale 2 puffs into the lungs 2 times daily     levalbuterol (XOPENEX HFA) 45 MCG/ACT inhaler Inhale 2 puffs into the lungs every 6 hours as needed for shortness of breath or wheezing     metFORMIN (GLUCOPHAGE XR) 500 MG 24 hr tablet Take one tab po with meals and increase every week, as tolerated, by 500mg to a max of 2000/day or 1000 mg BID     oxybutynin  ER (DITROPAN XL) 5 MG 24 hr tablet Take 1 tablet (5 mg) by mouth daily     phentermine (ADIPEX-P) 30 MG capsule Take 1 capsule (30 mg) by mouth every morning     pravastatin (PRAVACHOL) 20 MG tablet Take 1 tablet (20 mg) by mouth daily     progesterone (PROMETRIUM) 100 MG capsule TAKE ONE CAPSULE BY MOUTH ONCE DAILY     spironolactone (ALDACTONE) 50 MG tablet TAKE 1 TABLET (50 MG) BY MOUTH 2 TIMES DAILY     Current Facility-Administered Medications   Medication     lidocaine 1 % injection 3 mL     lidocaine 1 % injection 4 mL     methylPREDNISolone (DEPO-MEDROL) injection 80 mg     Allergies   Allergen Reactions     Niacin Shortness Of Breath, Other (See Comments) and Nausea and Vomiting     All over body pains (patient reported symptoms)         OBJECTIVE:     No vitals were obtained today due to virtual visit.    Physical Exam    PSYCH: Mentation appears normal, affect normal/bright, judgement and insight intact, normal speech           ASSESSMENT/PLAN:                                                      Phone call duration: 14 minutes with an additional 7 minutes of chart review and completion, for a total of 21 minutes on the same DOS      ICD-10-CM    1. Prediabetes  R73.03 phentermine (ADIPEX-P) 30 MG capsule      2. Overweight (BMI 25.0-29.9)  E66.3 phentermine (ADIPEX-P) 30 MG capsule      3. Fatty liver  K76.0 phentermine (ADIPEX-P) 30 MG capsule            Discussed that her starting weight was never that significant and never had a BMI >30, and even lower.  However genetic predisposition clearly to pre-DM and fatty liver, despite health and low carb eating, nearly normal weight, and exercise which is the only reason that phentermine was Rx'd to her in the first place.    Despite no overwhelming sense of decreased hunger she is not having any s.e and has lost at least 10# since starting on it and when BMI was never that high to begin with, this is a very good result and outcome w/o changing much in her  day to day life    Reviewed FDA guidance on use of phentermine and duration, etc  However, if checks BPs and all normal and no s.e then will continue on this for now  Repeat fasting labs to monitor A1C and LFTs in near future when due  Her current BMI is actually now normal at 24 and commended for what she has been able to accomplish in just 3 mnths or so.    Schedule annual in February but will send a 90 day supply of Phentermine for her to continue and then f/up in September on med and then transition to q6 months, alternating with her annuals, if continues on it long term.     Jessie Perdomo MD  [facility] [place]

## 2023-05-22 ENCOUNTER — VIRTUAL VISIT (OUTPATIENT)
Dept: OBGYN | Facility: CLINIC | Age: 53
End: 2023-05-22
Payer: COMMERCIAL

## 2023-05-22 VITALS — HEIGHT: 63 IN | WEIGHT: 140.8 LBS | BODY MASS INDEX: 24.95 KG/M2

## 2023-05-22 DIAGNOSIS — E66.3 OVERWEIGHT (BMI 25.0-29.9): ICD-10-CM

## 2023-05-22 DIAGNOSIS — K76.0 FATTY LIVER: ICD-10-CM

## 2023-05-22 DIAGNOSIS — R73.03 PREDIABETES: Primary | ICD-10-CM

## 2023-05-22 PROCEDURE — 99213 OFFICE O/P EST LOW 20 MIN: CPT | Mod: TEL | Performed by: OBSTETRICS & GYNECOLOGY

## 2023-05-22 RX ORDER — PHENTERMINE HYDROCHLORIDE 30 MG/1
30 CAPSULE ORAL EVERY MORNING
Qty: 90 CAPSULE | Refills: 1 | Status: SHIPPED | OUTPATIENT
Start: 2023-05-22 | End: 2023-10-04

## 2023-05-29 DIAGNOSIS — I10 HYPERTENSION, ESSENTIAL: ICD-10-CM

## 2023-05-29 DIAGNOSIS — L65.9 HAIR THINNING: ICD-10-CM

## 2023-05-30 RX ORDER — SPIRONOLACTONE 50 MG/1
50 TABLET, FILM COATED ORAL 2 TIMES DAILY
Qty: 180 TABLET | Refills: 1 | Status: SHIPPED | OUTPATIENT
Start: 2023-05-30 | End: 2023-11-20

## 2023-05-30 NOTE — TELEPHONE ENCOUNTER
"Requested Prescriptions   Pending Prescriptions Disp Refills     spironolactone (ALDACTONE) 50 MG tablet [Pharmacy Med Name: SPIRONOLACTONE 50MG TABS] 180 tablet 0     Sig: TAKE 1 TABLET (50 MG) BY MOUTH 2 TIMES DAILY       Diuretics (Including Combos) Protocol Failed - 5/29/2023  8:04 PM        Failed - Normal serum creatinine on file in past 12 months     Recent Labs   Lab Test 10/19/21  0925   CR 0.80              Failed - Normal serum potassium on file in past 12 months     Recent Labs   Lab Test 10/19/21  0925   POTASSIUM 3.9                    Failed - Normal serum sodium on file in past 12 months     Recent Labs   Lab Test 10/19/21  0925                 Passed - Blood pressure under 140/90 in past 12 months     BP Readings from Last 3 Encounters:   02/27/23 110/66   11/25/22 114/78   09/21/22 (!) 138/90                 Passed - Recent (12 mo) or future (30 days) visit within the authorizing provider's specialty     Patient has had an office visit with the authorizing provider or a provider within the authorizing providers department within the previous 12 mos or has a future within next 30 days. See \"Patient Info\" tab in inbasket, or \"Choose Columns\" in Meds & Orders section of the refill encounter.              Passed - Medication is active on med list        Passed - Patient is age 18 or older        Passed - No active pregancy on record        Passed - No positive pregnancy test in past 12 months           Last Written Prescription Date: 3/2/23   Last Fill Quantity: #180  Last office visit: 5/22/23    Prescription approved per [facility] Refill Protocol.      Radha Salas RN on 5/30/2023 at 9:08 AM    "

## 2023-06-14 ENCOUNTER — MYC MEDICAL ADVICE (OUTPATIENT)
Dept: OBGYN | Facility: CLINIC | Age: 53
End: 2023-06-14
Payer: COMMERCIAL

## 2023-06-14 NOTE — TELEPHONE ENCOUNTER
Patient needs to be seen , please have her make an appoint and we can fill it for her until her appoint  She can see any provider  Leo Tejada D.O.

## 2023-06-14 NOTE — TELEPHONE ENCOUNTER
Called patient and left a detailed voicemail message that Dr Perdomo does not manage diabetes medications and that she will need to see a family practice doctor/provider.  Told patient to call clinic and schedule an appointment with either Oscar Tinoco or Dr Tejada to get medication refilled.    LIZBETH Perez  [facility]

## 2023-06-15 NOTE — TELEPHONE ENCOUNTER
Called patient and she understands that family practice will need to manage her blood pressure medication.  Patient said she will schedule an appointment through [facility].    LIZBETH Perez  [facility]

## 2023-07-06 ENCOUNTER — OFFICE VISIT (OUTPATIENT)
Dept: FAMILY MEDICINE | Facility: CLINIC | Age: 53
End: 2023-07-06
Payer: COMMERCIAL

## 2023-07-06 VITALS
BODY MASS INDEX: 23.39 KG/M2 | RESPIRATION RATE: 16 BRPM | SYSTOLIC BLOOD PRESSURE: 108 MMHG | OXYGEN SATURATION: 99 % | TEMPERATURE: 98 F | HEIGHT: 63 IN | HEART RATE: 70 BPM | DIASTOLIC BLOOD PRESSURE: 68 MMHG | WEIGHT: 132 LBS

## 2023-07-06 DIAGNOSIS — E78.5 HYPERLIPIDEMIA WITH TARGET LDL LESS THAN 130: ICD-10-CM

## 2023-07-06 DIAGNOSIS — K76.0 NON-ALCOHOLIC FATTY LIVER DISEASE: ICD-10-CM

## 2023-07-06 DIAGNOSIS — I10 ESSENTIAL HYPERTENSION: Primary | ICD-10-CM

## 2023-07-06 PROCEDURE — 99214 OFFICE O/P EST MOD 30 MIN: CPT | Performed by: FAMILY MEDICINE

## 2023-07-06 RX ORDER — FENOFIBRATE 48 MG/1
TABLET, COATED ORAL
Qty: 180 TABLET | Refills: 0 | Status: SHIPPED | OUTPATIENT
Start: 2023-07-06 | End: 2023-11-03

## 2023-07-06 RX ORDER — PRAVASTATIN SODIUM 20 MG
20 TABLET ORAL DAILY
Qty: 90 TABLET | Refills: 1 | Status: SHIPPED | OUTPATIENT
Start: 2023-07-06 | End: 2024-02-01

## 2023-07-06 RX ORDER — AMLODIPINE BESYLATE 5 MG/1
5 TABLET ORAL DAILY
Qty: 90 TABLET | Refills: 0 | Status: SHIPPED | OUTPATIENT
Start: 2023-07-06 | End: 2023-11-03

## 2023-07-06 ASSESSMENT — PAIN SCALES - GENERAL: PAINLEVEL: NO PAIN (0)

## 2023-07-06 NOTE — PROGRESS NOTES
ICD-10-CM    1. Essential hypertension  I10       2. Hyperlipidemia with target LDL less than 130  E78.5 fenofibrate (TRICOR) 48 MG tablet     pravastatin (PRAVACHOL) 20 MG tablet     Lipid panel reflex to direct LDL Fasting     Comprehensive metabolic panel (BMP + Alb, Alk Phos, ALT, AST, Total. Bili, TP)      3. Non-alcoholic fatty liver disease  K76.0 fenofibrate (TRICOR) 48 MG tablet     pravastatin (PRAVACHOL) 20 MG tablet     Lipid panel reflex to direct LDL Fasting     Comprehensive metabolic panel (BMP + Alb, Alk Phos, ALT, AST, Total. Bili, TP)        History of hypertension-patient recently had additional weight loss and since then her blood pressure has been lower.  She is taking Norvasc and spironolactone.  She did have bilateral started by dermatologist for hair loss and is following her medication.  Patient is advised get fasting blood work and electrolytes and liver function test.  She wants to come back into a future lab is fasting.  We did advise checking blood pressures at home as it is lower here.  She should stop Norvasc for a week and see if her blood pressure is normalized.  She will call us back to  let us know if she needs the Norvasc for her blood pressure management.    High cholesterol-she is on backorder 2 tabs and pravastatin.  She is telling medication well.  Advise rechecking her lipids.   if worsening she should try to increase cholesterol medication.      Fatty liver disease-stable liver function.  She has lost some weight and cholesterol is improving classification.  Discussed again to avoid this alcohol use and other liver toxic medication including Tylenol.  Close monitoring with lab work and visit strongly advised.    Oni Duggan is a 52 year old, presenting for the following health issues:  Chronic Disease Management        7/6/2023     6:27 AM   Additional Questions   Roomed by Bren     History of Present Illness       Reason for visit:  Rx refill    She eats 0-1  "servings of fruits and vegetables daily.She consumes 0 sweetened beverage(s) daily.She exercises with enough effort to increase her heart rate 9 or less minutes per day.  She exercises with enough effort to increase her heart rate 3 or less days per week.   She is taking medications regularly.       Hyperlipidemia Follow-Up      Are you regularly taking any medication or supplement to lower your cholesterol?   Yes- statin therapy    Are you having muscle aches or other side effects that you think could be caused by your cholesterol lowering medication?  No    Hypertension Follow-up      Do you check your blood pressure regularly outside of the clinic? No     Are you following a low salt diet? No    Are your blood pressures ever more than 140 on the top number (systolic) OR more   than 90 on the bottom number (diastolic), for example 140/90? No          Review of Systems   Constitutional, HEENT, cardiovascular, pulmonary, GI, , musculoskeletal, neuro, skin, endocrine and psych systems are negative, except as otherwise noted.      Objective    /68   Pulse 70   Temp 98  F (36.7  C) (Oral)   Resp 16   Ht 1.6 m (5' 3\")   Wt 59.9 kg (132 lb)   LMP  (LMP Unknown)   SpO2 99%   BMI 23.38 kg/m    Body mass index is 23.38 kg/m .  Physical Exam   GENERAL: healthy, alert and no distress  NECK: no adenopathy, no asymmetry, masses, or scars and thyroid normal to palpation  RESP: lungs clear to auscultation - no rales, rhonchi or wheezes  CV: regular rate and rhythm, normal S1 S2, no S3 or S4, no murmur, click or rub, no peripheral edema and peripheral pulses strong  ABDOMEN: soft, nontender, no hepatosplenomegaly, no masses and bowel sounds normal  MS: no gross musculoskeletal defects noted, no edema                "

## 2023-07-06 NOTE — PATIENT INSTRUCTIONS
Trial of stopping norvasc and see what you bp is  If normal -then stop it, and let us know    If higher than than 130 then restart  Come back in 6 months  Follow up for shingles shot at the pharmacy  Follow up for fasting labs  Take care  Leo Tejada D.O.          Patient Education

## 2023-07-17 DIAGNOSIS — R35.0 URINARY FREQUENCY: ICD-10-CM

## 2023-07-17 RX ORDER — OXYBUTYNIN CHLORIDE 5 MG/1
5 TABLET, EXTENDED RELEASE ORAL DAILY
Qty: 90 TABLET | Refills: 0 | Status: SHIPPED | OUTPATIENT
Start: 2023-07-17 | End: 2023-11-27

## 2023-08-04 ENCOUNTER — LAB (OUTPATIENT)
Dept: LAB | Facility: CLINIC | Age: 53
End: 2023-08-04
Payer: COMMERCIAL

## 2023-08-04 DIAGNOSIS — E78.5 HYPERLIPIDEMIA WITH TARGET LDL LESS THAN 130: ICD-10-CM

## 2023-08-04 DIAGNOSIS — K76.0 NON-ALCOHOLIC FATTY LIVER DISEASE: ICD-10-CM

## 2023-08-04 LAB
ALBUMIN SERPL BCG-MCNC: 4.7 G/DL (ref 3.5–5.2)
ALP SERPL-CCNC: 43 U/L (ref 35–104)
ALT SERPL W P-5'-P-CCNC: 31 U/L (ref 0–50)
ANION GAP SERPL CALCULATED.3IONS-SCNC: 12 MMOL/L (ref 7–15)
AST SERPL W P-5'-P-CCNC: 23 U/L (ref 0–45)
BILIRUB SERPL-MCNC: 0.3 MG/DL
BUN SERPL-MCNC: 14.4 MG/DL (ref 6–20)
CALCIUM SERPL-MCNC: 9.9 MG/DL (ref 8.6–10)
CHLORIDE SERPL-SCNC: 100 MMOL/L (ref 98–107)
CHOLEST SERPL-MCNC: 179 MG/DL
CREAT SERPL-MCNC: 0.7 MG/DL (ref 0.51–0.95)
DEPRECATED HCO3 PLAS-SCNC: 26 MMOL/L (ref 22–29)
GFR SERPL CREATININE-BSD FRML MDRD: >90 ML/MIN/1.73M2
GLUCOSE SERPL-MCNC: 104 MG/DL (ref 70–99)
HDLC SERPL-MCNC: 54 MG/DL
LDLC SERPL CALC-MCNC: 96 MG/DL
NONHDLC SERPL-MCNC: 125 MG/DL
POTASSIUM SERPL-SCNC: 3.8 MMOL/L (ref 3.4–5.3)
PROT SERPL-MCNC: 7.4 G/DL (ref 6.4–8.3)
SODIUM SERPL-SCNC: 138 MMOL/L (ref 136–145)
TRIGL SERPL-MCNC: 144 MG/DL

## 2023-08-04 PROCEDURE — 36415 COLL VENOUS BLD VENIPUNCTURE: CPT

## 2023-08-04 PROCEDURE — 80053 COMPREHEN METABOLIC PANEL: CPT

## 2023-08-04 PROCEDURE — 80061 LIPID PANEL: CPT

## 2023-08-07 NOTE — RESULT ENCOUNTER NOTE
All your results are essentially stable. Please contact me if you have any questions.  Take care,  Leo Tejada D.O.

## 2023-08-23 DIAGNOSIS — G43.909 MIGRAINE: Primary | ICD-10-CM

## 2023-09-06 ENCOUNTER — MYC MEDICAL ADVICE (OUTPATIENT)
Dept: OBGYN | Facility: CLINIC | Age: 53
End: 2023-09-06
Payer: COMMERCIAL

## 2023-09-21 ENCOUNTER — IMMUNIZATION (OUTPATIENT)
Dept: FAMILY MEDICINE | Facility: CLINIC | Age: 53
End: 2023-09-21
Payer: COMMERCIAL

## 2023-09-21 DIAGNOSIS — Z23 NEED FOR PROPHYLACTIC VACCINATION AND INOCULATION AGAINST INFLUENZA: Primary | ICD-10-CM

## 2023-09-21 PROCEDURE — 90471 IMMUNIZATION ADMIN: CPT

## 2023-09-21 PROCEDURE — 90682 RIV4 VACC RECOMBINANT DNA IM: CPT

## 2023-09-21 NOTE — PROGRESS NOTES
Patient presents to influenza program requesting influenza vaccination.  Standing orders implemented.    Vaccination given by Letha Amato CMA    Recorded by Letha Amato CMA

## 2023-10-03 NOTE — PROGRESS NOTES
Olga Lidia Solorzano is a 53 year old female who is being evaluated via a billable telephone visit.      What phone number would you like to be contacted at? [phone]   How would you like to obtain your AVS? Yaneliharrosy      Originating Location (pt. Location): Home      Distant Location (provider location):  On-site      SUBJECTIVE:                                                   Olga Lidia Solorzano is a 53 year old female who presents for virtual visit today for the following health issue(s):  Patient presents with:  Medication Follow-up      Additional information: phentermine is going good with no side effects     HPI:  Patient is doing virtual visit by phone in f/up of her phentermine.    Started 2/23 at 15mg with very minimal effect. Bumped to 30mg in march after a month and had more pronounced though felt somewhat slow to her effect.    Patient's weight on home scale peaked at 165# per her report and now has been at 130# consistently and today was actually 129#. Her starting weight in the office the day of meds was 149# and had been 153# before then.  Her BMI was never about 26-27 but she had hepatosteatosis fairly significantly and couldn't lose ewight so phentermine started for that purpose more than overall weight itself.    Continues to feel well on it. No s.e, helps to limits portions, not snack, etc  Not significant of an impact anymore, compared to initially but it's helping to maintain and do the things she needs to do to maintain in a much easier way.    Had her lipids and cmp done a month ago and her ALT and AST were completely normal for the first time in years. Her LDL was 96 on fenofibrate and pravachol    Had had increased BPs so concern with starting phent but on aldactone and amlodipine her BP was great when in to see her other doc in July    Would like to stay on it for the maintaining phase    No LMP recorded (lmp unknown). Patient is postmenopausal..     Patient is sexually active,  .  Using menopause for contraception.    reports that she quit smoking about 10 years ago. Her smoking use included cigarettes. She has a 10.00 pack-year smoking history. She has never used smokeless tobacco.      Health maintenance updated:  yes    Today's PHQ-2 Score:       10/3/2023     6:27 PM   PHQ-2 (  [employer])   Q1: Little interest or pleasure in doing things 1   Q2: Feeling down, depressed or hopeless 0   PHQ-2 Score 1   Q1: Little interest or pleasure in doing things Several days   Q2: Feeling down, depressed or hopeless Not at all   PHQ-2 Score 1     Today's PHQ-9 Score:       2023     3:57 PM   PHQ-9 SCORE   PHQ-9 Total Score 1     Today's TIMO-7 Score:       2023     3:57 PM   TIMO-7 SCORE   Total Score 1       Problem list and histories reviewed & adjusted, as indicated.  Additional history: as documented.    Patient Active Problem List   Diagnosis    Hypertension, essential    Mixed hyperlipidemia    Herniated vertebral disc    Mild persistent asthma    Bilateral low back pain without sciatica    Hip pain, right    Partial nontraumatic tear of right rotator cuff    DJD of right AC (acromioclavicular) joint    Fatty liver    Telogen hair loss    Vasomotor symptoms due to menopause    Hormone replacement therapy (HRT)    Prediabetes    Lichen sclerosus et atrophicus of the vulva    Pain in finger of both hands    Chronic pain in left shoulder    Adhesive capsulitis of left shoulder     Past Surgical History:   Procedure Laterality Date    COLONOSCOPY N/A 2017    Procedure: COLONOSCOPY;  Surgeon: Tato Child MD;  Location: [facility] VASCULAR SURGERY PROCEDURE UNLIST      vericose vein      Social History     Tobacco Use    Smoking status: Former     Packs/day: 0.50     Years: 20.00     Pack years: 10.00     Types: Cigarettes     Quit date: 2012     Years since quitting: 10.7    Smokeless tobacco: Never   Substance Use Topics    Alcohol use: Yes     Alcohol/week: 0.0  standard drinks of alcohol     Comment: more on weekends 8 drinks per week or wine or mixed drinks       Problem (# of Occurrences) Relation (Name,Age of Onset)    Blood Disease (1) Paternal Uncle    Hypertension (3) Mother (wally), Father (emily), Maternal Grandmother (meka)    Breast Cancer (3) Paternal Aunt, Paternal Half-Sister (aunt), Cousin (ariela)    Other Cancer (1) Paternal Half-Brother (uncle): leukemia    Hyperlipidemia (1) Father (emily)    Lung Cancer (1) Mother (wally)    No Known Problems (6) Sister, Brother, Maternal Grandfather, Paternal Grandmother, Son, Other           Negative family history of: Liver Disease              Current Outpatient Medications   Medication Sig    amLODIPine (NORVASC) 5 MG tablet Take 1 tablet (5 mg) by mouth daily    clobetasol (TEMOVATE) 0.05 % external ointment Apply topically 2 times daily    estradiol (VIVELLE-DOT) 0.075 MG/24HR BIW patch Place 1 patch onto the skin twice a week    fenofibrate (TRICOR) 48 MG tablet TAKE TWO TABLETS BY MOUTH ONCE DAILY    metFORMIN (GLUCOPHAGE XR) 500 MG 24 hr tablet Take one tab po with meals and increase every week, as tolerated, by 500mg to a max of 2000/day or 1000 mg BID    oxybutynin ER (DITROPAN XL) 5 MG 24 hr tablet TAKE 1 TABLET (5 MG) BY MOUTH DAILY.    phentermine (ADIPEX-P) 30 MG capsule Take 1 capsule (30 mg) by mouth every morning    pravastatin (PRAVACHOL) 20 MG tablet Take 1 tablet (20 mg) by mouth daily    progesterone (PROMETRIUM) 100 MG capsule TAKE ONE CAPSULE BY MOUTH ONCE DAILY    spironolactone (ALDACTONE) 50 MG tablet TAKE 1 TABLET (50 MG) BY MOUTH 2 TIMES DAILY    fluticasone (FLOVENT HFA) 220 MCG/ACT inhaler Inhale 2 puffs into the lungs 2 times daily (Patient not taking: Reported on 10/4/2023)    levalbuterol (XOPENEX HFA) 45 MCG/ACT inhaler Inhale 2 puffs into the lungs every 6 hours as needed for shortness of breath or wheezing (Patient not taking: Reported on 10/4/2023)     Current Facility-Administered  Medications   Medication    lidocaine 1 % injection 3 mL    lidocaine 1 % injection 4 mL    methylPREDNISolone (DEPO-MEDROL) injection 80 mg     Allergies   Allergen Reactions    Niacin Shortness Of Breath, Other (See Comments) and Nausea and Vomiting     All over body pains (patient reported symptoms)         OBJECTIVE:     No vitals were obtained today due to virtual visit.    Physical Exam    PSYCH: Mentation appears normal, affect normal/bright, judgement and insight intact, normal speech           ASSESSMENT/PLAN:                                                      Phone call duration: 5 minutes with an additional 6 minutes of chart review and completion for a total of 11 minutes on the same DOS      ICD-10-CM    1. Overweight (BMI 25.0-29.9)  E66.3 phentermine (ADIPEX-P) 30 MG capsule      2. Fatty liver  K76.0 phentermine (ADIPEX-P) 30 MG capsule      3. Prediabetes  R73.03 phentermine (ADIPEX-P) 30 MG capsule      4. High risk medication use  Z79.899             Patient is doing great on her phentermine. Felt the effect was somewhat mild and weight loss was slow but overall was never at that high of a BMI to expect rapid loss.  However even at just a few pounds over the normal BMI cut off had fatty liver and high cholesterol and HTN so know optimizing weight and BMI is reza for her and this is allowing her to do that and now maintain.    BMI now is 22, labs have improved, bp Is well controlled and no S.E  Discussed FDA indications and tx time approval however being overweight/obese is not an acute issue nor are the comorbidities she already had despite only a slightly high BMI that are all now controlled  So will continue on phent 30mg and #90 wth 1 refill sent in and should see me end of February for annual and mammo and can f/up again at that time    Jessie Perdomo MD  [facility] [place]

## 2023-10-04 ENCOUNTER — VIRTUAL VISIT (OUTPATIENT)
Dept: OBGYN | Facility: CLINIC | Age: 53
End: 2023-10-04
Payer: COMMERCIAL

## 2023-10-04 VITALS — HEIGHT: 63 IN | WEIGHT: 129.7 LBS | BODY MASS INDEX: 22.98 KG/M2

## 2023-10-04 DIAGNOSIS — Z79.899 HIGH RISK MEDICATION USE: ICD-10-CM

## 2023-10-04 DIAGNOSIS — E66.3 OVERWEIGHT (BMI 25.0-29.9): Primary | ICD-10-CM

## 2023-10-04 DIAGNOSIS — R73.03 PREDIABETES: ICD-10-CM

## 2023-10-04 DIAGNOSIS — K76.0 FATTY LIVER: ICD-10-CM

## 2023-10-04 PROCEDURE — 99442 PR PHYSICIAN TELEPHONE EVALUATION 11-20 MIN: CPT | Performed by: OBSTETRICS & GYNECOLOGY

## 2023-10-04 RX ORDER — PHENTERMINE HYDROCHLORIDE 30 MG/1
30 CAPSULE ORAL EVERY MORNING
Qty: 90 CAPSULE | Refills: 1 | Status: SHIPPED | OUTPATIENT
Start: 2023-10-04 | End: 2024-03-01

## 2023-11-03 DIAGNOSIS — E78.5 HYPERLIPIDEMIA WITH TARGET LDL LESS THAN 130: ICD-10-CM

## 2023-11-03 DIAGNOSIS — K76.0 NON-ALCOHOLIC FATTY LIVER DISEASE: ICD-10-CM

## 2023-11-03 RX ORDER — AMLODIPINE BESYLATE 5 MG/1
5 TABLET ORAL DAILY
Qty: 90 TABLET | Refills: 0 | Status: SHIPPED | OUTPATIENT
Start: 2023-11-03 | End: 2024-02-01

## 2023-11-03 RX ORDER — FENOFIBRATE 48 MG/1
TABLET, COATED ORAL
Qty: 180 TABLET | Refills: 0 | Status: SHIPPED | OUTPATIENT
Start: 2023-11-03 | End: 2024-02-02

## 2023-11-19 DIAGNOSIS — I10 HYPERTENSION, ESSENTIAL: ICD-10-CM

## 2023-11-19 DIAGNOSIS — L65.9 HAIR THINNING: ICD-10-CM

## 2023-11-20 RX ORDER — SPIRONOLACTONE 50 MG/1
50 TABLET, FILM COATED ORAL 2 TIMES DAILY
Qty: 180 TABLET | Refills: 0 | Status: SHIPPED | OUTPATIENT
Start: 2023-11-20 | End: 2024-02-01

## 2023-11-20 NOTE — TELEPHONE ENCOUNTER
"Requested Prescriptions   Pending Prescriptions Disp Refills    spironolactone (ALDACTONE) 50 MG tablet [Pharmacy Med Name: SPIRONOLACTONE 50MG TABS] 180 tablet 1     Sig: TAKE ONE TABLET BY MOUTH TWICE A DAY       Diuretics (Including Combos) Protocol Passed - 11/19/2023  3:41 PM        Passed - Blood pressure under 140/90 in past 12 months     BP Readings from Last 3 Encounters:   07/06/23 108/68   02/27/23 110/66   11/25/22 114/78                 Passed - Recent (12 mo) or future (30 days) visit within the authorizing provider's specialty     Patient has had an office visit with the authorizing provider or a provider within the authorizing providers department within the previous 12 mos or has a future within next 30 days. See \"Patient Info\" tab in inbasket, or \"Choose Columns\" in Meds & Orders section of the refill encounter.              Passed - Medication is active on med list        Passed - Patient is age 18 or older        Passed - No active pregancy on record        Passed - Normal serum creatinine on file in past 12 months     Recent Labs   Lab Test 08/04/23  0656   CR 0.70              Passed - Normal serum potassium on file in past 12 months     Recent Labs   Lab Test 08/04/23  0656   POTASSIUM 3.8                    Passed - Normal serum sodium on file in past 12 months     Recent Labs   Lab Test 08/04/23  0656                 Passed - No positive pregnancy test in past 12 months           Last Written Prescription Date:  5/30/23  Last Fill Quantity: 180,  # refills: 1   Last office visit: 2/27/2023 ; last virtual visit: 10/4/2023 with prescribing provider:  Leanne   Future Office Visit:  3/1/24 with Dr. Perdomo  Prescription approved per [facility] Refill Protocol.  Prachi Key RN on 11/20/2023 at 6:37 AM          "

## 2023-11-25 DIAGNOSIS — R35.0 URINARY FREQUENCY: ICD-10-CM

## 2023-11-27 RX ORDER — OXYBUTYNIN CHLORIDE 5 MG/1
5 TABLET, EXTENDED RELEASE ORAL DAILY
Qty: 90 TABLET | Refills: 0 | Status: SHIPPED | OUTPATIENT
Start: 2023-11-27 | End: 2024-02-01

## 2023-12-27 ENCOUNTER — MYC REFILL (OUTPATIENT)
Dept: FAMILY MEDICINE | Facility: CLINIC | Age: 53
End: 2023-12-27
Payer: COMMERCIAL

## 2023-12-27 ENCOUNTER — MYC REFILL (OUTPATIENT)
Dept: OBGYN | Facility: CLINIC | Age: 53
End: 2023-12-27
Payer: COMMERCIAL

## 2023-12-27 DIAGNOSIS — K76.0 NON-ALCOHOLIC FATTY LIVER DISEASE: ICD-10-CM

## 2023-12-27 DIAGNOSIS — R35.0 URINARY FREQUENCY: ICD-10-CM

## 2023-12-27 DIAGNOSIS — I10 HYPERTENSION, ESSENTIAL: ICD-10-CM

## 2023-12-27 DIAGNOSIS — E78.5 HYPERLIPIDEMIA WITH TARGET LDL LESS THAN 130: ICD-10-CM

## 2023-12-27 DIAGNOSIS — L65.9 HAIR THINNING: ICD-10-CM

## 2023-12-27 RX ORDER — OXYBUTYNIN CHLORIDE 5 MG/1
5 TABLET, EXTENDED RELEASE ORAL DAILY
Qty: 90 TABLET | Refills: 0 | OUTPATIENT
Start: 2023-12-27

## 2023-12-27 RX ORDER — AMLODIPINE BESYLATE 5 MG/1
5 TABLET ORAL DAILY
Qty: 90 TABLET | Refills: 0 | OUTPATIENT
Start: 2023-12-27

## 2023-12-27 RX ORDER — SPIRONOLACTONE 50 MG/1
50 TABLET, FILM COATED ORAL 2 TIMES DAILY
Qty: 180 TABLET | Refills: 0 | OUTPATIENT
Start: 2023-12-27

## 2023-12-27 RX ORDER — FENOFIBRATE 48 MG/1
TABLET, COATED ORAL
Qty: 180 TABLET | Refills: 0 | OUTPATIENT
Start: 2023-12-27

## 2023-12-27 NOTE — TELEPHONE ENCOUNTER
Requested Prescriptions   Pending Prescriptions Disp Refills    spironolactone (ALDACTONE) 50 MG tablet 180 tablet 0     Sig: Take 1 tablet (50 mg) by mouth 2 times daily       Diuretics (Including Combos) Protocol Passed - 12/27/2023  2:04 PM        Passed - Blood pressure under 140/90 in past 12 months     BP Readings from Last 3 Encounters:   07/06/23 108/68   02/27/23 110/66   11/25/22 114/78                 Passed - Recent (12 mo) or future (30 days) visit within the authorizing provider's specialty     The patient must have completed an in-person or virtual visit within the past 12 months or has a future visit scheduled within the next 90 days with the authorizing provider s specialty.  Urgent care and e-visits do not quality as an office visit for this protocol.          Passed - Medication is active on med list        Passed - Patient is age 18 or older        Passed - No active pregancy on record        Passed - Normal serum creatinine on file in past 12 months     Recent Labs   Lab Test 08/04/23  0656   CR 0.70              Passed - Normal serum potassium on file in past 12 months     Recent Labs   Lab Test 08/04/23  0656   POTASSIUM 3.8                    Passed - Normal serum sodium on file in past 12 months     Recent Labs   Lab Test 08/04/23  0656                 Passed - No positive pregnancy test in past 12 months           Should have refills through Feb  Alicia Pardo RN on 12/27/2023 at 3:29 PM

## 2024-01-25 ENCOUNTER — LAB REQUISITION (OUTPATIENT)
Dept: LAB | Facility: CLINIC | Age: 54
End: 2024-01-25
Payer: COMMERCIAL

## 2024-01-25 DIAGNOSIS — L65.9 NONSCARRING HAIR LOSS, UNSPECIFIED: ICD-10-CM

## 2024-01-25 LAB
BASOPHILS # BLD AUTO: 0 10E3/UL (ref 0–0.2)
BASOPHILS NFR BLD AUTO: 1 %
EOSINOPHIL # BLD AUTO: 0.1 10E3/UL (ref 0–0.7)
EOSINOPHIL NFR BLD AUTO: 1 %
ERYTHROCYTE [DISTWIDTH] IN BLOOD BY AUTOMATED COUNT: 11.9 % (ref 10–15)
HCT VFR BLD AUTO: 41.8 % (ref 35–47)
HGB BLD-MCNC: 14.2 G/DL (ref 11.7–15.7)
IMM GRANULOCYTES # BLD: 0 10E3/UL
IMM GRANULOCYTES NFR BLD: 0 %
IRON BINDING CAPACITY (ROCHE): 425 UG/DL (ref 240–430)
IRON SATN MFR SERPL: 30 % (ref 15–46)
IRON SERPL-MCNC: 127 UG/DL (ref 37–145)
LYMPHOCYTES # BLD AUTO: 1.4 10E3/UL (ref 0.8–5.3)
LYMPHOCYTES NFR BLD AUTO: 22 %
MCH RBC QN AUTO: 30.7 PG (ref 26.5–33)
MCHC RBC AUTO-ENTMCNC: 34 G/DL (ref 31.5–36.5)
MCV RBC AUTO: 90 FL (ref 78–100)
MONOCYTES # BLD AUTO: 0.7 10E3/UL (ref 0–1.3)
MONOCYTES NFR BLD AUTO: 11 %
NEUTROPHILS # BLD AUTO: 4.1 10E3/UL (ref 1.6–8.3)
NEUTROPHILS NFR BLD AUTO: 65 %
NRBC # BLD AUTO: 0 10E3/UL
NRBC BLD AUTO-RTO: 0 /100
PLATELET # BLD AUTO: 265 10E3/UL (ref 150–450)
RBC # BLD AUTO: 4.63 10E6/UL (ref 3.8–5.2)
T4 FREE SERPL-MCNC: 1.11 NG/DL (ref 0.9–1.7)
TSH SERPL DL<=0.005 MIU/L-ACNC: 1.61 UIU/ML (ref 0.3–4.2)
WBC # BLD AUTO: 6.2 10E3/UL (ref 4–11)

## 2024-01-25 PROCEDURE — 82728 ASSAY OF FERRITIN: CPT | Mod: ORL | Performed by: DERMATOLOGY

## 2024-01-25 PROCEDURE — 36415 COLL VENOUS BLD VENIPUNCTURE: CPT | Mod: ORL | Performed by: DERMATOLOGY

## 2024-01-25 PROCEDURE — 85025 COMPLETE CBC W/AUTO DIFF WBC: CPT | Mod: ORL | Performed by: DERMATOLOGY

## 2024-01-25 PROCEDURE — 84443 ASSAY THYROID STIM HORMONE: CPT | Mod: ORL | Performed by: DERMATOLOGY

## 2024-01-25 PROCEDURE — 82627 DEHYDROEPIANDROSTERONE: CPT | Mod: ORL | Performed by: DERMATOLOGY

## 2024-01-25 PROCEDURE — 84439 ASSAY OF FREE THYROXINE: CPT | Mod: ORL | Performed by: DERMATOLOGY

## 2024-01-25 PROCEDURE — 83550 IRON BINDING TEST: CPT | Mod: ORL | Performed by: DERMATOLOGY

## 2024-01-26 LAB
DHEA-S SERPL-MCNC: 66 UG/DL (ref 35–430)
FERRITIN SERPL-MCNC: 111 NG/ML (ref 11–328)

## 2024-01-29 ENCOUNTER — PATIENT OUTREACH (OUTPATIENT)
Dept: CARE COORDINATION | Facility: CLINIC | Age: 54
End: 2024-01-29
Payer: COMMERCIAL

## 2024-02-01 DIAGNOSIS — L65.9 HAIR THINNING: ICD-10-CM

## 2024-02-01 DIAGNOSIS — R73.03 PREDIABETES: ICD-10-CM

## 2024-02-01 DIAGNOSIS — K76.0 NON-ALCOHOLIC FATTY LIVER DISEASE: ICD-10-CM

## 2024-02-01 DIAGNOSIS — E78.5 HYPERLIPIDEMIA WITH TARGET LDL LESS THAN 130: ICD-10-CM

## 2024-02-01 DIAGNOSIS — R35.0 URINARY FREQUENCY: ICD-10-CM

## 2024-02-01 DIAGNOSIS — I10 HYPERTENSION, ESSENTIAL: ICD-10-CM

## 2024-02-01 RX ORDER — PRAVASTATIN SODIUM 20 MG
20 TABLET ORAL DAILY
Qty: 90 TABLET | Refills: 0 | Status: SHIPPED | OUTPATIENT
Start: 2024-02-01 | End: 2024-04-17

## 2024-02-01 RX ORDER — METFORMIN HCL 500 MG
TABLET, EXTENDED RELEASE 24 HR ORAL
Qty: 270 TABLET | Refills: 3 | Status: SHIPPED | OUTPATIENT
Start: 2024-02-01

## 2024-02-01 RX ORDER — SPIRONOLACTONE 50 MG/1
50 TABLET, FILM COATED ORAL 2 TIMES DAILY
Qty: 180 TABLET | Refills: 0 | Status: SHIPPED | OUTPATIENT
Start: 2024-02-01 | End: 2024-03-01

## 2024-02-01 RX ORDER — AMLODIPINE BESYLATE 5 MG/1
5 TABLET ORAL DAILY
Qty: 90 TABLET | Refills: 0 | Status: SHIPPED | OUTPATIENT
Start: 2024-02-01 | End: 2024-04-17

## 2024-02-01 RX ORDER — OXYBUTYNIN CHLORIDE 5 MG/1
5 TABLET, EXTENDED RELEASE ORAL DAILY
Qty: 90 TABLET | Refills: 0 | Status: SHIPPED | OUTPATIENT
Start: 2024-02-01 | End: 2024-04-17

## 2024-02-01 NOTE — TELEPHONE ENCOUNTER
Requested Prescriptions   Pending Prescriptions Disp Refills    spironolactone (ALDACTONE) 50 MG tablet [Pharmacy Med Name: SPIRONOLACTONE 50MG TABS] 180 tablet 0     Sig: TAKE ONE TABLET BY MOUTH TWICE A DAY       Diuretics (Including Combos) Protocol Passed - 2/1/2024  1:39 PM        Passed - Blood pressure under 140/90 in past 12 months     BP Readings from Last 3 Encounters:   07/06/23 108/68   02/27/23 110/66   11/25/22 114/78                 Passed - Recent (12 mo) or future (30 days) visit within the authorizing provider's specialty     The patient must have completed an in-person or virtual visit within the past 12 months or has a future visit scheduled within the next 90 days with the authorizing provider s specialty.  Urgent care and e-visits do not quality as an office visit for this protocol.          Passed - Medication is active on med list        Passed - Medication indicated for associated diagnosis     Medication is associated with one or more of the following diagnoses:     Edema   Hypertension   Heart Failure   Meniere's Disease          Passed - Has GFR on file in past 12 months and most recent value is normal        Passed - Medication indicated for associated diagnosis     Medication is associated with one or more of the following diagnoses:     Hypertension   Heart Failure   Hyperaldosteronism   Acne   Hirsutism   Hypokalemia   Hepatic Cirrhosis   Nephrotic Syndrome   Myocardial Infarction   Transgender Female          Passed - Patient is age 18 or older        Passed - No active pregancy on record        Passed - No positive pregnancy test in past 12 months          metFORMIN (GLUCOPHAGE XR) 500 MG 24 hr tablet [Pharmacy Med Name: METFORMIN HCL ER 500MG TB24] 270 tablet 3     Sig: TAKE ONE TABLET BY MOUTH WITH MEALS AND INCREASE EVERY WEEK, AS TOLERATED, BY 500MG TO A MAX OF 4 TABLETS (2000MG) PER DAY OR 2 TABLETS (1000 MG) TWO TIMES A DAY.       Biguanide Agents Failed - 2/1/2024  1:39 PM         Failed - Patient has documented A1c within the specified period of time.     If HgbA1C is 8 or greater, it needs to be on file within the past 3 months.  If less than 8, must be on file within the past 6 months.     Recent Labs   Lab Test 11/18/22  0642   A1C 5.3             Failed - Medication indicated for associated diagnosis     Medication is associated with one or more of the following diagnoses:     Gestational diabetes mellitus     Hyperinsulinar obesity     Hypersecretion of ovarian androgens    Non-alcoholic fatty liver    Polycystic ovarian syndrome               Pre-diabetes (DM 2 prevention)    Type 2 diabetes mellitus     Weight gain, antipsychotic therapy-induced             Passed - Patient is age 10 or older        Passed - Patient does NOT have a diagnosis of CHF.        Passed - Medication is active on med list        Passed - Has GFR on file in past 12 months and most recent value is normal        Passed - Recent (6 mo) or future (90 days) visit within the authorizing provider's specialty     The patient must have completed an in-person or virtual visit within the past 6 months or has a future visit scheduled within the next 90 days with the authorizing provider s specialty.  Urgent care and e-visits do not quality as an office visit for this protocol.          Passed - Patient is not pregnant        Passed - Patient has not had a positive pregnancy test within the past 12 mos.            Appt 3/1/24 with Dr. Perdomo  Prescription approved per [facility] Refill Protocol.  Alicia Pardo RN on 2/1/2024 at 2:23 PM

## 2024-02-02 RX ORDER — FENOFIBRATE 48 MG/1
TABLET, COATED ORAL
Qty: 180 TABLET | Refills: 0 | Status: SHIPPED | OUTPATIENT
Start: 2024-02-02 | End: 2024-04-17

## 2024-02-12 ENCOUNTER — PATIENT OUTREACH (OUTPATIENT)
Dept: CARE COORDINATION | Facility: CLINIC | Age: 54
End: 2024-02-12
Payer: COMMERCIAL

## 2024-03-01 ENCOUNTER — ANCILLARY PROCEDURE (OUTPATIENT)
Dept: MAMMOGRAPHY | Facility: CLINIC | Age: 54
End: 2024-03-01
Payer: COMMERCIAL

## 2024-03-01 ENCOUNTER — OFFICE VISIT (OUTPATIENT)
Dept: OBGYN | Facility: CLINIC | Age: 54
End: 2024-03-01
Payer: COMMERCIAL

## 2024-03-01 VITALS
DIASTOLIC BLOOD PRESSURE: 88 MMHG | WEIGHT: 130 LBS | BODY MASS INDEX: 23.04 KG/M2 | SYSTOLIC BLOOD PRESSURE: 130 MMHG | HEIGHT: 63 IN

## 2024-03-01 DIAGNOSIS — Z12.31 VISIT FOR SCREENING MAMMOGRAM: ICD-10-CM

## 2024-03-01 DIAGNOSIS — I10 HYPERTENSION, ESSENTIAL: ICD-10-CM

## 2024-03-01 DIAGNOSIS — L65.9 HAIR THINNING: ICD-10-CM

## 2024-03-01 DIAGNOSIS — Z01.419 ENCOUNTER FOR GYNECOLOGICAL EXAMINATION WITHOUT ABNORMAL FINDING: Primary | ICD-10-CM

## 2024-03-01 DIAGNOSIS — N90.4 LICHEN SCLEROSUS ET ATROPHICUS OF THE VULVA: ICD-10-CM

## 2024-03-01 DIAGNOSIS — K76.0 FATTY LIVER: ICD-10-CM

## 2024-03-01 DIAGNOSIS — R73.03 PREDIABETES: ICD-10-CM

## 2024-03-01 DIAGNOSIS — E66.3 OVERWEIGHT (BMI 25.0-29.9): ICD-10-CM

## 2024-03-01 DIAGNOSIS — Z79.890 HORMONE REPLACEMENT THERAPY (HRT): ICD-10-CM

## 2024-03-01 DIAGNOSIS — Z78.0 POSTMENOPAUSAL STATUS: ICD-10-CM

## 2024-03-01 DIAGNOSIS — Z13.820 SCREENING FOR OSTEOPOROSIS: ICD-10-CM

## 2024-03-01 DIAGNOSIS — N95.1 VASOMOTOR SYMPTOMS DUE TO MENOPAUSE: ICD-10-CM

## 2024-03-01 PROCEDURE — 77063 BREAST TOMOSYNTHESIS BI: CPT | Mod: TC | Performed by: RADIOLOGY

## 2024-03-01 PROCEDURE — 99214 OFFICE O/P EST MOD 30 MIN: CPT | Mod: 25 | Performed by: OBSTETRICS & GYNECOLOGY

## 2024-03-01 PROCEDURE — 77067 SCR MAMMO BI INCL CAD: CPT | Mod: TC | Performed by: RADIOLOGY

## 2024-03-01 PROCEDURE — 99396 PREV VISIT EST AGE 40-64: CPT | Performed by: OBSTETRICS & GYNECOLOGY

## 2024-03-01 RX ORDER — CLOBETASOL PROPIONATE 0.5 MG/G
OINTMENT TOPICAL 2 TIMES DAILY
Qty: 60 G | Refills: 0 | Status: SHIPPED | OUTPATIENT
Start: 2024-03-01 | End: 2024-05-09

## 2024-03-01 RX ORDER — ESTRADIOL 0.07 MG/D
1 FILM, EXTENDED RELEASE TRANSDERMAL
Qty: 24 PATCH | Refills: 4 | Status: SHIPPED | OUTPATIENT
Start: 2024-03-04 | End: 2024-05-30

## 2024-03-01 RX ORDER — DESONIDE 0.5 MG/G
OINTMENT TOPICAL
COMMUNITY
Start: 2024-01-25

## 2024-03-01 RX ORDER — SPIRONOLACTONE 50 MG/1
50 TABLET, FILM COATED ORAL 2 TIMES DAILY
Qty: 180 TABLET | Refills: 4 | Status: SHIPPED | OUTPATIENT
Start: 2024-03-01

## 2024-03-01 RX ORDER — PHENTERMINE HYDROCHLORIDE 30 MG/1
30 CAPSULE ORAL EVERY MORNING
Qty: 90 CAPSULE | Refills: 1 | Status: SHIPPED | OUTPATIENT
Start: 2024-03-01 | End: 2024-07-29

## 2024-03-01 RX ORDER — PROGESTERONE 100 MG/1
CAPSULE ORAL
Qty: 90 CAPSULE | Refills: 4 | Status: SHIPPED | OUTPATIENT
Start: 2024-03-01

## 2024-03-01 RX ORDER — METFORMIN HCL 500 MG
TABLET, EXTENDED RELEASE 24 HR ORAL
Qty: 270 TABLET | Refills: 3 | Status: CANCELLED | OUTPATIENT
Start: 2024-03-01

## 2024-03-01 ASSESSMENT — ANXIETY QUESTIONNAIRES
3. WORRYING TOO MUCH ABOUT DIFFERENT THINGS: NOT AT ALL
2. NOT BEING ABLE TO STOP OR CONTROL WORRYING: SEVERAL DAYS
6. BECOMING EASILY ANNOYED OR IRRITABLE: NOT AT ALL
GAD7 TOTAL SCORE: 1
GAD7 TOTAL SCORE: 1
5. BEING SO RESTLESS THAT IT IS HARD TO SIT STILL: NOT AT ALL
1. FEELING NERVOUS, ANXIOUS, OR ON EDGE: NOT AT ALL
IF YOU CHECKED OFF ANY PROBLEMS ON THIS QUESTIONNAIRE, HOW DIFFICULT HAVE THESE PROBLEMS MADE IT FOR YOU TO DO YOUR WORK, TAKE CARE OF THINGS AT HOME, OR GET ALONG WITH OTHER PEOPLE: NOT DIFFICULT AT ALL
7. FEELING AFRAID AS IF SOMETHING AWFUL MIGHT HAPPEN: NOT AT ALL

## 2024-03-01 ASSESSMENT — PATIENT HEALTH QUESTIONNAIRE - PHQ9
SUM OF ALL RESPONSES TO PHQ QUESTIONS 1-9: 2
5. POOR APPETITE OR OVEREATING: NOT AT ALL

## 2024-03-01 NOTE — PROGRESS NOTES
Olga Lidia is a 53 year old  female who presents for annual exam.     Besides routine health maintenance, she has no other health concerns today .    HPI:  The patient's PCP is TONYA Osorio CNP.      Patient present for an annual physical exam.    Reported v.m sx are well controlled w/ hormone therapy, sometimes forgets to swap her patch at 3.5 days and leaves it longer and then she'll notice the v.m sx ramp up and switches it out. Is doing the 0.075 vivelle and 100mg prometrium.  No PMB/BTB    L.S. well controled w/ clobetasol cream     Doing well on phentermine and has been on it for a while now. Was only ever at a BMI of 26 at her highest but had fatty liver and high trigs and pre-DM so started her on it. Has lost about 23# since going on it and maintaining a normal BMI since then.  Had some labs with derm in January that were all normal. Do not see repeat lipids or LFTs since  but does have a PCP.    Dr. Araiza requested spirolactone to be filled by PCP, but it was previously filled by her and done for hair loss. Not sure if it's dramatically helped with regrowth but feels it's at least stable from a losing/thinning standpoint.      Reported occasional hip pain, Dx scoliosis     BP decently controlled on norvasc 5mg but is 130/88 today. Managed by her PCP. Is on fenofibrate now as well as pravachol      GYNECOLOGIC HISTORY:    No LMP recorded (lmp unknown). Patient is postmenopausal.    Her current contraception method is: menopause.  She  reports that she quit smoking about 11 years ago. Her smoking use included cigarettes. She has a 10 pack-year smoking history. She has never used smokeless tobacco.    Patient is sexually active.  STD testing offered?  Declined  Last PHQ-9 score on record =       3/1/2024     2:57 PM   PHQ-9 SCORE   PHQ-9 Total Score 2     Last GAD7 score on record =       3/1/2024     2:57 PM   TIMO-7 SCORE   Total Score 1     Alcohol Score = 3    HEALTH MAINTENANCE:  Cholesterol:    Recent Labs   Lab Test 23  0656 22  0642   CHOL 179 189   HDL 54 47*   LDL 96 101*   TRIG 144 204*     Last Mammo: One year ago, Result: Normal, Next Mammo: Today     Pap:   Lab Results   Component Value Date    GYNINTERP  2022     Negative for Intraepithelial Lesion or Malignancy (NILM)    PAP NIL 2017     Colonoscopy:  3/13/17, Result: Normal, Next Colonoscopy: 10 years.  Dexa:  NA    Health maintenance updated:  yes    HISTORY:  OB History    Para Term  AB Living   3 1 1 0 2 1   SAB IAB Ectopic Multiple Live Births   0 0 0 0 1      # Outcome Date GA Lbr Davis/2nd Weight Sex Delivery Anes PTL Lv   3 Term 99    M       2 AB            1 AB                Patient Active Problem List   Diagnosis    Hypertension, essential    Mixed hyperlipidemia    Herniated vertebral disc    Mild persistent asthma    Bilateral low back pain without sciatica    Hip pain, right    Partial nontraumatic tear of right rotator cuff    DJD of right AC (acromioclavicular) joint    Fatty liver    Telogen hair loss    Vasomotor symptoms due to menopause    Hormone replacement therapy (HRT)    Prediabetes    Lichen sclerosus et atrophicus of the vulva    Pain in finger of both hands    Chronic pain in left shoulder    Adhesive capsulitis of left shoulder     Past Surgical History:   Procedure Laterality Date    COLONOSCOPY N/A 2017    Procedure: COLONOSCOPY;  Surgeon: Tato Child MD;  Location: [facility] VASCULAR SURGERY PROCEDURE UNLIST      vericose vein      Social History     Tobacco Use    Smoking status: Former     Packs/day: 0.50     Years: 20.00     Additional pack years: 0.00     Total pack years: 10.00     Types: Cigarettes     Quit date: 2012     Years since quittin.2    Smokeless tobacco: Never   Substance Use Topics    Alcohol use: Yes     Alcohol/week: 0.0 standard drinks of alcohol     Comment: more on weekends 8 drinks per week or wine or mixed drinks        Problem (# of Occurrences) Relation (Name,Age of Onset)    Blood Disease (1) Paternal Uncle    Hypertension (3) Mother (wally), Father (emily), Maternal Grandmother (meka)    Breast Cancer (3) Paternal Aunt, Paternal Half-Sister (aunt), Cousin (ariela)    Other Cancer (1) Paternal Half-Brother (uncle): leukemia    Hyperlipidemia (1) Father (emily)    Lung Cancer (1) Mother (wally)    No Known Problems (6) Sister, Brother, Maternal Grandfather, Paternal Grandmother, Son, Other           Negative family history of: Liver Disease              Current Outpatient Medications   Medication Sig    amLODIPine (NORVASC) 5 MG tablet TAKE ONE TABLET BY MOUTH ONCE DAILY    clobetasol (TEMOVATE) 0.05 % external ointment Apply topically 2 times daily    desonide (DESOWEN) 0.05 % external ointment     estradiol (VIVELLE-DOT) 0.075 MG/24HR BIW patch Place 1 patch onto the skin twice a week    fenofibrate (TRICOR) 48 MG tablet TAKE TWO TABLETS BY MOUTH ONCE DAILY    phentermine 30 MG capsule Take 1 capsule (30 mg) by mouth every morning    pravastatin (PRAVACHOL) 20 MG tablet TAKE ONE TABLET BY MOUTH ONCE DAILY    progesterone (PROMETRIUM) 100 MG capsule TAKE ONE CAPSULE BY MOUTH ONCE DAILY    spironolactone (ALDACTONE) 50 MG tablet Take 1 tablet (50 mg) by mouth 2 times daily    metFORMIN (GLUCOPHAGE XR) 500 MG 24 hr tablet TAKE ONE TABLET BY MOUTH WITH MEALS AND INCREASE EVERY WEEK, AS TOLERATED, BY 500MG TO A MAX OF 4 TABLETS (2000MG) PER DAY OR 2 TABLETS (1000 MG) TWO TIMES A DAY.    oxyBUTYnin ER (DITROPAN XL) 5 MG 24 hr tablet TAKE 1 TABLET (5 MG) BY MOUTH DAILY.     Current Facility-Administered Medications   Medication    lidocaine 1 % injection 3 mL    lidocaine 1 % injection 4 mL    methylPREDNISolone (DEPO-MEDROL) injection 80 mg     Allergies   Allergen Reactions    Niacin Shortness Of Breath, Other (See Comments) and Nausea and Vomiting     All over body pains (patient reported symptoms)       Past medical, surgical,  "social and family histories were reviewed and updated in [facility].    EXAM:  /88   Ht 1.594 m (5' 2.75\")   Wt 59 kg (130 lb)   LMP  (LMP Unknown)   Breastfeeding No   BMI 23.21 kg/m     BMI: Body mass index is 23.21 kg/m .    PHYSICAL EXAM:  Constitutional:   Appearance: Well nourished, well developed, alert, in no acute distress  Neck:  Lymph Nodes:  No lymphadenopathy present    Thyroid:  Gland size normal, nontender, no nodules or masses present  on palpation  Chest:  Respiratory Effort:  Breathing unlabored, CTA bilaterally  Cardiovascular:    Heart: Auscultation:  Regular rate, normal rhythm, no murmurs present  Breasts: Inspection of Breasts:  No lymphadenopathy present., Palpation of Breasts and Axillae:  No masses present on palpation, no breast tenderness., Axillary Lymph Nodes:  No lymphadenopathy present., and No nodularity, asymmetry or nipple discharge bilaterally. Dense FIBROUS tissue IN A LARGE SECTION UPPER OUTER RIGHT BREAST, LEFT as WELL THOUGH SLIGHTLY MORE PROMINENT ON RIGHT  Gastrointestinal:   Abdominal Examination:  Abdomen nontender to palpation, tone normal without rigidity or guarding, no masses present, umbilicus without lesions   Liver and Spleen:  No hepatomegaly present, liver nontender to palpation    Hernias:  No hernias present  Lymphatic: Lymph Nodes:  No other lymphadenopathy present  Skin:  General Inspection:  No rashes present, no lesions present, no areas of  discoloration  Neurologic:    Mental Status:  Oriented X3.  Normal strength and tone, sensory exam                grossly normal, mentation intact and speech normal.    Psychiatric:   Mentation appears normal and affect normal/bright.         Pelvic Exam:  External Genitalia:     Normal appearance for age, no discharge present, no tenderness present, no inflammatory lesions present, color normal  Vagina:     Normal vaginal vault without central or paravaginal defects, no discharge present, no inflammatory lesions " present, no masses present  Bladder:     Nontender to palpation  Urethra:   Urethral Body:  Urethra palpation normal, urethra structural support normal   Urethral Meatus:  No erythema or lesions present  Cervix:     Appearance healthy, no lesions present, nontender to palpation, no bleeding present  Uterus:     Uterus: firm, normal sized and nontender, anteverted in position.   Adnexa:     No adnexal tenderness present, no adnexal masses present  Perineum:     Perineum within normal limits, no evidence of trauma, no rashes or skin lesions present  Anus:     Anus within normal limits, no hemorrhoids present  Inguinal Lymph Nodes:     No lymphadenopathy present  Pubic Hair:     Normal pubic hair distribution for age  Genitalia and Groin:     No rashes present, no lesions present, no areas of discoloration, no masses present    COUNSELING:   Reviewed preventive health counseling, as reflected in patient instructions       Regular exercise       Healthy diet/nutrition       Immunizations  Declined: shingrix due to Conscientious objector           Osteoporosis prevention/bone health       (Ashlee)menopause management    BMI: Body mass index is 23.21 kg/m .      ASSESSMENT:  53 year old female with satisfactory annual exam.    ICD-10-CM    1. Encounter for gynecological examination without abnormal finding  Z01.419       2. Hormone replacement therapy (HRT)  Z79.890 estradiol (VIVELLE-DOT) 0.075 MG/24HR BIW patch     progesterone (PROMETRIUM) 100 MG capsule      3. Vasomotor symptoms due to menopause  N95.1 estradiol (VIVELLE-DOT) 0.075 MG/24HR BIW patch      4. Prediabetes  R73.03 phentermine 30 MG capsule      5. Overweight (BMI 25.0-29.9)  E66.3 phentermine 30 MG capsule      6. Fatty liver  K76.0 phentermine 30 MG capsule      7. Hypertension, essential  I10 spironolactone (ALDACTONE) 50 MG tablet      8. Hair thinning  L65.9 spironolactone (ALDACTONE) 50 MG tablet      9. Lichen sclerosus et atrophicus of the vulva   N90.4 clobetasol (TEMOVATE) 0.05 % external ointment          PLAN:    Pap is UTD for 3 more years.   Cannot follow routine ASCCP guidelines d/t marriage status    Fasting labs and HTN/lipid/pre-DM mgmt deferred to PCP.    Mammo today    Additional health issues addressed at today's visit include:    Discussed completing DEXA w/in the next year now that menopausal for several years.  Can schedule it at the time of her next annual as can do right at time of mammo and patient will plan on this    Reviewed HRT with pros/cons, benefits and overtime risks and patient feels comfortable with those things and feels she has benefit on v.m sx, sleep and overall well being and would like to continue on it  Refills on vivelle 0.075mg and prometrium 100mg at bedtime sent in for the year    L.S flares occasionally and clobetasol works well for it. No current flare but refill sent in on clobetasol     Reviewed her phentermine and despite her HTN the weight loss has helped her other comorbidities and no s.e and feels more energy and appetite suppression on it.  Refills sent on 30mg for 3 months and 1 additional 3 month refill.  Will then need to have a 6 month phone visit check in with me to review.      15 minutes in addition to the routine annual preventative exam,  were spent on direct management of the patient's other medical issues as above as well as chart review including: imaging, lab work, previous visit notes by this provider, and other provider notes, as well as chart completion on the same DOS        Jesise Perdomo MD

## 2024-03-20 ENCOUNTER — MYC MEDICAL ADVICE (OUTPATIENT)
Dept: FAMILY MEDICINE | Facility: CLINIC | Age: 54
End: 2024-03-20
Payer: COMMERCIAL

## 2024-03-20 DIAGNOSIS — I10 ESSENTIAL HYPERTENSION: ICD-10-CM

## 2024-03-20 DIAGNOSIS — K76.0 NON-ALCOHOLIC FATTY LIVER DISEASE: ICD-10-CM

## 2024-03-20 DIAGNOSIS — E78.5 HYPERLIPIDEMIA WITH TARGET LDL LESS THAN 130: Primary | ICD-10-CM

## 2024-03-20 NOTE — TELEPHONE ENCOUNTER
Routing [employer] message to provider.    Patient requesting lab order prior to 4/17/24 appointment so she can get them done at     [facility] prior to visit.    Thank you,    Christine M Klisch, RN

## 2024-04-16 ENCOUNTER — LAB (OUTPATIENT)
Dept: LAB | Facility: CLINIC | Age: 54
End: 2024-04-16
Payer: COMMERCIAL

## 2024-04-16 DIAGNOSIS — I10 ESSENTIAL HYPERTENSION: ICD-10-CM

## 2024-04-16 DIAGNOSIS — K76.0 NON-ALCOHOLIC FATTY LIVER DISEASE: ICD-10-CM

## 2024-04-16 DIAGNOSIS — E78.5 HYPERLIPIDEMIA WITH TARGET LDL LESS THAN 130: ICD-10-CM

## 2024-04-16 LAB
ALBUMIN SERPL BCG-MCNC: 4.5 G/DL (ref 3.5–5.2)
ALP SERPL-CCNC: 40 U/L (ref 40–150)
ALT SERPL W P-5'-P-CCNC: 23 U/L (ref 0–50)
ANION GAP SERPL CALCULATED.3IONS-SCNC: 12 MMOL/L (ref 7–15)
AST SERPL W P-5'-P-CCNC: 22 U/L (ref 0–45)
BILIRUB SERPL-MCNC: 0.3 MG/DL
BUN SERPL-MCNC: 16.1 MG/DL (ref 6–20)
CALCIUM SERPL-MCNC: 9.4 MG/DL (ref 8.6–10)
CHLORIDE SERPL-SCNC: 100 MMOL/L (ref 98–107)
CHOLEST SERPL-MCNC: 224 MG/DL
CREAT SERPL-MCNC: 0.76 MG/DL (ref 0.51–0.95)
DEPRECATED HCO3 PLAS-SCNC: 24 MMOL/L (ref 22–29)
EGFRCR SERPLBLD CKD-EPI 2021: >90 ML/MIN/1.73M2
FASTING STATUS PATIENT QL REPORTED: YES
GLUCOSE SERPL-MCNC: 105 MG/DL (ref 70–99)
HDLC SERPL-MCNC: 67 MG/DL
LDLC SERPL CALC-MCNC: 136 MG/DL
NONHDLC SERPL-MCNC: 157 MG/DL
POTASSIUM SERPL-SCNC: 3.8 MMOL/L (ref 3.4–5.3)
PROT SERPL-MCNC: 7.2 G/DL (ref 6.4–8.3)
SODIUM SERPL-SCNC: 136 MMOL/L (ref 135–145)
TRIGL SERPL-MCNC: 106 MG/DL

## 2024-04-16 PROCEDURE — 80053 COMPREHEN METABOLIC PANEL: CPT

## 2024-04-16 PROCEDURE — 80061 LIPID PANEL: CPT

## 2024-04-16 PROCEDURE — 36415 COLL VENOUS BLD VENIPUNCTURE: CPT

## 2024-04-16 ASSESSMENT — ASTHMA QUESTIONNAIRES
QUESTION_4 LAST FOUR WEEKS HOW OFTEN HAVE YOU USED YOUR RESCUE INHALER OR NEBULIZER MEDICATION (SUCH AS ALBUTEROL): NOT AT ALL
QUESTION_1 LAST FOUR WEEKS HOW MUCH OF THE TIME DID YOUR ASTHMA KEEP YOU FROM GETTING AS MUCH DONE AT WORK, SCHOOL OR AT HOME: NONE OF THE TIME
QUESTION_2 LAST FOUR WEEKS HOW OFTEN HAVE YOU HAD SHORTNESS OF BREATH: NOT AT ALL
QUESTION_3 LAST FOUR WEEKS HOW OFTEN DID YOUR ASTHMA SYMPTOMS (WHEEZING, COUGHING, SHORTNESS OF BREATH, CHEST TIGHTNESS OR PAIN) WAKE YOU UP AT NIGHT OR EARLIER THAN USUAL IN THE MORNING: NOT AT ALL
ACT_TOTALSCORE: 25
QUESTION_5 LAST FOUR WEEKS HOW WOULD YOU RATE YOUR ASTHMA CONTROL: COMPLETELY CONTROLLED
ACT_TOTALSCORE: 25

## 2024-04-17 ENCOUNTER — OFFICE VISIT (OUTPATIENT)
Dept: FAMILY MEDICINE | Facility: CLINIC | Age: 54
End: 2024-04-17
Payer: COMMERCIAL

## 2024-04-17 VITALS
TEMPERATURE: 98.3 F | OXYGEN SATURATION: 100 % | DIASTOLIC BLOOD PRESSURE: 74 MMHG | HEIGHT: 63 IN | HEART RATE: 92 BPM | SYSTOLIC BLOOD PRESSURE: 126 MMHG | RESPIRATION RATE: 12 BRPM | BODY MASS INDEX: 24.1 KG/M2 | WEIGHT: 136 LBS

## 2024-04-17 DIAGNOSIS — K76.0 NON-ALCOHOLIC FATTY LIVER DISEASE: ICD-10-CM

## 2024-04-17 DIAGNOSIS — Z87.891 HISTORY OF SMOKING: ICD-10-CM

## 2024-04-17 DIAGNOSIS — E78.5 HYPERLIPIDEMIA WITH TARGET LDL LESS THAN 130: ICD-10-CM

## 2024-04-17 DIAGNOSIS — I10 HYPERTENSION, ESSENTIAL: Primary | ICD-10-CM

## 2024-04-17 DIAGNOSIS — Z12.2 SCREENING FOR LUNG CANCER: ICD-10-CM

## 2024-04-17 DIAGNOSIS — Z13.6 SCREENING FOR AAA (ABDOMINAL AORTIC ANEURYSM): ICD-10-CM

## 2024-04-17 DIAGNOSIS — R73.01 ELEVATED FASTING GLUCOSE: ICD-10-CM

## 2024-04-17 DIAGNOSIS — R35.0 URINARY FREQUENCY: ICD-10-CM

## 2024-04-17 PROCEDURE — 90471 IMMUNIZATION ADMIN: CPT

## 2024-04-17 PROCEDURE — 90750 HZV VACC RECOMBINANT IM: CPT

## 2024-04-17 PROCEDURE — 99215 OFFICE O/P EST HI 40 MIN: CPT | Mod: 25

## 2024-04-17 RX ORDER — PRAVASTATIN SODIUM 20 MG
20 TABLET ORAL DAILY
Qty: 90 TABLET | Refills: 3 | Status: SHIPPED | OUTPATIENT
Start: 2024-04-17

## 2024-04-17 RX ORDER — AMLODIPINE BESYLATE 5 MG/1
5 TABLET ORAL DAILY
Qty: 90 TABLET | Refills: 3 | Status: SHIPPED | OUTPATIENT
Start: 2024-04-17

## 2024-04-17 RX ORDER — FINASTERIDE 1 MG/1
1 TABLET, FILM COATED ORAL DAILY
COMMUNITY
End: 2024-09-04

## 2024-04-17 RX ORDER — OXYBUTYNIN CHLORIDE 5 MG/1
5 TABLET, EXTENDED RELEASE ORAL DAILY
Qty: 90 TABLET | Refills: 0 | Status: SHIPPED | OUTPATIENT
Start: 2024-04-17 | End: 2024-08-01

## 2024-04-17 RX ORDER — FENOFIBRATE 48 MG/1
TABLET, COATED ORAL
Qty: 180 TABLET | Refills: 3 | Status: SHIPPED | OUTPATIENT
Start: 2024-04-17

## 2024-04-17 ASSESSMENT — PAIN SCALES - GENERAL: PAINLEVEL: NO PAIN (0)

## 2024-04-17 NOTE — NURSING NOTE
HPI    Pt presents for 12 mo dfe/oct OU     States no new ocular complaints. Pt states over the past year she was   diagnosed with breast cancer, had a full mastectomy, and started taking   two medications.     Refresh PRN OU     Last edited by Tika Jha on 1/10/2024  2:23 PM.         A/P    ICD-10-CM ICD-9-CM   1. Posterior vitreous detachment, left eye  H43.812 379.21   2. Vitreomacular adhesion, right eye  H43.821 379.27   3. Age-related nuclear cataract of both eyes  H25.13 366.16   4. Dry eye syndrome of both eyes  H04.123 375.15         1. Posterior vitreous detachment, left eye  2. Vitreomacular adhesion, right eye  F/u for floaters       Pt had breast cancer this past year, had mastectomy, 2nd bout with breast cancer, doing better now       Exam stable, floaters better  Exam notable for VMA OD, PVD OS, no RT/RD OU    Plan: Observation, counseled on RT/RD risk   Pathology of PVD, Retinal Tear, Retinal Detachment reviewed in great detail  RD precautions discussed in detail, patient expressed understanding  RTC immediately PRN (especially ANY change flashes, floaters, vision, visual field)     3. Age-related nuclear cataract of both eyes  Mild NS, NVS  Plan: Observation, update Mrx prn     4. Dry eye syndrome of both eyes  Mild dry eye  Rec ATs prn     RTC 12 mo DFE/OCTm OU       I saw and examined the patient and reviewed in detail the findings documented. The final examination findings, image interpretations, and plan as documented in the record represent my personal judgment and conclusions.    Felice Phillips MD  Vitreoretinal Surgery   [facility]   Prior to immunization administration, verified patients identity using patient s name and date of birth. Please see Immunization Activity for additional information.     Screening Questionnaire for Adult Immunization    Are you sick today?   No   Do you have allergies to medications, food, a vaccine component or latex?   Yes   Have you ever had a serious reaction after receiving a vaccination?   No   Do you have a long-term health problem with heart, lung, kidney, or metabolic disease (e.g., diabetes), asthma, a blood disorder, no spleen, complement component deficiency, a cochlear implant, or a spinal fluid leak?  Are you on long-term aspirin therapy?   No   Do you have cancer, leukemia, HIV/AIDS, or any other immune system problem?   No   Do you have a parent, brother, or sister with an immune system problem?   No   In the past 3 months, have you taken medications that affect  your immune system, such as prednisone, other steroids, or anticancer drugs; drugs for the treatment of rheumatoid arthritis, Crohn s disease, or psoriasis; or have you had radiation treatments?   No   Have you had a seizure, or a brain or other nervous system problem?   No   During the past year, have you received a transfusion of blood or blood    products, or been given immune (gamma) globulin or antiviral drug?   No   For women: Are you pregnant or is there a chance you could become       pregnant during the next month?   No   Have you received any vaccinations in the past 4 weeks?   No     Immunization questionnaire was positive for at least one answer.  Notified Oscar Tinoco CNP.      Patient instructed to remain in clinic for 15 minutes afterwards, and to report any adverse reactions.     Screening performed by Tracy De Leon CMA on 4/17/2024 at 8:05 AM.

## 2024-04-17 NOTE — PATIENT INSTRUCTIONS
Schedule lung cancer screening (CT)  Schedule coronary calcium scan  Schedule abdominal aortic aneurysm    Look into pneumonia vaccine    Follow up if getting palpitations/heart racing.

## 2024-04-17 NOTE — PROGRESS NOTES
Assessment & Plan     Hypertension, essential  Chronic, stable, at goal. Increases risk of AAA complications and ASCVD. I recommend monitoring closely as pt is on phentermine for wt.loss.   - amLODIPine (NORVASC) 5 MG tablet  Dispense: 90 tablet; Refill: 3    Hyperlipidemia with target LDL less than 130  Chronic, stable/unchanged recommend coronary calcium score.    - fenofibrate (TRICOR) 48 MG tablet  Dispense: 180 tablet; Refill: 3  - pravastatin (PRAVACHOL) 20 MG tablet  Dispense: 90 tablet; Refill: 3  - CT Coronary Calcium Scan    Non-alcoholic fatty liver disease  Chronic, stable/unchanged, liver labs normal, weight is stable/improving on phentermine Rx through her OBGYN.    - fenofibrate (TRICOR) 48 MG tablet  Dispense: 180 tablet; Refill: 3  - pravastatin (PRAVACHOL) 20 MG tablet  Dispense: 90 tablet; Refill: 3  - CT Coronary Calcium Scan    Urinary frequency  Chronic, refilled oxybutynin.   - oxyBUTYnin ER (DITROPAN XL) 5 MG 24 hr tablet  Dispense: 90 tablet; Refill: 0    Screening for AAA (abdominal aortic aneurysm)  + family hx, former smoker, HTN, HLD.  - Abdominal Aortic Aneurysm Screening/Tracking  - US Abdominal Aorta Limited  - CT Coronary Calcium Scan    History of smoking  - US Abdominal Aorta Limited  - CT Coronary Calcium Scan  - CT Chest Lung Cancer Scrn Low Dose wo    Elevated fasting glucose  Prior labs, monitoring.   - Hemoglobin A1c    Screening for lung cancer  Borderline meeting criteria, consider if still needed next year.   - CT Chest Lung Cancer Scrn Low Dose wo        There are some possible medication interactions/effects that could be worsening her chronic conditions (HTN and overactive bladder - phentermine).      See Patient Instructions    Oni Duggan is a 53 year old, presenting for the following health issues:  Chronic Disease Management  She recently saw her OBGYN for an annual wellness exam.         4/17/2024     6:50 AM   Additional Questions   Roomed by Tracy PIERRE    Accompanied by self         2024     6:50 AM   Patient Reported Additional Medications   Patient reports taking the following new medications Finasteride      History of Present Illness       Hyperlipidemia:  She presents for follow up of hyperlipidemia.   She is taking medication to lower cholesterol. She is having myalgia or other side effects to statin medications.    Hypertension: She presents for follow up of hypertension.  She does not check blood pressure  regularly outside of the clinic. Outpatient blood pressures have not been over 140/90. She follows a low salt diet.     She eats 0-1 servings of fruits and vegetables daily.She consumes 0 sweetened beverage(s) daily.She exercises with enough effort to increase her heart rate 10 to 19 minutes per day.  She exercises with enough effort to increase her heart rate 3 or less days per week.   She is taking medications regularly.       Asthma Follow-Up  Was ACT completed today?  Yes        2024    11:51 AM   ACT Total Scores   ACT TOTAL SCORE (Goal Greater than or Equal to 20) 25   In the past 12 months, how many times did you visit the emergency room for your asthma without being admitted to the hospital? 0   In the past 12 months, how many times were you hospitalized overnight because of your asthma? 0        How many days per week do you miss taking your asthma controller medication?  0  Please describe any recent triggers for your asthma:  Heat, dust   Have you had any Emergency Room Visits, Urgent Care Visits, or Hospital Admissions since your last office visit?  No  No recent exacerbations.    Lung cancer screening   - smoked for over 20 years, on average 0.5 ppd but sometimes more. 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  - mom  from lung cancer (smoker, but had quit in ).   - last screening done in , lungs clear, no nodules.      Current Outpatient Medications    Medication Instructions    amLODIPine (NORVASC) - BP 5 mg, Oral, DAILY    clobetasol (TEMOVATE) 0.05 % external ointment Topical, 2 TIMES DAILY    desonide (DESOWEN) 0.05 % external ointment     estradiol (VIVELLE-DOT) 0.075 MG/24HR BIW patch 1 patch, Transdermal, TWICE WEEKLY    fenofibrate (TRICOR) 48 MG tablet TAKE TWO TABLETS BY MOUTH ONCE DAILY    finasteride (PROPECIA) - hair loss 1 mg, Oral, DAILY    metFORMIN (GLUCOPHAGE XR) 500 MG 24 hr tablet TAKE ONE TABLET BY MOUTH WITH MEALS AND INCREASE EVERY WEEK, AS TOLERATED, BY 500MG TO A MAX OF 4 TABLETS (2000MG) PER DAY OR 2 TABLETS (1000 MG) TWO TIMES A DAY.    oxyBUTYnin ER (DITROPAN XL) 5 mg, Oral, DAILY    Phentermine - taking about 1 year. 30 mg, Oral, EVERY MORNING    pravastatin (PRAVACHOL) 20 mg, Oral, DAILY    progesterone (PROMETRIUM) 100 MG capsule TAKE ONE CAPSULE BY MOUTH ONCE DAILY    spironolactone (ALDACTONE) - BP and hair loss, Rx from OB. 50 mg, Oral, 2 TIMES DAILY        The 10-year ASCVD risk score (Kathy DK, et al., 2019) is: 2.4%    Values used to calculate the score:      Age: 53 years      Sex: Female      Is Non- : No      Diabetic: No      Tobacco smoker: No      Systolic Blood Pressure: 126 mmHg      Is BP treated: Yes      HDL Cholesterol: 67 mg/dL      Total Cholesterol: 224 mg/dL      Wt Readings from Last 5 Encounters:   04/17/24 61.7 kg (136 lb)   03/01/24 59 kg (130 lb)   10/04/23 58.8 kg (129 lb 11.2 oz)   07/06/23 59.9 kg (132 lb)   05/22/23 63.9 kg (140 lb 12.8 oz)        Chronic fatty liver disease  Lost weight - taking phentermine      PAST MEDICAL HISTORY:   Past Medical History:   Diagnosis Date    Anemia 09/2018    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 Provider to review and confirm.    NAFLD (nonalcoholic fatty liver disease)     Obesity     Prediabetes 2022    Scoliosis     Uncomplicated asthma     Vasomotor symptoms due to menopause 2022       PAST SURGICAL HISTORY:   Past Surgical History:   Procedure Laterality Date    COLONOSCOPY N/A 2017    Procedure: COLONOSCOPY;  Surgeon: Tato Child MD;  Location: [facility] VASCULAR SURGERY PROCEDURE UNLIST      vericose vein       FAMILY HISTORY:   Family History   Problem Relation Age of Onset    Hypertension Mother     Lung Cancer Mother     Hypertension Father     Hyperlipidemia Father     Aortic aneurysm Father         iliac    Abdominal Aortic Aneurysm Father         cause of death    Coronary Artery Disease Father         stents    No Known Problems Sister     No Known Problems Brother     Hypertension Maternal Grandmother     No Known Problems Maternal Grandfather     No Known Problems Paternal Grandmother     No Known Problems Son     Breast Cancer Paternal Aunt     Blood Disease Paternal Uncle     Other Cancer Paternal Half-Brother         leukemia    Breast Cancer Paternal Half-Sister     Breast Cancer Cousin     No Known Problems Other     Liver Disease No family hx of        SOCIAL HISTORY:   Social History     Tobacco Use    Smoking status: Former     Current packs/day: 0.00     Average packs/day: 1 pack/day for 20.0 years (20.0 ttl pk-yrs)     Types: Cigarettes     Start date: 1992     Quit date: 2012     Years since quittin.3     Passive exposure: Never    Smokeless tobacco: Never   Substance Use Topics    Alcohol use: Yes     Alcohol/week: 0.0 standard drinks of alcohol     Comment: more on weekends 8 drinks per week or wine or mixed drinks          Review of Systems  Constitutional, HEENT, cardiovascular,  "pulmonary, gi and gu systems are negative, except as otherwise noted.      Objective    /74 (BP Location: Left arm, Patient Position: Sitting, Cuff Size: Adult Regular)   Pulse 92   Temp 98.3  F (36.8  C) (Oral)   Resp 12   Ht 1.6 m (5' 3\")   Wt 61.7 kg (136 lb)   LMP  (LMP Unknown)   SpO2 100%   BMI 24.09 kg/m    Body mass index is 24.09 kg/m .  Physical Exam   GENERAL: alert and no distress  NECK: no adenopathy, no asymmetry, masses, or scars  RESP: lungs clear to auscultation - no rales, rhonchi or wheezes  CV: regular rate and rhythm, normal S1 S2, no S3 or S4, no murmur, click or rub, no peripheral edema  ABDOMEN: soft, nontender, no hepatosplenomegaly, no masses and bowel sounds normal  MS: no gross musculoskeletal defects noted, no edema    Lab on 04/16/2024   Component Date Value Ref Range Status    Cholesterol 04/16/2024 224 (H)  <200 mg/dL Final    Triglycerides 04/16/2024 106  <150 mg/dL Final    Direct Measure HDL 04/16/2024 67  >=50 mg/dL Final    LDL Cholesterol Calculated 04/16/2024 136 (H)  <=100 mg/dL Final    Non HDL Cholesterol 04/16/2024 157 (H)  <130 mg/dL Final    Patient Fasting > 8hrs? 04/16/2024 Yes   Final    Sodium 04/16/2024 136  135 - 145 mmol/L Final    Reference intervals for this test were updated on 09/26/2023 to more accurately reflect our healthy population. There may be differences in the flagging of prior results with similar values performed with this method. Interpretation of those prior results can be made in the context of the updated reference intervals.     Potassium 04/16/2024 3.8  3.4 - 5.3 mmol/L Final    Carbon Dioxide (CO2) 04/16/2024 24  22 - 29 mmol/L Final    Anion Gap 04/16/2024 12  7 - 15 mmol/L Final    Urea Nitrogen 04/16/2024 16.1  6.0 - 20.0 mg/dL Final    Creatinine 04/16/2024 0.76  0.51 - 0.95 mg/dL Final    GFR Estimate 04/16/2024 >90  >60 mL/min/1.73m2 Final    Calcium 04/16/2024 9.4  8.6 - 10.0 mg/dL Final    Chloride 04/16/2024 100  98 - " 107 mmol/L Final    Glucose 04/16/2024 105 (H)  70 - 99 mg/dL Final    Alkaline Phosphatase 04/16/2024 40  40 - 150 U/L Final    Reference intervals for this test were updated on 11/14/2023 to more accurately reflect our healthy population. There may be differences in the flagging of prior results with similar values performed with this method. Interpretation of those prior results can be made in the context of the updated reference intervals.    AST 04/16/2024 22  0 - 45 U/L Final    Reference intervals for this test were updated on 6/12/2023 to more accurately reflect our healthy population. There may be differences in the flagging of prior results with similar values performed with this method. Interpretation of those prior results can be made in the context of the updated reference intervals.    ALT 04/16/2024 23  0 - 50 U/L Final    Reference intervals for this test were updated on 6/12/2023 to more accurately reflect our healthy population. There may be differences in the flagging of prior results with similar values performed with this method. Interpretation of those prior results can be made in the context of the updated reference intervals.      Protein Total 04/16/2024 7.2  6.4 - 8.3 g/dL Final    Albumin 04/16/2024 4.5  3.5 - 5.2 g/dL Final    Bilirubin Total 04/16/2024 0.3  <=1.2 mg/dL Final             The longitudinal plan of care for the diagnosis(es)/condition(s) as documented were addressed during this visit. Due to the added complexity in care, I will continue to support Olga Lidia in the subsequent management and with ongoing continuity of care.     40 minutes spent on the date of the encounter doing chart review, history and exam, documentation and further activities as noted above    Signed Electronically by: TONYA Osorio CNP

## 2024-04-19 ENCOUNTER — LAB (OUTPATIENT)
Dept: LAB | Facility: CLINIC | Age: 54
End: 2024-04-19
Payer: COMMERCIAL

## 2024-04-19 DIAGNOSIS — R73.01 ELEVATED FASTING GLUCOSE: ICD-10-CM

## 2024-04-19 LAB — HBA1C MFR BLD: 5.5 %

## 2024-04-19 PROCEDURE — 83036 HEMOGLOBIN GLYCOSYLATED A1C: CPT

## 2024-04-19 PROCEDURE — 36415 COLL VENOUS BLD VENIPUNCTURE: CPT

## 2024-04-23 ENCOUNTER — ANCILLARY PROCEDURE (OUTPATIENT)
Dept: ULTRASOUND IMAGING | Facility: CLINIC | Age: 54
End: 2024-04-23
Payer: COMMERCIAL

## 2024-04-23 DIAGNOSIS — Z13.6 SCREENING FOR AAA (ABDOMINAL AORTIC ANEURYSM): ICD-10-CM

## 2024-04-23 DIAGNOSIS — Z87.891 HISTORY OF SMOKING: ICD-10-CM

## 2024-04-23 PROCEDURE — 76775 US EXAM ABDO BACK WALL LIM: CPT

## 2024-04-29 ENCOUNTER — MYC MEDICAL ADVICE (OUTPATIENT)
Dept: FAMILY MEDICINE | Facility: CLINIC | Age: 54
End: 2024-04-29
Payer: COMMERCIAL

## 2024-04-29 NOTE — TELEPHONE ENCOUNTER
Routing [employer] message to PCP      Looks like a CT Coronary Calcium and US screening for AAA were done.      Pt is wondering about scheduling for Lung cancer screening    Roxy RN    Triage Nurse  [facility]

## 2024-05-08 ENCOUNTER — HOSPITAL ENCOUNTER (OUTPATIENT)
Dept: CT IMAGING | Facility: CLINIC | Age: 54
Discharge: HOME | End: 2024-05-08
Payer: COMMERCIAL

## 2024-05-08 DIAGNOSIS — Z87.891 HISTORY OF SMOKING: ICD-10-CM

## 2024-05-08 DIAGNOSIS — K76.0 NON-ALCOHOLIC FATTY LIVER DISEASE: ICD-10-CM

## 2024-05-08 DIAGNOSIS — Z13.6 SCREENING FOR AAA (ABDOMINAL AORTIC ANEURYSM): ICD-10-CM

## 2024-05-08 DIAGNOSIS — E78.5 HYPERLIPIDEMIA WITH TARGET LDL LESS THAN 130: ICD-10-CM

## 2024-05-08 DIAGNOSIS — Z12.2 SCREENING FOR LUNG CANCER: ICD-10-CM

## 2024-05-08 PROCEDURE — 75571 CT HRT W/O DYE W/CA TEST: CPT | Mod: 26 | Performed by: INTERNAL MEDICINE

## 2024-05-08 PROCEDURE — 71271 CT THORAX LUNG CANCER SCR C-: CPT | Mod: 26 | Performed by: RADIOLOGY

## 2024-05-08 PROCEDURE — 71271 CT THORAX LUNG CANCER SCR C-: CPT

## 2024-05-08 PROCEDURE — 75571 CT HRT W/O DYE W/CA TEST: CPT

## 2024-05-09 ENCOUNTER — MYC REFILL (OUTPATIENT)
Dept: OBGYN | Facility: CLINIC | Age: 54
End: 2024-05-09
Payer: COMMERCIAL

## 2024-05-09 DIAGNOSIS — N90.4 LICHEN SCLEROSUS ET ATROPHICUS OF THE VULVA: ICD-10-CM

## 2024-05-09 RX ORDER — CLOBETASOL PROPIONATE 0.5 MG/G
OINTMENT TOPICAL 2 TIMES DAILY
Qty: 60 G | Refills: 0 | Status: SHIPPED | OUTPATIENT
Start: 2024-05-09

## 2024-05-09 NOTE — TELEPHONE ENCOUNTER
Requested Prescriptions   Pending Prescriptions Disp Refills    clobetasol (TEMOVATE) 0.05 % external ointment 60 g 0     Sig: Apply topically 2 times daily       There is no refill protocol information for this order        Last Written Prescription Date:  3/1/24  Last Fill Quantity: 60g,  # refills: 0   Last office visit: 3/1/2024 ; last virtual visit: 10/4/2023 with prescribing provider:  Dr Perdomo   Future Office Visit:      Rx sent    Siomara Guerrero RN on 5/9/2024 at 1:13 PM

## 2024-05-28 ENCOUNTER — MYC REFILL (OUTPATIENT)
Dept: OBGYN | Facility: CLINIC | Age: 54
End: 2024-05-28
Payer: COMMERCIAL

## 2024-05-28 DIAGNOSIS — L65.9 HAIR THINNING: ICD-10-CM

## 2024-05-28 DIAGNOSIS — I10 HYPERTENSION, ESSENTIAL: ICD-10-CM

## 2024-05-28 DIAGNOSIS — N95.1 VASOMOTOR SYMPTOMS DUE TO MENOPAUSE: ICD-10-CM

## 2024-05-28 DIAGNOSIS — Z79.890 HORMONE REPLACEMENT THERAPY (HRT): ICD-10-CM

## 2024-05-28 RX ORDER — ESTRADIOL 0.07 MG/D
1 FILM, EXTENDED RELEASE TRANSDERMAL
Qty: 24 PATCH | Refills: 4 | OUTPATIENT
Start: 2024-05-30

## 2024-05-28 RX ORDER — SPIRONOLACTONE 50 MG/1
50 TABLET, FILM COATED ORAL 2 TIMES DAILY
Qty: 180 TABLET | Refills: 4 | OUTPATIENT
Start: 2024-05-28

## 2024-05-28 NOTE — TELEPHONE ENCOUNTER
Requested Prescriptions   Pending Prescriptions Disp Refills    spironolactone (ALDACTONE) 50 MG tablet 180 tablet 4     Sig: Take 1 tablet (50 mg) by mouth 2 times daily       Diuretics (Including Combos) Protocol Passed - 5/28/2024  4:19 PM        Passed - Blood pressure under 140/90 in past 12 months     BP Readings from Last 3 Encounters:   04/17/24 126/74   03/01/24 130/88   07/06/23 108/68       No data recorded            Passed - Medication is active on med list        Passed - Has GFR on file in past 12 months and most recent value is normal        Passed - Medication indicated for associated diagnosis     Medication is associated with one or more of the following diagnoses:     Hypertension   Heart Failure   Hyperaldosteronism   Acne   Hirsutism   Hypokalemia   Hepatic Cirrhosis   Nephrotic Syndrome   Myocardial Infarction   Transgender Female          Passed - Recent (12 mo) or future (90 days) visit within the authorizing provider's specialty     The patient must have completed an in-person or virtual visit within the past 12 months or has a future visit scheduled within the next 90 days with the authorizing provider s specialty.  Urgent care and e-visits do not quality as an office visit for this protocol.          Passed - Patient is age 18 or older        Passed - No active pregancy on record        Passed - No positive pregnancy test in past 12 months           Refills available   Alicia Pardo RN on 5/28/2024 at 4:23 PM

## 2024-05-28 NOTE — TELEPHONE ENCOUNTER
Requested Prescriptions   Pending Prescriptions Disp Refills    estradiol (VIVELLE-DOT) 0.075 MG/24HR BIW patch 24 patch 4     Sig: Place 1 patch onto the skin twice a week       Contraceptives Protocol Failed - 5/28/2024  4:20 PM        Failed - Medication indicated for associated diagnosis     Medication is associated with one or more of the following diagnoses:  Contraception  Acne  Dysmenorrhea  Menorrhagia  Amenorrhea  PCOS  Premenstrual Dysphoric Disorder          Passed - Patient is not a current smoker if age is 35 or older        Passed - Recent (12 mo) or future (30 days) visit within the authorizing provider's specialty     The patient must have completed an in-person or virtual visit within the past 12 months or has a future visit scheduled within the next 90 days with the authorizing provider s specialty.  Urgent care and e-visits do not quality as an office visit for this protocol.          Passed - Medication is active on med list        Passed - No active pregnancy on record        Passed - No positive pregnancy test in past 12 months       Hormone Replacement Therapy Failed - 5/28/2024  4:20 PM        Failed - Medication indicated for associated diagnosis     The medication is prescribed for one or more of the following conditions:    Menopause   Vulva/Vaginal atrophy   Low Estrogen   Gender Dysphoria   Male to Female transgender          Passed - Blood pressure under 140/90 in past 12 months     BP Readings from Last 3 Encounters:   04/17/24 126/74   03/01/24 130/88   07/06/23 108/68       No data recorded            Passed - Medication is active on med list        Passed - Recent (12 mo) or future (90 days) visit within the authorizing provider's specialty     The patient must have completed an in-person or virtual visit within the past 12 months or has a future visit scheduled within the next 90 days with the authorizing provider s specialty.  Urgent care and e-visits do not quality as an office  visit for this protocol.          Passed - Patient is 18 years of age or older        Passed - No active pregnancy on record        Passed - No positive pregnancy test on record in past 12 months       Hormone Replacement Therapy (vaginal) Failed - 5/28/2024  4:20 PM        Failed - Medication indicated for associated diagnosis     Medication is associated with one or more of the following diagnoses:     Menopause   Vulva/Vaginal atrophy   UTI prophylaxis          Passed - Medication is active on med list        Passed - Recent (12 mo) or future (90 days) visit within the authorizing provider's specialty     The patient must have completed an in-person or virtual visit within the past 12 months or has a future visit scheduled within the next 90 days with the authorizing provider s specialty.  Urgent care and e-visits do not quality as an office visit for this protocol.          Passed - Patient is 18 years of age or older        Passed - No active pregnancy on record        Passed - No positive pregnancy test on record in past 12 months           Refills available  Alicia Pardo RN on 5/28/2024 at 4:22 PM

## 2024-05-29 DIAGNOSIS — Z79.890 HORMONE REPLACEMENT THERAPY (HRT): ICD-10-CM

## 2024-05-29 DIAGNOSIS — N95.1 VASOMOTOR SYMPTOMS DUE TO MENOPAUSE: ICD-10-CM

## 2024-05-30 RX ORDER — ESTRADIOL 0.07 MG/D
1 FILM, EXTENDED RELEASE TRANSDERMAL
Qty: 24 PATCH | Refills: 0 | Status: SHIPPED | OUTPATIENT
Start: 2024-05-30 | End: 2024-09-04

## 2024-05-30 NOTE — TELEPHONE ENCOUNTER
Requested Prescriptions   Pending Prescriptions Disp Refills    estradiol (VIVELLE-DOT) 0.075 MG/24HR BIW patch [Pharmacy Med Name: ESTRADIOL 0.075MG/24HR PTTW] 24 patch 3     Sig: PLACE 1 PATCH ONTO THE SKIN TWICE A WEEK       Contraceptives Protocol Failed - 5/29/2024  3:29 PM        Failed - Medication indicated for associated diagnosis     Medication is associated with one or more of the following diagnoses:  Contraception  Acne  Dysmenorrhea  Menorrhagia  Amenorrhea  PCOS  Premenstrual Dysphoric Disorder          Passed - Patient is not a current smoker if age is 35 or older        Passed - Recent (12 mo) or future (30 days) visit within the authorizing provider's specialty     The patient must have completed an in-person or virtual visit within the past 12 months or has a future visit scheduled within the next 90 days with the authorizing provider s specialty.  Urgent care and e-visits do not quality as an office visit for this protocol.          Passed - Medication is active on med list        Passed - No active pregnancy on record        Passed - No positive pregnancy test in past 12 months       Hormone Replacement Therapy Failed - 5/29/2024  3:29 PM        Failed - Medication indicated for associated diagnosis     The medication is prescribed for one or more of the following conditions:    Menopause   Vulva/Vaginal atrophy   Low Estrogen   Gender Dysphoria   Male to Female transgender          Passed - Blood pressure under 140/90 in past 12 months     BP Readings from Last 3 Encounters:   04/17/24 126/74   03/01/24 130/88   07/06/23 108/68       No data recorded            Passed - Medication is active on med list        Passed - Recent (12 mo) or future (90 days) visit within the authorizing provider's specialty     The patient must have completed an in-person or virtual visit within the past 12 months or has a future visit scheduled within the next 90 days with the authorizing provider s specialty.   Urgent care and e-visits do not quality as an office visit for this protocol.          Passed - Patient is 18 years of age or older        Passed - No active pregnancy on record        Passed - No positive pregnancy test on record in past 12 months       Hormone Replacement Therapy (vaginal) Failed - 5/29/2024  3:29 PM        Failed - Medication indicated for associated diagnosis     Medication is associated with one or more of the following diagnoses:     Menopause   Vulva/Vaginal atrophy   UTI prophylaxis          Passed - Medication is active on med list        Passed - Recent (12 mo) or future (90 days) visit within the authorizing provider's specialty     The patient must have completed an in-person or virtual visit within the past 12 months or has a future visit scheduled within the next 90 days with the authorizing provider s specialty.  Urgent care and e-visits do not quality as an office visit for this protocol.          Passed - Patient is 18 years of age or older        Passed - No active pregnancy on record        Passed - No positive pregnancy test on record in past 12 months           Last Written Prescription Date:  3/4/24  Last Fill Quantity: 24,  # refills: 4   Last office visit: 3/1/2024 ; last virtual visit: 10/4/2023 with prescribing provider:  Leanne   Future Office Visit:  9/4/24 with Dr. Perdomo    Medication is being filled for 1 time refill only due to:  Patient needs to be seen because it has been more than one year since last visit.  Appointment scheduled.  Prachi Key RN on 5/30/2024 at 6:32 AM

## 2024-07-02 ENCOUNTER — ALLIED HEALTH/NURSE VISIT (OUTPATIENT)
Dept: FAMILY MEDICINE | Facility: CLINIC | Age: 54
End: 2024-07-02
Payer: COMMERCIAL

## 2024-07-02 DIAGNOSIS — Z23 NEED FOR VACCINATION: Primary | ICD-10-CM

## 2024-07-02 PROCEDURE — 90471 IMMUNIZATION ADMIN: CPT

## 2024-07-02 PROCEDURE — 99207 PR NO CHARGE NURSE ONLY: CPT

## 2024-07-02 PROCEDURE — 90750 HZV VACC RECOMBINANT IM: CPT

## 2024-07-29 DIAGNOSIS — E66.3 OVERWEIGHT (BMI 25.0-29.9): ICD-10-CM

## 2024-07-29 DIAGNOSIS — R35.0 URINARY FREQUENCY: ICD-10-CM

## 2024-07-29 DIAGNOSIS — K76.0 FATTY LIVER: ICD-10-CM

## 2024-07-29 DIAGNOSIS — R73.03 PREDIABETES: ICD-10-CM

## 2024-07-29 RX ORDER — PHENTERMINE HYDROCHLORIDE 30 MG/1
30 CAPSULE ORAL EVERY MORNING
Qty: 60 CAPSULE | Refills: 0 | Status: SHIPPED | OUTPATIENT
Start: 2024-07-29 | End: 2024-09-04

## 2024-07-29 NOTE — TELEPHONE ENCOUNTER
Requested Prescriptions   Pending Prescriptions Disp Refills    phentermine 30 MG capsule [Pharmacy Med Name: PHENTERMINE HCL 30MG CAPS] 90 capsule 1     Sig: TAKE 1 CAPSULE (30 MG) BY MOUTH EVERY MORNING       There is no refill protocol information for this order        Last Written Prescription Date:  3/1/24  Last Fill Quantity: 90,  # refills: 1   Last office visit: 3/1/2024 ; last virtual visit: 10/4/2023 with prescribing provider:  Dr Perdomo   Future Office Visit:    9/4/2024 with Dr Perdomo    RN called pharmacy to check when last  and if refill necessary prior to scheduled apt:  90 day picked up 6/4/2024 - only enough until 9/2/24 per pharmacy    Pt did not answer call and no message left.    Routing to provide - willing to send until seen 9/4/24 as scheduled?    Siomara Guerrero, RN on 7/29/2024 at 2:53 PM

## 2024-08-01 RX ORDER — OXYBUTYNIN CHLORIDE 5 MG/1
5 TABLET, EXTENDED RELEASE ORAL DAILY
Qty: 90 TABLET | Refills: 0 | Status: SHIPPED | OUTPATIENT
Start: 2024-08-01

## 2024-09-03 ENCOUNTER — ANCILLARY PROCEDURE (OUTPATIENT)
Dept: BONE DENSITY | Facility: CLINIC | Age: 54
End: 2024-09-03
Attending: OBSTETRICS & GYNECOLOGY
Payer: COMMERCIAL

## 2024-09-03 DIAGNOSIS — Z78.0 POSTMENOPAUSAL STATUS: ICD-10-CM

## 2024-09-03 DIAGNOSIS — Z13.820 SCREENING FOR OSTEOPOROSIS: ICD-10-CM

## 2024-09-03 PROCEDURE — 77080 DXA BONE DENSITY AXIAL: CPT | Performed by: OBSTETRICS & GYNECOLOGY

## 2024-09-04 ENCOUNTER — OFFICE VISIT (OUTPATIENT)
Dept: OBGYN | Facility: CLINIC | Age: 54
End: 2024-09-04
Payer: COMMERCIAL

## 2024-09-04 VITALS
HEIGHT: 63 IN | BODY MASS INDEX: 23.6 KG/M2 | DIASTOLIC BLOOD PRESSURE: 80 MMHG | SYSTOLIC BLOOD PRESSURE: 130 MMHG | WEIGHT: 133.2 LBS

## 2024-09-04 DIAGNOSIS — M85.851 OSTEOPENIA OF FEMORAL NECK, BILATERAL: Primary | ICD-10-CM

## 2024-09-04 DIAGNOSIS — R73.03 PREDIABETES: ICD-10-CM

## 2024-09-04 DIAGNOSIS — N95.1 VASOMOTOR SYMPTOMS DUE TO MENOPAUSE: ICD-10-CM

## 2024-09-04 DIAGNOSIS — K76.0 FATTY LIVER: ICD-10-CM

## 2024-09-04 DIAGNOSIS — Z79.890 HORMONE REPLACEMENT THERAPY (HRT): ICD-10-CM

## 2024-09-04 DIAGNOSIS — M41.27 OTHER IDIOPATHIC SCOLIOSIS, LUMBOSACRAL REGION: ICD-10-CM

## 2024-09-04 DIAGNOSIS — E66.3 OVERWEIGHT (BMI 25.0-29.9): ICD-10-CM

## 2024-09-04 DIAGNOSIS — M85.852 OSTEOPENIA OF FEMORAL NECK, BILATERAL: Primary | ICD-10-CM

## 2024-09-04 PROCEDURE — 99214 OFFICE O/P EST MOD 30 MIN: CPT | Performed by: OBSTETRICS & GYNECOLOGY

## 2024-09-04 RX ORDER — FINASTERIDE 5 MG/1
TABLET, FILM COATED ORAL
COMMUNITY
Start: 2024-07-29

## 2024-09-04 NOTE — PROGRESS NOTES
SUBJECTIVE:                                                   Olga Lidia Solorzano is a 54 year old female who presents to clinic today for the following health issue(s):  Patient presents with:  Follow Up      Additional information: dexa and phentermine     HPI:  Patient presents for a Dexa and phentermine f/up.    She has been on her phentermine since  when started on 15mg and bumped up to 30mg one month later.  BMI was never more than 25-26 but had pre-DM, high lipids and trigs, and fatty liver. Was eating very healthy and exercising and couldn't lose any weight and with this she lost a total of about 23#. 10/23 was at 129# and BMI was 22 and today is holding stable at 133#.    Saw me for annual in march and reviewed med and doing well on it and helping her to maintain and keep her other comorbidities under control she is in person for this med f/up today b/c also had her first dexa and reviewing it as well.  Did not actually receive the dexa prior to our visit so contacted her after the visit with it and reviewed it.    Patient has a normal lumbar spine but has scoliosis which is very likely causing false elevations of the T scores. Her left fem neck was -2.1 and her right was -1.4 however. This goes along with her scoliosis and gait and way she bears weight as well. She reported that she is experiencing left hip pain and has for awhile w/o significant change but by description is more bursitis/tendonitis/arthritis type pain.    She is active and walks and exercises.  She is on HRT since   She is on 50mg BID aldactone but no oral or chronic steroids  Does not have any fhx of osteoporosis or any personal h.o easy fractures.      No LMP recorded (lmp unknown). Patient is postmenopausal..     Patient is sexually active, .  Using menopause for contraception.    reports that she quit smoking about 11 years ago. Her smoking use included cigarettes. She started smoking about 31 years ago. She has a 20  pack-year smoking history. She has never been exposed to tobacco smoke. She has never used smokeless tobacco.    STD testing offered?  Declined    Health maintenance updated:  yes    Today's PHQ-2 Score:       3/1/2024     2:57 PM   PHQ-2 (  [employer])   Q1: Little interest or pleasure in doing things 0   Q2: Feeling down, depressed or hopeless 0   PHQ-2 Score 0     Today's PHQ-9 Score:       3/1/2024     2:57 PM   PHQ-9 SCORE   PHQ-9 Total Score 2     Today's TIMO-7 Score:       3/1/2024     2:57 PM   TIMO-7 SCORE   Total Score 1       Problem list and histories reviewed & adjusted, as indicated.  Additional history: as documented.    Patient Active Problem List   Diagnosis    Other idiopathic scoliosis, lumbosacral region    Hypertension, essential    Mixed hyperlipidemia    Herniated vertebral disc    Mild persistent asthma    Bilateral low back pain without sciatica    Hip pain, right    Partial nontraumatic tear of right rotator cuff    DJD of right AC (acromioclavicular) joint    Fatty liver    Telogen hair loss    Vasomotor symptoms due to menopause    Hormone replacement therapy (HRT)    Prediabetes    Lichen sclerosus et atrophicus of the vulva    Pain in finger of both hands    Chronic pain in left shoulder    Adhesive capsulitis of left shoulder    Osteopenia of femoral neck, bilateral     Past Surgical History:   Procedure Laterality Date    COLONOSCOPY N/A 2017    Procedure: COLONOSCOPY;  Surgeon: Tato Child MD;  Location: [facility] VASCULAR SURGERY PROCEDURE UNLIST      vericose vein      Social History     Tobacco Use    Smoking status: Former     Current packs/day: 0.00     Average packs/day: 1 pack/day for 20.0 years (20.0 ttl pk-yrs)     Types: Cigarettes     Start date: 1992     Quit date: 2012     Years since quittin.7     Passive exposure: Never    Smokeless tobacco: Never   Substance Use Topics    Alcohol use: Yes     Alcohol/week: 0.0 standard drinks of alcohol      Comment: more on weekends 8 drinks per week or wine or mixed drinks       Problem (# of Occurrences) Relation (Name,Age of Onset)    Blood Disease (1) Paternal Uncle    Hypertension (3) Mother (wally), Father (emily), Maternal Grandmother (meka)    Breast Cancer (3) Paternal Aunt, Paternal Half-Sister (aunt), Cousin (ariela)    Aortic aneurysm (1) Father (emily): iliac    Other Cancer (1) Paternal Half-Brother (uncle): leukemia    Hyperlipidemia (1) Father (emily)    Coronary Artery Disease (1) Father (emily): stents    Abdominal Aortic Aneurysm (1) Father (emily): cause of death    Lung Cancer (1) Mother (wally)    No Known Problems (6) Sister, Brother, Maternal Grandfather, Paternal Grandmother, Son, Other           Negative family history of: Liver Disease              Current Outpatient Medications   Medication Sig Dispense Refill    amLODIPine (NORVASC) 5 MG tablet Take 1 tablet (5 mg) by mouth daily 90 tablet 3    clobetasol (TEMOVATE) 0.05 % external ointment Apply topically 2 times daily 60 g 0    desonide (DESOWEN) 0.05 % external ointment       estradiol (VIVELLE-DOT) 0.075 MG/24HR BIW patch Place 1 patch onto the skin twice a week. 24 patch 2    fenofibrate (TRICOR) 48 MG tablet TAKE TWO TABLETS BY MOUTH ONCE DAILY 180 tablet 3    finasteride (PROSCAR) 5 MG tablet       metFORMIN (GLUCOPHAGE XR) 500 MG 24 hr tablet TAKE ONE TABLET BY MOUTH WITH MEALS AND INCREASE EVERY WEEK, AS TOLERATED, BY 500MG TO A MAX OF 4 TABLETS (2000MG) PER DAY OR 2 TABLETS (1000 MG) TWO TIMES A DAY. 270 tablet 3    oxyBUTYnin ER (DITROPAN XL) 5 MG 24 hr tablet TAKE ONE TABLET BY MOUTH ONCE DAILY 90 tablet 0    phentermine 30 MG capsule Take 1 capsule (30 mg) by mouth every morning. 90 capsule 1    pravastatin (PRAVACHOL) 20 MG tablet Take 1 tablet (20 mg) by mouth daily 90 tablet 3    progesterone (PROMETRIUM) 100 MG capsule TAKE ONE CAPSULE BY MOUTH ONCE DAILY 90 capsule 4    spironolactone (ALDACTONE) 50 MG tablet Take 1  "tablet (50 mg) by mouth 2 times daily 180 tablet 4     No current facility-administered medications for this visit.     Allergies   Allergen Reactions    Niacin Shortness Of Breath, Other (See Comments) and Nausea and Vomiting     All over body pains (patient reported symptoms)     OBJECTIVE:     /80   Ht 1.6 m (5' 3\")   Wt 60.4 kg (133 lb 3.2 oz)   LMP  (LMP Unknown)   Breastfeeding No   BMI 23.60 kg/m    Body mass index is 23.6 kg/m .    Exam:  Constitutional:  Appearance: Well nourished, well developed alert, in no acute distress  CV:RRR, no murmurs,  Lungs:CTA biaterally  Neurologic:  Mental Status:  Oriented X3.  Normal strength and tone, sensory exam grossly normal, mentation intact and speech normal.    Psychiatric:  Mentation appears normal and affect normal/bright.   Cardiology: No cardiomegaly or thrills; regular rate and rhythm, no murmur or gallop    In-Clinic Test Results:  No results found for this or any previous visit (from the past 24 hour(s)).    ASSESSMENT/PLAN:                                                        ICD-10-CM    1. Osteopenia of femoral neck, bilateral  M85.851     M85.852       2. Overweight (BMI 25.0-29.9)  E66.3 phentermine 30 MG capsule      3. Prediabetes  R73.03 phentermine 30 MG capsule      4. Fatty liver  K76.0 phentermine 30 MG capsule      5. Hormone replacement therapy (HRT)  Z79.890 estradiol (VIVELLE-DOT) 0.075 MG/24HR BIW patch      6. Vasomotor symptoms due to menopause  N95.1 estradiol (VIVELLE-DOT) 0.075 MG/24HR BIW patch      7. Other idiopathic scoliosis, lumbosacral region  M41.27           Reviewed her osteopenia that is mostly just In her left femoral neck and disproportionately so b/c of scoliosis and her gait/weight bearing b/c of it.  Her FRAX is 8% and 1% and based on her age and activity level do not feel she needs to start medication at this time.    Additional calcium supplementation is not recommended, and has not been shown to impact bone " health,  and can be achieved just through dietary sources.  Weight bearing exercise is the mainstay of recommendations for osteopenia/osteoporosis treatment and actually shown to help clinically with improved and maintained bone density. This would include walking, running, stairstep, aerobics, lifting free weights but while standing up not sitting down on a bench.    Discussed Vitamin D supplements and though they do not show improved bone density with use, it is typically recommened to do  2000 IU of vit d everyday regardless given other benefits and limited sun exposure time for her.    Reviewed other treatment options like bisphophonates ie)Fosamax, actonel, Boniva,  Prolia, Reclast, and Forteo with the latter two being done only through Rheumatology or endocrinology as we do not do them here and prolia only for osteoporosis.  Also discussed SERMs and HRT and she is already on the latter.    At this point she is going to focus on other lifestyle focused changes and we will plan to repeat dexa in 2 yrs but add forearm since lumbar spine is too impacted by scoliosis to be useful    Reviewed her phentermine and on it for a long time wihout any s.e and still good effect and no issues.  It is helping to maintain her 20# weight loss which in setting of lipids/high trigs/pre-DM and fatty liver is important    Refills sent on phent #90 with 1 RF to get her through until her next annual exam.    30  minutes  was spent on direct management of the patient's other medical issue(s) as above, including chart review and chart completion, on the same DOS        Jessie Perdomo MD  [facility] FOR [facility]

## 2024-09-06 RX ORDER — ESTRADIOL 0.07 MG/D
1 FILM, EXTENDED RELEASE TRANSDERMAL
Qty: 24 PATCH | Refills: 2 | Status: SHIPPED | OUTPATIENT
Start: 2024-09-09

## 2024-09-06 RX ORDER — PHENTERMINE HYDROCHLORIDE 30 MG/1
30 CAPSULE ORAL EVERY MORNING
Qty: 90 CAPSULE | Refills: 1 | Status: SHIPPED | OUTPATIENT
Start: 2024-09-27

## 2024-10-31 DIAGNOSIS — R35.0 URINARY FREQUENCY: ICD-10-CM

## 2024-10-31 RX ORDER — OXYBUTYNIN CHLORIDE 5 MG/1
5 TABLET, EXTENDED RELEASE ORAL DAILY
Qty: 90 TABLET | Refills: 1 | Status: SHIPPED | OUTPATIENT
Start: 2024-10-31

## 2024-11-01 DIAGNOSIS — R35.0 URINARY FREQUENCY: ICD-10-CM

## 2024-11-04 RX ORDER — OXYBUTYNIN CHLORIDE 5 MG/1
5 TABLET, EXTENDED RELEASE ORAL DAILY
Qty: 90 TABLET | Refills: 0 | OUTPATIENT
Start: 2024-11-04

## 2025-01-30 ENCOUNTER — PATIENT OUTREACH (OUTPATIENT)
Dept: CARE COORDINATION | Facility: CLINIC | Age: 55
End: 2025-01-30
Payer: COMMERCIAL

## 2025-02-13 ENCOUNTER — PATIENT OUTREACH (OUTPATIENT)
Dept: CARE COORDINATION | Facility: CLINIC | Age: 55
End: 2025-02-13
Payer: COMMERCIAL

## 2025-03-11 DIAGNOSIS — Z79.890 HORMONE REPLACEMENT THERAPY (HRT): ICD-10-CM

## 2025-03-11 RX ORDER — PROGESTERONE 100 MG/1
CAPSULE ORAL
Qty: 90 CAPSULE | Refills: 0 | Status: SHIPPED | OUTPATIENT
Start: 2025-03-11

## 2025-03-11 NOTE — TELEPHONE ENCOUNTER
Requested Prescriptions   Pending Prescriptions Disp Refills    progesterone (PROMETRIUM) 100 MG capsule [Pharmacy Med Name: PROGESTERONE 100MG CAPS] 90 capsule 4     Sig: TAKE ONE CAPSULE BY MOUTH ONCE DAILY       Hormone Replacement Therapy Failed - 3/11/2025  8:20 AM        Failed - Medication indicated for associated diagnosis     The medication is prescribed for one or more of the following conditions:    Menopause   Vulva/Vaginal atrophy   Low Estrogen   Gender Dysphoria   Male to Female transgender          Passed - Most recent blood pressure under 140/90 in past 12 months     BP Readings from Last 3 Encounters:   01/03/25 138/81   09/04/24 130/80   04/17/24 126/74       No data recorded            Passed - Medication is active on med list and the sig matches. RN to manually verify dose and sig if red X/fail.     If the protocol passes (green check), you do not need to verify med dose and sig.    A prescription matches if they are the same clinical intention.    For Example: once daily and every morning are the same.    The protocol can not identify upper and lower case letters as matching and will fail.     For Example: Take 1 tablet (50 mg) by mouth daily     TAKE 1 TABLET (50 MG) BY MOUTH DAILY    For all fails (red x), verify dose and sig.    If the refill does match what is on file, the RN can still proceed to approve the refill request.       If they do not match, route to the appropriate provider.             Passed - Recent (12 mo) or future (90 days) visit within the authorizing provider's specialty     The patient must have completed an in-person or virtual visit within the past 12 months or has a future visit scheduled within the next 90 days with the authorizing provider s specialty.  Urgent care and e-visits do not qualify as an office visit for this protocol.          Passed - Patient is 18 years of age or older        Passed - No active pregnancy on record        Passed - No positive pregnancy  test on record in past 12 months           Last Written Prescription Date:  3/1/24  Last Fill Quantity: 90,  # refills: 4   Last office visit: 3/1/24 - last annual ; last virtual visit: 10/4/2023 with prescribing provider:  Leanne    Future Office Visit:  4/18/25 with Leanne       Rx approved to get to upcoming appt 4/18/25 with Dr.Jeffers Jannette George, RN on 3/11/2025 at 8:21 AM  [facility]

## 2025-04-07 DIAGNOSIS — K76.0 NON-ALCOHOLIC FATTY LIVER DISEASE: ICD-10-CM

## 2025-04-07 DIAGNOSIS — E78.5 HYPERLIPIDEMIA WITH TARGET LDL LESS THAN 130: ICD-10-CM

## 2025-04-08 RX ORDER — PRAVASTATIN SODIUM 20 MG
20 TABLET ORAL DAILY
Qty: 90 TABLET | Refills: 3 | Status: SHIPPED | OUTPATIENT
Start: 2025-04-08

## 2025-04-15 PROBLEM — M75.02 ADHESIVE CAPSULITIS OF LEFT SHOULDER: Status: RESOLVED | Noted: 2022-09-27 | Resolved: 2025-04-15

## 2025-04-17 DIAGNOSIS — I10 HYPERTENSION, ESSENTIAL: ICD-10-CM

## 2025-04-17 DIAGNOSIS — K76.0 NON-ALCOHOLIC FATTY LIVER DISEASE: ICD-10-CM

## 2025-04-17 DIAGNOSIS — E78.5 HYPERLIPIDEMIA WITH TARGET LDL LESS THAN 130: ICD-10-CM

## 2025-04-17 RX ORDER — PRAVASTATIN SODIUM 20 MG
20 TABLET ORAL DAILY
Qty: 90 TABLET | Refills: 3 | OUTPATIENT
Start: 2025-04-17

## 2025-04-18 ENCOUNTER — ANCILLARY PROCEDURE (OUTPATIENT)
Dept: MAMMOGRAPHY | Facility: CLINIC | Age: 55
End: 2025-04-18
Payer: COMMERCIAL

## 2025-04-18 DIAGNOSIS — Z12.31 VISIT FOR SCREENING MAMMOGRAM: ICD-10-CM

## 2025-04-18 DIAGNOSIS — R35.0 URINARY FREQUENCY: ICD-10-CM

## 2025-04-18 PROBLEM — E66.3 OVERWEIGHT (BMI 25.0-29.9): Status: ACTIVE | Noted: 2025-04-18

## 2025-04-18 PROBLEM — R45.4 IRRITABILITY: Status: ACTIVE | Noted: 2025-04-18

## 2025-04-18 PROCEDURE — 77067 SCR MAMMO BI INCL CAD: CPT | Mod: TC | Performed by: RADIOLOGY

## 2025-04-18 PROCEDURE — 77063 BREAST TOMOSYNTHESIS BI: CPT | Mod: TC | Performed by: RADIOLOGY

## 2025-04-21 RX ORDER — FENOFIBRATE 48 MG/1
96 TABLET, FILM COATED ORAL
Qty: 180 TABLET | Refills: 0 | Status: SHIPPED | OUTPATIENT
Start: 2025-04-21

## 2025-04-21 RX ORDER — AMLODIPINE BESYLATE 5 MG/1
5 TABLET ORAL DAILY
Qty: 90 TABLET | Refills: 0 | Status: SHIPPED | OUTPATIENT
Start: 2025-04-21

## 2025-04-21 RX ORDER — OXYBUTYNIN CHLORIDE 5 MG/1
5 TABLET, EXTENDED RELEASE ORAL DAILY
Qty: 90 TABLET | Refills: 0 | Status: SHIPPED | OUTPATIENT
Start: 2025-04-21

## 2025-04-27 DIAGNOSIS — R35.0 URINARY FREQUENCY: ICD-10-CM

## 2025-04-28 RX ORDER — OXYBUTYNIN CHLORIDE 5 MG/1
5 TABLET, EXTENDED RELEASE ORAL DAILY
Qty: 90 TABLET | Refills: 1 | OUTPATIENT
Start: 2025-04-28

## 2025-05-05 ENCOUNTER — MYC MEDICAL ADVICE (OUTPATIENT)
Dept: FAMILY MEDICINE | Facility: CLINIC | Age: 55
End: 2025-05-05
Payer: COMMERCIAL

## 2025-05-05 NOTE — TELEPHONE ENCOUNTER
See new mychart message from today 5/5    Looks like this was addressed    STEFANIE Garcia  [place] Triage RN  [facility]

## 2025-05-15 ENCOUNTER — RESULTS FOLLOW-UP (OUTPATIENT)
Dept: FAMILY MEDICINE | Facility: CLINIC | Age: 55
End: 2025-05-15

## 2025-05-15 ENCOUNTER — OFFICE VISIT (OUTPATIENT)
Dept: FAMILY MEDICINE | Facility: CLINIC | Age: 55
End: 2025-05-15
Payer: COMMERCIAL

## 2025-05-15 VITALS
DIASTOLIC BLOOD PRESSURE: 80 MMHG | TEMPERATURE: 98.2 F | BODY MASS INDEX: 23.92 KG/M2 | RESPIRATION RATE: 14 BRPM | SYSTOLIC BLOOD PRESSURE: 126 MMHG | OXYGEN SATURATION: 99 % | HEART RATE: 99 BPM | WEIGHT: 135 LBS | HEIGHT: 63 IN

## 2025-05-15 DIAGNOSIS — E78.2 MIXED HYPERLIPIDEMIA: ICD-10-CM

## 2025-05-15 DIAGNOSIS — R73.03 PREDIABETES: ICD-10-CM

## 2025-05-15 DIAGNOSIS — L65.9 HAIR THINNING: ICD-10-CM

## 2025-05-15 DIAGNOSIS — K76.0 FATTY LIVER: ICD-10-CM

## 2025-05-15 DIAGNOSIS — I10 HYPERTENSION, ESSENTIAL: Primary | ICD-10-CM

## 2025-05-15 DIAGNOSIS — Z87.891 HISTORY OF TOBACCO USE, PRESENTING HAZARDS TO HEALTH: ICD-10-CM

## 2025-05-15 DIAGNOSIS — I10 HYPERTENSION, ESSENTIAL: ICD-10-CM

## 2025-05-15 DIAGNOSIS — R35.0 URINARY FREQUENCY: ICD-10-CM

## 2025-05-15 LAB
ALBUMIN SERPL BCG-MCNC: 4.8 G/DL (ref 3.5–5.2)
ALP SERPL-CCNC: 42 U/L (ref 40–150)
ALT SERPL W P-5'-P-CCNC: 27 U/L (ref 0–50)
ANION GAP SERPL CALCULATED.3IONS-SCNC: 12 MMOL/L (ref 7–15)
AST SERPL W P-5'-P-CCNC: 29 U/L (ref 0–45)
BILIRUB SERPL-MCNC: 0.5 MG/DL
BUN SERPL-MCNC: 14.2 MG/DL (ref 6–20)
CALCIUM SERPL-MCNC: 9.4 MG/DL (ref 8.8–10.4)
CHLORIDE SERPL-SCNC: 101 MMOL/L (ref 98–107)
CHOLEST SERPL-MCNC: 223 MG/DL
CREAT SERPL-MCNC: 0.64 MG/DL (ref 0.51–0.95)
EGFRCR SERPLBLD CKD-EPI 2021: >90 ML/MIN/1.73M2
ERYTHROCYTE [DISTWIDTH] IN BLOOD BY AUTOMATED COUNT: 11.9 % (ref 10–15)
EST. AVERAGE GLUCOSE BLD GHB EST-MCNC: 111 MG/DL
FASTING STATUS PATIENT QL REPORTED: YES
FASTING STATUS PATIENT QL REPORTED: YES
GLUCOSE SERPL-MCNC: 107 MG/DL (ref 70–99)
HBA1C MFR BLD: 5.5 % (ref 0–5.6)
HCO3 SERPL-SCNC: 25 MMOL/L (ref 22–29)
HCT VFR BLD AUTO: 41.8 % (ref 35–47)
HDLC SERPL-MCNC: 69 MG/DL
HGB BLD-MCNC: 14.7 G/DL (ref 11.7–15.7)
LDLC SERPL CALC-MCNC: 139 MG/DL
MCH RBC QN AUTO: 31.3 PG (ref 26.5–33)
MCHC RBC AUTO-ENTMCNC: 35.2 G/DL (ref 31.5–36.5)
MCV RBC AUTO: 89 FL (ref 78–100)
NONHDLC SERPL-MCNC: 154 MG/DL
PLATELET # BLD AUTO: 243 10E3/UL (ref 150–450)
POTASSIUM SERPL-SCNC: 3.9 MMOL/L (ref 3.4–5.3)
PROT SERPL-MCNC: 7.6 G/DL (ref 6.4–8.3)
RBC # BLD AUTO: 4.7 10E6/UL (ref 3.8–5.2)
SODIUM SERPL-SCNC: 138 MMOL/L (ref 135–145)
TRIGL SERPL-MCNC: 77 MG/DL
TSH SERPL DL<=0.005 MIU/L-ACNC: 1.67 UIU/ML (ref 0.3–4.2)
WBC # BLD AUTO: 5.7 10E3/UL (ref 4–11)

## 2025-05-15 PROCEDURE — 99215 OFFICE O/P EST HI 40 MIN: CPT

## 2025-05-15 PROCEDURE — 3074F SYST BP LT 130 MM HG: CPT

## 2025-05-15 PROCEDURE — 3079F DIAST BP 80-89 MM HG: CPT

## 2025-05-15 PROCEDURE — 80053 COMPREHEN METABOLIC PANEL: CPT

## 2025-05-15 PROCEDURE — 84443 ASSAY THYROID STIM HORMONE: CPT

## 2025-05-15 PROCEDURE — 36415 COLL VENOUS BLD VENIPUNCTURE: CPT

## 2025-05-15 PROCEDURE — G2211 COMPLEX E/M VISIT ADD ON: HCPCS

## 2025-05-15 PROCEDURE — 80061 LIPID PANEL: CPT

## 2025-05-15 PROCEDURE — 83036 HEMOGLOBIN GLYCOSYLATED A1C: CPT

## 2025-05-15 PROCEDURE — 85027 COMPLETE CBC AUTOMATED: CPT

## 2025-05-15 RX ORDER — PHENTERMINE HYDROCHLORIDE 15 MG/1
15 CAPSULE ORAL EVERY MORNING
Qty: 30 CAPSULE | Refills: 0 | Status: SHIPPED | OUTPATIENT
Start: 2025-07-28 | End: 2025-08-27

## 2025-05-15 RX ORDER — SPIRONOLACTONE 50 MG/1
50 TABLET, FILM COATED ORAL 2 TIMES DAILY
Qty: 180 TABLET | Refills: 4 | OUTPATIENT
Start: 2025-05-15

## 2025-05-15 RX ORDER — OXYBUTYNIN CHLORIDE 5 MG/1
5 TABLET, EXTENDED RELEASE ORAL DAILY
Qty: 90 TABLET | Refills: 0 | Status: SHIPPED | OUTPATIENT
Start: 2025-05-15

## 2025-05-15 RX ORDER — AMLODIPINE BESYLATE 5 MG/1
5 TABLET ORAL DAILY
Qty: 90 TABLET | Refills: 0 | Status: SHIPPED | OUTPATIENT
Start: 2025-05-15

## 2025-05-15 NOTE — TELEPHONE ENCOUNTER
Requested Prescriptions   Pending Prescriptions Disp Refills    spironolactone (ALDACTONE) 50 MG tablet [Pharmacy Med Name: SPIRONOLACTONE 50MG TABS] 180 tablet 4     Sig: TAKE 1 TABLET (50 MG) BY MOUTH 2 TIMES DAILY.       Diuretics (Including Combos) Protocol Failed - 5/15/2025  9:48 AM        Failed - Potassium level on file in past 12 months        Failed - Has GFR on file in past 12 months and most recent value is normal        Passed - Most recent blood pressure under 140/90 in past 12 months     BP Readings from Last 3 Encounters:   05/15/25 126/80   04/18/25 126/88   01/03/25 138/81       No data recorded            Passed - Medication is active on med list and the sig matches. RN to manually verify dose and sig if red X/fail.     If the protocol passes (green check), you do not need to verify med dose and sig.    A prescription matches if they are the same clinical intention.    For Example: once daily and every morning are the same.    The protocol can not identify upper and lower case letters as matching and will fail.     For Example: Take 1 tablet (50 mg) by mouth daily     TAKE 1 TABLET (50 MG) BY MOUTH DAILY    For all fails (red x), verify dose and sig.    If the refill does match what is on file, the RN can still proceed to approve the refill request.       If they do not match, route to the appropriate provider.             Passed - Medication indicated for associated diagnosis     Medication is associated with one or more of the following diagnoses:     Hypertension   Heart Failure   Hyperaldosteronism   Acne   Hirsutism   Hypokalemia   Hepatic Cirrhosis   Nephrotic Syndrome   Myocardial Infarction   Transgender Female   Cardiomyopathy  Congenital Heart Disease  Diastolic Dysfunction          Passed - Recent (12 mo) or future (90 days) visit within the authorizing provider's specialty     The patient must have completed an in-person or virtual visit within the past 12 months or has a future visit  scheduled within the next 90 days with the authorizing provider s specialty.  Urgent care and e-visits do not qualify as an office visit for this protocol.          Passed - Patient is age 18 or older        Passed - No active pregancy on record        Passed - No positive pregnancy test in past 12 months           Last Written Prescription Date:  3/1/24  Last Fill Quantity: 180,  # refills: 4   Last office visit: 4/18/2025 ; last virtual visit: 10/4/2023 with prescribing provider:  Leanne    Future Office Visit:      O/w has her PCP to manage her lipids and her HTN and also on aldactone thru I believe now her PCP though may have been derm initially     Rx denied, should contact PCP or derm for refills    Sent pt Y[employer] message to follow up with PCP or derm    Jannette George RN on 5/15/2025 at 9:57 AM  [facility]

## 2025-05-15 NOTE — PROGRESS NOTES
Assessment & Plan     Hypertension, essential  - Comprehensive metabolic panel (BMP + Alb, Alk Phos, ALT, AST, Total. Bili, TP)  - CBC with platelets  - TSH with free T4 reflex  - amLODIPine (NORVASC) 5 MG tablet  Dispense: 90 tablet; Refill: 0  - Comprehensive metabolic panel (BMP + Alb, Alk Phos, ALT, AST, Total. Bili, TP)  - CBC with platelets  - TSH with free T4 reflex    Mixed hyperlipidemia  - Lipid panel reflex to direct LDL Fasting  - Lipid panel reflex to direct LDL Fasting    History of tobacco use, presenting hazards to health  - CT Chest Lung Cancer Screen Low Dose Without    Urinary frequency  - oxyBUTYnin ER (DITROPAN XL) 5 MG 24 hr tablet  Dispense: 90 tablet; Refill: 0    Fatty liver  Discussed long term phentermine is not standard guidelines so we will have her wean off since she has been on same dose for many years. Will want to replace with something else to help maintain healthy weight. Monitor liver imaging.  - US Abdomen Complete  - CBC with platelets  - phentermine 15 MG capsule  Dispense: 30 capsule; Refill: 0  - CBC with platelets    Prediabetes  - Hemoglobin A1c  - Hemoglobin A1c      Schedule annual Lung CT  Schedule abdominal ultrasound  Labs today - liver, kidney, thyroid  Finish current supply of phentermine   Next refill end of July/early August will be 15 MG capsules.  Schedule follow up for 2 weeks later (mid August). We will start bupropion.        Follow-up    Follow-up Visit   Expected date:  Aug 15, 2025 (Approximate)      Follow Up Appointment Details:     Follow-up with whom?: Me    Follow-Up for what?: Chronic Disease f/u    Chronic Disease f/u: General (Other) Comment - wean off phentermine    How?: In Person    Is this an as-needed follow-up?: No                 Subjective   Olga Lidia is a 54 year old, presenting for the following health issues:  Recheck Medication    History of Present Illness       Reason for visit:  Medication monitoring   She is taking medications  "regularly.            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   Diet; coffee with cream, yogurt/almonds, eggs around 11. Veggies. Minimizes carbs.     Phentermine, metformin.  + family hx of heart disease, dad had significant ASCVD.    Wt Readings from Last 5 Encounters:   05/15/25 61.2 kg (135 lb)   04/18/25 62.5 kg (137 lb 12.8 oz)   01/03/25 62.3 kg (137 lb 6.4 oz)   09/04/24 60.4 kg (133 lb 3.2 oz)   04/17/24 61.7 kg (136 lb)        Estimated body mass index is 23.91 kg/m  as calculated from the following:    Height as of this encounter: 1.6 m (5' 3\").    Weight as of this encounter: 61.2 kg (135 lb).    Complete \"Weight Managment Plan\" in the progress note from the Adult Preventative or Medicare smartsets, use phrase .WEIGHTPLAN, or choose an option from Weight Management Resources smartset below.     Bilateral hand-  Has flared up in last few days.  Pain, feel hot      Urinary frequency-      Hyperlipidemia Follow-Up  Are you regularly taking any medication or supplement to lower your cholesterol?   Yes- Fenofibrate and Pravastatin  Are you having muscle aches or other side effects that you think could be caused by your cholesterol lowering medication?  No tolerable  Previously had pain with higher doses, generalized body aches.  On statin since her 20s.        Hypertension Follow-up  Spironolactone, amlodipine.  Do you check your blood pressure regularly outside of the clinic? No checks this seldomly. Does have bp cuff   Are you following a low salt diet? No  Are your blood pressures ever more than 140 on the top number (systolic) OR more   than 90 on the bottom number (diastolic), for example 140/90?     BP Readings from Last 2 Encounters:   05/15/25 126/80   04/18/25 126/88             Review of Systems  Constitutional, HEENT, cardiovascular, pulmonary, gi and gu systems are negative, except as otherwise noted.      Objective    /80 (BP " "Location: Right arm)   Pulse 99   Temp 98.2  F (36.8  C) (Oral)   Resp 14   Ht 1.6 m (5' 3\")   Wt 61.2 kg (135 lb)   LMP  (LMP Unknown)   SpO2 99%   BMI 23.91 kg/m    Body mass index is 23.91 kg/m .  Physical Exam   GENERAL: alert and no distress  NECK: no adenopathy, no asymmetry, masses, or scars  RESP: lungs clear to auscultation - no rales, rhonchi or wheezes  CV: regular rate and rhythm, normal S1 S2, no S3 or S4, no murmur, click or rub, no peripheral edema  ABDOMEN: soft, nontender, no hepatosplenomegaly, no masses and bowel sounds normal  MS: no gross musculoskeletal defects noted, no edema    Results for orders placed or performed in visit on 05/15/25   Lipid panel reflex to direct LDL Fasting     Status: Abnormal   Result Value Ref Range    Cholesterol 223 (H) <200 mg/dL    Triglycerides 77 <150 mg/dL    Direct Measure HDL 69 >=50 mg/dL    LDL Cholesterol Calculated 139 (H) <100 mg/dL    Non HDL Cholesterol 154 (H) <130 mg/dL    Patient Fasting > 8hrs? Yes     Narrative    Cholesterol  Desirable: < 200 mg/dL  Borderline High: 200 - 239 mg/dL  High: >= 240 mg/dL    Triglycerides  Normal: < 150 mg/dL  Borderline High: 150 - 199 mg/dL  High: 200-499 mg/dL  Very High: >= 500 mg/dL    Direct Measure HDL  Female: >= 50 mg/dL   Male: >= 40 mg/dL    LDL Cholesterol  Desirable: < 100 mg/dL  Above Desirable: 100 - 129 mg/dL   Borderline High: 130 - 159 mg/dL   High:  160 - 189 mg/dL   Very High: >= 190 mg/dL    Non HDL Cholesterol  Desirable: < 130 mg/dL  Above Desirable: 130 - 159 mg/dL  Borderline High: 160 - 189 mg/dL  High: 190 - 219 mg/dL  Very High: >= 220 mg/dL   Comprehensive metabolic panel (BMP + Alb, Alk Phos, ALT, AST, Total. Bili, TP)     Status: Abnormal   Result Value Ref Range    Sodium 138 135 - 145 mmol/L    Potassium 3.9 3.4 - 5.3 mmol/L    Carbon Dioxide (CO2) 25 22 - 29 mmol/L    Anion Gap 12 7 - 15 mmol/L    Urea Nitrogen 14.2 6.0 - 20.0 mg/dL    Creatinine 0.64 0.51 - 0.95 mg/dL    GFR " Estimate >90 >60 mL/min/1.73m2    Calcium 9.4 8.8 - 10.4 mg/dL    Chloride 101 98 - 107 mmol/L    Glucose 107 (H) 70 - 99 mg/dL    Alkaline Phosphatase 42 40 - 150 U/L    AST 29 0 - 45 U/L    ALT 27 0 - 50 U/L    Protein Total 7.6 6.4 - 8.3 g/dL    Albumin 4.8 3.5 - 5.2 g/dL    Bilirubin Total 0.5 <=1.2 mg/dL    Patient Fasting > 8hrs? Yes    Hemoglobin A1c     Status: Normal   Result Value Ref Range    Estimated Average Glucose 111 <117 mg/dL    Hemoglobin A1C 5.5 0.0 - 5.6 %   CBC with platelets     Status: Normal   Result Value Ref Range    WBC Count 5.7 4.0 - 11.0 10e3/uL    RBC Count 4.70 3.80 - 5.20 10e6/uL    Hemoglobin 14.7 11.7 - 15.7 g/dL    Hematocrit 41.8 35.0 - 47.0 %    MCV 89 78 - 100 fL    MCH 31.3 26.5 - 33.0 pg    MCHC 35.2 31.5 - 36.5 g/dL    RDW 11.9 10.0 - 15.0 %    Platelet Count 243 150 - 450 10e3/uL   TSH with free T4 reflex     Status: Normal   Result Value Ref Range    TSH 1.67 0.30 - 4.20 uIU/mL           40 minutes spent on the date of the encounter doing chart review, history and exam, documentation and further activities as noted above    The longitudinal plan of care for the diagnosis(es)/condition(s) as documented were addressed during this visit. Due to the added complexity in care, I will continue to support Olga Lidia in the subsequent management and with ongoing continuity of care.   Signed Electronically by: TONYA Osorio CNP

## 2025-05-15 NOTE — PATIENT INSTRUCTIONS
Schedule annual Lung CT  Schedule abdominal ultrasound    Labs today - liver, kidney, thyroid    Finish current supply of phentermine   Next refill end of July/early August will be 15 MG capsules.  Schedule follow up for 2 weeks later (mid August). We will start bupropion.

## 2025-05-23 ENCOUNTER — HOSPITAL ENCOUNTER (OUTPATIENT)
Dept: ULTRASOUND IMAGING | Facility: CLINIC | Age: 55
Discharge: HOME | End: 2025-05-23
Payer: COMMERCIAL

## 2025-05-23 ENCOUNTER — HOSPITAL ENCOUNTER (OUTPATIENT)
Dept: CT IMAGING | Facility: CLINIC | Age: 55
Discharge: HOME | End: 2025-05-23
Payer: COMMERCIAL

## 2025-05-23 DIAGNOSIS — K76.0 FATTY LIVER: ICD-10-CM

## 2025-05-23 DIAGNOSIS — Z87.891 HISTORY OF TOBACCO USE, PRESENTING HAZARDS TO HEALTH: ICD-10-CM

## 2025-05-23 PROCEDURE — 76700 US EXAM ABDOM COMPLETE: CPT

## 2025-05-23 PROCEDURE — 71271 CT THORAX LUNG CANCER SCR C-: CPT

## 2025-07-16 ENCOUNTER — PATIENT OUTREACH (OUTPATIENT)
Dept: CARE COORDINATION | Facility: CLINIC | Age: 55
End: 2025-07-16
Payer: COMMERCIAL

## 2025-07-16 DIAGNOSIS — K76.0 NON-ALCOHOLIC FATTY LIVER DISEASE: ICD-10-CM

## 2025-07-16 DIAGNOSIS — E78.5 HYPERLIPIDEMIA WITH TARGET LDL LESS THAN 130: ICD-10-CM

## 2025-07-16 RX ORDER — FENOFIBRATE 48 MG/1
96 TABLET, FILM COATED ORAL DAILY
Qty: 180 TABLET | Refills: 2 | Status: SHIPPED | OUTPATIENT
Start: 2025-07-16

## 2025-08-04 ENCOUNTER — MYC MEDICAL ADVICE (OUTPATIENT)
Dept: FAMILY MEDICINE | Facility: CLINIC | Age: 55
End: 2025-08-04
Payer: COMMERCIAL

## 2025-08-04 DIAGNOSIS — L65.9 HAIR THINNING: ICD-10-CM

## 2025-08-04 DIAGNOSIS — I10 HYPERTENSION, ESSENTIAL: ICD-10-CM

## 2025-08-04 RX ORDER — SPIRONOLACTONE 50 MG/1
50 TABLET, FILM COATED ORAL 2 TIMES DAILY
Qty: 180 TABLET | Refills: 0 | Status: SHIPPED | OUTPATIENT
Start: 2025-08-04

## 2025-08-21 ASSESSMENT — ASTHMA QUESTIONNAIRES
QUESTION_1 LAST FOUR WEEKS HOW MUCH OF THE TIME DID YOUR ASTHMA KEEP YOU FROM GETTING AS MUCH DONE AT WORK, SCHOOL OR AT HOME: NONE OF THE TIME
QUESTION_5 LAST FOUR WEEKS HOW WOULD YOU RATE YOUR ASTHMA CONTROL: WELL CONTROLLED
ACT_TOTALSCORE: 21
QUESTION_3 LAST FOUR WEEKS HOW OFTEN DID YOUR ASTHMA SYMPTOMS (WHEEZING, COUGHING, SHORTNESS OF BREATH, CHEST TIGHTNESS OR PAIN) WAKE YOU UP AT NIGHT OR EARLIER THAN USUAL IN THE MORNING: ONCE OR TWICE
QUESTION_4 LAST FOUR WEEKS HOW OFTEN HAVE YOU USED YOUR RESCUE INHALER OR NEBULIZER MEDICATION (SUCH AS ALBUTEROL): NOT AT ALL
QUESTION_2 LAST FOUR WEEKS HOW OFTEN HAVE YOU HAD SHORTNESS OF BREATH: THREE TO SIX TIMES A WEEK

## 2025-08-25 ENCOUNTER — VIRTUAL VISIT (OUTPATIENT)
Dept: FAMILY MEDICINE | Facility: CLINIC | Age: 55
End: 2025-08-25
Payer: COMMERCIAL

## 2025-08-25 DIAGNOSIS — E78.2 MIXED HYPERLIPIDEMIA: ICD-10-CM

## 2025-08-25 DIAGNOSIS — E66.3 OVERWEIGHT (BMI 25.0-29.9): ICD-10-CM

## 2025-08-25 DIAGNOSIS — K76.0 FATTY LIVER: ICD-10-CM

## 2025-08-25 DIAGNOSIS — I10 HYPERTENSION, ESSENTIAL: Primary | ICD-10-CM

## 2025-08-25 PROCEDURE — 98006 SYNCH AUDIO-VIDEO EST MOD 30: CPT

## 2025-08-25 RX ORDER — BUPROPION HYDROCHLORIDE 150 MG/1
150 TABLET ORAL EVERY MORNING
Qty: 30 TABLET | Refills: 0 | Status: SHIPPED | OUTPATIENT
Start: 2025-08-25

## 2025-08-26 ENCOUNTER — PATIENT OUTREACH (OUTPATIENT)
Dept: CARE COORDINATION | Facility: CLINIC | Age: 55
End: 2025-08-26
Payer: COMMERCIAL

## 2025-08-28 ENCOUNTER — PATIENT OUTREACH (OUTPATIENT)
Dept: CARE COORDINATION | Facility: CLINIC | Age: 55
End: 2025-08-28
Payer: COMMERCIAL

## (undated) RX ORDER — SIMETHICONE 40MG/0.6ML
SUSPENSION, DROPS(FINAL DOSAGE FORM)(ML) ORAL
Status: DISPENSED
Start: 2017-03-13

## (undated) RX ORDER — DIPHENHYDRAMINE HYDROCHLORIDE 50 MG/ML
INJECTION INTRAMUSCULAR; INTRAVENOUS
Status: DISPENSED
Start: 2017-03-13

## (undated) RX ORDER — FENTANYL CITRATE 50 UG/ML
INJECTION, SOLUTION INTRAMUSCULAR; INTRAVENOUS
Status: DISPENSED
Start: 2017-03-13